# Patient Record
Sex: MALE | Race: ASIAN | NOT HISPANIC OR LATINO | URBAN - METROPOLITAN AREA
[De-identification: names, ages, dates, MRNs, and addresses within clinical notes are randomized per-mention and may not be internally consistent; named-entity substitution may affect disease eponyms.]

---

## 2017-04-09 ENCOUNTER — EMERGENCY (EMERGENCY)
Facility: HOSPITAL | Age: 77
LOS: 1 days | Discharge: HOME | End: 2017-04-09
Attending: EMERGENCY MEDICINE
Payer: COMMERCIAL

## 2017-04-09 VITALS
OXYGEN SATURATION: 98 % | DIASTOLIC BLOOD PRESSURE: 48 MMHG | RESPIRATION RATE: 18 BRPM | SYSTOLIC BLOOD PRESSURE: 130 MMHG | HEART RATE: 72 BPM | HEIGHT: 67 IN | WEIGHT: 210.1 LBS | TEMPERATURE: 98 F

## 2017-04-09 VITALS
DIASTOLIC BLOOD PRESSURE: 68 MMHG | SYSTOLIC BLOOD PRESSURE: 154 MMHG | RESPIRATION RATE: 18 BRPM | TEMPERATURE: 98 F | HEART RATE: 66 BPM | OXYGEN SATURATION: 99 %

## 2017-04-09 DIAGNOSIS — Z98.89 OTHER SPECIFIED POSTPROCEDURAL STATES: Chronic | ICD-10-CM

## 2017-04-09 DIAGNOSIS — M25.551 PAIN IN RIGHT HIP: ICD-10-CM

## 2017-04-09 DIAGNOSIS — I10 ESSENTIAL (PRIMARY) HYPERTENSION: ICD-10-CM

## 2017-04-09 DIAGNOSIS — E11.9 TYPE 2 DIABETES MELLITUS WITHOUT COMPLICATIONS: ICD-10-CM

## 2017-04-09 PROCEDURE — 73502 X-RAY EXAM HIP UNI 2-3 VIEWS: CPT | Mod: 26,RT

## 2017-04-09 PROCEDURE — 99284 EMERGENCY DEPT VISIT MOD MDM: CPT

## 2017-04-09 PROCEDURE — 99283 EMERGENCY DEPT VISIT LOW MDM: CPT | Mod: 25

## 2017-04-09 PROCEDURE — 73502 X-RAY EXAM HIP UNI 2-3 VIEWS: CPT

## 2017-04-09 RX ORDER — OXYCODONE HYDROCHLORIDE 5 MG/1
1 TABLET ORAL
Qty: 10 | Refills: 0 | OUTPATIENT
Start: 2017-04-09

## 2017-04-09 NOTE — ED PROVIDER NOTE - NS ED MD SCRIBE ATTENDING SCRIBE SECTIONS
HIV/DISPOSITION/PHYSICAL EXAM/HISTORY OF PRESENT ILLNESS/PAST MEDICAL/SURGICAL/SOCIAL HISTORY/VITAL SIGNS( Pullset)

## 2017-04-09 NOTE — ED PROVIDER NOTE - MEDICAL DECISION MAKING DETAILS
Patient with right hip pain. ambulatory in Emergency Department. Pain improved with percocet. home with rx percocet.

## 2017-04-09 NOTE — ED ADULT NURSE NOTE - OBJECTIVE STATEMENT
c/o  R sided groin pain from last night as per patient  he slipped and fell in the bathroom on Thursday.  denies hitting head or loc was ambulatory after the fall . denies any urinary complaints . b/l leg lymphedema more on R lower leg .with some dry skin and discoloration

## 2017-04-09 NOTE — ED PROVIDER NOTE - OBJECTIVE STATEMENT
78 y/o male with PMHx of DM and HTN presents to the ED c/o R thigh pain x last night. Pt reports he went to the bathroom last night and was unable to get back into bed as his leg became very painful and heavy. Pt took Oxycodone this morning with moderate relief in pain, but comes to the ED as pain is still continuous. Pt denies fever, chill, CP, or any other complaints. NKDA. PMD: Dr. Cameron Guidry 76 y/o male with PMHx of DM and HTN presents to the ED c/o R thigh pain x last night. Pt reports he went to the bathroom last night and was unable to get back into bed as his leg became very painful and heavy. Pt took Oxycodone this morning with moderate relief in pain, but comes to the ED as pain is still continuous. Pt denies fever, chill, CP, or any other complaints. NKDA. PMD: Dr. Cameron Guidry. Patient had chronic lymphedema and old right femur fracture.

## 2017-07-09 ENCOUNTER — INPATIENT (INPATIENT)
Facility: HOSPITAL | Age: 77
LOS: 1 days | Discharge: HOME | DRG: 312 | End: 2017-07-11
Attending: INTERNAL MEDICINE | Admitting: INTERNAL MEDICINE
Payer: COMMERCIAL

## 2017-07-09 VITALS
HEART RATE: 57 BPM | SYSTOLIC BLOOD PRESSURE: 147 MMHG | WEIGHT: 220.02 LBS | DIASTOLIC BLOOD PRESSURE: 72 MMHG | OXYGEN SATURATION: 97 % | RESPIRATION RATE: 17 BRPM | HEIGHT: 68 IN | TEMPERATURE: 98 F

## 2017-07-09 DIAGNOSIS — Z29.9 ENCOUNTER FOR PROPHYLACTIC MEASURES, UNSPECIFIED: ICD-10-CM

## 2017-07-09 DIAGNOSIS — E11.9 TYPE 2 DIABETES MELLITUS WITHOUT COMPLICATIONS: ICD-10-CM

## 2017-07-09 DIAGNOSIS — R94.31 ABNORMAL ELECTROCARDIOGRAM [ECG] [EKG]: ICD-10-CM

## 2017-07-09 DIAGNOSIS — I10 ESSENTIAL (PRIMARY) HYPERTENSION: ICD-10-CM

## 2017-07-09 DIAGNOSIS — R55 SYNCOPE AND COLLAPSE: ICD-10-CM

## 2017-07-09 DIAGNOSIS — Z98.89 OTHER SPECIFIED POSTPROCEDURAL STATES: Chronic | ICD-10-CM

## 2017-07-09 DIAGNOSIS — N40.0 BENIGN PROSTATIC HYPERPLASIA WITHOUT LOWER URINARY TRACT SYMPTOMS: ICD-10-CM

## 2017-07-09 DIAGNOSIS — M25.562 PAIN IN LEFT KNEE: ICD-10-CM

## 2017-07-09 DIAGNOSIS — I89.0 LYMPHEDEMA, NOT ELSEWHERE CLASSIFIED: ICD-10-CM

## 2017-07-09 LAB
ACETONE SERPL-MCNC: NEGATIVE — SIGNIFICANT CHANGE UP
ALBUMIN SERPL ELPH-MCNC: 3 G/DL — LOW (ref 3.5–5)
ALP SERPL-CCNC: 71 U/L — SIGNIFICANT CHANGE UP (ref 40–120)
ALT FLD-CCNC: 14 U/L DA — SIGNIFICANT CHANGE UP (ref 10–60)
ANION GAP SERPL CALC-SCNC: 7 MMOL/L — SIGNIFICANT CHANGE UP (ref 5–17)
APTT BLD: 29 SEC — SIGNIFICANT CHANGE UP (ref 27.5–37.4)
AST SERPL-CCNC: 13 U/L — SIGNIFICANT CHANGE UP (ref 10–40)
BASOPHILS # BLD AUTO: 0.1 K/UL — SIGNIFICANT CHANGE UP (ref 0–0.2)
BASOPHILS NFR BLD AUTO: 0.7 % — SIGNIFICANT CHANGE UP (ref 0–2)
BILIRUB SERPL-MCNC: 0.5 MG/DL — SIGNIFICANT CHANGE UP (ref 0.2–1.2)
BUN SERPL-MCNC: 24 MG/DL — HIGH (ref 7–18)
CALCIUM SERPL-MCNC: 8.7 MG/DL — SIGNIFICANT CHANGE UP (ref 8.4–10.5)
CHLORIDE SERPL-SCNC: 107 MMOL/L — SIGNIFICANT CHANGE UP (ref 96–108)
CHOLEST SERPL-MCNC: 92 MG/DL — SIGNIFICANT CHANGE UP (ref 10–199)
CK MB BLD-MCNC: 1.3 % — SIGNIFICANT CHANGE UP (ref 0–3.5)
CK MB BLD-MCNC: <0.9 % — SIGNIFICANT CHANGE UP (ref 0–3.5)
CK MB BLD-MCNC: <1 % — SIGNIFICANT CHANGE UP (ref 0–3.5)
CK MB CFR SERPL CALC: 1.2 NG/ML — SIGNIFICANT CHANGE UP (ref 0–3.6)
CK MB CFR SERPL CALC: <1 NG/ML — SIGNIFICANT CHANGE UP (ref 0–3.6)
CK MB CFR SERPL CALC: <1 NG/ML — SIGNIFICANT CHANGE UP (ref 0–3.6)
CK SERPL-CCNC: 101 U/L — SIGNIFICANT CHANGE UP (ref 35–232)
CK SERPL-CCNC: 106 U/L — SIGNIFICANT CHANGE UP (ref 35–232)
CK SERPL-CCNC: 95 U/L — SIGNIFICANT CHANGE UP (ref 35–232)
CO2 SERPL-SCNC: 27 MMOL/L — SIGNIFICANT CHANGE UP (ref 22–31)
CREAT SERPL-MCNC: 1.08 MG/DL — SIGNIFICANT CHANGE UP (ref 0.5–1.3)
EOSINOPHIL # BLD AUTO: 0.2 K/UL — SIGNIFICANT CHANGE UP (ref 0–0.5)
EOSINOPHIL NFR BLD AUTO: 2.1 % — SIGNIFICANT CHANGE UP (ref 0–6)
GLUCOSE SERPL-MCNC: 141 MG/DL — HIGH (ref 70–99)
HBA1C BLD-MCNC: 6.9 % — HIGH (ref 4–5.6)
HCT VFR BLD CALC: 37.8 % — LOW (ref 39–50)
HDLC SERPL-MCNC: 53 MG/DL — SIGNIFICANT CHANGE UP (ref 40–125)
HGB BLD-MCNC: 12.2 G/DL — LOW (ref 13–17)
INR BLD: 1.12 RATIO — SIGNIFICANT CHANGE UP (ref 0.88–1.16)
LIDOCAIN IGE QN: 107 U/L — SIGNIFICANT CHANGE UP (ref 73–393)
LIPID PNL WITH DIRECT LDL SERPL: 30 MG/DL — SIGNIFICANT CHANGE UP
LYMPHOCYTES # BLD AUTO: 0.8 K/UL — LOW (ref 1–3.3)
LYMPHOCYTES # BLD AUTO: 8.5 % — LOW (ref 13–44)
MAGNESIUM SERPL-MCNC: 1.9 MG/DL — SIGNIFICANT CHANGE UP (ref 1.6–2.6)
MCHC RBC-ENTMCNC: 26.6 PG — LOW (ref 27–34)
MCHC RBC-ENTMCNC: 32.3 GM/DL — SIGNIFICANT CHANGE UP (ref 32–36)
MCV RBC AUTO: 82.4 FL — SIGNIFICANT CHANGE UP (ref 80–100)
MONOCYTES # BLD AUTO: 1.1 K/UL — HIGH (ref 0–0.9)
MONOCYTES NFR BLD AUTO: 11.7 % — SIGNIFICANT CHANGE UP (ref 2–14)
NEUTROPHILS # BLD AUTO: 7.5 K/UL — HIGH (ref 1.8–7.4)
NEUTROPHILS NFR BLD AUTO: 77.1 % — HIGH (ref 43–77)
NT-PROBNP SERPL-SCNC: 541 PG/ML — HIGH (ref 0–450)
PLATELET # BLD AUTO: 186 K/UL — SIGNIFICANT CHANGE UP (ref 150–400)
POTASSIUM SERPL-MCNC: 5 MMOL/L — SIGNIFICANT CHANGE UP (ref 3.5–5.3)
POTASSIUM SERPL-SCNC: 5 MMOL/L — SIGNIFICANT CHANGE UP (ref 3.5–5.3)
PROT SERPL-MCNC: 7 G/DL — SIGNIFICANT CHANGE UP (ref 6–8.3)
PROTHROM AB SERPL-ACNC: 12.2 SEC — SIGNIFICANT CHANGE UP (ref 9.8–12.7)
RBC # BLD: 4.58 M/UL — SIGNIFICANT CHANGE UP (ref 4.2–5.8)
RBC # FLD: 13.5 % — SIGNIFICANT CHANGE UP (ref 10.3–14.5)
SODIUM SERPL-SCNC: 141 MMOL/L — SIGNIFICANT CHANGE UP (ref 135–145)
TOTAL CHOLESTEROL/HDL RATIO MEASUREMENT: 1.7 RATIO — LOW (ref 3.4–9.6)
TRIGL SERPL-MCNC: 44 MG/DL — SIGNIFICANT CHANGE UP (ref 10–149)
TROPONIN I SERPL-MCNC: <0.015 NG/ML — SIGNIFICANT CHANGE UP (ref 0–0.04)
TSH SERPL-MCNC: 2.31 UU/ML — SIGNIFICANT CHANGE UP (ref 0.34–4.82)
WBC # BLD: 9.7 K/UL — SIGNIFICANT CHANGE UP (ref 3.8–10.5)
WBC # FLD AUTO: 9.7 K/UL — SIGNIFICANT CHANGE UP (ref 3.8–10.5)

## 2017-07-09 PROCEDURE — 95819 EEG AWAKE AND ASLEEP: CPT | Mod: 26

## 2017-07-09 PROCEDURE — 70450 CT HEAD/BRAIN W/O DYE: CPT | Mod: 26

## 2017-07-09 PROCEDURE — 71275 CT ANGIOGRAPHY CHEST: CPT | Mod: 26

## 2017-07-09 PROCEDURE — 99223 1ST HOSP IP/OBS HIGH 75: CPT | Mod: GC

## 2017-07-09 PROCEDURE — 71010: CPT | Mod: 26

## 2017-07-09 PROCEDURE — 99285 EMERGENCY DEPT VISIT HI MDM: CPT

## 2017-07-09 RX ORDER — INSULIN LISPRO 100/ML
VIAL (ML) SUBCUTANEOUS
Qty: 0 | Refills: 0 | Status: DISCONTINUED | OUTPATIENT
Start: 2017-07-09 | End: 2017-07-11

## 2017-07-09 RX ORDER — DOXAZOSIN MESYLATE 4 MG
2 TABLET ORAL AT BEDTIME
Qty: 0 | Refills: 0 | Status: DISCONTINUED | OUTPATIENT
Start: 2017-07-09 | End: 2017-07-11

## 2017-07-09 RX ORDER — ATORVASTATIN CALCIUM 80 MG/1
40 TABLET, FILM COATED ORAL AT BEDTIME
Qty: 0 | Refills: 0 | Status: DISCONTINUED | OUTPATIENT
Start: 2017-07-09 | End: 2017-07-11

## 2017-07-09 RX ORDER — GLUCAGON INJECTION, SOLUTION 0.5 MG/.1ML
1 INJECTION, SOLUTION SUBCUTANEOUS ONCE
Qty: 0 | Refills: 0 | Status: DISCONTINUED | OUTPATIENT
Start: 2017-07-09 | End: 2017-07-11

## 2017-07-09 RX ORDER — ASPIRIN/CALCIUM CARB/MAGNESIUM 324 MG
81 TABLET ORAL DAILY
Qty: 0 | Refills: 0 | Status: DISCONTINUED | OUTPATIENT
Start: 2017-07-09 | End: 2017-07-11

## 2017-07-09 RX ORDER — LISINOPRIL 2.5 MG/1
40 TABLET ORAL DAILY
Qty: 0 | Refills: 0 | Status: DISCONTINUED | OUTPATIENT
Start: 2017-07-09 | End: 2017-07-11

## 2017-07-09 RX ORDER — ENOXAPARIN SODIUM 100 MG/ML
40 INJECTION SUBCUTANEOUS DAILY
Qty: 0 | Refills: 0 | Status: DISCONTINUED | OUTPATIENT
Start: 2017-07-09 | End: 2017-07-11

## 2017-07-09 RX ORDER — SODIUM CHLORIDE 9 MG/ML
1000 INJECTION, SOLUTION INTRAVENOUS
Qty: 0 | Refills: 0 | Status: DISCONTINUED | OUTPATIENT
Start: 2017-07-09 | End: 2017-07-11

## 2017-07-09 RX ORDER — DEXTROSE 50 % IN WATER 50 %
25 SYRINGE (ML) INTRAVENOUS ONCE
Qty: 0 | Refills: 0 | Status: DISCONTINUED | OUTPATIENT
Start: 2017-07-09 | End: 2017-07-11

## 2017-07-09 RX ORDER — SENNA PLUS 8.6 MG/1
2 TABLET ORAL AT BEDTIME
Qty: 0 | Refills: 0 | Status: DISCONTINUED | OUTPATIENT
Start: 2017-07-09 | End: 2017-07-11

## 2017-07-09 RX ORDER — ASPIRIN/CALCIUM CARB/MAGNESIUM 324 MG
81 TABLET ORAL ONCE
Qty: 0 | Refills: 0 | Status: COMPLETED | OUTPATIENT
Start: 2017-07-09 | End: 2017-07-09

## 2017-07-09 RX ORDER — DILTIAZEM HCL 120 MG
120 CAPSULE, EXT RELEASE 24 HR ORAL DAILY
Qty: 0 | Refills: 0 | Status: DISCONTINUED | OUTPATIENT
Start: 2017-07-09 | End: 2017-07-11

## 2017-07-09 RX ORDER — DEXTROSE 50 % IN WATER 50 %
12.5 SYRINGE (ML) INTRAVENOUS ONCE
Qty: 0 | Refills: 0 | Status: DISCONTINUED | OUTPATIENT
Start: 2017-07-09 | End: 2017-07-11

## 2017-07-09 RX ORDER — SODIUM CHLORIDE 9 MG/ML
3 INJECTION INTRAMUSCULAR; INTRAVENOUS; SUBCUTANEOUS ONCE
Qty: 0 | Refills: 0 | Status: COMPLETED | OUTPATIENT
Start: 2017-07-09 | End: 2017-07-09

## 2017-07-09 RX ORDER — ACETAMINOPHEN 500 MG
650 TABLET ORAL EVERY 6 HOURS
Qty: 0 | Refills: 0 | Status: DISCONTINUED | OUTPATIENT
Start: 2017-07-09 | End: 2017-07-09

## 2017-07-09 RX ORDER — ACETAMINOPHEN 500 MG
650 TABLET ORAL EVERY 6 HOURS
Qty: 0 | Refills: 0 | Status: DISCONTINUED | OUTPATIENT
Start: 2017-07-09 | End: 2017-07-11

## 2017-07-09 RX ORDER — DEXTROSE 50 % IN WATER 50 %
1 SYRINGE (ML) INTRAVENOUS ONCE
Qty: 0 | Refills: 0 | Status: DISCONTINUED | OUTPATIENT
Start: 2017-07-09 | End: 2017-07-11

## 2017-07-09 RX ORDER — DOCUSATE SODIUM 100 MG
100 CAPSULE ORAL
Qty: 0 | Refills: 0 | Status: DISCONTINUED | OUTPATIENT
Start: 2017-07-09 | End: 2017-07-11

## 2017-07-09 RX ADMIN — Medication 81 MILLIGRAM(S): at 10:25

## 2017-07-09 RX ADMIN — LISINOPRIL 40 MILLIGRAM(S): 2.5 TABLET ORAL at 17:31

## 2017-07-09 RX ADMIN — SODIUM CHLORIDE 75 MILLILITER(S): 9 INJECTION, SOLUTION INTRAVENOUS at 18:18

## 2017-07-09 RX ADMIN — Medication 2 MILLIGRAM(S): at 22:41

## 2017-07-09 RX ADMIN — ATORVASTATIN CALCIUM 40 MILLIGRAM(S): 80 TABLET, FILM COATED ORAL at 21:40

## 2017-07-09 RX ADMIN — Medication 100 MILLIGRAM(S): at 17:31

## 2017-07-09 RX ADMIN — SENNA PLUS 2 TABLET(S): 8.6 TABLET ORAL at 21:40

## 2017-07-09 RX ADMIN — SODIUM CHLORIDE 3 MILLILITER(S): 9 INJECTION INTRAMUSCULAR; INTRAVENOUS; SUBCUTANEOUS at 10:24

## 2017-07-09 RX ADMIN — ENOXAPARIN SODIUM 40 MILLIGRAM(S): 100 INJECTION SUBCUTANEOUS at 17:32

## 2017-07-09 RX ADMIN — Medication 650 MILLIGRAM(S): at 19:09

## 2017-07-09 RX ADMIN — Medication 120 MILLIGRAM(S): at 17:31

## 2017-07-09 NOTE — ED PROVIDER NOTE - MUSCULOSKELETAL, MLM
Spine appears normal, range of motion is not limited, no muscle or joint tenderness, lauren lower ext- lymphedema, RLE>LLE, lauren venous stasis Spine appears normal, range of motion is not limited, no muscle or joint tenderness, neck- NT to palp. lauren lower ext- lymphedema, RLE>LLE, lauren venous stasis

## 2017-07-09 NOTE — CONSULT NOTE ADULT - SUBJECTIVE AND OBJECTIVE BOX
Neurology consult    JHONNY GARCÍA77yMale    HPI:  77 y/ o. male, lives with family, walks without assistance at home, with cane outside,  PMHx  NIDDM ( Actos HTN, Chronic lymphedema of bilateral legs, left knee arthritis , DVT of bilateral legs ( left peroneal vein and right posterior tibial veins, on Xarelto for sometime then stopped taking it as told by PMD that has no DVT) , PSHx of right incarcerated inguinal hernia repair., presented with weakness, loss of consciousness this morning As per patient he was eating the breakfast in AM , sitting on chair when he felt very weak, diaphoretic, blackouts , unable to recognize anyone Patient denies any fall, any trauma to head. All this episode happen when he was sitting on chair. Episode witnessed by wife, as per her he is not responding to her voice, a/w bilateral shaking of hands, Whole episode lasted for about 2 mins. After that family called the EMS. Patient remember the episode Also he complains of clicking sensation in the neck since last couple of days , whic he attributed to inappropriate posture while sitting and watching TV. He took 2 Tylenol this morning , wth relief of pain. Also re[ports on and of dry cough for past couple of days. He reports that nowadays he is feeling very weak, does not have any energy to do the work. Denies any similar episode in past, chest pain, headache, SOB, abdominal  pain , urinary or bowel complains.     Patient took all morning meds today. No cardiac work up in past, last visit to PCP about 2 months ago for routine check up. Had colonoscopy about many years ago with normal results. For chronic lymphedema patient followed  , prescribed stocking but patient does not use it. (09 Jul 2017 12:04)    No chest pain.No prior episodes          MEDICATIONS    aspirin  chewable 81 milliGRAM(s) Oral daily  doxazosin 2 milliGRAM(s) Oral at bedtime  diltiazem    milliGRAM(s) Oral daily  atorvastatin 40 milliGRAM(s) Oral at bedtime  docusate sodium 100 milliGRAM(s) Oral two times a day  senna 2 Tablet(s) Oral at bedtime  insulin lispro (HumaLOG) corrective regimen sliding scale   SubCutaneous three times a day before meals  dextrose 5%. 1000 milliLiter(s) IV Continuous <Continuous>  dextrose Gel 1 Dose(s) Oral once PRN  dextrose 50% Injectable 12.5 Gram(s) IV Push once  dextrose 50% Injectable 25 Gram(s) IV Push once  dextrose 50% Injectable 25 Gram(s) IV Push once  glucagon  Injectable 1 milliGRAM(s) IntraMuscular once PRN  sodium chloride 0.45%. 1000 milliLiter(s) IV Continuous <Continuous>  enoxaparin Injectable 40 milliGRAM(s) SubCutaneous daily  lisinopril 40 milliGRAM(s) Oral daily         Family history: No history of dementia, strokes, or seizures   FAMILY HISTORY:  No pertinent family history in first degree relatives    SOCIAL HISTORY -- No history of tobacco or alcohol use     Allergies    No Known Allergies    Intolerances        Height (cm): 172.72 (07-09 @ 09:34)  Weight (kg): 99.8 (07-09 @ 09:34)  BMI (kg/m2): 33.5 (07-09 @ 09:34)    Vital Signs Last 24 Hrs  T(C): 36.7 (09 Jul 2017 10:19), Max: 36.7 (09 Jul 2017 10:19)  T(F): 98 (09 Jul 2017 10:19), Max: 98 (09 Jul 2017 10:19)  HR: 64 (09 Jul 2017 10:19) (57 - 64)  BP: 140/70 (09 Jul 2017 10:19) (140/70 - 147/72)  BP(mean): --  RR: 18 (09 Jul 2017 10:19) (17 - 18)  SpO2: 98% (09 Jul 2017 10:19) (97% - 98%)      REVIEW OF SYSTEMS:    Constitutional: No fever, chills, fatigue, weakness  Eyes: no eye pain, visual disturbances, or discharge  ENT:  No difficulty hearing, tinnitus, vertigo; No sinus or throat pain  Neck: No pain or stiffness  Respiratory: No cough, dyspnea, wheezing   Cardiovascular: No chest pain, palpitations,   Gastrointestinal: No abdominal or epigastric pain. No nausea, vomiting  No diarrhea or constipation.   Genitourinary: No dysuria, frequency, hematuria or incontinence    Psychiatric: No depression, anxiety, mood swings or difficulty sleeping  Musculoskeletal: No joint pain or swelling; No muscle, back or extremity pain  Skin: Lymphedema bilaterally in lower ext  Lymph Nodes: No enlarged glands    Allergy and Immunologic: No hives or eczema    On Neurological Examination:    Mental Status - Patient is alert, awake, oriented X3     Follows commands well and able to answer questions appropriately. Mood and affect  normal  Follow simple commands  Follow complex commands      Speech -   Fluent                                Cranial Nerves - Pupils 3 mm equal and reactive to light,   extraocular eye movements intact.     2 to 12 were normal    Motor Exam -   Right upper5/5  Left upper5/5  Right lower power is normal    Severe Lymphedema is seen  Left lower  power is normal Lymphedema is seen.    With stimuli positive movement of all 4 extremities    Muscle tone - is normal all over. No asymmetry is seen.      Sensory    Decreased pinprick of lower ext sec to lymphedema     dtr  uppers were normal      were absent for achilles    Gait -  not tested     GENERAL Exam:     Nontoxic , No Acute Distress   	  HEENT:  normocephalic, atraumatic  		  LUNGS:	Clear bilaterally  No Wheeze  Decreased bilaterally  	  HEART:	 Normal S1S2   No murmur RRR        	  GI/ ABDOMEN:  Soft  Non tender              LABS:  CBC Full  -  ( 09 Jul 2017 10:10 )  WBC Count : 9.7 K/uL  Hemoglobin : 12.2 g/dL  Hematocrit : 37.8 %  Platelet Count - Automated : 186 K/uL  Mean Cell Volume : 82.4 fl  Mean Cell Hemoglobin : 26.6 pg  Mean Cell Hemoglobin Concentration : 32.3 gm/dL  Auto Neutrophil # : 7.5 K/uL  Auto Lymphocyte # : 0.8 K/uL  Auto Monocyte # : 1.1 K/uL  Auto Eosinophil # : 0.2 K/uL  Auto Basophil # : 0.1 K/uL  Auto Neutrophil % : 77.1 %  Auto Lymphocyte % : 8.5 %  Auto Monocyte % : 11.7 %  Auto Eosinophil % : 2.1 %  Auto Basophil % : 0.7 %      07-09    141  |  107  |  24<H>  ----------------------------<  141<H>  5.0   |  27  |  1.08    Ca    8.7      09 Jul 2017 10:10  Mg     1.9     07-09    TPro  7.0  /  Alb  3.0<L>  /  TBili  0.5  /  DBili  x   /  AST  13  /  ALT  14  /  AlkPhos  71  07-09    Hemoglobin A1C:     LIVER FUNCTIONS - ( 09 Jul 2017 10:10 )  Alb: 3.0 g/dL / Pro: 7.0 g/dL / ALK PHOS: 71 U/L / ALT: 14 U/L DA / AST: 13 U/L / GGT: x           Vitamin B12   PT/INR - ( 09 Jul 2017 10:10 )   PT: 12.2 sec;   INR: 1.12 ratio         PTT - ( 09 Jul 2017 10:10 )  PTT:29.0 sec      RADIOLOGY    EKG    < from: CT Head No Cont (07.09.17 @ 10:08) >    EXAM:  CT BRAIN                            PROCEDURE DATE:  07/09/2017          INTERPRETATION:  HISTORY: Syncope.    TECHNIQUE: A noncontrast head CT was performed. 5 mm axial images were   performed from the skull base to the vertex. Coronal and sagittal   reconstructions were obtained.    COMPARISON: None    FINDINGS:    The ventricles and sulci are prominent, compatible with the patient's   stated age. Minimal periventricular white matter lucencies are present,   likely microvascular ischemic changes. There is a small low-attenuation   focus in the left basal ganglia, possibly representing remote lacunar   infarct or Virchow-Wesly space. No CT evidence for acute territorial   infarct, intracranial hemorrhage, or midline shift is visualized. No mass   or mass effect is noted.    The visualized portions of the paranasal sinuses, mastoids and the bones   of the calvarium appear to be unremarkable.    IMPRESSION:    Cerebral atrophy. No CT evidence for acute intracranial abnormality. MRI   brain may be obtained for further evaluation as clinically indicated.

## 2017-07-09 NOTE — EEG REPORT - NS EEG TEXT BOX
7/9/2017    Hx: Concern for Seizures    Study Interpretation:    FINDINGS:  The background was continuous, spontaneously variable and reactive.  During wakefulness, the posteriorly dominant rhythm consisted of symmetric, well modulated 7-8 Hz activity, with an amplitude to 40 uV, that attenuated to eye opening.  Low amplitude central beta was noted in wakefulness.    Sleep Background:  Drowsiness was characterized by fragmentation, attenuation, and slowing of the background activity.    Segments of stage II sleep were not recorded.    Background Slowing:  Generalized background slowing: Intermittent theta activity   Focal slowing: none was present.    Other Paroxysmal Non-Epileptiform Findings:    None.    Epileptiform Activity:   No epileptiform discharges were present.    Events:  No clinical events were recorded.  No seizures were recorded.    Activation Procedures:   -Hyperventilation was not performed.    -Photic stimulation was not performed.    Artifacts:  Intermittent myogenic and movement artifacts were noted.    ECG:  The heart rate on single channel ECG was predominantly between 70-80 BPM.    Compressed Spectral Array Digital Analysis    FINDINGS:  Compressed Spectral Array (CSA) data was reviewed separately and correlated with the electroencephalographic findings detailed above.  CSA showed a variable spectral pattern.  Areas of increased power in particular were reviewed in detail, and compared with the raw EEG data.  Areas of abrupt increases in spectral power were reviewed to exclude seizures, and were determined to be artifactual in nature.    The relative ratio of the power of delta range frequencies and faster frequencies remained stable over the course of the study.  There was no definitive increase in the relative power in the delta frequency spectrum apparent in the left hemisphere versus the right hemisphere.      Compressed Spectral Array (Digital Analysis) Summary/ Impression:  No persistent hemispheric asymmetry.  Intermittent areas of increased power reviewed, without definite epileptiform activity associated on CSA.      EEG Classification:  Abnormal study  -	Mild diffuse slowing    Impression:  Findings indicate non-specific mild diffuse or multifocal cerebral dysfunction. There were no epileptiform abnormalities recorded

## 2017-07-09 NOTE — H&P ADULT - NSHPREVIEWOFSYSTEMS_GEN_ALL_CORE
REVIEW OF SYSTEMS:    CONSTITUTIONAL: No fever, feeling weak   EYES: no acute visual disturbances  NECK: No pain or stiffness  RESPIRATORY: No cough; No shortness of breath  CARDIOVASCULAR: No chest pain, no palpitations  GASTROINTESTINAL: No pain. No nausea or vomiting; No diarrhea   NEUROLOGICAL: No headache or numbness, no tremors, feeling weak   HEME/LYMPH: No  bleeding gums

## 2017-07-09 NOTE — H&P ADULT - PROBLEM SELECTOR PLAN 1
Differential include syncope due to sinus bradycardia, hypoglycemia, could be vasovagal from weakness , also could be a seizure ( as witnessed shaky movements of body)   Due to DM, HTN, will rule out ACS also   Telemetry monitoring   Non-focal neuro exam   Ct head diffuse cerebral atrophy  EKG sinus bradycardia to 59 BPM, sinus arrythmia  Troponin x 1 negative , T2 at 4:00 pm, 10:00 pm  F/up on HBA1c, lipid profile, TSH, vitamin d, orthostatics   C/w aspirin 81 mg, Lipitor 40 mg, Cardizem 120   F/up on carotid doppler, echo , PT eval ( patient has been admitted to rehab twice in past)  Cardio Dr Holloawy   Neuro eval, patient likely need MRI , EEG   Since dehydrated will give gentle hydration x 24 hours Differential include syncope due to sinus bradycardia, hypoglycemia, could be vasovagal from weakness , also could be a seizure ( as witnessed shaky movements of body)   Due to DM, HTN, will rule out ACS also   Telemetry monitoring   Non-focal neuro exam   Ct head diffuse cerebral atrophy  EKG sinus bradycardia to 59 BPM, sinus arrythmia  Troponin x 1 negative , T2 at 4:00 pm, 10:00 pm  F/up on HBA1c, lipid profile, TSH, vitamin d, orthostatics   C/w aspirin 81 mg, Lipitor 40 mg, Cardizem 120   F/up on carotid doppler, echo , PT eval ( patient has been admitted to rehab twice in past)  Cardio Dr Rosenberg  Neuro eval Dr Echeverria , patient likely need MRI , EEG   Since dehydrated will give gentle hydration x 24 hours

## 2017-07-09 NOTE — H&P ADULT - NSHPLABSRESULTS_GEN_ALL_CORE
12.2   9.7   )-----------( 186      ( 09 Jul 2017 10:10 )             37.8   07-09    141  |  107  |  24<H>  ----------------------------<  141<H>  5.0   |  27  |  1.08    Ca    8.7      09 Jul 2017 10:10  Mg     1.9     07-09    TPro  7.0  /  Alb  3.0<L>  /  TBili  0.5  /  DBili  x   /  AST  13  /  ALT  14  /  AlkPhos  71  07-09  PT/INR - ( 09 Jul 2017 10:10 )   PT: 12.2 sec;   INR: 1.12 ratio         PTT - ( 09 Jul 2017 10:10 )  PTT:29.0 sec  CARDIAC MARKERS ( 09 Jul 2017 10:10 )  <0.015 ng/mL / x     / 106 U/L / x     / <1.0 ng/mL    Xray Chest 1 View AP/PA (07.09.17 @ 10:11) >   No acute pulmonary disease.    CT Head No Cont (07.09.17 @ 10:08) >  Cerebral atrophy. No CT evidence for acute intracranial abnormality.

## 2017-07-09 NOTE — H&P ADULT - ASSESSMENT
In the ED patient initial vitals bradycardiac to 57, /72 . Ct scan shows the  cerebral atrophy , no acute changes. EKG shows sinus bradycardia to 59 BPM, sinus arrythmia. Labs cbc, BMP, troponin x 1 negative. CXR clear . Patient admitted to [facility] for syncope .

## 2017-07-09 NOTE — H&P ADULT - PROBLEM SELECTOR PLAN 8
Improve VTE score is 4 with 3 month risk of VTE is 2.9   Patient has DVT last year , dc with xarelto as per him , only took for a month then stopped as told that he has no DVT by PMD.  Will give Lovenox for now   No need for GI ppx

## 2017-07-09 NOTE — H&P ADULT - PROBLEM SELECTOR PLAN 4
At home takes ramipril 10 mg , Cardizem 120 CD   BP stable on admission   Continue with Cardizem for now Continue with home meds ramipril 10 mg , Cardizem 120 CD   BP stable on admission   DASH/TLC diet

## 2017-07-09 NOTE — H&P ADULT - PROBLEM SELECTOR PLAN 3
Hold the home med Actoz  Used to take the gabapentin in past , but not using currently   F/u on HBA1c , last yr was 6.9   c/w HSS

## 2017-07-09 NOTE — H&P ADULT - HISTORY OF PRESENT ILLNESS
77 y/ o. male, lives with family, walks without assistance at home, with cane outside,  PMHx  NIDDM ( Actos HTN, Chronic lymphedema of bilateral legs, left knee arthritis , DVT of bilateral legs ( left peroneal vein and right posterior tibial veins, on Xarelto for sometime then stopped taking it as told by PMD that has no DVT) , PSHx of right incarcerated inguinal hernia repair., presented with weakness, loss of consciousness this morning As per patient he was eating the breakfast in AM , sitting on chair when he felt very weak, diaphoretic, blackouts , unable to recognize anyone Patient denies any fall, any trauma to head. All this episode happen when he sitting on chir. Episode witnessed by wife, as per her he is not responding to her voice, a/w bilateral shaking of hands, Whole episode lasted for about 2 mins. After that family called the EMS. Patient remember the episode Also he complains of clicking sensation in the neck since last couple of days , whic he attributed to inappropriate posture while sitting and watching TV. He took 2 Tylenol this morning , wth relief of pain. Also re[ports on and of dry cough for past couple of days. He reports that nowadays he is feeling very weak, does not have any energy to do the work. Denies any similar episode in past, chest pain, headache, SOB, abdominal  pain , urinary or bowel complains.     Patient took all morning meds today. No cardiac work up in past, last visit to PCP about 2 months ago for routine check up. Had colonoscopy about many years ago with normal results. For chronic lymphedema patient followed  , prescribed stocking but patient does not use it.

## 2017-07-09 NOTE — H&P ADULT - PROBLEM SELECTOR PLAN 5
Has chronic bilateral lymphedema ( Right > left)   As per patient initially on right side x 15 yrs , now developing on left side   On exam patient has bilateral xerotic patches , warm, positive pulses   Usual manages with compression stockings at home  C/w compression stockings

## 2017-07-09 NOTE — ED PROVIDER NOTE - ENMT, MLM
Airway patent, Nasal mucosa clear. Mouth with normal mucosa. Throat has no vesicles, no oropharyngeal exudates and uvula is midline. no tongue biting

## 2017-07-09 NOTE — H&P ADULT - NSHPPHYSICALEXAM_GEN_ALL_CORE
PHYSICAL EXAM:  GENERAL: NAD, lying in bed, weak   HEAD:  , Normocephalic  EYES:  conjunctiva and sclera clear  NECK: Supple, No JVD    NERVOUS SYSTEM:  Alert & Oriented X3, moving all extremities equally  CHEST/LUNG: Clear to auscultation bilaterally;   HEART: S1 S2+;   ABDOMEN: Soft, Nontender, Nondistended; Bowel sounds present  EXTREMITIES: no cyanosis;  no calf tenderness                        Bilateral lymphedema Right > left , has bilateral dry cracked skin patches over both shins , slightly warm

## 2017-07-09 NOTE — H&P ADULT - ATTENDING COMMENTS
Patient seen and examined; Agree with PGY2 A/P above with editing as needed. d/w PGY2 ARVIND Leugn Patient seen and examined; Agree with PGY2 A/P above with editing as needed. d/w PGY2 MAR dr. Leung    Patient seen and examined after d/w PGY MAR    HPI:  77 y/ o. male, lives with family, walks without assistance at home, with cane outside,  PMHx  NIDDM ( Actos HTN, Chronic lymphedema of bilateral legs, left knee arthritis , DVT of bilateral legs ( left peroneal vein and right posterior tibial veins, on Xarelto for sometime then stopped taking it as told by PMD that has no DVT) , PSHx of right incarcerated inguinal hernia repair., presented with weakness, loss of consciousness this morning As per patient he was eating the breakfast in AM , sitting on chair when he felt very weak, diaphoretic, blackouts , unable to recognize anyone Patient denies any fall, any trauma to head. All this episode happen when he sitting on chir. Episode witnessed by wife, as per her he is not responding to her voice, a/w bilateral shaking of hands, Whole episode lasted for about 2 mins. After that family called the EMS. Patient remember the episode Also he complains of clicking sensation in the neck since last couple of days , whic he attributed to inappropriate posture while sitting and watching TV. He took 2 Tylenol this morning , wth relief of pain. Also re[ports on and of dry cough for past couple of days. He reports that nowadays he is feeling very weak, does not have any energy to do the work. Denies any similar episode in past, chest pain, headache, SOB, abdominal  pain , urinary or bowel complains.     ROS: As above; other ROS negative    Allergies: No Known Allergies      SH: Non smoker  FAMILY HISTORY: No pertinent family history in first degree relatives      Vital Signs Last 24 Hrs  T(C): 37.1 (09 Jul 2017 15:16), Max: 37.1 (09 Jul 2017 15:16)  T(F): 98.7 (09 Jul 2017 15:16), Max: 98.7 (09 Jul 2017 15:16)  HR: 57 (09 Jul 2017 15:16) (57 - 64)  BP: 136/93 (09 Jul 2017 15:16) (136/93 - 147/72)  RR: 18 (09 Jul 2017 15:16) (17 - 18)  SpO2: 100% (09 Jul 2017 15:16) (97% - 100%)    P/E: As per MAR above    Labs Reviewed:                         12.2   9.7   )-----------( 186      ( 09 Jul 2017 10:10 )             37.8     07-09    141  |  107  |  24<H>  ----------------------------<  141<H>  5.0   |  27  |  1.08    Ca    8.7      09 Jul 2017 10:10  Mg     1.9     07-09    TPro  7.0  /  Alb  3.0<L>  /  TBili  0.5  /  DBili  x   /  AST  13  /  ALT  14  /  AlkPhos  71  07-09    CARDIAC MARKERS ( 09 Jul 2017 16:09 )  <0.015 ng/mL / x     / 101 U/L / x     / <1.0 ng/mL  CARDIAC MARKERS ( 09 Jul 2017 10:10 )  <0.015 ng/mL / x     / 106 U/L / x     / <1.0 ng/mL        PT/INR - ( 09 Jul 2017 10:10 )   PT: 12.2 sec;   INR: 1.12 ratio     PTT - ( 09 Jul 2017 10:10 )  PTT:29.0 sec  BNP: Serum Pro-Brain Natriuretic Peptide: 541 pg/mL (07-09 @ 10:10)    MEDICATIONS  (STANDING):  aspirin  chewable 81 milliGRAM(s) Oral daily  doxazosin 2 milliGRAM(s) Oral at bedtime  diltiazem    milliGRAM(s) Oral daily  atorvastatin 40 milliGRAM(s) Oral at bedtime  docusate sodium 100 milliGRAM(s) Oral two times a day  senna 2 Tablet(s) Oral at bedtime  insulin lispro (HumaLOG) corrective regimen sliding scale   SubCutaneous three times a day before meals  sodium chloride 0.45%. 1000 milliLiter(s) (75 mL/Hr) IV Continuous <Continuous>  enoxaparin Injectable 40 milliGRAM(s) SubCutaneous daily  lisinopril 40 milliGRAM(s) Oral daily    CXR reviewed:     EKG Reviewed:     D/D:  Syncope likely Vasovagal episode concern for ACS Vs Seizure activity less likely  Hx Diabetes with possible Hypoglycemic episode less likely  HTN controlled  Chronic lymphedema        A/P:  Admit to  Tele; Serial Cardiac enzymes; 2 D Echo; Orthostasis  EEG;   Gentle IVF  Cardiology evaluation Dr. Rosenberg;  Neurology evaluation Dr. Echeverria (discussed)  Labs in AM    Discussed with PGY MAR findings and plan of care  Discussed with Patient and family Patient seen and examined; Agree with PGY2 A/P above with editing as needed. d/w PGY2 MAR dr. Leung    Patient seen and examined after d/w PGY MAR    HPI:  77 y/ o. male, lives with family, walks without assistance at home, with cane outside,  PMHx  NIDDM ( Actos HTN, Chronic lymphedema of bilateral legs, left knee arthritis , DVT of bilateral legs ( left peroneal vein and right posterior tibial veins, on Xarelto for sometime then stopped taking it as told by PMD that has no DVT) , PSHx of right incarcerated inguinal hernia repair., presented with weakness, loss of consciousness this morning As per patient he was eating the breakfast in AM , sitting on chair when he felt very weak, diaphoretic, blackouts , unable to recognize anyone Patient denies any fall, any trauma to head. All this episode happen when he sitting on chir. Episode witnessed by wife, as per her he is not responding to her voice, a/w bilateral shaking of hands, Whole episode lasted for about 2 mins. After that family called the EMS. Patient remember the episode Also he complains of clicking sensation in the neck since last couple of days , whic he attributed to inappropriate posture while sitting and watching TV. He took 2 Tylenol this morning , wth relief of pain. Also re[ports on and of dry cough for past couple of days. He reports that nowadays he is feeling very weak, does not have any energy to do the work. Denies any similar episode in past, chest pain, headache, SOB, abdominal  pain , urinary or bowel complains.     ROS: As above; other ROS negative    Allergies: No Known Allergies      SH: Non smoker, lives with family  FAMILY HISTORY: No pertinent family history in first degree relatives      Vital Signs Last 24 Hrs  T(C): 37.1 (09 Jul 2017 15:16), Max: 37.1 (09 Jul 2017 15:16)  T(F): 98.7 (09 Jul 2017 15:16), Max: 98.7 (09 Jul 2017 15:16)  HR: 57 (09 Jul 2017 15:16) (57 - 64)  BP: 136/93 (09 Jul 2017 15:16) (136/93 - 147/72)  RR: 18 (09 Jul 2017 15:16) (17 - 18)  SpO2: 100% (09 Jul 2017 15:16) (97% - 100%)    P/E: As per MAR above    Labs Reviewed:                         12.2   9.7   )-----------( 186      ( 09 Jul 2017 10:10 )             37.8     07-09    141  |  107  |  24<H>  ----------------------------<  141<H>  5.0   |  27  |  1.08    Ca    8.7      09 Jul 2017 10:10  Mg     1.9     07-09    TPro  7.0  /  Alb  3.0<L>  /  TBili  0.5  /  DBili  x   /  AST  13  /  ALT  14  /  AlkPhos  71  07-09    CARDIAC MARKERS ( 09 Jul 2017 16:09 )  <0.015 ng/mL / x     / 101 U/L / x     / <1.0 ng/mL  CARDIAC MARKERS ( 09 Jul 2017 10:10 )  <0.015 ng/mL / x     / 106 U/L / x     / <1.0 ng/mL        PT/INR - ( 09 Jul 2017 10:10 )   PT: 12.2 sec;   INR: 1.12 ratio     PTT - ( 09 Jul 2017 10:10 )  PTT:29.0 sec  BNP: Serum Pro-Brain Natriuretic Peptide: 541 pg/mL (07-09 @ 10:10)    MEDICATIONS  (STANDING):  aspirin  chewable 81 milliGRAM(s) Oral daily  doxazosin 2 milliGRAM(s) Oral at bedtime  diltiazem    milliGRAM(s) Oral daily  atorvastatin 40 milliGRAM(s) Oral at bedtime  docusate sodium 100 milliGRAM(s) Oral two times a day  senna 2 Tablet(s) Oral at bedtime  insulin lispro (HumaLOG) corrective regimen sliding scale   SubCutaneous three times a day before meals  sodium chloride 0.45%. 1000 milliLiter(s) (75 mL/Hr) IV Continuous <Continuous>  enoxaparin Injectable 40 milliGRAM(s) SubCutaneous daily  lisinopril 40 milliGRAM(s) Oral daily    CXR reviewed:   EKG Reviewed:     D/D:  Syncope concern for PE given prior Hx DVT   Vasovagal episode concern for ACS Vs Seizure activity less likely  Hx Diabetes with possible Hypoglycemic episode less likely  HTN controlled  Chronic lymphedema        A/P:  Admit to  Tele; Serial Cardiac enzymes; 2 D Echo; Orthostasis  CT Angiogram for evaluation for PE; EEG;   Gentle IVF  Cardiology evaluation Dr. Rosenberg;  Neurology evaluation Dr. Echeverria (discussed) appreciated; Will get MRI in AM  Labs in AM  Hold Actos; Humalog scale; hgbA1c    Discussed with PGY MAR findings and plan of care  Discussed with Patient and family wife and son at bedside

## 2017-07-09 NOTE — CONSULT NOTE ADULT - ASSESSMENT
MOST LIKELY HE HAD SYNCOPE .    DISCUSSED WITH THE PATIENT AND WIFE AT BED SIDE     SEIZURE LESS LIKELY    PLAN:TELEMETRY    CARDIOLOGY EVALUATION    MRI OF THE BRAIN     EEG.

## 2017-07-09 NOTE — ED PROVIDER NOTE - OBJECTIVE STATEMENT
77 y.o. male with h/o NIDDM, last dose 7:30am, pt claims he has been having "clicking" of his neck past 2-3 days, took Tylenol with relief.  While having breakfast, pt suddenly felt dizzy, weak, as per wife, pt was shaking with stiffness lasted few secs., LOC, no diaphoresis, urinary incontinence, CP, SOB, recent travelling, fever. 77 y.o. male with h/o NIDDM, last dose 7:30am, pt claims he has been having "clicking" of his neck past 2-3 days, took Tylenol with relief.  While having breakfast, pt suddenly felt dizzy, weak, as per wife, pt was shaking with stiffness lasted few secs., LOC, no diaphoresis, urinary incontinence, CP, SOB, recent travelling, fever, no postictal.

## 2017-07-10 DIAGNOSIS — M79.2 NEURALGIA AND NEURITIS, UNSPECIFIED: ICD-10-CM

## 2017-07-10 DIAGNOSIS — R55 SYNCOPE AND COLLAPSE: ICD-10-CM

## 2017-07-10 DIAGNOSIS — I89.0 LYMPHEDEMA, NOT ELSEWHERE CLASSIFIED: ICD-10-CM

## 2017-07-10 DIAGNOSIS — E11.9 TYPE 2 DIABETES MELLITUS WITHOUT COMPLICATIONS: ICD-10-CM

## 2017-07-10 LAB
24R-OH-CALCIDIOL SERPL-MCNC: 40.4 NG/ML — SIGNIFICANT CHANGE UP (ref 30–100)
FOLATE SERPL-MCNC: 8.5 NG/ML — SIGNIFICANT CHANGE UP (ref 4.8–24.2)
VIT B12 SERPL-MCNC: 344 PG/ML — SIGNIFICANT CHANGE UP (ref 243–894)

## 2017-07-10 PROCEDURE — 99233 SBSQ HOSP IP/OBS HIGH 50: CPT

## 2017-07-10 PROCEDURE — 93880 EXTRACRANIAL BILAT STUDY: CPT | Mod: 26

## 2017-07-10 RX ORDER — GABAPENTIN 400 MG/1
100 CAPSULE ORAL
Qty: 0 | Refills: 0 | Status: DISCONTINUED | OUTPATIENT
Start: 2017-07-10 | End: 2017-07-11

## 2017-07-10 RX ADMIN — Medication 81 MILLIGRAM(S): at 13:14

## 2017-07-10 RX ADMIN — GABAPENTIN 100 MILLIGRAM(S): 400 CAPSULE ORAL at 17:55

## 2017-07-10 RX ADMIN — SENNA PLUS 2 TABLET(S): 8.6 TABLET ORAL at 21:20

## 2017-07-10 RX ADMIN — GABAPENTIN 100 MILLIGRAM(S): 400 CAPSULE ORAL at 14:40

## 2017-07-10 RX ADMIN — LISINOPRIL 40 MILLIGRAM(S): 2.5 TABLET ORAL at 05:49

## 2017-07-10 RX ADMIN — Medication 100 MILLIGRAM(S): at 17:55

## 2017-07-10 RX ADMIN — Medication 2: at 13:15

## 2017-07-10 RX ADMIN — Medication 100 MILLIGRAM(S): at 05:49

## 2017-07-10 RX ADMIN — ATORVASTATIN CALCIUM 40 MILLIGRAM(S): 80 TABLET, FILM COATED ORAL at 21:20

## 2017-07-10 RX ADMIN — Medication 2 MILLIGRAM(S): at 21:20

## 2017-07-10 RX ADMIN — Medication 650 MILLIGRAM(S): at 03:59

## 2017-07-10 RX ADMIN — Medication 650 MILLIGRAM(S): at 08:15

## 2017-07-10 RX ADMIN — ENOXAPARIN SODIUM 40 MILLIGRAM(S): 100 INJECTION SUBCUTANEOUS at 13:14

## 2017-07-10 NOTE — ED ADULT NURSE REASSESSMENT NOTE - NS ED NURSE REASSESS COMMENT FT1
Patient alert and oriented x3, breathing spontaneously on oxygen via nasal cannula @ 2lpm. Patient reported no complaints of chest pains or chest discomfort. Remains being nursed on Tele box. Stated that neck stiffness has subsided. V infusion continues as ordered,. Vitals charted rest in bed maintained. Patient was medicated as ordered. Is awaiting bed availability.
Patient awake, complained of left sided neck feels stiffness and pain. Oral analgesia given for pain relief as ordered. Oxygen and IV infusion administered as ordered.
Patient received sitting up in bed alert and oriented x3, breathing spontaneously on room air, relative at bedside. Patient Is being nursed on Telebox F. Patient voiced no complaints of pain or distress. IV infusion in progress flowing at prescribed rate. Patient is being admitted diagnosed with Syncope and collapse, is awaiting bed availability.
Pt axox3 NAD due meds given endorsed to Elodia NUÑEZ

## 2017-07-10 NOTE — PROGRESS NOTE ADULT - PROBLEM SELECTOR PLAN 1
Likely vasovagal; Orthostasis;   D/C IVF; Echo WNL  Cardio consult appreciated; Likely vasovagal; Orthostasis;   D/C IVF; Echo WNL  Cardio consult appreciated;  Continue Tele for overnight, if no arrhythmia D/C home tomorrow, d/w Cardio Dr. Rosenberg no further work up at this time. Likely vasovagal; Orthostasis;   D/C IVF; Echo WNL  Cardio consult appreciated;  Continue Tele for overnight, if no arrhythmia D/C home tomorrow, d/w Cardio Dr. Rosenberg no further work up at this time.  EEG findings noted; No epileptiform activity. neuro eval appreciated.  CTA Chest no PE

## 2017-07-10 NOTE — CONSULT NOTE ADULT - PROBLEM SELECTOR RECOMMENDATION 3
Chronic venous disease-support stockings,hold diuretics at present. Chronic lymph disease-support stockings,hold diuretics at present. Patient from [facility]-has had lymphedema since age 14-likely endemic?Filiarial.

## 2017-07-10 NOTE — PROGRESS NOTE ADULT - SUBJECTIVE AND OBJECTIVE BOX
MEDICAL ATTENDING NOTE  Patient is a 77y old  Male who presents with a chief complaint of Near syncope and weakness (09 Jul 2017 12:04)  patient seen earlier today 1.30PM in ED Holding.       INTERVAL HPI/OVERNIGHT EVENTS: no new complaints except c/o tearing from both eyes. On latanoprost at home but not using.     MEDICATIONS  (STANDING):  aspirin  chewable 81 milliGRAM(s) Oral daily  doxazosin 2 milliGRAM(s) Oral at bedtime  diltiazem    milliGRAM(s) Oral daily  atorvastatin 40 milliGRAM(s) Oral at bedtime  docusate sodium 100 milliGRAM(s) Oral two times a day  senna 2 Tablet(s) Oral at bedtime  insulin lispro (HumaLOG) corrective regimen sliding scale   SubCutaneous three times a day before meals  sodium chloride 0.45%. 1000 milliLiter(s) (75 mL/Hr) IV Continuous <Continuous>  enoxaparin Injectable 40 milliGRAM(s) SubCutaneous daily  lisinopril 40 milliGRAM(s) Oral daily  gabapentin 100 milliGRAM(s) Oral two times a day    MEDICATIONS  (PRN):  acetaminophen   Tablet 650 milliGRAM(s) Oral every 6 hours PRN Pain      __________________________________________________  REVIEW OF SYSTEMS:    CONSTITUTIONAL: No fever,   EYES: no acute visual disturbances; Tearing from both eyes.   NECK: Neck pain  RESPIRATORY: No cough; No shortness of breath  CARDIOVASCULAR: No chest pain, no palpitations  GASTROINTESTINAL: No pain. No nausea or vomiting; No diarrhea   NEUROLOGICAL: No headache or numbness, no tremors  MUSCULOSKELETAL: No joint pain, no muscle pain  GENITOURINARY: no dysuria, no frequency, no hesitancy  PSYCHIATRY: no depression , no anxiety  ALL OTHER  ROS negative        Vital Signs Last 24 Hrs  T(C): 36.3 (10 Jul 2017 16:49), Max: 37 (10 Jul 2017 08:31)  T(F): 97.4 (10 Jul 2017 16:49), Max: 98.6 (10 Jul 2017 08:31)  HR: 68 (10 Jul 2017 16:49) (58 - 68)  BP: 137/47 (10 Jul 2017 16:49) (120/44 - 149/47)  RR: 16 (10 Jul 2017 16:49) (16 - 18)  SpO2: 100% (10 Jul 2017 16:49) (95% - 100%)    ________________________________________________  PHYSICAL EXAM:  GENERAL: NAD  HEENT: Normocephalic;  conjunctivae and sclerae clear; moist mucous membranes;   NECK : supple; chronically flexed  CHEST/LUNG: Clear to auscultation bilaterally with good air entry   HEART: S1 S2  regular; no murmurs, gallops or rubs  ABDOMEN: Soft, Nontender, Nondistended; Bowel sounds present  EXTREMITIES: no cyanosis; no edema; no calf tenderness  SKIN: warm and dry; no rash  NERVOUS SYSTEM:  Awake and alert; Oriented  to place, person and time ; no new deficits    _________________________________________________  LABS:                        12.2   9.7   )-----------( 186      ( 09 Jul 2017 10:10 )             37.8     07-09    141  |  107  |  24<H>  ----------------------------<  141<H>  5.0   |  27  |  1.08    Ca    8.7      09 Jul 2017 10:10  Mg     1.9     07-09    TPro  7.0  /  Alb  3.0<L>  /  TBili  0.5  /  DBili  x   /  AST  13  /  ALT  14  /  AlkPhos  71  07-09    PT/INR - ( 09 Jul 2017 10:10 )   PT: 12.2 sec;   INR: 1.12 ratio    PTT - ( 09 Jul 2017 10:10 )  PTT:29.0 sec    CAPILLARY BLOOD GLUCOSE  90 (10 Jul 2017 16:49)  210 (10 Jul 2017 11:21)  106 (10 Jul 2017 08:28)            RADIOLOGY & ADDITIONAL TESTS:  Transthoracic Echocardiogram (07.10.17 @ 12:57) >  CONCLUSIONS:  1. Normal Left Ventricular Systolic Function,  (EF = 55 to 60%)  2. Grade II diastolic dysfunction  3. RV systolic pressure is mildly increased.=33mmhg    Consultant(s) Notes Reviewed:   YES    Care Discussed with Consultants :     Plan of care was discussed with patient and /or primary care giver; all questions and concerns were addressed and care was aligned with patient's wishes. MEDICAL ATTENDING NOTE  Patient is a 77y old  Male who presents with a chief complaint of Near syncope and weakness (09 Jul 2017 12:04)  patient seen earlier today 1.30PM in ED Holding.       INTERVAL HPI/OVERNIGHT EVENTS: no new complaints except c/o tearing from both eyes. On latanoprost at home but not using.     MEDICATIONS  (STANDING):  aspirin  chewable 81 milliGRAM(s) Oral daily  doxazosin 2 milliGRAM(s) Oral at bedtime  diltiazem    milliGRAM(s) Oral daily  atorvastatin 40 milliGRAM(s) Oral at bedtime  docusate sodium 100 milliGRAM(s) Oral two times a day  senna 2 Tablet(s) Oral at bedtime  insulin lispro (HumaLOG) corrective regimen sliding scale   SubCutaneous three times a day before meals  sodium chloride 0.45%. 1000 milliLiter(s) (75 mL/Hr) IV Continuous <Continuous>  enoxaparin Injectable 40 milliGRAM(s) SubCutaneous daily  lisinopril 40 milliGRAM(s) Oral daily  gabapentin 100 milliGRAM(s) Oral two times a day    MEDICATIONS  (PRN):  acetaminophen   Tablet 650 milliGRAM(s) Oral every 6 hours PRN Pain      __________________________________________________  REVIEW OF SYSTEMS:    CONSTITUTIONAL: No fever,   EYES: no acute visual disturbances; Tearing from both eyes.   NECK: Neck pain  RESPIRATORY: No cough; No shortness of breath  CARDIOVASCULAR: No chest pain, no palpitations  GASTROINTESTINAL: No pain. No nausea or vomiting; No diarrhea   NEUROLOGICAL: No headache or numbness, no tremors  MUSCULOSKELETAL: No joint pain, no muscle pain  GENITOURINARY: no dysuria, no frequency, no hesitancy  PSYCHIATRY: no depression , no anxiety  ALL OTHER  ROS negative        Vital Signs Last 24 Hrs  T(C): 36.3 (10 Jul 2017 16:49), Max: 37 (10 Jul 2017 08:31)  T(F): 97.4 (10 Jul 2017 16:49), Max: 98.6 (10 Jul 2017 08:31)  HR: 68 (10 Jul 2017 16:49) (58 - 68)  BP: 137/47 (10 Jul 2017 16:49) (120/44 - 149/47)  RR: 16 (10 Jul 2017 16:49) (16 - 18)  SpO2: 100% (10 Jul 2017 16:49) (95% - 100%)    ________________________________________________  PHYSICAL EXAM:  GENERAL: NAD  HEENT: Normocephalic;  conjunctivae and sclerae clear; moist mucous membranes;   NECK : supple; chronically flexed  CHEST/LUNG: Clear to auscultation bilaterally with good air entry   HEART: S1 S2  regular; no murmurs, gallops or rubs  ABDOMEN: Soft, Nontender, Nondistended; Bowel sounds present  EXTREMITIES: no cyanosis; no edema; no calf tenderness  SKIN: warm and dry; no rash  NERVOUS SYSTEM:  Awake and alert; Oriented  to place, person and time ; no new deficits    _________________________________________________  LABS:                        12.2   9.7   )-----------( 186      ( 09 Jul 2017 10:10 )             37.8     07-09    141  |  107  |  24<H>  ----------------------------<  141<H>  5.0   |  27  |  1.08    Ca    8.7      09 Jul 2017 10:10  Mg     1.9     07-09    TPro  7.0  /  Alb  3.0<L>  /  TBili  0.5  /  DBili  x   /  AST  13  /  ALT  14  /  AlkPhos  71  07-09    PT/INR - ( 09 Jul 2017 10:10 )   PT: 12.2 sec;   INR: 1.12 ratio    PTT - ( 09 Jul 2017 10:10 )  PTT:29.0 sec    CAPILLARY BLOOD GLUCOSE  90 (10 Jul 2017 16:49)  210 (10 Jul 2017 11:21)  106 (10 Jul 2017 08:28)            RADIOLOGY & ADDITIONAL TESTS:  Transthoracic Echocardiogram (07.10.17 @ 12:57) >  CONCLUSIONS:  1. Normal Left Ventricular Systolic Function,  (EF = 55 to 60%)  2. Grade II diastolic dysfunction  3. RV systolic pressure is mildly increased.=33mmhg      EEG: Findings indicate non-specific mild diffuse or multifocal cerebral dysfunction. There were no epileptiform abnormalities recorded      Consultant(s) Notes Reviewed:   YES    Care Discussed with Consultants :     Plan of care was discussed with patient and /or primary care giver; all questions and concerns were addressed and care was aligned with patient's wishes.

## 2017-07-10 NOTE — PHYSICAL THERAPY INITIAL EVALUATION ADULT - GENERAL OBSERVATIONS, REHAB EVAL
Consult received, chart reviewed. Patient received supine in bed, NAD, +NC, +tele. Patient agreed to EVALUATION from Physical Therapist.

## 2017-07-10 NOTE — CONSULT NOTE ADULT - PROBLEM SELECTOR RECOMMENDATION 9
Non exertional-no orthostatics-noted bradycardia-likely vasovagal,no arrhythmias noted-TTE if no sig LV dysfunction can discontinue telemetry

## 2017-07-10 NOTE — PROGRESS NOTE ADULT - ASSESSMENT
Patient is a 77y old  Male who presents with a chief complaint of Near syncope and weakness (09 Jul 2017 12:04)  HPI:  77 y/ o. male, lives with family, walks without assistance at home, with cane outside,  PMHx  NIDDM ( Actos HTN, Chronic lymphedema of bilateral legs, left knee arthritis , DVT of bilateral legs ( left peroneal vein and right posterior tibial veins, on Xarelto for sometime then stopped taking it as told by PMD that has no DVT) , PSHx of right incarcerated inguinal hernia repair., presented with weakness, loss of consciousness was eating the breakfast in AM , sitting on chair when he felt very weak, diaphoretic, blackouts , unable to recognize anyone  Episode witnessed by wife, as per her he is not responding to her voice, a/w bilateral shaking of hands, Whole episode lasted for about 2 mins. After that family called the EMS. Patient remember the episode Also he complains of clicking sensation in the neck since last couple of days , whic he attributed to inappropriate posture while sitting and watching TV. He took 2 Tylenol this morning , wth relief of pain. abdominal  pain , urinary or bowel complains.     Patient took all morning meds today. No cardiac work up in past, last visit to PCP about 2 months ago for routine check up. Had colonoscopy about many years ago with normal results. For chronic lymphedema patient followed  , prescribed stocking but patient does not use it. (09 Jul 2017 12:04)

## 2017-07-10 NOTE — CONSULT NOTE ADULT - SUBJECTIVE AND OBJECTIVE BOX
Patient is a 77y old  Male who presents with a chief complaint of Near syncope and weakness    HPI:  77 y/ o. male, lives with family, walks without assistance at home, with cane outside,  PMHx  T2DM ,HTN, Chronic lymphedema of bilateral legs, left knee arthritis , DVT of bilateral legs ( left peroneal vein and right posterior tibial veins, on Xarelto for sometime then stopped taking it as told by PMD that has no DVT) ,, presented with weakness, loss of consciousness this morning As per patient he was eating the breakfast, sitting on chair when he felt very weak, diaphoretic,and passed out, unable to recognize anyone Patient denies any fall, any trauma to head. All this episode happen when he sitting down. Episode witnessed by wife, as per her he was not responding to her voice, a/w bilateral shaking of hands, Whole episode lasted for about 2 mins. After that family called the EMS. Patient remember the episode Also he complains of clicking sensation in the neck since last couple of days , which he attributed to inappropriate posture while sitting and watching TV. He took 2 Tylenol this morning , wth relief of pain. Also reports on and of dry cough for past couple of days. He reports that nowadays he is feeling very weak, does not have any energy to do the work. Denies any similar episode in past,No chest pain, headache, SOB, abdominal  pain , urinary or bowel complains.     Patient took all morning meds today. No cardiac work up in past, last visit to PCP about 2 months ago for routine check up. Had colonoscopy about many years ago with normal results. For chronic lymphedema patient followed  , prescribed stocking but patient does not use it.     PAST MEDICAL & SURGICAL HISTORY:  Lymphedema of leg  Hypertension  Diabetes  H/O inguinal hernia repair      PREVIOUS DIAGNOSTIC TESTING:      ECHO  FINDINGS:PENDING    MEDICATIONS  (STANDING):  aspirin  chewable 81 milliGRAM(s) Oral daily  doxazosin 2 milliGRAM(s) Oral at bedtime  diltiazem    milliGRAM(s) Oral daily  atorvastatin 40 milliGRAM(s) Oral at bedtime  docusate sodium 100 milliGRAM(s) Oral two times a day  senna 2 Tablet(s) Oral at bedtime  insulin lispro (HumaLOG) corrective regimen sliding scale   SubCutaneous three times a day before meals  sodium chloride 0.45%. 1000 milliLiter(s) (75 mL/Hr) IV Continuous <Continuous>  enoxaparin Injectable 40 milliGRAM(s) SubCutaneous daily  lisinopril 40 milliGRAM(s) Oral daily    MEDICATIONS  (PRN):  dextrose Gel 1 Dose(s) Oral once PRN Blood Glucose LESS THAN 70 milliGRAM(s)/deciLiter  glucagon  Injectable 1 milliGRAM(s) IntraMuscular once PRN Glucose <70 milliGRAM(s)/deciLiter  acetaminophen   Tablet 650 milliGRAM(s) Oral every 6 hours PRN Pain      FAMILY HISTORY:  No pertinent family history in first degree relatives      SOCIAL HISTORY:Lives at home with wife    CIGARETTES:Non smoker    ALCOHOL:Denied    REVIEW OF SYSTEMS:  CONSTITUTIONAL: No fever, weight loss, or fatigue  EYES: No eye pain, visual disturbances, or discharge  ENMT:  No difficulty hearing, tinnitus, vertigo; No sinus or throat pain  NECK: No pain or stiffness  RESPIRATORY: No cough, wheezing, chills or hemoptysis; No shortness of breath  CARDIOVASCULAR: No chest pain, palpitations,had dizziness, bilateral leg swelling  GASTROINTESTINAL: No abdominal or epigastric pain. No nausea, vomiting, or hematemesis; No diarrhea or constipation. No melena or hematochezia.  GENITOURINARY: No dysuria, frequency, hematuria, or incontinence  NEUROLOGICAL: No headaches, memory loss, loss of strength, numbness, or tremors  SKIN: No itching, burning, rashes, or lesions   LYMPH NODES: No enlarged glands  ENDOCRINE: No heat or cold intolerance; No hair loss  MUSCULOSKELETAL: No joint pain or swelling; No muscle, back, or extremity pain  PSYCHIATRIC: No depression, anxiety, mood swings, or difficulty sleeping  HEME/LYMPH: No easy bruising, or bleeding gums  ALLERY AND IMMUNOLOGIC: No hives or eczema    Vital Signs Last 24 Hrs  T(C): 37 (10 Jul 2017 08:31), Max: 37.1 (09 Jul 2017 15:16)  T(F): 98.6 (10 Jul 2017 08:31), Max: 98.7 (09 Jul 2017 15:16)  HR: 59 (10 Jul 2017 08:31) (57 - 78)  BP: 129/59 (10 Jul 2017 08:31) (120/44 - 147/72)   ORTHOSTATICS SUPINE--134/56  STANDING--124/54  RR: 16 (10 Jul 2017 08:31) (16 - 18)  SpO2: 99% (10 Jul 2017 08:31) (97% - 100%)    PHYSICAL EXAM:  GENERAL: NAD, well-groomed, well-developed BMI-33  HEAD:  Atraumatic, Normocephalic  EYES: EOMI, PERRLA, conjunctiva and sclera clear  ENMT: No tonsillar erythema, exudates, or enlargement; Moist mucous membranes, Good dentition, No lesions  NECK: Supple, No JVD, Normal thyroid  NERVOUS SYSTEM:  Alert & Oriented X3, Good concentration; Motor Strength 5/5 B/L upper and lower extremities; DTRs 2+ intact and symmetric  CHEST/LUNG: Clear to percussion bilaterally; No rales, rhonchi, wheezing, or rubs  HEART: Regular rate and rhythm; 3/6 systolic murmurs,No rubs, or gallops  ABDOMEN: Soft, Nontender, Nondistended; Bowel sounds present  EXTREMITIES:  2+ Peripheral Pulses, No clubbing, cyanosis,Bilateral pitting edema  LYMPH: No lymphadenopathy noted  SKIN: No rashes or lesions      INTERPRETATION OF TELEMETRY:Sinus rhythm PVC's    ECG:Simus bradycardia no acute ST,T wave abnormalities    LABS:                        12.2   9.7   )-----------( 186      ( 09 Jul 2017 10:10 )             37.8     07-09    141  |  107  |  24<H>  ----------------------------<  141<H>  5.0   |  27  |  1.08    Ca    8.7      09 Jul 2017 10:10  Mg     1.9     07-09    TPro  7.0  /  Alb  3.0<L>  /  TBili  0.5  /  DBili  x   /  AST  13  /  ALT  14  /  AlkPhos  71  07-09    CARDIAC MARKERS ( 09 Jul 2017 21:53 )  <0.015 ng/mL / x     / 95 U/L / x     / 1.2 ng/mL  CARDIAC MARKERS ( 09 Jul 2017 16:09 )  <0.015 ng/mL / x     / 101 U/L / x     / <1.0 ng/mL  CARDIAC MARKERS ( 09 Jul 2017 10:10 )  <0.015 ng/mL / x     / 106 U/L / x     / <1.0 ng/mL      PT/INR - ( 09 Jul 2017 10:10 )   PT: 12.2 sec;   INR: 1.12 ratio    PTT - ( 09 Jul 2017 10:10 )  PTT:29.0 sec    Lipid Panel: Cholesterol, Serum 92  Direct LDL 30  HDL Cholesterol, Serum 53  Triglycerides, Serum 44    RADIOLOGY & ADDITIONAL STUDIES  CHEST SINGLE AP--No acute pulmonary disease.    CT BRAIN-- Cerebral atrophy. No CT evidence for acute intracranial abnormality

## 2017-07-11 VITALS
RESPIRATION RATE: 16 BRPM | TEMPERATURE: 98 F | SYSTOLIC BLOOD PRESSURE: 127 MMHG | HEART RATE: 65 BPM | OXYGEN SATURATION: 98 % | DIASTOLIC BLOOD PRESSURE: 48 MMHG

## 2017-07-11 PROCEDURE — 82550 ASSAY OF CK (CPK): CPT

## 2017-07-11 PROCEDURE — 82009 KETONE BODYS QUAL: CPT

## 2017-07-11 PROCEDURE — 82746 ASSAY OF FOLIC ACID SERUM: CPT

## 2017-07-11 PROCEDURE — 85730 THROMBOPLASTIN TIME PARTIAL: CPT

## 2017-07-11 PROCEDURE — 83735 ASSAY OF MAGNESIUM: CPT

## 2017-07-11 PROCEDURE — 80061 LIPID PANEL: CPT

## 2017-07-11 PROCEDURE — 99239 HOSP IP/OBS DSCHRG MGMT >30: CPT

## 2017-07-11 PROCEDURE — 82306 VITAMIN D 25 HYDROXY: CPT

## 2017-07-11 PROCEDURE — 99285 EMERGENCY DEPT VISIT HI MDM: CPT | Mod: 25

## 2017-07-11 PROCEDURE — 70450 CT HEAD/BRAIN W/O DYE: CPT

## 2017-07-11 PROCEDURE — 93005 ELECTROCARDIOGRAM TRACING: CPT

## 2017-07-11 PROCEDURE — 85610 PROTHROMBIN TIME: CPT

## 2017-07-11 PROCEDURE — 82553 CREATINE MB FRACTION: CPT

## 2017-07-11 PROCEDURE — 84443 ASSAY THYROID STIM HORMONE: CPT

## 2017-07-11 PROCEDURE — 97162 PT EVAL MOD COMPLEX 30 MIN: CPT

## 2017-07-11 PROCEDURE — 36415 COLL VENOUS BLD VENIPUNCTURE: CPT

## 2017-07-11 PROCEDURE — 82607 VITAMIN B-12: CPT

## 2017-07-11 PROCEDURE — 80053 COMPREHEN METABOLIC PANEL: CPT

## 2017-07-11 PROCEDURE — 83880 ASSAY OF NATRIURETIC PEPTIDE: CPT

## 2017-07-11 PROCEDURE — 93306 TTE W/DOPPLER COMPLETE: CPT

## 2017-07-11 PROCEDURE — 85027 COMPLETE CBC AUTOMATED: CPT

## 2017-07-11 PROCEDURE — 93880 EXTRACRANIAL BILAT STUDY: CPT

## 2017-07-11 PROCEDURE — 95819 EEG AWAKE AND ASLEEP: CPT

## 2017-07-11 PROCEDURE — 84484 ASSAY OF TROPONIN QUANT: CPT

## 2017-07-11 PROCEDURE — 71275 CT ANGIOGRAPHY CHEST: CPT

## 2017-07-11 PROCEDURE — 83690 ASSAY OF LIPASE: CPT

## 2017-07-11 PROCEDURE — 95957 EEG DIGITAL ANALYSIS: CPT

## 2017-07-11 PROCEDURE — 83036 HEMOGLOBIN GLYCOSYLATED A1C: CPT

## 2017-07-11 PROCEDURE — 71045 X-RAY EXAM CHEST 1 VIEW: CPT

## 2017-07-11 RX ORDER — CYCLOBENZAPRINE HYDROCHLORIDE 10 MG/1
1 TABLET, FILM COATED ORAL
Qty: 15 | Refills: 0 | OUTPATIENT
Start: 2017-07-11 | End: 2017-07-16

## 2017-07-11 RX ORDER — DOCUSATE SODIUM 100 MG
1 CAPSULE ORAL
Qty: 0 | Refills: 0 | DISCHARGE
Start: 2017-07-11

## 2017-07-11 RX ORDER — DOXAZOSIN MESYLATE 4 MG
1 TABLET ORAL
Qty: 0 | Refills: 0 | DISCHARGE
Start: 2017-07-11

## 2017-07-11 RX ORDER — ATORVASTATIN CALCIUM 80 MG/1
1 TABLET, FILM COATED ORAL
Qty: 0 | Refills: 0 | DISCHARGE
Start: 2017-07-11

## 2017-07-11 RX ORDER — ASPIRIN/CALCIUM CARB/MAGNESIUM 324 MG
1 TABLET ORAL
Qty: 0 | Refills: 0 | DISCHARGE
Start: 2017-07-11

## 2017-07-11 RX ORDER — GABAPENTIN 400 MG/1
1 CAPSULE ORAL
Qty: 0 | Refills: 0 | COMMUNITY
Start: 2017-07-11

## 2017-07-11 RX ORDER — DILTIAZEM HCL 120 MG
1 CAPSULE, EXT RELEASE 24 HR ORAL
Qty: 0 | Refills: 0 | COMMUNITY
Start: 2017-07-11

## 2017-07-11 RX ORDER — SENNA PLUS 8.6 MG/1
2 TABLET ORAL
Qty: 0 | Refills: 0 | COMMUNITY
Start: 2017-07-11

## 2017-07-11 RX ORDER — CYCLOBENZAPRINE HYDROCHLORIDE 10 MG/1
5 TABLET, FILM COATED ORAL EVERY 8 HOURS
Qty: 0 | Refills: 0 | Status: DISCONTINUED | OUTPATIENT
Start: 2017-07-11 | End: 2017-07-11

## 2017-07-11 RX ADMIN — ENOXAPARIN SODIUM 40 MILLIGRAM(S): 100 INJECTION SUBCUTANEOUS at 11:20

## 2017-07-11 RX ADMIN — LISINOPRIL 40 MILLIGRAM(S): 2.5 TABLET ORAL at 05:56

## 2017-07-11 RX ADMIN — Medication 100 MILLIGRAM(S): at 05:56

## 2017-07-11 RX ADMIN — GABAPENTIN 100 MILLIGRAM(S): 400 CAPSULE ORAL at 05:56

## 2017-07-11 RX ADMIN — Medication 120 MILLIGRAM(S): at 05:55

## 2017-07-11 RX ADMIN — Medication 81 MILLIGRAM(S): at 11:20

## 2017-07-11 RX ADMIN — Medication 2: at 11:22

## 2017-07-11 RX ADMIN — GABAPENTIN 100 MILLIGRAM(S): 400 CAPSULE ORAL at 17:44

## 2017-07-11 NOTE — PROGRESS NOTE ADULT - SUBJECTIVE AND OBJECTIVE BOX
SUBJ:Patient is a 77y old  Male who presents with a chief complaint of Near syncope and weakness,no arrhythmias noted,denies chest pain dizziness or dyspnea.      MEDICATIONS  (STANDING):  aspirin  chewable 81 milliGRAM(s) Oral daily  doxazosin 2 milliGRAM(s) Oral at bedtime  diltiazem    milliGRAM(s) Oral daily  atorvastatin 40 milliGRAM(s) Oral at bedtime  docusate sodium 100 milliGRAM(s) Oral two times a day  senna 2 Tablet(s) Oral at bedtime  insulin lispro (HumaLOG) corrective regimen sliding scale   SubCutaneous three times a day before meals  sodium chloride 0.45%. 1000 milliLiter(s) (75 mL/Hr) IV Continuous <Continuous>  enoxaparin Injectable 40 milliGRAM(s) SubCutaneous daily  lisinopril 40 milliGRAM(s) Oral daily  gabapentin 100 milliGRAM(s) Oral two times a day    PAST MEDICAL & SURGICAL HISTORY:  Lymphedema of leg  Hypertension  Diabetes  H/O inguinal hernia repair        Vital Signs Last 24 Hrs  T(C): 37.3 (11 Jul 2017 05:20), Max: 37.3 (11 Jul 2017 05:20)  T(F): 99.2 (11 Jul 2017 05:20), Max: 99.2 (11 Jul 2017 05:20)  HR: 75 (11 Jul 2017 05:20) (60 - 75)  BP: 145/60 (11 Jul 2017 05:20) (126/49 - 149/47)  BP(mean): --  RR: 16 (11 Jul 2017 05:20) (16 - 18)  SpO2: 96% (11 Jul 2017 05:20) (95% - 100%)    REVIEW OF SYSTEMS:  NO CP, SOB, RICHARD, PND, orthopnea, palpitations, diaphoresis, lightheadedness, dizziness, syncope, increased lower extremity edema, fever chills, malaise, myalgias, anorexia, weight changes ( loss or gain), night sweats, generalized fatigue abdominal pain, N/V/C/D BRBPR, melena, urinary symptoms, cough, and wheezing.       PHYSICAL EXAM:  GENERAL: NAD, well-groomed, well-developed BMI-33  HEAD:  Atraumatic, Normocephalic  EYES: EOMI, PERRLA, conjunctiva and sclera clear  ENMT: No tonsillar erythema, exudates, or enlargement; Moist mucous membranes, Good dentition, No lesions  NECK: Supple, No JVD, Normal thyroid  NERVOUS SYSTEM:  Alert & Oriented X3, Good concentration; Motor Strength 5/5 B/L upper and lower extremities; DTRs 2+ intact and symmetric  CHEST/LUNG: Clear to percussion bilaterally; No rales, rhonchi, wheezing, or rubs  HEART: Regular rate and rhythm; 3/6 systolic murmurs,No rubs, or gallops  ABDOMEN: Soft, Nontender, Nondistended; Bowel sounds present  EXTREMITIES:  2+ Peripheral Pulses, No clubbing, cyanosis,Bilateral pitting edema  LYMPH: No lymphadenopathy noted  SKIN: No rashes or lesions·    ECG:Sinus bradycardia with marked sinus arrhythmia      TTE:Study Date: 7/10/2017--Normal Left Ventricular Systolic Function,  (EF = 55 to 60%) Grade II diastolic dysfunction    LABS:                        12.2   9.7   )-----------( 186      ( 09 Jul 2017 10:10 )             37.8     07-09    141  |  107  |  24<H>  ----------------------------<  141<H>  5.0   |  27  |  1.08    Ca    8.7      09 Jul 2017 10:10  Mg     1.9     07-09    TPro  7.0  /  Alb  3.0<L>  /  TBili  0.5  /  DBili  x   /  AST  13  /  ALT  14  /  AlkPhos  71  07-09    CARDIAC MARKERS ( 09 Jul 2017 21:53 )  <0.015 ng/mL / x     / 95 U/L / x     / 1.2 ng/mL  CARDIAC MARKERS ( 09 Jul 2017 16:09 )  <0.015 ng/mL / x     / 101 U/L / x     / <1.0 ng/mL  CARDIAC MARKERS ( 09 Jul 2017 10:10 )  <0.015 ng/mL / x     / 106 U/L / x     / <1.0 ng/mL      PT/INR - ( 09 Jul 2017 10:10 )   PT: 12.2 sec;   INR: 1.12 ratio    PTT - ( 09 Jul 2017 10:10 )  PTT:29.0 sec      RADIOLOGY & ADDITIONAL STUDIES: US DPLX CAROTIDS No hemodynamically significant internal carotid artery  stenosis by velocity criteria.        IMPRESSION AND PLAN:  Problem/Recommendation - 1:  Problem: Syncope, unspecified syncope type. Recommendation: Non exertional-no orthostatics-noted bradycardia-likely vasovagal,no arrhythmias noted-TTE-normal LV systolic function  Cardiac status stable for discharge.    Problem/Recommendation - 2:  ·  Problem: Type 2 diabetes mellitus without complication, without long-term current use of insulin.  Recommendation: On insulin coverage.     Problem/Recommendation - 3:  ·  Problem: Lymphedema of both lower extremities.  Recommendation: Chronic lymph disease-support stockings,hold diuretics at present. Patient from [facility]-has had lymphedema since age 14-likely endemic?Filiarial.Chronic venous disease-support stockings,hold diuretics at present..

## 2017-07-11 NOTE — DISCHARGE NOTE ADULT - REASON FOR ADMISSION
Near syncope and weakness Passed out with shaking for few seconds and loss of consciousness and awareness for few seconds

## 2017-07-11 NOTE — DISCHARGE NOTE ADULT - MEDICATION SUMMARY - MEDICATIONS TO TAKE
I will START or STAY ON the medications listed below when I get home from the hospital:    finasteride 5 mg oral tablet  -- 1 tab(s) by mouth once a day  -- Indication: For BPH (benign prostatic hyperplasia)    acetaminophen-oxyCODONE 325 mg-5 mg oral tablet  -- 1 tab(s) by mouth every 6 hours, As Needed - for severe pain RICKI: [id number] MDD:2  -- Caution federal law prohibits the transfer of this drug to any person other  than the person for whom it was prescribed.  May cause drowsiness.  Alcohol may intensify this effect.  Use care when operating dangerous machinery.  This prescription cannot be refilled.  This product contains acetaminophen.  Do not use  with any other product containing acetaminophen to prevent possible liver damage.  Using more of this medication than prescribed may cause serious breathing problems.      -- Indication: For pain    aspirin 81 mg oral tablet, chewable  -- 1 tab(s) by mouth once a day  -- Indication: For chest pain    ramipril  -- 10 milligram(s) by mouth once a day  -- Indication: For HTN    doxazosin 2 mg oral tablet  -- 1 tab(s) by mouth once a day (at bedtime)  -- Indication: For Htn    dilTIAZem 120 mg/24 hours oral capsule, extended release  -- 1 cap(s) by mouth once a day  -- Indication: For HtN    Xarelto 15 mg oral tablet  -- 1 tab(s) by mouth 2 times a dayfor 21 days  -- Check with your doctor before becoming pregnant.  It is very important that you take or use this exactly as directed.  Do not skip doses or discontinue unless directed by your doctor.  Obtain medical advice before taking any non-prescription drugs as some may affect the action of this medication.  Take with food.    -- Indication: For CLOT PREVENTION    gabapentin 100 mg oral capsule  -- 1 cap(s) by mouth 2 times a day  -- Indication: For PAIN    Actos  -- 30 milligram(s) by mouth once a day  -- Indication: For Diabetes    atorvastatin 40 mg oral tablet  -- 1 tab(s) by mouth once a day (at bedtime)  -- Indication: For Hld    Advair Diskus 250 mcg-50 mcg inhalation powder  -- 1 puff(s) inhaled 2 times a day  -- Check with your doctor before becoming pregnant.  For inhalation only.  Obtain medical advice before taking any non-prescription drugs as some may affect the action of this medication.  Rinse mouth thoroughly after use.    -- Indication: For reactive airway disease    docusate sodium 100 mg oral capsule  -- 1 cap(s) by mouth 2 times a day  -- Indication: For constipation    senna oral tablet  -- 2 tab(s) by mouth once a day (at bedtime)  -- Indication: For constipation    cyclobenzaprine 5 mg oral tablet  -- 1 tab(s) by mouth every 8 hours, As needed, Muscle Spasm  -- Indication: For muscle spasms

## 2017-07-11 NOTE — PROGRESS NOTE ADULT - ATTENDING COMMENTS
Yuan Rosenberg MD  [address] [place], [place] [phone]
D/C Plan Home tomorrow if tele unremarkable and if Orthostasis negative  PT eval noted Outpatient PT

## 2017-07-11 NOTE — DISCHARGE NOTE ADULT - HOSPITAL COURSE
77 y/ o. male, lives with family, walks without assistance at home, with cane outside,  PMHx  NIDDM ( Actos HTN, Chronic lymphedema of bilateral legs, left knee arthritis , DVT of bilateral legs ( left peroneal vein and right posterior tibial veins, on Xarelto for sometime then stopped taking it as told by PMD that has no DVT) , PSHx of right incarcerated inguinal hernia repair., presented with weakness, loss of consciousness was eating the breakfast in AM , sitting on chair when he felt very weak, diaphoretic, blackouts , unable to recognize anyone  Episode witnessed by wife, as per her he is not responding to her voice, a/w bilateral shaking of hands, Whole episode lasted for about 2 mins. After that family called the EMS.    Patient was admitted to medicine and evaluated for syncope to rule out of any cardiovascular , neurological or metabolic causes. Patient EKG showed sinus bradycardia, Trponionx3 negative orthostatics were negative hypotension  ECHO showed EF 51%, Chest X Ray normal, CT angio negative for PE. patient neurological exam showed no focal neurological deficits, carotid duplex normal, Ct head was normal , pt is not a candidate for MRI because of implanted metallic device., EEG showed no seizure like activity. Neurology Dr Akins and Cardiology Dr. Rosenberg were consulted. their recommendations were incorporated in the management of the patient. Dr Rosenberg advised to avoid Diuretics. patient came with sinu bradycardia his syncopal episode is most likely due to vasovagal response.     At present patient denies any complaints of Dizziness, sob, cheat pain or palpitations . Pt is afebrile, hemodynamically stable is been discharged to home today.

## 2017-07-11 NOTE — DISCHARGE NOTE ADULT - PATIENT PORTAL LINK FT
“You can access the FollowHealth Patient Portal, offered by [facility], by registering with the following website: [email or web address]”

## 2017-07-11 NOTE — DISCHARGE NOTE ADULT - CARE PROVIDER_API CALL
Marilia Be), Internal Medicine  [address] [address] [place] [address]  Phone: [phone]  Fax: [phone] Cameron Guidry), Family Medicine  [address] [place] [address]  Phone: [phone]  Fax: [phone]

## 2017-07-11 NOTE — DISCHARGE NOTE ADULT - CARE PLAN
Principal Discharge DX:	Vasovagal syncope  Goal:	avoid stressful situations, prevent future syncope attacks and injuries due to falls  Instructions for follow-up, activity and diet:	regular follow up with primary care  Secondary Diagnosis:	BPH (benign prostatic hyperplasia)  Goal:	To prevent acute urinary retention  Instructions for follow-up, activity and diet:	continue to prescribed medications  Secondary Diagnosis:	Diabetes  Goal:	maintain blood sugars < 130 , HAB1c , 6.5  Instructions for follow-up, activity and diet:	continue hypoglycemic drugs as proscribed  Secondary Diagnosis:	Essential hypertension  Goal:	maintain SBP < 140  Instructions for follow-up, activity and diet:	continue anti- hypertensive drugs as prescribed  Secondary Diagnosis:	Neuropathic pain  Goal:	to treat painful episodes  Instructions for follow-up, activity and diet:	Continue medications as per prescribed  Secondary Diagnosis:	Lymphedema of both lower extremities  Goal:	To prevent worsening of Edema, maintain integrity of skin, avoid skin infections and DVT  Instructions for follow-up, activity and diet:	to use compression stocking, elevate leg and regular follow Principal Discharge DX:	Vasovagal syncope  Goal:	avoid stressful situations, prevent future syncope attacks and injuries due to falls  Instructions for follow-up, activity and diet:	Encourage oral fluids; Give some time between moving from a lying to standing position.  Secondary Diagnosis:	BPH (benign prostatic hyperplasia)  Goal:	To prevent acute urinary retention  Instructions for follow-up, activity and diet:	continue Finasteride and Doxazosin  Secondary Diagnosis:	Diabetes  Goal:	maintain blood sugars 120 to 160 , HAB1c target around 7.0 for age  Instructions for follow-up, activity and diet:	Continue Actos as before. May consider decreasing dose of Actos to 15mg if sugars low.  Secondary Diagnosis:	Essential hypertension  Goal:	maintain SBP < 140  Instructions for follow-up, activity and diet:	continue anti- hypertensive drugs as prescribed  Secondary Diagnosis:	Neck pain  Goal:	Relief of pain  Instructions for follow-up, activity and diet:	You were put on gabapentin for possible Neuropathic pain. You were also put on Flexeril which is a muscle relaxant for possible Neck spasm. If no significant relief in the next couple of weeks may consider imaging study with a CT scan.  Secondary Diagnosis:	Lymphedema of both lower extremities  Goal:	To prevent worsening of Edema, maintain integrity of skin, avoid skin infections and DVT  Instructions for follow-up, activity and diet:	to use compression stocking, elevate leg and regular follow;  Also clean both legs with lukewarm water and antibacterial soap to prevent infection you are at risk

## 2017-07-11 NOTE — DISCHARGE NOTE ADULT - SECONDARY DIAGNOSIS.
BPH (benign prostatic hyperplasia) Diabetes Essential hypertension Neuropathic pain Lymphedema of both lower extremities Neck pain

## 2017-07-11 NOTE — DISCHARGE NOTE ADULT - PLAN OF CARE
avoid stressful situations, prevent future syncope attacks and injuries due to falls regular follow up with primary care To prevent acute urinary retention continue to prescribed medications maintain blood sugars < 130 , HAB1c , 6.5 continue hypoglycemic drugs as proscribed maintain SBP < 140 continue anti- hypertensive drugs as prescribed to treat painful episodes Continue medications as per prescribed To prevent worsening of Edema, maintain integrity of skin, avoid skin infections and DVT to use compression stocking, elevate leg and regular follow Encourage oral fluids; Give some time between moving from a lying to standing position. continue Finasteride and Doxazosin maintain blood sugars 120 to 160 , HAB1c target around 7.0 for age Continue Actos as before. May consider decreasing dose of Actos to 15mg if sugars low. Relief of pain You were put on gabapentin for possible Neuropathic pain. You were also put on Flexeril which is a muscle relaxant for possible Neck spasm. If no significant relief in the next couple of weeks may consider imaging study with a CT scan. to use compression stocking, elevate leg and regular follow;  Also clean both legs with lukewarm water and antibacterial soap to prevent infection you are at risk

## 2017-07-13 DIAGNOSIS — I89.0 LYMPHEDEMA, NOT ELSEWHERE CLASSIFIED: ICD-10-CM

## 2017-07-13 DIAGNOSIS — Z87.891 PERSONAL HISTORY OF NICOTINE DEPENDENCE: ICD-10-CM

## 2017-07-13 DIAGNOSIS — E11.9 TYPE 2 DIABETES MELLITUS WITHOUT COMPLICATIONS: ICD-10-CM

## 2017-07-13 DIAGNOSIS — M54.2 CERVICALGIA: ICD-10-CM

## 2017-07-13 DIAGNOSIS — Z86.718 PERSONAL HISTORY OF OTHER VENOUS THROMBOSIS AND EMBOLISM: ICD-10-CM

## 2017-07-13 DIAGNOSIS — N40.0 BENIGN PROSTATIC HYPERPLASIA WITHOUT LOWER URINARY TRACT SYMPTOMS: ICD-10-CM

## 2017-07-13 DIAGNOSIS — I10 ESSENTIAL (PRIMARY) HYPERTENSION: ICD-10-CM

## 2017-07-13 DIAGNOSIS — M17.12 UNILATERAL PRIMARY OSTEOARTHRITIS, LEFT KNEE: ICD-10-CM

## 2017-07-13 DIAGNOSIS — R55 SYNCOPE AND COLLAPSE: ICD-10-CM

## 2017-07-13 DIAGNOSIS — R00.1 BRADYCARDIA, UNSPECIFIED: ICD-10-CM

## 2018-03-13 ENCOUNTER — INPATIENT (INPATIENT)
Facility: HOSPITAL | Age: 78
LOS: 7 days | Discharge: NURSING, REHAB OR LONG-TERM CARE | DRG: 292 | End: 2018-03-21
Attending: HOSPITALIST | Admitting: HOSPITALIST
Payer: COMMERCIAL

## 2018-03-13 VITALS
WEIGHT: 220.02 LBS | HEIGHT: 68 IN | HEART RATE: 90 BPM | SYSTOLIC BLOOD PRESSURE: 131 MMHG | OXYGEN SATURATION: 96 % | TEMPERATURE: 95 F | DIASTOLIC BLOOD PRESSURE: 71 MMHG | RESPIRATION RATE: 22 BRPM

## 2018-03-13 DIAGNOSIS — D64.9 ANEMIA, UNSPECIFIED: ICD-10-CM
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
## 2018-03-13 DIAGNOSIS — I89.0 LYMPHEDEMA, NOT ELSEWHERE CLASSIFIED: ICD-10-CM

## 2018-03-13 DIAGNOSIS — Z29.9 ENCOUNTER FOR PROPHYLACTIC MEASURES, UNSPECIFIED: ICD-10-CM

## 2018-03-13 DIAGNOSIS — Z98.89 OTHER SPECIFIED POSTPROCEDURAL STATES: Chronic | ICD-10-CM

## 2018-03-13 DIAGNOSIS — J18.9 PNEUMONIA, UNSPECIFIED ORGANISM: ICD-10-CM

## 2018-03-13 DIAGNOSIS — E11.9 TYPE 2 DIABETES MELLITUS WITHOUT COMPLICATIONS: ICD-10-CM

## 2018-03-13 DIAGNOSIS — I10 ESSENTIAL (PRIMARY) HYPERTENSION: ICD-10-CM

## 2018-03-13 DIAGNOSIS — R06.83 SNORING: ICD-10-CM

## 2018-03-13 DIAGNOSIS — R06.02 SHORTNESS OF BREATH: ICD-10-CM

## 2018-03-13 DIAGNOSIS — R93.8 ABNORMAL FINDINGS ON DIAGNOSTIC IMAGING OF OTHER SPECIFIED BODY STRUCTURES: ICD-10-CM

## 2018-03-13 DIAGNOSIS — I50.9 HEART FAILURE, UNSPECIFIED: ICD-10-CM

## 2018-03-13 LAB
24R-OH-CALCIDIOL SERPL-MCNC: 35.2 NG/ML — SIGNIFICANT CHANGE UP (ref 30–80)
ALBUMIN SERPL ELPH-MCNC: 2.6 G/DL — LOW (ref 3.5–5)
ALBUMIN SERPL ELPH-MCNC: 2.8 G/DL — LOW (ref 3.5–5)
ALP SERPL-CCNC: 60 U/L — SIGNIFICANT CHANGE UP (ref 40–120)
ALP SERPL-CCNC: 63 U/L — SIGNIFICANT CHANGE UP (ref 40–120)
ALT FLD-CCNC: 38 U/L DA — SIGNIFICANT CHANGE UP (ref 10–60)
ALT FLD-CCNC: 42 U/L DA — SIGNIFICANT CHANGE UP (ref 10–60)
ANION GAP SERPL CALC-SCNC: 6 MMOL/L — SIGNIFICANT CHANGE UP (ref 5–17)
ANION GAP SERPL CALC-SCNC: 8 MMOL/L — SIGNIFICANT CHANGE UP (ref 5–17)
APPEARANCE UR: CLEAR — SIGNIFICANT CHANGE UP
AST SERPL-CCNC: 58 U/L — HIGH (ref 10–40)
AST SERPL-CCNC: 61 U/L — HIGH (ref 10–40)
BASOPHILS # BLD AUTO: 0 K/UL — SIGNIFICANT CHANGE UP (ref 0–0.2)
BASOPHILS # BLD AUTO: 0.1 K/UL — SIGNIFICANT CHANGE UP (ref 0–0.2)
BASOPHILS NFR BLD AUTO: 0.7 % — SIGNIFICANT CHANGE UP (ref 0–2)
BASOPHILS NFR BLD AUTO: 1.3 % — SIGNIFICANT CHANGE UP (ref 0–2)
BILIRUB SERPL-MCNC: 0.4 MG/DL — SIGNIFICANT CHANGE UP (ref 0.2–1.2)
BILIRUB SERPL-MCNC: 0.5 MG/DL — SIGNIFICANT CHANGE UP (ref 0.2–1.2)
BILIRUB UR-MCNC: NEGATIVE — SIGNIFICANT CHANGE UP
BUN SERPL-MCNC: 32 MG/DL — HIGH (ref 7–18)
BUN SERPL-MCNC: 37 MG/DL — HIGH (ref 7–18)
CALCIUM SERPL-MCNC: 8.2 MG/DL — LOW (ref 8.4–10.5)
CALCIUM SERPL-MCNC: 8.5 MG/DL — SIGNIFICANT CHANGE UP (ref 8.4–10.5)
CHLORIDE SERPL-SCNC: 105 MMOL/L — SIGNIFICANT CHANGE UP (ref 96–108)
CHLORIDE SERPL-SCNC: 107 MMOL/L — SIGNIFICANT CHANGE UP (ref 96–108)
CHOLEST SERPL-MCNC: 69 MG/DL — SIGNIFICANT CHANGE UP (ref 10–199)
CK MB BLD-MCNC: 0.9 % — SIGNIFICANT CHANGE UP (ref 0–3.5)
CK MB BLD-MCNC: 1 % — SIGNIFICANT CHANGE UP (ref 0–3.5)
CK MB BLD-MCNC: 1.1 % — SIGNIFICANT CHANGE UP (ref 0–3.5)
CK MB CFR SERPL CALC: 11.7 NG/ML — HIGH (ref 0–3.6)
CK MB CFR SERPL CALC: 12.6 NG/ML — HIGH (ref 0–3.6)
CK MB CFR SERPL CALC: 15.6 NG/ML — HIGH (ref 0–3.6)
CK SERPL-CCNC: 1326 U/L — HIGH (ref 35–232)
CK SERPL-CCNC: 1341 U/L — HIGH (ref 35–232)
CK SERPL-CCNC: 1374 U/L — HIGH (ref 35–232)
CO2 SERPL-SCNC: 24 MMOL/L — SIGNIFICANT CHANGE UP (ref 22–31)
CO2 SERPL-SCNC: 24 MMOL/L — SIGNIFICANT CHANGE UP (ref 22–31)
COLOR SPEC: YELLOW — SIGNIFICANT CHANGE UP
CREAT SERPL-MCNC: 1.01 MG/DL — SIGNIFICANT CHANGE UP (ref 0.5–1.3)
CREAT SERPL-MCNC: 1.1 MG/DL — SIGNIFICANT CHANGE UP (ref 0.5–1.3)
DIFF PNL FLD: ABNORMAL
EOSINOPHIL # BLD AUTO: 0.4 K/UL — SIGNIFICANT CHANGE UP (ref 0–0.5)
EOSINOPHIL # BLD AUTO: 0.4 K/UL — SIGNIFICANT CHANGE UP (ref 0–0.5)
EOSINOPHIL NFR BLD AUTO: 5 % — SIGNIFICANT CHANGE UP (ref 0–6)
EOSINOPHIL NFR BLD AUTO: 6.3 % — HIGH (ref 0–6)
FOLATE SERPL-MCNC: 8.4 NG/ML — SIGNIFICANT CHANGE UP (ref 4.8–24.2)
GLUCOSE BLDC GLUCOMTR-MCNC: 106 MG/DL — HIGH (ref 70–99)
GLUCOSE BLDC GLUCOMTR-MCNC: 112 MG/DL — HIGH (ref 70–99)
GLUCOSE SERPL-MCNC: 110 MG/DL — HIGH (ref 70–99)
GLUCOSE SERPL-MCNC: 126 MG/DL — HIGH (ref 70–99)
GLUCOSE UR QL: NEGATIVE — SIGNIFICANT CHANGE UP
HBA1C BLD-MCNC: 7 % — HIGH (ref 4–5.6)
HCT VFR BLD CALC: 31.7 % — LOW (ref 39–50)
HCT VFR BLD CALC: 32 % — LOW (ref 39–50)
HDLC SERPL-MCNC: 54 MG/DL — SIGNIFICANT CHANGE UP (ref 40–125)
HGB BLD-MCNC: 10 G/DL — LOW (ref 13–17)
HGB BLD-MCNC: 9.7 G/DL — LOW (ref 13–17)
INR BLD: 1.12 RATIO — SIGNIFICANT CHANGE UP (ref 0.88–1.16)
KETONES UR-MCNC: ABNORMAL
LACTATE SERPL-SCNC: 1.5 MMOL/L — SIGNIFICANT CHANGE UP (ref 0.7–2)
LEUKOCYTE ESTERASE UR-ACNC: NEGATIVE — SIGNIFICANT CHANGE UP
LIPID PNL WITH DIRECT LDL SERPL: 7 MG/DL — SIGNIFICANT CHANGE UP
LYMPHOCYTES # BLD AUTO: 0.4 K/UL — LOW (ref 1–3.3)
LYMPHOCYTES # BLD AUTO: 0.4 K/UL — LOW (ref 1–3.3)
LYMPHOCYTES # BLD AUTO: 4.6 % — LOW (ref 13–44)
LYMPHOCYTES # BLD AUTO: 5.6 % — LOW (ref 13–44)
MAGNESIUM SERPL-MCNC: 1.9 MG/DL — SIGNIFICANT CHANGE UP (ref 1.6–2.6)
MCHC RBC-ENTMCNC: 25.8 PG — LOW (ref 27–34)
MCHC RBC-ENTMCNC: 26 PG — LOW (ref 27–34)
MCHC RBC-ENTMCNC: 30.6 GM/DL — LOW (ref 32–36)
MCHC RBC-ENTMCNC: 31.1 GM/DL — LOW (ref 32–36)
MCV RBC AUTO: 83.6 FL — SIGNIFICANT CHANGE UP (ref 80–100)
MCV RBC AUTO: 84.1 FL — SIGNIFICANT CHANGE UP (ref 80–100)
MONOCYTES # BLD AUTO: 0.8 K/UL — SIGNIFICANT CHANGE UP (ref 0–0.9)
MONOCYTES # BLD AUTO: 0.8 K/UL — SIGNIFICANT CHANGE UP (ref 0–0.9)
MONOCYTES NFR BLD AUTO: 10.3 % — SIGNIFICANT CHANGE UP (ref 2–14)
MONOCYTES NFR BLD AUTO: 10.9 % — SIGNIFICANT CHANGE UP (ref 2–14)
NEUTROPHILS # BLD AUTO: 5.3 K/UL — SIGNIFICANT CHANGE UP (ref 1.8–7.4)
NEUTROPHILS # BLD AUTO: 6.4 K/UL — SIGNIFICANT CHANGE UP (ref 1.8–7.4)
NEUTROPHILS NFR BLD AUTO: 76.5 % — SIGNIFICANT CHANGE UP (ref 43–77)
NEUTROPHILS NFR BLD AUTO: 78.7 % — HIGH (ref 43–77)
NITRITE UR-MCNC: NEGATIVE — SIGNIFICANT CHANGE UP
PH UR: 5 — SIGNIFICANT CHANGE UP (ref 5–8)
PHOSPHATE SERPL-MCNC: 3.7 MG/DL — SIGNIFICANT CHANGE UP (ref 2.5–4.5)
PLATELET # BLD AUTO: 209 K/UL — SIGNIFICANT CHANGE UP (ref 150–400)
PLATELET # BLD AUTO: 210 K/UL — SIGNIFICANT CHANGE UP (ref 150–400)
POTASSIUM SERPL-MCNC: 4.5 MMOL/L — SIGNIFICANT CHANGE UP (ref 3.5–5.3)
POTASSIUM SERPL-MCNC: 4.6 MMOL/L — SIGNIFICANT CHANGE UP (ref 3.5–5.3)
POTASSIUM SERPL-SCNC: 4.5 MMOL/L — SIGNIFICANT CHANGE UP (ref 3.5–5.3)
POTASSIUM SERPL-SCNC: 4.6 MMOL/L — SIGNIFICANT CHANGE UP (ref 3.5–5.3)
PROCALCITONIN SERPL-MCNC: 0.08 NG/ML — HIGH (ref 0–0.04)
PROT SERPL-MCNC: 5.9 G/DL — LOW (ref 6–8.3)
PROT SERPL-MCNC: 6.2 G/DL — SIGNIFICANT CHANGE UP (ref 6–8.3)
PROT UR-MCNC: NEGATIVE — SIGNIFICANT CHANGE UP
PROTHROM AB SERPL-ACNC: 12.2 SEC — SIGNIFICANT CHANGE UP (ref 9.8–12.7)
RAPID RVP RESULT: SIGNIFICANT CHANGE UP
RBC # BLD: 3.77 M/UL — LOW (ref 4.2–5.8)
RBC # BLD: 3.83 M/UL — LOW (ref 4.2–5.8)
RBC # FLD: 14 % — SIGNIFICANT CHANGE UP (ref 10.3–14.5)
RBC # FLD: 14 % — SIGNIFICANT CHANGE UP (ref 10.3–14.5)
SODIUM SERPL-SCNC: 137 MMOL/L — SIGNIFICANT CHANGE UP (ref 135–145)
SODIUM SERPL-SCNC: 137 MMOL/L — SIGNIFICANT CHANGE UP (ref 135–145)
SP GR SPEC: 1.02 — SIGNIFICANT CHANGE UP (ref 1.01–1.02)
TOTAL CHOLESTEROL/HDL RATIO MEASUREMENT: 1.3 RATIO — LOW (ref 3.4–9.6)
TRIGL SERPL-MCNC: 42 MG/DL — SIGNIFICANT CHANGE UP (ref 10–149)
TROPONIN I SERPL-MCNC: 0.03 NG/ML — SIGNIFICANT CHANGE UP (ref 0–0.04)
TROPONIN I SERPL-MCNC: 0.03 NG/ML — SIGNIFICANT CHANGE UP (ref 0–0.04)
TROPONIN I SERPL-MCNC: <0.015 NG/ML — SIGNIFICANT CHANGE UP (ref 0–0.04)
TSH SERPL-MCNC: 2.8 UU/ML — SIGNIFICANT CHANGE UP (ref 0.34–4.82)
UROBILINOGEN FLD QL: NEGATIVE — SIGNIFICANT CHANGE UP
VIT B12 SERPL-MCNC: 538 PG/ML — SIGNIFICANT CHANGE UP (ref 232–1245)
WBC # BLD: 6.9 K/UL — SIGNIFICANT CHANGE UP (ref 3.8–10.5)
WBC # BLD: 8.1 K/UL — SIGNIFICANT CHANGE UP (ref 3.8–10.5)
WBC # FLD AUTO: 6.9 K/UL — SIGNIFICANT CHANGE UP (ref 3.8–10.5)
WBC # FLD AUTO: 8.1 K/UL — SIGNIFICANT CHANGE UP (ref 3.8–10.5)

## 2018-03-13 PROCEDURE — 70450 CT HEAD/BRAIN W/O DYE: CPT | Mod: 26

## 2018-03-13 PROCEDURE — 72125 CT NECK SPINE W/O DYE: CPT | Mod: 26

## 2018-03-13 PROCEDURE — 71045 X-RAY EXAM CHEST 1 VIEW: CPT | Mod: 26

## 2018-03-13 PROCEDURE — 71275 CT ANGIOGRAPHY CHEST: CPT | Mod: 26

## 2018-03-13 PROCEDURE — 93970 EXTREMITY STUDY: CPT | Mod: 26

## 2018-03-13 PROCEDURE — 99285 EMERGENCY DEPT VISIT HI MDM: CPT | Mod: 25

## 2018-03-13 RX ORDER — GABAPENTIN 400 MG/1
100 CAPSULE ORAL
Qty: 0 | Refills: 0 | Status: DISCONTINUED | OUTPATIENT
Start: 2018-03-13 | End: 2018-03-21

## 2018-03-13 RX ORDER — LISINOPRIL 2.5 MG/1
40 TABLET ORAL DAILY
Qty: 0 | Refills: 0 | Status: DISCONTINUED | OUTPATIENT
Start: 2018-03-13 | End: 2018-03-20

## 2018-03-13 RX ORDER — FINASTERIDE 5 MG/1
5 TABLET, FILM COATED ORAL DAILY
Qty: 0 | Refills: 0 | Status: DISCONTINUED | OUTPATIENT
Start: 2018-03-13 | End: 2018-03-21

## 2018-03-13 RX ORDER — ASPIRIN/CALCIUM CARB/MAGNESIUM 324 MG
81 TABLET ORAL DAILY
Qty: 0 | Refills: 0 | Status: DISCONTINUED | OUTPATIENT
Start: 2018-03-13 | End: 2018-03-21

## 2018-03-13 RX ORDER — DILTIAZEM HCL 120 MG
120 CAPSULE, EXT RELEASE 24 HR ORAL DAILY
Qty: 0 | Refills: 0 | Status: DISCONTINUED | OUTPATIENT
Start: 2018-03-13 | End: 2018-03-21

## 2018-03-13 RX ORDER — ATORVASTATIN CALCIUM 80 MG/1
40 TABLET, FILM COATED ORAL AT BEDTIME
Qty: 0 | Refills: 0 | Status: DISCONTINUED | OUTPATIENT
Start: 2018-03-13 | End: 2018-03-21

## 2018-03-13 RX ORDER — DOXAZOSIN MESYLATE 4 MG
2 TABLET ORAL AT BEDTIME
Qty: 0 | Refills: 0 | Status: DISCONTINUED | OUTPATIENT
Start: 2018-03-13 | End: 2018-03-21

## 2018-03-13 RX ORDER — ACETAMINOPHEN 500 MG
650 TABLET ORAL EVERY 6 HOURS
Qty: 0 | Refills: 0 | Status: DISCONTINUED | OUTPATIENT
Start: 2018-03-13 | End: 2018-03-21

## 2018-03-13 RX ORDER — IPRATROPIUM/ALBUTEROL SULFATE 18-103MCG
3 AEROSOL WITH ADAPTER (GRAM) INHALATION EVERY 6 HOURS
Qty: 0 | Refills: 0 | Status: DISCONTINUED | OUTPATIENT
Start: 2018-03-13 | End: 2018-03-21

## 2018-03-13 RX ORDER — VANCOMYCIN HCL 1 G
1000 VIAL (EA) INTRAVENOUS ONCE
Qty: 0 | Refills: 0 | Status: COMPLETED | OUTPATIENT
Start: 2018-03-13 | End: 2018-03-13

## 2018-03-13 RX ORDER — SODIUM CHLORIDE 9 MG/ML
3000 INJECTION INTRAMUSCULAR; INTRAVENOUS; SUBCUTANEOUS ONCE
Qty: 0 | Refills: 0 | Status: COMPLETED | OUTPATIENT
Start: 2018-03-13 | End: 2018-03-13

## 2018-03-13 RX ORDER — FUROSEMIDE 40 MG
40 TABLET ORAL ONCE
Qty: 0 | Refills: 0 | Status: COMPLETED | OUTPATIENT
Start: 2018-03-13 | End: 2018-03-13

## 2018-03-13 RX ORDER — FUROSEMIDE 40 MG
40 TABLET ORAL DAILY
Qty: 0 | Refills: 0 | Status: DISCONTINUED | OUTPATIENT
Start: 2018-03-13 | End: 2018-03-14

## 2018-03-13 RX ORDER — INSULIN LISPRO 100/ML
VIAL (ML) SUBCUTANEOUS
Qty: 0 | Refills: 0 | Status: DISCONTINUED | OUTPATIENT
Start: 2018-03-13 | End: 2018-03-21

## 2018-03-13 RX ORDER — ENOXAPARIN SODIUM 100 MG/ML
40 INJECTION SUBCUTANEOUS DAILY
Qty: 0 | Refills: 0 | Status: DISCONTINUED | OUTPATIENT
Start: 2018-03-13 | End: 2018-03-15

## 2018-03-13 RX ADMIN — Medication 3 MILLILITER(S): at 16:31

## 2018-03-13 RX ADMIN — Medication 81 MILLIGRAM(S): at 11:46

## 2018-03-13 RX ADMIN — GABAPENTIN 100 MILLIGRAM(S): 400 CAPSULE ORAL at 17:53

## 2018-03-13 RX ADMIN — ENOXAPARIN SODIUM 40 MILLIGRAM(S): 100 INJECTION SUBCUTANEOUS at 11:46

## 2018-03-13 RX ADMIN — SODIUM CHLORIDE 1500 MILLILITER(S): 9 INJECTION INTRAMUSCULAR; INTRAVENOUS; SUBCUTANEOUS at 03:36

## 2018-03-13 RX ADMIN — Medication 250 MILLIGRAM(S): at 04:40

## 2018-03-13 RX ADMIN — FINASTERIDE 5 MILLIGRAM(S): 5 TABLET, FILM COATED ORAL at 11:46

## 2018-03-13 RX ADMIN — Medication 40 MILLIGRAM(S): at 09:27

## 2018-03-13 NOTE — ED PROVIDER NOTE - CHPI ED SYMPTOMS NEG
no shortness of breath, no chest pain, no palpitations, nausea, vomiting, diarrhea, abd pain, no dysuria, no urinary frequency, no hematuria, no numbness, no tingling, no weakness

## 2018-03-13 NOTE — H&P ADULT - PROBLEM SELECTOR PROBLEM 6
After Visit Summary   3/15/2017    Lasha Webster    MRN: [id number]           Patient Information     Date Of Birth          2007        Visit Information        Provider Department      3/15/2017 9:50 AM Junie Curiel MD [facility]        Today's Diagnoses     Ear pain, right    -  1       Follow-ups after your visit        Who to contact     You can reach your care team any time of the day by calling [phone].  Notification of test results:  If you have an abnormal lab result, we will notify you by phone as soon as possible.         Additional Information About Your Visit        MyChart Information     [employer] lets you send messages to your doctor, view your test results, renew your prescriptions, schedule appointments and more. To sign up, go to [email or web address], contact your [place] clinic or call [phone] during business hours.            Care EveryWhere ID     This is your Care EveryWhere ID. This could be used by other organizations to access your [place] medical records  [id number]        Your Vitals Were     Temperature                   96.7  F (35.9  C) (Temporal)            Blood Pressure from Last 3 Encounters:   08/22/16 110/60   06/22/16 90/62   09/01/15 110/54    Weight from Last 3 Encounters:   03/15/17 160 lb 9.6 oz (72.8 kg) (>99 %)*   08/22/16 148 lb (67.1 kg) (>99 %)*   07/06/16 144 lb 9.6 oz (65.6 kg) (>99 %)*     * Growth percentiles are based on CDC 2-20 Years data.              Today, you had the following     No orders found for display         Today's Medication Changes          These changes are accurate as of: 3/15/17 10:32 AM.  If you have any questions, ask your nurse or doctor.               Start taking these medicines.        Dose/Directions    amoxicillin 250 MG chewable tablet   Commonly known as:  AMOXIL   Used for:  Ear pain, right        Dose:  750 mg   Take 3 tablets (750 mg) by mouth 2 times daily   Quantity:   60 tablet   Refills:  0            Where to get your medicines      These medications were sent to [id number] - [place], [place] - [address] [facility]  [address] [address]     Phone:  [phone]     amoxicillin 250 MG chewable tablet                Primary Care Provider Office Phone # Fax #    Courtney Zuleta [phone]       [facility] [address]  [facility] [address]        Thank you!     Thank you for choosing [facility]  for your care. Our goal is always to provide you with excellent care. Hearing back from our patients is one way we can continue to improve our services. Please take a few minutes to complete the written survey that you may receive in the mail after your visit with us. Thank you!             Your Updated Medication List - Protect others around you: Learn how to safely use, store and throw away your medicines at www.disposemymeds.org.          This list is accurate as of: 3/15/17 10:32 AM.  Always use your most recent med list.                   Brand Name Dispense Instructions for use    amoxicillin 250 MG chewable tablet    AMOXIL    60 tablet    Take 3 tablets (750 mg) by mouth 2 times daily       DAYQUIL OR          NYQUIL PO             Lymphedema of leg Hypertension

## 2018-03-13 NOTE — ED PROVIDER NOTE - OBJECTIVE STATEMENT
79 y/o M pt with PMHx of HTN, DM, BPH, ex smoker, presents to ED c/o s/p 2 falls; one fall around 2300 tonight and another fall yesterday. Pt claims he lost his balance both times which caused him to fall. Pt notes having a low temperature at home associated with chills, and cough. Pt denies shortness of breath, chest pain, palpitations, nausea, vomiting, diarrhea, abd pain, dysuria, urinary frequency, hematuria, numbness, tingling, weakness, or any other complaints. NKDA.

## 2018-03-13 NOTE — H&P ADULT - PROBLEM SELECTOR PLAN 2
P/w worsening SOB, productive cough with sputum   Meets SIRS criteria ( temp<96, tachypenic, source PNA)  RVp negative  Afebrile, no wbc count, lactate wnl,  but based on symptoms will treat for CAP  Given 3 L bolus, vancomycin, Levaquin  C/w levaquin, duoneb for now   F/up sputum cx, blood cx  ID consult Dr Beauchamp

## 2018-03-13 NOTE — ED ADULT NURSE NOTE - PAIN: PRESENCE, MLM
----- Message from Zulma Shaffer sent at 3/6/2018  8:10 AM CST -----  pls work pt in for prozac change/back pain any time except 8 &11..[phone] ([place])      complains of pain/discomfort

## 2018-03-13 NOTE — H&P ADULT - PROBLEM SELECTOR PLAN 3
Patient has falls times x 2, could be due to generalized weakness as has recent diarrhea and not eating and drinking properly   Non-focal neuro exam   Ck elevated, got the fluids, for now stop the fluid  CT head negative   Ct cervical spine wnl  Complain of left knee, leg, thigh pain, will do Xray of that sides  F/up on B12, vitamin d, folate, RPR  F/up on Pt consult

## 2018-03-13 NOTE — H&P ADULT - RS GEN PE MLT RESP DETAILS PC
breath sounds equal/no rales/respirations non-labored/no intercostal retractions/no rhonchi/no subcutaneous emphysema/no wheezes/airway patent/no chest wall tenderness

## 2018-03-13 NOTE — CONSULT NOTE ADULT - SUBJECTIVE AND OBJECTIVE BOX
HPI:  78  y/ o. 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 Patient reports that on  he went to sister home, there on ground he fell down on back and on left side, but did not hit the head. Yesterday morning he fell again. Denies any palpitations, dizziness, headache, LOC, near syncope lightheaded, vertigo prior to it. He is unable to get by himself later on. Patient also reports that since few days he is getting more SOb upon lying down, on walking, exertion he is ok. He endorses productive cough with yellow sputum . Denies fever, recent travel, sick contacts,  chest pain, SOB on exertion, abdominal pain, urinary or bowel complains, urinary or bowel complains.     Allergies: NKDA  Fhx: No pertinent fhx    In the ED initial vitals shows temp of 94.7, HR 90, BP of 129/52, RR 22, SPO2 on 2 L NC is 96, labs with Hb of 10, wbc normal, pro bnp is 1112, UA 11-25 wbc, moderate bacteria, likely negative, CT head negative, CT spine normal, CT angio chest shows no PE, multiple opacities, vague  4.5 cm left retroperitoneal/ paraspinal mass, got Levaquin vancomycin, 3 L bolus, lactate wnl, admitted to [facility] for SOB with concern for CHF?, had CAP, falls. (13 Mar 2018 09:48)      PAST MEDICAL & SURGICAL HISTORY:  Lymphedema of leg  Hypertension  Diabetes  H/O inguinal hernia repair      No Known Allergies      acetaminophen   Tablet. 650 milliGRAM(s) Oral every 6 hours PRN  ALBUTerol/ipratropium for Nebulization 3 milliLiter(s) Nebulizer every 6 hours  aspirin  chewable 81 milliGRAM(s) Oral daily  atorvastatin 40 milliGRAM(s) Oral at bedtime  diltiazem    milliGRAM(s) Oral daily  doxazosin 2 milliGRAM(s) Oral at bedtime  enoxaparin Injectable 40 milliGRAM(s) SubCutaneous daily  finasteride 5 milliGRAM(s) Oral daily  furosemide   Injectable 40 milliGRAM(s) IV Push daily  gabapentin 100 milliGRAM(s) Oral two times a day  insulin lispro (HumaLOG) corrective regimen sliding scale   SubCutaneous three times a day before meals  levoFLOXacin IVPB 750 milliGRAM(s) IV Intermittent every 24 hours  lisinopril 40 milliGRAM(s) Oral daily      Social Hx:    FAMILY HISTORY:  No pertinent family history in first degree relatives        ROS  [  ] UNABLE TO ELICIT    General:  [  ] None  [  ] Fever  [  ] Chills  [ x ] Malaise    Skin:  [ x ] None [  ] Rash  [  ] Wound  [  ] Ulcer    HEENT:  [ x ] None  [  ] Sore Throat  [  ] Nasal congestion/ runny nose  [  ] Photophobia [  ] Neck pain      Chest:  [  ] None   [ x ] SOB  [ x ] Cough  [  ] None    Cardiovascular:   [  ] None  [  ] CP  [  ] Palpitation [ x ] Orthopnea    Gastrointestinal:  [ x ] None  [  ] Abd pain   [  ] Nausea    [  ] Vomiting   [  ] Diarrhea	     Genitourinary:  [ x ] None [  ] Polyuria   [  ] Urgency  [  ] Frequency  [  ] Dysuria    [  ]  Hematuria       Musculoskeletal:  [  ] None [  ] Back Pain	[  ] Body aches  [  ] Joint pain [ x ] Recurrent fall.    Neurological: [  ] None [  ]Dizziness  [  ]Visual Disturbance  [  ]Headaches   [ x ] Weakness      PHYSICAL EXAM:    Vital Signs Last 24 Hrs  T(C): 36.6 (13 Mar 2018 16:00), Max: 36.9 (13 Mar 2018 07:25)  T(F): 97.8 (13 Mar 2018 16:00), Max: 98.4 (13 Mar 2018 07:25)  HR: 78 (13 Mar 2018 16:00) (76 - 90)  BP: 134/56 (13 Mar 2018 16:00) (117/44 - 134/56)  BP(mean): --  RR: 16 (13 Mar 2018 16:00) (16 - 22)  SpO2: 99% (13 Mar 2018 16:00) (96% - 100%)    Constitutional:    HEENT: [ x ] Wnl  [  ] Pharyngeal congestion    Neck:  [ x ] Supple  [  ]Lymphadenopathy  [ x ] No JVD   [  ] JVD  [  ] Masses   [  ] WNL    CHEST/Respiratory:  [  ]Clear to auscultation  [ x ] Rales   [  ] Rhonchi   [  ] Wheezing     [  ] Chest Tenderness      Cardiovascular:  [ x ] Reg S1 S2   [  ] Irreg S1 S2   [ x ]No Murmur  [  ] +ve Murmurs  [  ]Systolic [  ]Diastolic      Abdomen:  [ x ] Soft  [ x ] No tendrerness  [  ] Tenderness  [  ] Organomegaly  [  ] ABD Distention  [  ] Rigidity                       [ x ] No Regidity                       [ x ] No Rebound Tenderness  [ x ] No Guarding Rigidity  [  ] Rebound Tenderness[  ] Guarding Rigidity                          [ x ]  +ve Bowel Sounds  [  ] Decreased Bowel Sounds    [  ] Absent Bowel Sounds                            Extremities: [  ] No edema [ x ] Edema  [  ] Clubbing   [  ] Cyanosis                         [ x ] No Tender Calf muscles  [  ] Tender Calf muscles                        [ x ] Palpable peripheral pulses    Neurological: [ x ] Awake  [ x ] Alert  [ x ] Oriented  x  3                           [  ] Confused  [  ] Drowsy  [  ] respond to painful stimuli  [  ] Unresponsive    Skin:  [ x ] Intact [  ] Redness [  ] Thrombophlebitis  [  ] Rashes  [  ] Dry  [  ] Ulcers    Ortho:  [  ] Joint Swelling  [  ] Joint erythema [  ] Joint tenderness                [  ] Increased temp. to touch  [  ] DJD [ x ] WNL      LABS/DIAGNOSTIC TESTS                          9.7    6.9   )-----------( 209      ( 13 Mar 2018 10:27 )             31.7     WBC Count: 6.9 K/uL ( @ 10:27)  WBC Count: 8.1 K/uL ( @ 03:00)          137  |  107  |  32<H>  ----------------------------<  110<H>  4.5   |  24  |  1.01    Ca    8.2<L>      13 Mar 2018 10:27  Phos  3.7       Mg     1.9         TPro  5.9<L>  /  Alb  2.6<L>  /  TBili  0.4  /  DBili  x   /  AST  58<H>  /  ALT  38  /  AlkPhos  60        Urinalysis Basic - ( 13 Mar 2018 04:16 )    Color: Yellow / Appearance: Clear / S.020 / pH: x  Gluc: x / Ketone: Trace  / Bili: Negative / Urobili: Negative   Blood: x / Protein: Negative / Nitrite: Negative   Leuk Esterase: Negative / RBC: 5-10 /HPF / WBC 11-25 /HPF   Sq Epi: x / Non Sq Epi: Few /HPF / Bacteria: Moderate /HPF        LIVER FUNCTIONS - ( 13 Mar 2018 10:27 )  Alb: 2.6 g/dL / Pro: 5.9 g/dL / ALK PHOS: 60 U/L / ALT: 38 U/L DA / AST: 58 U/L / GGT: x             PT/INR - ( 13 Mar 2018 10:27 )   PT: 12.2 sec;   INR: 1.12 ratio             LACTATE:Lactate, Blood: 1.5 mmol/L ( @ 03:00)        CULTURES:   None yet.    RADIOLOGY      EXAM:  CT ANGIO CHEST (W)AW IC                            PROCEDURE DATE:  2018          INTERPRETATION:  CLINICAL INFORMATION: Pain    COMPARISON: CT chest 2017    PROCEDURE:   CT Angiography of the Chest.  70 ml of Omnipaque 350 was injected intravenously.   Sagittal and coronal reformats were performed as well as MIPS.    FINDINGS:    CHEST:     Limited examination due to motion.    LUNGS, LARGE AIRWAYS, PLEURA: Central airways are patent. There is likely   trace pleural effusion along the right major fissure. Scattered   groundglass opacities particularly along the upper lobes, right greater   than left, as well as the right lower lobe may indicate pulmonary edema   or multifocal infection.    VESSELS: Limited evaluation for apulmonary embolism especially along the   segmental and subsegmental branches. No definite pulmonary embolism is   seen. Aortic atherosclerosis  HEART: Cardiomegaly. No pericardial effusion. Coronary atherosclerosis  MEDIASTINUM AND TANIKA: Shotty mediastinal lymph nodes are nonspecific  CHEST WALL AND LOWER NECK: Anasarca along the visualized abdominal   subcutaneous tissues  VISUALIZED UPPER ABDOMEN: Gallstones. Partially visualized 4.5 x 3.4 cm   left retroperitoneal/paraspinal lesion.  BONES: Degenerative changes    IMPRESSION:     No definite pulmonary embolism given significant motion artifact.    Multifocal groundglass opacities along the upper lobes, right greater   than left as well as right lower lobe. This may be related to pulmonary  edema or multifocal infection. Correlate with symptoms. Follow-up after   treatment    Partially visualized 4.5 cm left retroperitoneal/paraspinal mass is not   well characterized. CT abdomen/pelvis recommended.
PULMONARY CONSULT NOTE      JHONNY GARCÍA  MRN-[id number]    Patient is a 78y old  Male who presents with a chief complaint of Fall x 2, worsening SOB (13 Mar 2018 09:48)      History of Present Illness:  Chief Complaint/Reason for Admission: Fall x 2, worsening SOB	  History of Present Illness: 	  78  y/ o. 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 Patient reports that on  he went to sister home, there on ground he fell down on back and on left side, but did not hit the head. Yesterday morning he fell again. Denies any palpitations, dizziness, headache, LOC, near syncope lightheaded, vertigo prior to it. He is unable to get by himself later on. Patient also reports that since few days he is getting more SOb upon lying down, on walking, exertion he is ok. He endorses productive cough with yellow sputum . Denies fever, recent travel, sick contacts,  chest pain, SOB on exertion, abdominal pain, urinary or bowel complains, urinary or bowel complains.       HISTORY OF PRESENT ILLNESS: As above. Has loud snoring and hypersomnia. Never tested for JOSIE    MEDICATIONS  (STANDING):  ALBUTerol/ipratropium for Nebulization 3 milliLiter(s) Nebulizer every 6 hours  aspirin  chewable 81 milliGRAM(s) Oral daily  atorvastatin 40 milliGRAM(s) Oral at bedtime  diltiazem    milliGRAM(s) Oral daily  doxazosin 2 milliGRAM(s) Oral at bedtime  enoxaparin Injectable 40 milliGRAM(s) SubCutaneous daily  finasteride 5 milliGRAM(s) Oral daily  furosemide   Injectable 40 milliGRAM(s) IV Push daily  gabapentin 100 milliGRAM(s) Oral two times a day  insulin lispro (HumaLOG) corrective regimen sliding scale   SubCutaneous three times a day before meals  levoFLOXacin IVPB 750 milliGRAM(s) IV Intermittent every 24 hours  lisinopril 40 milliGRAM(s) Oral daily      MEDICATIONS  (PRN):  acetaminophen   Tablet. 650 milliGRAM(s) Oral every 6 hours PRN Mild Pain (1 - 3)      Allergies    No Known Allergies    Intolerances        PAST MEDICAL & SURGICAL HISTORY:  Lymphedema of leg  Hypertension  Diabetes  H/O inguinal hernia repair      FAMILY HISTORY:  No pertinent family history in first degree relatives      SOCIAL HISTORY  Smoking History:     REVIEW OF SYSTEMS:    CONSTITUTIONAL:  No fevers, chills, sweats    HEENT:  Eyes:  No diplopia or blurred vision. ENT:  No earache, sore throat or runny nose.    CARDIOVASCULAR:  No pressure, squeezing, tightness, or heaviness about the chest; no palpitations.    RESPIRATORY:  Per HPI    GASTROINTESTINAL:  No abdominal pain, nausea, vomiting or diarrhea.    GENITOURINARY:  No dysuria, frequency or urgency.    NEUROLOGIC:  No paresthesias, fasciculations, seizures or weakness.    PSYCHIATRIC:  No disorder of thought or mood.    Vital Signs Last 24 Hrs  T(C): 36.8 (13 Mar 2018 10:51), Max: 36.9 (13 Mar 2018 07:25)  T(F): 98.2 (13 Mar 2018 10:51), Max: 98.4 (13 Mar 2018 07:25)  HR: 80 (13 Mar 2018 10:51) (76 - 90)  BP: 128/55 (13 Mar 2018 10:51) (117/44 - 129/52)  BP(mean): --  RR: 20 (13 Mar 2018 10:51) (20 - 22)  SpO2: 98% (13 Mar 2018 10:51) (96% - 100%)  I&O's Detail      PHYSICAL EXAMINATION:    GENERAL: The patient is a well-developed, well-nourished _____in no apparent distress.     HEENT: Head is normocephalic and atraumatic. Extraocular muscles are intact. Mucous membranes are moist.     NECK: Supple.     LUNGS: Clear to auscultation without wheezing, rales, or rhonchi. Respirations unlabored    HEART: Regular rate and rhythm without murmur.    ABDOMEN: Soft, nontender, and nondistended.  No hepatosplenomegaly is noted.    EXTREMITIES: Edema of leg    NEUROLOGIC: Grossly intact.      LABS:                        9.7    6.9   )-----------( 209      ( 13 Mar 2018 10:27 )             31.7     03-    137  |  107  |  32<H>  ----------------------------<  110<H>  4.5   |  24  |  1.01    Ca    8.2<L>      13 Mar 2018 10:27  Phos  3.7       Mg     1.9         TPro  5.9<L>  /  Alb  2.6<L>  /  TBili  0.4  /  DBili  x   /  AST  58<H>  /  ALT  38  /  AlkPhos  60      PT/INR - ( 13 Mar 2018 10:27 )   PT: 12.2 sec;   INR: 1.12 ratio           Urinalysis Basic - ( 13 Mar 2018 04:16 )    Color: Yellow / Appearance: Clear / S.020 / pH: x  Gluc: x / Ketone: Trace  / Bili: Negative / Urobili: Negative   Blood: x / Protein: Negative / Nitrite: Negative   Leuk Esterase: Negative / RBC: 5-10 /HPF / WBC 11-25 /HPF   Sq Epi: x / Non Sq Epi: Few /HPF / Bacteria: Moderate /HPF        CARDIAC MARKERS ( 13 Mar 2018 10:27 )  0.030 ng/mL / x     / 1326 U/L / x     / 12.6 ng/mL  CARDIAC MARKERS ( 13 Mar 2018 09:06 )  <0.015 ng/mL / x     / 1374 U/L / x     / 15.6 ng/mL        Serum Pro-Brain Natriuretic Peptide: 1112 pg/mL (18 @ 03:00)    Lactate, Blood: 1.5 mmol/L (18 @ 03:00)        MICROBIOLOGY:    RADIOLOGY & ADDITIONAL STUDIES:    CXR:  < from: Xray Chest 1 View-PORTABLE IMMEDIATE (18 @ 03:08) >  IMPRESSION:Question finding diffuse acute bilateral infiltrates.    < end of copied text >    Ct scan chest:  < from: CT Angio Chest w/ IV Cont (18 @ 06:34) >  IMPRESSION:     No definite pulmonary embolism given significant motion artifact.    Multifocal groundglass opacities along the upper lobes, right greater   than left as well as right lower lobe. This may be related to pulmonary  edema or multifocal infection. Correlate with symptoms. Follow-up after   treatment    Partially visualized 4.5 cm left retroperitoneal/paraspinal mass is not   well characterized. CT abdomen/pelvis recommended.    < end of copied text >    ekg;    echo:

## 2018-03-13 NOTE — H&P ADULT - ASSESSMENT
78  y/ o. male,   PMHx  NIDDM, HTN, Chronic lymphedema of bilateral legs, left knee arthritis , DVT of bilateral legs ( left peroneal vein and right posterior tibial veins, on Xarelto for sometime then stopped), BPH, PSHx of right incarcerated inguinal hernia repair., presented with fall x 2 , worsening SOB from 2 days, productive cough admitted to tele for SOB with concern for CHF?, had CAP, falls.

## 2018-03-13 NOTE — ED ADULT NURSE REASSESSMENT NOTE - NS ED NURSE REASSESS COMMENT FT1
code sepsis called when pt. arrived. Pt. received fluids and bear hugger placed on pt to elevate temperature to normal limits. Wife at bedside. Pt. is pending admission. Dr. Freeman notified of Pt. request for pain medication.

## 2018-03-13 NOTE — CONSULT NOTE ADULT - PROBLEM SELECTOR RECOMMENDATION 9
1- Continue Levaquin.  2- Cardiac management for CHF.  3- O2 as needed.  4- Pulmonary management.  5- F/u CXR.
Panculture  antibiotics  oxygen supp  follow up CXR  ID eval

## 2018-03-13 NOTE — H&P ADULT - PROBLEM SELECTOR PLAN 8
IMPROVE VTE Individual Risk Assessment    RISK                                                          Points  [x] Previous VTE                                           3  [] Thrombophilia                                        2  [] Lower limb paralysis                              2   [] Current Cancer                                       2   [] Immobilization > 24 hrs                        1  [] ICU/CCU stay > 24 hours                       1  [x] Age > 60                                                   1    IMPROVE VTE Score: 4, will give the Lovenox  no need for GI ppx

## 2018-03-13 NOTE — CONSULT NOTE ADULT - PROBLEM SELECTOR RECOMMENDATION 3
DVT PPX
1- Blood sugar monitoring and control.  2- Accu-Cheks with coverage.  3- 1800 sheldon ADA diet.  4- Follow HB A1C.

## 2018-03-13 NOTE — H&P ADULT - PROBLEM SELECTOR PLAN 1
Worsening of SOb on lying down, on exertion also   In 2017 echo with Ef > 53%, 2 dd  On exam with mild crackles on bases, chronic edema on legs, BNP is 1112  Got 3 L bolus in ED, later was very SOb   S/p lasix 40 in ED   Could be CHf exacerbation?   ECG  Troponin x 1 negative   F/up on ECHO   Monitor on tele, aspirin, statin, Cardizem, ramipril, serial trops  Cardio consult Dr Grullon

## 2018-03-13 NOTE — H&P ADULT - PROBLEM SELECTOR PLAN 4
Ct angio shows gallstones, partially visualized 4.5 x 3.4 cm left retroperitoneal /paraspinal lesion.  F/up on Ct scan abdomen and pelvis

## 2018-03-13 NOTE — CONSULT NOTE ADULT - PROBLEM SELECTOR RECOMMENDATION 2
1- Anemia profile.  2- Iron supplements.  3- Closely monitor H & H.  4- Observe for bleeding.
lasxi trial  Echo  Cardio eval  Echo

## 2018-03-13 NOTE — H&P ADULT - PROBLEM SELECTOR PLAN 7
Chronic venous insufficiency, hx of DVT in past on leg, was on Xarelto for long time then was told to stop as he no longer needed it   Right leg > left, warmth  F/up on doppler lower ext  Keep leg elevated

## 2018-03-13 NOTE — CONSULT NOTE ADULT - ASSESSMENT
78  y/ o. 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   Patient was admitted for pneumonia and CHF and was started on IV Levaquin.

## 2018-03-13 NOTE — H&P ADULT - HISTORY OF PRESENT ILLNESS
78  y/ o. male, lives with family, walks without assistance at home, with cane outside,  PMHx  NIDDM, HTN, Chronic lymphedema of bilateral legs, left knee arthritis , DVT of bilateral legs ( left peroneal vein and right posterior tibial veins, on Xarelto for sometime then stopped), BPH, PSHx of right incarcerated inguinal hernia repair., presented with fall x 2 , worsening SOB from 2 days. Patient reports that on Sunday he went to sister home, there on ground he fell down on back and on left side, but did not hit the head. Yesterday morning he fell again. Denies any palpitations, dizziness, headache, LOC, near syncope lightheaded, vertigo prior to it. He is unable to get by himself later on. Patient also reports that since few days he is getting more SOb upon lying down, on walking, exertion he is ok. Denies chest pain, SOB on exertion, abdominal pain, urinary or bowel complains, urinary or bowel complains.     Allergies: NKDA  Fhx: No pertinent fhx 78  y/ o. 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 Patient reports that on Sunday he went to sister home, there on ground he fell down on back and on left side, but did not hit the head. Yesterday morning he fell again. Denies any palpitations, dizziness, headache, LOC, near syncope lightheaded, vertigo prior to it. He is unable to get by himself later on. Patient also reports that since few days he is getting more SOb upon lying down, on walking, exertion he is ok. He endorses productive cough with yellow sputum . Denies fever, recent travel, sick contacts,  chest pain, SOB on exertion, abdominal pain, urinary or bowel complains, urinary or bowel complains.     Allergies: NKDA  Fhx: No pertinent fhx    In the ED initial vitals shows temp of 94.7, HR 90, BP of 129/52, RR 22, SPO2 on 2 L NC is 96, labs with Hb of 10, wbc normal, pro bnp is 1112, UA 11-25 wbc, moderate bacteria, likely negative, CT head negative, CT spine normal, CT angio chest shows no PE, multiple opacities, vague  4.5 cm left retroperitoneal/ paraspinal mass, got Levaquin vancomycin, 3 L bolus, lactate wnl, admitted to [facility] for SOB with concern for CHF?, had CAP, falls.

## 2018-03-14 ENCOUNTER — TRANSCRIPTION ENCOUNTER (OUTPATIENT)
Age: 78
End: 2018-03-14

## 2018-03-14 LAB
ALBUMIN SERPL ELPH-MCNC: 2.7 G/DL — LOW (ref 3.5–5)
ALP SERPL-CCNC: 63 U/L — SIGNIFICANT CHANGE UP (ref 40–120)
ALT FLD-CCNC: 40 U/L DA — SIGNIFICANT CHANGE UP (ref 10–60)
ANION GAP SERPL CALC-SCNC: 9 MMOL/L — SIGNIFICANT CHANGE UP (ref 5–17)
AST SERPL-CCNC: 55 U/L — HIGH (ref 10–40)
BILIRUB SERPL-MCNC: 0.5 MG/DL — SIGNIFICANT CHANGE UP (ref 0.2–1.2)
BUN SERPL-MCNC: 26 MG/DL — HIGH (ref 7–18)
CALCIUM SERPL-MCNC: 8.6 MG/DL — SIGNIFICANT CHANGE UP (ref 8.4–10.5)
CHLORIDE SERPL-SCNC: 105 MMOL/L — SIGNIFICANT CHANGE UP (ref 96–108)
CO2 SERPL-SCNC: 26 MMOL/L — SIGNIFICANT CHANGE UP (ref 22–31)
CREAT SERPL-MCNC: 1 MG/DL — SIGNIFICANT CHANGE UP (ref 0.5–1.3)
CULTURE RESULTS: NO GROWTH — SIGNIFICANT CHANGE UP
FERRITIN SERPL-MCNC: 286 NG/ML — SIGNIFICANT CHANGE UP (ref 30–400)
GLUCOSE SERPL-MCNC: 99 MG/DL — SIGNIFICANT CHANGE UP (ref 70–99)
GRAM STN FLD: SIGNIFICANT CHANGE UP
HCT VFR BLD CALC: 30.7 % — LOW (ref 39–50)
HGB BLD-MCNC: 10 G/DL — LOW (ref 13–17)
IRON SATN MFR SERPL: 16 % — LOW (ref 20–55)
IRON SATN MFR SERPL: 35 UG/DL — LOW (ref 65–170)
MAGNESIUM SERPL-MCNC: 2 MG/DL — SIGNIFICANT CHANGE UP (ref 1.6–2.6)
MCHC RBC-ENTMCNC: 27.4 PG — SIGNIFICANT CHANGE UP (ref 27–34)
MCHC RBC-ENTMCNC: 32.5 GM/DL — SIGNIFICANT CHANGE UP (ref 32–36)
MCV RBC AUTO: 84.4 FL — SIGNIFICANT CHANGE UP (ref 80–100)
PHOSPHATE SERPL-MCNC: 3.4 MG/DL — SIGNIFICANT CHANGE UP (ref 2.5–4.5)
PLATELET # BLD AUTO: 194 K/UL — SIGNIFICANT CHANGE UP (ref 150–400)
POTASSIUM SERPL-MCNC: 4.8 MMOL/L — SIGNIFICANT CHANGE UP (ref 3.5–5.3)
POTASSIUM SERPL-SCNC: 4.8 MMOL/L — SIGNIFICANT CHANGE UP (ref 3.5–5.3)
PROT SERPL-MCNC: 6.4 G/DL — SIGNIFICANT CHANGE UP (ref 6–8.3)
RBC # BLD: 3.64 M/UL — LOW (ref 4.2–5.8)
RBC # FLD: 14.2 % — SIGNIFICANT CHANGE UP (ref 10.3–14.5)
SODIUM SERPL-SCNC: 140 MMOL/L — SIGNIFICANT CHANGE UP (ref 135–145)
SPECIMEN SOURCE: SIGNIFICANT CHANGE UP
SPECIMEN SOURCE: SIGNIFICANT CHANGE UP
T PALLIDUM AB TITR SER: NEGATIVE — SIGNIFICANT CHANGE UP
TIBC SERPL-MCNC: 221 UG/DL — LOW (ref 250–450)
TRANSFERRIN SERPL-MCNC: 159 MG/DL — LOW (ref 200–360)
UIBC SERPL-MCNC: 186 UG/DL — SIGNIFICANT CHANGE UP (ref 110–370)
WBC # BLD: 6.7 K/UL — SIGNIFICANT CHANGE UP (ref 3.8–10.5)
WBC # FLD AUTO: 6.7 K/UL — SIGNIFICANT CHANGE UP (ref 3.8–10.5)

## 2018-03-14 PROCEDURE — 73630 X-RAY EXAM OF FOOT: CPT | Mod: 26,LT

## 2018-03-14 PROCEDURE — 73551 X-RAY EXAM OF FEMUR 1: CPT | Mod: 26,LT

## 2018-03-14 PROCEDURE — 73564 X-RAY EXAM KNEE 4 OR MORE: CPT | Mod: 26,LT

## 2018-03-14 PROCEDURE — 73502 X-RAY EXAM HIP UNI 2-3 VIEWS: CPT | Mod: 26,LT

## 2018-03-14 PROCEDURE — 99233 SBSQ HOSP IP/OBS HIGH 50: CPT | Mod: GC

## 2018-03-14 PROCEDURE — 74177 CT ABD & PELVIS W/CONTRAST: CPT | Mod: 26

## 2018-03-14 RX ORDER — SENNA PLUS 8.6 MG/1
2 TABLET ORAL AT BEDTIME
Qty: 0 | Refills: 0 | Status: COMPLETED | OUTPATIENT
Start: 2018-03-14 | End: 2018-03-16

## 2018-03-14 RX ORDER — FERROUS SULFATE 325(65) MG
325 TABLET ORAL DAILY
Qty: 0 | Refills: 0 | Status: DISCONTINUED | OUTPATIENT
Start: 2018-03-14 | End: 2018-03-21

## 2018-03-14 RX ORDER — AMPICILLIN SODIUM AND SULBACTAM SODIUM 250; 125 MG/ML; MG/ML
1.5 INJECTION, POWDER, FOR SUSPENSION INTRAMUSCULAR; INTRAVENOUS EVERY 6 HOURS
Qty: 0 | Refills: 0 | Status: DISCONTINUED | OUTPATIENT
Start: 2018-03-15 | End: 2018-03-16

## 2018-03-14 RX ORDER — AMPICILLIN SODIUM AND SULBACTAM SODIUM 250; 125 MG/ML; MG/ML
1.5 INJECTION, POWDER, FOR SUSPENSION INTRAMUSCULAR; INTRAVENOUS ONCE
Qty: 0 | Refills: 0 | Status: COMPLETED | OUTPATIENT
Start: 2018-03-14 | End: 2018-03-15

## 2018-03-14 RX ORDER — AMPICILLIN SODIUM AND SULBACTAM SODIUM 250; 125 MG/ML; MG/ML
INJECTION, POWDER, FOR SUSPENSION INTRAMUSCULAR; INTRAVENOUS
Qty: 0 | Refills: 0 | Status: DISCONTINUED | OUTPATIENT
Start: 2018-03-15 | End: 2018-03-16

## 2018-03-14 RX ORDER — FUROSEMIDE 40 MG
40 TABLET ORAL EVERY 12 HOURS
Qty: 0 | Refills: 0 | Status: DISCONTINUED | OUTPATIENT
Start: 2018-03-14 | End: 2018-03-16

## 2018-03-14 RX ORDER — DOCUSATE SODIUM 100 MG
100 CAPSULE ORAL
Qty: 0 | Refills: 0 | Status: COMPLETED | OUTPATIENT
Start: 2018-03-14 | End: 2018-03-17

## 2018-03-14 RX ADMIN — Medication 1: at 17:15

## 2018-03-14 RX ADMIN — GABAPENTIN 100 MILLIGRAM(S): 400 CAPSULE ORAL at 17:15

## 2018-03-14 RX ADMIN — Medication 3 MILLILITER(S): at 15:34

## 2018-03-14 RX ADMIN — Medication 650 MILLIGRAM(S): at 08:59

## 2018-03-14 RX ADMIN — Medication 3 MILLILITER(S): at 00:06

## 2018-03-14 RX ADMIN — Medication 650 MILLIGRAM(S): at 09:30

## 2018-03-14 RX ADMIN — FINASTERIDE 5 MILLIGRAM(S): 5 TABLET, FILM COATED ORAL at 12:01

## 2018-03-14 RX ADMIN — Medication 2 MILLIGRAM(S): at 21:17

## 2018-03-14 RX ADMIN — Medication 81 MILLIGRAM(S): at 12:01

## 2018-03-14 RX ADMIN — ATORVASTATIN CALCIUM 40 MILLIGRAM(S): 80 TABLET, FILM COATED ORAL at 21:17

## 2018-03-14 RX ADMIN — Medication 3 MILLILITER(S): at 09:22

## 2018-03-14 RX ADMIN — Medication 100 MILLIGRAM(S): at 17:15

## 2018-03-14 RX ADMIN — Medication 3 MILLILITER(S): at 20:00

## 2018-03-14 RX ADMIN — Medication 120 MILLIGRAM(S): at 06:05

## 2018-03-14 RX ADMIN — Medication 1: at 12:01

## 2018-03-14 RX ADMIN — ATORVASTATIN CALCIUM 40 MILLIGRAM(S): 80 TABLET, FILM COATED ORAL at 00:04

## 2018-03-14 RX ADMIN — Medication 40 MILLIGRAM(S): at 06:05

## 2018-03-14 RX ADMIN — ENOXAPARIN SODIUM 40 MILLIGRAM(S): 100 INJECTION SUBCUTANEOUS at 11:57

## 2018-03-14 RX ADMIN — Medication 2 MILLIGRAM(S): at 00:05

## 2018-03-14 RX ADMIN — LISINOPRIL 40 MILLIGRAM(S): 2.5 TABLET ORAL at 06:05

## 2018-03-14 RX ADMIN — SENNA PLUS 2 TABLET(S): 8.6 TABLET ORAL at 21:17

## 2018-03-14 RX ADMIN — GABAPENTIN 100 MILLIGRAM(S): 400 CAPSULE ORAL at 06:05

## 2018-03-14 NOTE — DISCHARGE NOTE ADULT - MEDICATION SUMMARY - MEDICATIONS TO TAKE
I will START or STAY ON the medications listed below when I get home from the hospital:    finasteride 5 mg oral tablet  -- 1 tab(s) by mouth once a day  -- Indication: For BPH    aspirin 81 mg oral tablet, chewable  -- 1 tab(s) by mouth once a day  -- Indication: For Prophylactic measure    lisinopril 20 mg oral tablet  -- 1 tab(s) by mouth once a day  -- Indication: For HTN    doxazosin 2 mg oral tablet  -- 1 tab(s) by mouth once a day (at bedtime)  -- Indication: For BPH    dilTIAZem 120 mg/24 hours oral capsule, extended release  -- 1 cap(s) by mouth once a day  -- Indication: For CHF, left ventricular    gabapentin 100 mg oral capsule  -- 1 cap(s) by mouth 2 times a day  -- Indication: For Diabetic neuropathy    Actos  -- 30 milligram(s) by mouth once a day  -- Indication: For Diabetes    atorvastatin 40 mg oral tablet  -- 1 tab(s) by mouth once a day (at bedtime)  -- Indication: For HLD    Advair Diskus 250 mcg-50 mcg inhalation powder  -- 1 puff(s) inhaled 2 times a day  -- Check with your doctor before becoming pregnant.  For inhalation only.  Obtain medical advice before taking any non-prescription drugs as some may affect the action of this medication.  Rinse mouth thoroughly after use.    -- Indication: For SOB (shortness of breath)    furosemide 40 mg oral tablet  -- 1 tab(s) by mouth 2 times a day  -- Indication: For CHF, left ventricular    ferrous sulfate 325 mg (65 mg elemental iron) oral tablet  -- 1 tab(s) by mouth once a day   -- Indication: For iron deficiency    docusate sodium 100 mg oral capsule  -- 1 cap(s) by mouth 2 times a day  -- Indication: For Constipataion I will START or STAY ON the medications listed below when I get home from the hospital:    finasteride 5 mg oral tablet  -- 1 tab(s) by mouth once a day  -- Indication: For BPH    aspirin 81 mg oral tablet, chewable  -- 1 tab(s) by mouth once a day  -- Indication: For Prophylactic measure    ramipril 5 mg oral capsule  -- 1 cap(s) by mouth once a day   -- Do not take this drug if you are pregnant.  It is very important that you take or use this exactly as directed.  Do not skip doses or discontinue unless directed by your doctor.  Some non-prescription drugs may aggravate your condition.  Read all labels carefully.  If a warning appears, check with your doctor before taking.    -- Indication: For HTN    doxazosin 2 mg oral tablet  -- 1 tab(s) by mouth once a day (at bedtime)  -- Indication: For BPH    dilTIAZem 120 mg/24 hours oral capsule, extended release  -- 1 cap(s) by mouth once a day  -- Indication: For CHF, left ventricular    gabapentin 100 mg oral capsule  -- 1 cap(s) by mouth 2 times a day  -- Indication: For Diabetic neuropathy    Actos  -- 30 milligram(s) by mouth once a day  -- Indication: For Diabetes    atorvastatin 40 mg oral tablet  -- 1 tab(s) by mouth once a day (at bedtime)  -- Indication: For HLD    Advair Diskus 250 mcg-50 mcg inhalation powder  -- 1 puff(s) inhaled 2 times a day  -- Check with your doctor before becoming pregnant.  For inhalation only.  Obtain medical advice before taking any non-prescription drugs as some may affect the action of this medication.  Rinse mouth thoroughly after use.    -- Indication: For SOB (shortness of breath)    furosemide 40 mg oral tablet  -- 1 tab(s) by mouth 2 times a day  -- Indication: For CHF, left ventricular    ferrous sulfate 325 mg (65 mg elemental iron) oral tablet  -- 1 tab(s) by mouth once a day   -- Indication: For iron deficiency    docusate sodium 100 mg oral capsule  -- 1 cap(s) by mouth 2 times a day  -- Indication: For Constipataion

## 2018-03-14 NOTE — DISCHARGE NOTE ADULT - FINDINGS/TREATMENT
< from: CT Abdomen and Pelvis w/ IV Cont (03.14.18 @ 14:14) >  Impression: Asymmetric enlargement of the left psoas muscle; this may be   due to muscular hypertrophy, or underlying intramuscular hematoma or   mass. If clinically indicated, lumbar spine MR without and with IV   contrast may be pursued for further evaluation after 24 hours.   Small bilateral pleural perfusions.  Patchy airspace opacifications in both lungs may be due to pneumonia or   pulmonary edema. Clinical correlation is recommended.  Cholelithiasis. Mild stranding adjacent to the gallbladder and the   duodenum may represent acute cholecystitis and/or nonspecific duodenitis.   Clinical correlation is recommended. If clinically indicated, HIDA scan   may be pursued for further evaluation.  Sclerotic lesion in L5 vertebra. If clinically indicated, bone scan may   be pursued to rule out osseous metastasis.  Small pelvic free fluid.  < end of copied text >

## 2018-03-14 NOTE — DISCHARGE NOTE ADULT - CARE PLAN
Principal Discharge DX:	CHF, left ventricular  Goal:	symptoms resolved  Assessment and plan of treatment:	Your repeat Echocardiogram shows EF 55%, moderate Aortic stenosis. Please continue lasix  ---- and please follow up with primary care doctor and cardiologist within one week.  Secondary Diagnosis:	Cellulitis  Goal:	clear infection  Assessment and plan of treatment:	Your already completed 7 days unasyn for cellulitis. Please  follow up with primary care doctor within one week.  Secondary Diagnosis:	Hematoma  Assessment and plan of treatment:	Your CT abd shows asymmetric enlargement of the left psoas muscle intramuscular hematoma, likely due to falls. Your red blood cell count stable. Please  follow up with primary care doctor within one week.  Secondary Diagnosis:	Hypertension  Goal:	keep BP <140/90mmHg  Assessment and plan of treatment:	Please continue home meds for HTN as above dose and Please  follow up with primary care doctor within one week. Principal Discharge DX:	CHF, left ventricular  Goal:	symptoms resolved  Assessment and plan of treatment:	Your repeat Echocardiogram shows EF 55%, moderate Aortic stenosis. Please continue ramipril to 5mg daily and cardizem 120mg once a day , lasix 40mg two times a day as above instruction doses and please follow up with primary care doctor and cardiologist within one week.  Secondary Diagnosis:	Cellulitis  Goal:	clear infection  Assessment and plan of treatment:	Your already completed 7 days unasyn for cellulitis. Please  follow up with primary care doctor within one week.  Secondary Diagnosis:	Hematoma  Assessment and plan of treatment:	Your CT abd shows asymmetric enlargement of the left psoas muscle intramuscular hematoma, likely due to falls. Your red blood cell count stable. Please  follow up with primary care doctor within one week.  Secondary Diagnosis:	Hypertension  Goal:	keep BP <140/90mmHg  Assessment and plan of treatment:	Please decrease ramipril to 5mg daily and continue cardizem 120mg once a day , lasix 40mg two times a day for HTN as above dose and Please  follow up with primary care doctor within one week.

## 2018-03-14 NOTE — DISCHARGE NOTE ADULT - PATIENT PORTAL LINK FT
You can access the [facility] Patient Portal, offered by [facility], by registering with the following website: [email or web address]

## 2018-03-14 NOTE — DISCHARGE NOTE ADULT - CARE PROVIDER_API CALL
Cameron Guidry), Family Medicine  [address] [place] [address]  Phone: [phone]  Fax: [phone]

## 2018-03-14 NOTE — PROGRESS NOTE ADULT - PROBLEM SELECTOR PLAN 3
Patient has falls times x 2, could be due to generalized weakness as has recent diarrhea and not eating and drinking properly   Non-focal neuro exam   Ck elevated, got the fluids, for now stop the fluid  CT head negative   CT cervical spine wnl  Complain of left knee, leg, thigh pain, f/u Xray of that sides  f/u syphilis RPR   F/up on PT eval

## 2018-03-14 NOTE — PROGRESS NOTE ADULT - PROBLEM SELECTOR PLAN 1
Worsening of SOb on lying down, on exertion also , On exam with mild crackles on bases, chronic edema on legs, BNP is 1112 on admission  In 2017 echo with Ef > 53%, G2DD  Got 3 L bolus in ED, later was very SOB  S/p lasix 40 in ED , Could be CHf exacerbation    ECG NSR @75  Troponin x 3 negative   F/up repeat ECHO   US LE negative for DVT   CT angio chest negative for PE  Monitor on tele, aspirin, statin, Cardizem, ramipril  Cardio consult Dr Grullon Worsening of SOb on lying down, on exertion also , On exam with mild crackles on bases, acute on chronic edema on legs, BNP is 1112 on admission  In 2017 echo with Ef > 53%, G2DD  Got 3 L bolus in ED, later was very SOB  S/p lasix 40 in ED , Could be CHf exacerbation    ECG NSR @75  Troponin x 3 negative   F/up repeat ECHO   US LE negative for DVT   CT angio chest negative for PE  Monitor on tele, aspirin, statin, Cardizem, ramipril

## 2018-03-14 NOTE — PROGRESS NOTE ADULT - SUBJECTIVE AND OBJECTIVE BOX
PGY1 Note discussed with supervising resident and primary attending.    Patient is a 78y old  Male who presents with a chief complaint of Fall x 2, worsening SOB (13 Mar 2018 09:48)      INTERVAL HPI/OVERNIGHT EVENTS: pt had no new overnight events, no SOB this am, still c/o left leg pain, controlled by Tylenol. will keep NPO for CT abd and f/u x ray of left leg.     MEDICATIONS  (STANDING):  ALBUTerol/ipratropium for Nebulization 3 milliLiter(s) Nebulizer every 6 hours  aspirin  chewable 81 milliGRAM(s) Oral daily  atorvastatin 40 milliGRAM(s) Oral at bedtime  diltiazem    milliGRAM(s) Oral daily  doxazosin 2 milliGRAM(s) Oral at bedtime  enoxaparin Injectable 40 milliGRAM(s) SubCutaneous daily  finasteride 5 milliGRAM(s) Oral daily  furosemide   Injectable 40 milliGRAM(s) IV Push daily  gabapentin 100 milliGRAM(s) Oral two times a day  insulin lispro (HumaLOG) corrective regimen sliding scale   SubCutaneous three times a day before meals  levoFLOXacin IVPB 750 milliGRAM(s) IV Intermittent every 24 hours  lisinopril 40 milliGRAM(s) Oral daily    MEDICATIONS  (PRN):  acetaminophen   Tablet. 650 milliGRAM(s) Oral every 6 hours PRN Mild Pain (1 - 3)      Allergies    No Known Allergies    Intolerances        REVIEW OF SYSTEMS:  CONSTITUTIONAL: No fever, weight loss, or fatigue  RESPIRATORY: No cough, wheezing, chills or hemoptysis; no shortness of breath   CARDIOVASCULAR: No chest pain, palpitations, dizziness, or leg swelling  GASTROINTESTINAL: No abdominal or epigastric pain. No nausea, vomiting, or hematemesis;   NEUROLOGICAL: No headaches, memory loss, loss of strength, numbness, or tremors  EXT: left leg pain  SKIN: No itching, burning, rashes, or lesions     Vital Signs Last 24 Hrs  T(C): 37 (14 Mar 2018 07:44), Max: 37.1 (14 Mar 2018 04:20)  T(F): 98.6 (14 Mar 2018 07:44), Max: 98.7 (14 Mar 2018 04:20)  HR: 80 (14 Mar 2018 07:44) (78 - 90)  BP: 118/52 (14 Mar 2018 07:44) (118/52 - 143/79)  BP(mean): --  RR: 18 (14 Mar 2018 07:44) (16 - 22)  SpO2: 98% (14 Mar 2018 07:44) (96% - 100%)    PHYSICAL EXAM:  GENERAL: NAD, well-groomed, well-developed  CHEST/LUNG: Clear to percussion bilaterally; mild rales on bases  HEART: Regular rate and rhythm; No murmurs, rubs, or gallops  ABDOMEN: Soft, Nontender, Nondistended; Bowel sounds present  NERVOUS SYSTEM:  Alert & Oriented X3, Good concentration; Motor Strength 5/5 right side, 4/5 left side    EXTREMITIES:  2+ Peripheral Pulses, left thigh and lower leg tenderness, chronic lower legs edema      LABS:                        9.7    6.9   )-----------( 209      ( 13 Mar 2018 10:27 )             31.7     03-13    137  |  107  |  32<H>  ----------------------------<  110<H>  4.5   |  24  |  1.01    Ca    8.2<L>      13 Mar 2018 10:27  Phos  3.7     03-13  Mg     1.9     03-13    TPro  5.9<L>  /  Alb  2.6<L>  /  TBili  0.4  /  DBili  x   /  AST  58<H>  /  ALT  38  /  AlkPhos  60  03-13    PT/INR - ( 13 Mar 2018 10:27 )   PT: 12.2 sec;   INR: 1.12 ratio           Urinalysis Basic - ( 13 Mar 2018 04:16 )    Color: Yellow / Appearance: Clear / S.020 / pH: x  Gluc: x / Ketone: Trace  / Bili: Negative / Urobili: Negative   Blood: x / Protein: Negative / Nitrite: Negative   Leuk Esterase: Negative / RBC: 5-10 /HPF / WBC 11-25 /HPF   Sq Epi: x / Non Sq Epi: Few /HPF / Bacteria: Moderate /HPF      CAPILLARY BLOOD GLUCOSE      POCT Blood Glucose.: 122 mg/dL (14 Mar 2018 07:28)  POCT Blood Glucose.: 106 mg/dL (13 Mar 2018 16:40)  POCT Blood Glucose.: 112 mg/dL (13 Mar 2018 11:34)      RADIOLOGY & ADDITIONAL TESTS:    Imaging Personally Reviewed:  [ x] YES  [ ] NO    Consultant(s) Notes Reviewed:  [x ] YES  [ ] NO

## 2018-03-14 NOTE — DISCHARGE NOTE ADULT - MEDICATION SUMMARY - MEDICATIONS TO CHANGE
I will SWITCH the dose or number of times a day I take the medications listed below when I get home from the hospital:  None I will SWITCH the dose or number of times a day I take the medications listed below when I get home from the hospital:    ramipril  -- 10 milligram(s) by mouth once a day

## 2018-03-14 NOTE — PROGRESS NOTE ADULT - SUBJECTIVE AND OBJECTIVE BOX
Meds:    Allergies:  Allergies    No Known Allergies    Intolerances    ROS  [  ] UNABLE TO ELICIT    General:  [  ] None  [  ] Fever  [  ] Chills  [ x ] Malaise    Skin:  [ x ] None [  ] Rash  [  ] Wound  [  ] Ulcer    HEENT:  [ x ] None  [  ] Sore Throat  [  ] Nasal congestion/ runny nose  [  ] Photophobia [  ] Neck pain      Chest:  [  ] None   [ x ] SOB  [ x ] Cough  [  ] None    Cardiovascular:   [  ] None  [  ] CP  [  ] Palpitation [ x ] Orthopnea    Gastrointestinal:  [ x ] None  [  ] Abd pain   [  ] Nausea    [  ] Vomiting   [  ] Diarrhea	     Genitourinary:  [ x ] None [  ] Polyuria   [  ] Urgency  [  ] Frequency  [  ] Dysuria    [  ]  Hematuria       Musculoskeletal:  [  ] None [  ] Back Pain	[  ] Body aches  [  ] Joint pain [ x ] Recurrent fall.    Neurological: [  ] None [  ]Dizziness  [  ]Visual Disturbance  [  ]Headaches   [ x ] Weakness              PHYSICAL EXAM:    Vital Signs Last 24 Hrs  T(C): 36.6 (14 Mar 2018 15:21), Max: 37.1 (14 Mar 2018 04:20)  T(F): 97.9 (14 Mar 2018 15:21), Max: 98.7 (14 Mar 2018 04:20)  HR: 72 (14 Mar 2018 15:21) (70 - 95)  BP: 127/42 (14 Mar 2018 15:21) (100/52 - 143/79)  BP(mean): --  RR: 18 (14 Mar 2018 15:21) (18 - 22)  SpO2: 99% (14 Mar 2018 15:21) (96% - 100%)    Constitutional:    HEENT: [ x ] Wnl  [  ] Pharyngeal congestion    Neck:  [ x ] Supple  [  ]Lymphadenopathy  [ x ] No JVD   [  ] JVD  [  ] Masses   [  ] WNL    CHEST/Respiratory:  [  ]Clear to auscultation  [ x ] Rales   [  ] Rhonchi   [  ] Wheezing     [  ] Chest Tenderness      Cardiovascular:  [ x ] Reg S1 S2   [  ] Irreg S1 S2   [ x ]No Murmur  [  ] +ve Murmurs  [  ]Systolic [  ]Diastolic      Abdomen:  [ x ] Soft  [ x ] No tendrerness  [  ] Tenderness  [  ] Organomegaly  [  ] ABD Distention  [  ] Rigidity                       [ x ] No Regidity                       [ x ] No Rebound Tenderness  [ x ] No Guarding Rigidity  [  ] Rebound Tenderness[  ] Guarding Rigidity                          [ x ]  +ve Bowel Sounds  [  ] Decreased Bowel Sounds    [  ] Absent Bowel Sounds                            Extremities: [  ] No edema [ x ] Edema  [  ] Clubbing   [  ] Cyanosis                         [ x ] No Tender Calf muscles  [  ] Tender Calf muscles                        [ x ] Palpable peripheral pulses    Neurological: [ x ] Awake  [ x ] Alert  [ x ] Oriented  x  3                           [  ] Confused  [  ] Drowsy  [  ] respond to painful stimuli  [  ] Unresponsive    Skin:  [ x ] Intact [  ] Redness [  ] Thrombophlebitis  [  ] Rashes  [  ] Dry  [  ] Ulcers    Ortho:  [  ] Joint Swelling  [  ] Joint erythema [  ] Joint tenderness                [  ] Increased temp. to touch  [  ] DJD [ x ] WNL          LABS/DIAGNOSTIC TESTS                          10.0   6.7   )-----------( 194      ( 14 Mar 2018 06:59 )             30.7         03-14    140  |  105  |  26<H>  ----------------------------<  99  4.8   |  26  |  1.00    Ca    8.6      14 Mar 2018 06:59  Phos  3.4     03-14  Mg     2.0     03-14    TPro  6.4  /  Alb  2.7<L>  /  TBili  0.5  /  DBili  x   /  AST  55<H>  /  ALT  40  /  AlkPhos  63  03-14      LIVER FUNCTIONS - ( 14 Mar 2018 06:59 )  Alb: 2.7 g/dL / Pro: 6.4 g/dL / ALK PHOS: 63 U/L / ALT: 40 U/L DA / AST: 55 U/L / GGT: x               CULTURES:   Culture - Urine (03.13.18 @ 09:58)    Specimen Source: .Urine Clean Catch (Midstream)    Culture Results:   No growth    Culture - Blood (03.13.18 @ 09:42)    Specimen Source: .Blood Blood-Peripheral    Culture Results:   No growth to date.    Culture - Blood (03.13.18 @ 09:42)    Specimen Source: .Blood Blood-Peripheral    Culture Results:   No growth to date.        RADIOLOGY  RADIOLOGY      EXAM:  CT ANGIO CHEST (W)AW IC                            PROCEDURE DATE:  03/13/2018          INTERPRETATION:  CLINICAL INFORMATION: Pain    COMPARISON: CT chest 7/9/2017    PROCEDURE:   CT Angiography of the Chest.  70 ml of Omnipaque 350 was injected intravenously.   Sagittal and coronal reformats were performed as well as MIPS.    FINDINGS:    CHEST:     Limited examination due to motion.    LUNGS, LARGE AIRWAYS, PLEURA: Central airways are patent. There is likely   trace pleural effusion along the right major fissure. Scattered   groundglass opacities particularly along the upper lobes, right greater   than left, as well as the right lower lobe may indicate pulmonary edema   or multifocal infection.    VESSELS: Limited evaluation for apulmonary embolism especially along the   segmental and subsegmental branches. No definite pulmonary embolism is   seen. Aortic atherosclerosis  HEART: Cardiomegaly. No pericardial effusion. Coronary atherosclerosis  MEDIASTINUM AND TANIKA: Shotty mediastinal lymph nodes are nonspecific  CHEST WALL AND LOWER NECK: Anasarca along the visualized abdominal   subcutaneous tissues  VISUALIZED UPPER ABDOMEN: Gallstones. Partially visualized 4.5 x 3.4 cm   left retroperitoneal/paraspinal lesion.  BONES: Degenerative changes    IMPRESSION:     No definite pulmonary embolism given significant motion artifact.    Multifocal groundglass opacities along the upper lobes, right greater   than left as well as right lower lobe. This may be related to pulmonary  edema or multifocal infection. Correlate with symptoms. Follow-up after   treatment    Partially visualized 4.5 cm left retroperitoneal/paraspinal mass is not   well characterized. CT abdomen/pelvis recommended.        Assessment and Recommendation:   78  y/ o. 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   Patient was admitted for pneumonia and CHF and was started on IV Levaquin, that was subsequently discontinued in view of absent symptoms and procalcitonin level of 0.08.    Problem/Recommendation - 1:  Problem: PNA (Questionable pneumonia).   Recommendation:   1- Discontinue Levaquin and Closely observe the patient.  2- Cardiac management for CHF.  3- O2 as needed.  4- Pulmonary management.  5- F/u CT abdomen.    Problem/Recommendation - 2:  ·  Problem: Anemia.    Recommendation:   1- Anemia profile.  2- Iron supplements.  3- Closely monitor H & H.  4- Observe for bleeding.     Problem/Recommendation - 3:  ·  Problem: Diabetes.    Recommendation:   1- Blood sugar monitoring and control.  2- Accu-Cheks with coverage.  3- 1800 sheldon ADA diet.  4- Follow HB A1C.     Problem/Recommendation - 4:  ·  Problem: Hypertension.    Recommendation:   1- Monitor Blood pressure closely.  2- Blood pressure control.  3- BP. meds as per cardiology and primary care team.       Discussed with medical resident.

## 2018-03-14 NOTE — PROGRESS NOTE ADULT - PROBLEM SELECTOR PLAN 7
Chronic venous insufficiency, hx of DVT in past on leg, was on Xarelto for long time then was told to stop as he no longer needed it   Right leg > left, warmth  doppler lower ext negative for DVT  Keep leg elevated

## 2018-03-14 NOTE — DISCHARGE NOTE ADULT - PLAN OF CARE
symptoms resolved Your repeat Echocardiogram shows EF 55%, moderate Aortic stenosis. Please continue lasix  ---- and please follow up with primary care doctor and cardiologist within one week. clear infection Your already completed 7 days unasyn for cellulitis. Please  follow up with primary care doctor within one week. Your CT abd shows asymmetric enlargement of the left psoas muscle intramuscular hematoma, likely due to falls. Your red blood cell count stable. Please  follow up with primary care doctor within one week. keep BP <140/90mmHg Please continue home meds for HTN as above dose and Please  follow up with primary care doctor within one week. Your repeat Echocardiogram shows EF 55%, moderate Aortic stenosis. Please continue ramipril to 5mg daily and cardizem 120mg once a day , lasix 40mg two times a day as above instruction doses and please follow up with primary care doctor and cardiologist within one week. Please decrease ramipril to 5mg daily and continue cardizem 120mg once a day , lasix 40mg two times a day for HTN as above dose and Please  follow up with primary care doctor within one week. Your repeat Echocardiogram shows EF 55%, moderate Aortic stenosis. Please continue ramipril to 5mg daily and Cardizem 120mg once a day , Lasix 40mg two times a day as above instruction doses and please follow up with primary care doctor and cardiologist within one week. Your already completed 7 days Unasyn for cellulitis. Please  follow up with primary care doctor within one week. Please decrease ramipril to 5mg daily and continue Cardizem 120mg once a day , Lasix 40mg two times a day for HTN as above dose and Please  follow up with primary care doctor within one week.

## 2018-03-14 NOTE — PHYSICAL THERAPY INITIAL EVALUATION ADULT - DIAGNOSIS, PT EVAL
Patient presented with pain limiting ability to perform AROM on L LE, impaired balance with transfers

## 2018-03-14 NOTE — PROGRESS NOTE ADULT - PROBLEM SELECTOR PLAN 2
P/w worsening SOB, productive cough with sputum   Meets SIRS criteria ( temp<96, tachypenic, source PNA)  RVP negative, Afebrile, no wbc count, lactate wnl  Given 3 L bolus, vancomycin, Levaquin in ER  CT angio shows IMPRESSION: Multifocal groundglass opacities along the upper lobes, right greater than left as well as right lower lobe. This may be related to pulmonary edema or multifocal infection.   C/w levaquin, duoneb for now   F/up sputum cx, blood cx  ID consult Dr Beauchamp Bilateral groundglass opacities on CXR; CT angio shows IMPRESSION: Multifocal groundglass opacities along the upper lobes, right greater than left as well as right lower lobe. This may be related to pulmonary edema or multifocal infection.   Empirically started on levaquin; Continue duoneb for now   F/up sputum cx, blood cx

## 2018-03-14 NOTE — PROGRESS NOTE ADULT - PROBLEM SELECTOR PLAN 8
IMPROVE VTE Individual Risk Assessment    RISK                                                          Points  [x] Previous VTE                                           3  [] Thrombophilia                                        2  [] Lower limb paralysis                              2   [] Current Cancer                                       2   [] Immobilization > 24 hrs                        1  [] ICU/CCU stay > 24 hours                       1  [x] Age > 60                                                   1    IMPROVE VTE Score: 4  Need DVT ppx , on Lovenox  no need for GI ppx

## 2018-03-14 NOTE — CHART NOTE - NSCHARTNOTEFT_GEN_A_CORE
Care transferred to fulltime hospitalist on 3/14/18 from Dr Bliss as this is an [facility] patient ( Dr Cameron Guidry- PCP)

## 2018-03-14 NOTE — PROGRESS NOTE ADULT - SUBJECTIVE AND OBJECTIVE BOX
Patient is a 78y old  Male who presents with a chief complaint of Fall x 2, worsening SOB (13 Mar 2018 09:48)  awake, alert, comfortable in bed in NAD. Feeling better    INTERVAL HPI/OVERNIGHT EVENTS:      VITAL SIGNS:  T(F): 97.9 (18 @ 15:21)  HR: 72 (18 @ 15:21)  BP: 127/42 (18 @ 15:21)  RR: 18 (18 @ 15:21)  SpO2: 99% (18 @ 15:21)  Wt(kg): --  I&O's Detail    13 Mar 2018 07:01  -  14 Mar 2018 07:00  --------------------------------------------------------  IN:    Solution: 150 mL  Total IN: 150 mL    OUT:    Voided: 450 mL  Total OUT: 450 mL    Total NET: -300 mL              REVIEW OF SYSTEMS:    CONSTITUTIONAL:  No fevers, chills, sweats    HEENT:  Eyes:  No diplopia or blurred vision. ENT:  No earache, sore throat or runny nose.    CARDIOVASCULAR:  No pressure, squeezing, tightness, or heaviness about the chest; no palpitations.    RESPIRATORY:  Per HPI    GASTROINTESTINAL:  No abdominal pain, nausea, vomiting or diarrhea.    GENITOURINARY:  No dysuria, frequency or urgency.    NEUROLOGIC:  No paresthesias, fasciculations, seizures or weakness.    PSYCHIATRIC:  No disorder of thought or mood.      PHYSICAL EXAM:    Constitutional: Well developed and nourished  Eyes:Perrla  ENMT: normal  Neck:supple  Respiratory: Occasional ronchi bilaterally  Cardiovascular: S1 S2 regular  Gastrointestinal: Soft, Non tender  Extremities: + edema  Vascular:normal  Neurological:Awake, alert,Ox3  Musculoskeletal:Normal      MEDICATIONS  (STANDING):  ALBUTerol/ipratropium for Nebulization 3 milliLiter(s) Nebulizer every 6 hours  aspirin  chewable 81 milliGRAM(s) Oral daily  atorvastatin 40 milliGRAM(s) Oral at bedtime  diltiazem    milliGRAM(s) Oral daily  doxazosin 2 milliGRAM(s) Oral at bedtime  enoxaparin Injectable 40 milliGRAM(s) SubCutaneous daily  ferrous    sulfate 325 milliGRAM(s) Oral daily  finasteride 5 milliGRAM(s) Oral daily  furosemide   Injectable 40 milliGRAM(s) IV Push daily  gabapentin 100 milliGRAM(s) Oral two times a day  insulin lispro (HumaLOG) corrective regimen sliding scale   SubCutaneous three times a day before meals  lisinopril 40 milliGRAM(s) Oral daily    MEDICATIONS  (PRN):  acetaminophen   Tablet. 650 milliGRAM(s) Oral every 6 hours PRN Mild Pain (1 - 3)      Allergies    No Known Allergies    Intolerances        LABS:                        10.0   6.7   )-----------( 194      ( 14 Mar 2018 06:59 )             30.7     03-14    140  |  105  |  26<H>  ----------------------------<  99  4.8   |  26  |  1.00    Ca    8.6      14 Mar 2018 06:59  Phos  3.4     -  Mg     2.0     -14    TPro  6.4  /  Alb  2.7<L>  /  TBili  0.5  /  DBili  x   /  AST  55<H>  /  ALT  40  /  AlkPhos  63  03-14    PT/INR - ( 13 Mar 2018 10:27 )   PT: 12.2 sec;   INR: 1.12 ratio           Urinalysis Basic - ( 13 Mar 2018 04:16 )    Color: Yellow / Appearance: Clear / S.020 / pH: x  Gluc: x / Ketone: Trace  / Bili: Negative / Urobili: Negative   Blood: x / Protein: Negative / Nitrite: Negative   Leuk Esterase: Negative / RBC: 5-10 /HPF / WBC 11-25 /HPF   Sq Epi: x / Non Sq Epi: Few /HPF / Bacteria: Moderate /HPF        CARDIAC MARKERS ( 13 Mar 2018 17:05 )  0.027 ng/mL / x     / 1341 U/L / x     / 11.7 ng/mL  CARDIAC MARKERS ( 13 Mar 2018 10:27 )  0.030 ng/mL / x     / 1326 U/L / x     / 12.6 ng/mL  CARDIAC MARKERS ( 13 Mar 2018 09:06 )  <0.015 ng/mL / x     / 1374 U/L / x     / 15.6 ng/mL      CAPILLARY BLOOD GLUCOSE      POCT Blood Glucose.: 194 mg/dL (14 Mar 2018 11:34)  POCT Blood Glucose.: 122 mg/dL (14 Mar 2018 07:28)  POCT Blood Glucose.: 106 mg/dL (13 Mar 2018 16:40)    pro-bnp 1112  @ 03:00     d-dimer --   @ 03:00      RADIOLOGY & ADDITIONAL TESTS:    CXR:  < from: Xray Hip 2-3 Views, Left (18 @ 14:16) >  IMPRESSION:  No radiographic evidence of acutefracture or dislocation. If symptoms   persist, consider CT or MRI exam to exclude occult fracture.      < end of copied text >  < from: CT Abdomen and Pelvis w/ IV Cont (18 @ 14:14) >  Impression: Asymmetric enlargement of the left psoas muscle; this may be   due to muscular hypertrophy, or underlying intramuscular hematoma or   mass. If clinically indicated, lumbar spine MR without and with IV   contrast may be pursued for further evaluation after 24 hours.     Small bilateral pleural perfusions.    Patchy airspace opacifications in both lungs may be due to pneumonia or   pulmonary edema. Clinical correlation is recommended.    Cholelithiasis. Mild stranding adjacent to the gallbladder and the   duodenum may represent acute cholecystitis and/or nonspecific duodenitis.   Clinical correlation is recommended. If clinically indicated, HIDA scan   may be pursued for further evaluation.    Sclerotic lesion in L5 vertebra. If clinically indicated, bone scan may   be pursued to rule out osseous metastasis.    Small pelvic free fluid.    < end of copied text >    Ct scan chest:    ekg;    echo:

## 2018-03-14 NOTE — PHYSICAL THERAPY INITIAL EVALUATION ADULT - PERTINENT HX OF CURRENT PROBLEM, REHAB EVAL
Patient s/p fall at home twice.Imaging for Brain and cervical spine negative for fracture Patient s/p fall at home twice.Imaging for Brain and cervical spine negative for fracture or acute changes

## 2018-03-14 NOTE — DISCHARGE NOTE ADULT - MEDICATION SUMMARY - MEDICATIONS TO STOP TAKING
I will STOP taking the medications listed below when I get home from the hospital:    ramipril  -- 10 milligram(s) by mouth once a day I will STOP taking the medications listed below when I get home from the hospital:  None

## 2018-03-14 NOTE — DISCHARGE NOTE ADULT - HOSPITAL COURSE
Patient is a 78  year old male with PMHx  NIDDM, HTN, Chronic lymphedema of bilateral legs, left knee arthritis , DVT of bilateral legs ( left peroneal vein and right posterior tibial veins, on Xarelto for sometime then stopped), BPH, PSHx of right incarcerated inguinal hernia repair., presented with fall x 2 , worsening SOB from 2 days, productive cough admitted to tele for SOB with concern for CHF , had CAP, falls. Patient is a 78  y/ o male with PMHx  NIDDM, HTN, Chronic lymphedema of bilateral legs, left knee arthritis , DVT of bilateral legs ( left peroneal vein and right posterior tibial veins, on Xarelto for sometime then stopped), BPH, PSHx of right incarcerated inguinal hernia repair., presented with fall x 2 , worsening SOB , productive cough admitted to tele for SOB with concern for CHF , s/p two falls. Pt was admitted for CHF exacerbation.In 2017 echo with Ef > 53%, G2DD, S/P 3 L bolus in ED, later was very SOB  ECG NSR @75, Troponin x 3 negative, given IV and po lasix 40mg bid, diuresis well  ,repeat ECHO shows EF 55%, moderate AS  US LE negative for DVT , CT angio chest negative for PE, Monitor on tele, aspirin, statin, Cardizem, ramipril. Patient has falls times x 2, Non-focal neuro exam, CK elevated, got the fluids, for now stop the fluid, CT head negative , CT cervical spine wnl  Xray of that sides negative , syphilis RPR negative, CT abd shows asymmetric enlargement of the left psoas muscle; this may be   due to muscular hypertrophy, or underlying intramuscular hematoma due to s/p fall, Hb stable, Pain controlled with Tylenol.  PT recommended sub acute rehab. Pt also has cellulitis of lower legs.  afebrile , no leucocytosis, BCx negative, given antibiotics for total 7 days. Bp currently stable, c/w home med ramipril, diltiazem and DASH/TLC diet.     Pt was seen and examined at bedside, medical stable to be discharged to sub acute rehab. Patient is a 78  y/ o male with PMHx  NIDDM, HTN, Chronic lymphedema of bilateral legs, left knee arthritis , DVT of bilateral legs ( left peroneal vein and right posterior tibial veins, on Xarelto for sometime then stopped), BPH, PSHx of right incarcerated inguinal hernia repair., presented with fall x 2 , worsening SOB , productive cough admitted to tele for SOB with concern for CHF , s/p two falls. Pt was admitted for CHF exacerbation.In 2017 echo with Ef > 53%, G2DD, S/P 3 L bolus in ED, later was very SOB, ECG NSR @75, Troponin x 3 negative, given IV and po lasix 40mg bid, diuresis well  ,repeat ECHO shows EF 55%, moderate AS, US LE negative for DVT , CT angio chest negative for PE, Monitor on tele, aspirin, statin, Cardizem, ramipril. Patient has falls times x 2, Non-focal neuro exam, CK elevated, got the fluids, for now stop the fluid, CT head negative , CT cervical spine wnl, Xray of that sides negative , syphilis RPR negative, CT abd shows asymmetric enlargement of the left psoas muscle; this may be due to muscular hypertrophy, or underlying intramuscular hematoma due to s/p fall, Hb stable, Pain controlled with Tylenol.  PT recommended sub acute rehab. Pt also has cellulitis of lower legs.  afebrile , no leucocytosis, BCx negative, given antibiotics for total 7 days. Bp currently stable, c/w home med ramipril, diltiazem and DASH/TLC diet.     Pt was seen and examined at bedside, medical stable to be discharged to sub acute rehab. Discussed with Attending . Patient is a 78  y/ o male with PMHx  NIDDM, HTN, Chronic lymphedema of bilateral legs, left knee arthritis , DVT of bilateral legs ( left peroneal vein and right posterior tibial veins, on Xarelto for sometime then stopped), BPH, PSHx of right incarcerated inguinal hernia repair., presented with fall x 2 , worsening SOB , productive cough admitted to tele for SOB with concern for CHF , s/p two falls. Pt was admitted for CHF exacerbation.In 2017 echo with Ef > 53%, G2DD, S/P 3 L bolus in ED, later was very SOB, ECG NSR @75, Troponin x 3 negative, given IV and po lasix 40mg bid, diuresis well ,repeat ECHO shows EF 55%, moderate AS, US LE negative for DVT , CT angio chest negative for PE, Monitor on tele, aspirin, statin, Cardizem, ramipril. Patient has falls times x 2, Non-focal neuro exam, CK elevated, got the fluids, for now stop the fluid, CT head negative , CT cervical spine wnl, Xray of that sides negative , syphilis RPR negative, CT abd shows asymmetric enlargement of the left psoas muscle; this may be due to muscular hypertrophy, or underlying intramuscular hematoma due to s/p fall, Hb stable, Pain controlled with Tylenol.  PT recommended sub acute rehab. Pt also has cellulitis of lower legs.  afebrile , no leucocytosis, BCx negative, given antibiotics for total 7 days. Bp currently stable, decrease ramipril to 5mg daily and continue cardizem 120mg once a day , lasix 40mg two times a day  and DASH/TLC diet.     Pt was seen and examined at bedside, medical stable to be discharged to sub acute rehab. Discussed with Attending . Patient is a 78  y/ o male with PMHx  NIDDM, HTN, Chronic lymphedema of bilateral legs, left knee arthritis , DVT of bilateral legs ( left peroneal vein and right posterior tibial veins, on Xarelto for sometime then stopped), BPH, PSHx of right incarcerated inguinal hernia repair., presented with fall x 2 , worsening SOB , productive cough admitted to tele for SOB with concern for CHF , s/p two falls. Pt was admitted for CHF exacerbation.In 2017 echo with Ef > 53%, G2DD, S/P 3 L bolus in ED, later was very SOB, ECG NSR @75, Troponin x 3 negative, given IV and po lasix 40mg bid, diuresis well ,repeat ECHO shows EF 55%, moderate AS, US LE negative for DVT , CT angio chest negative for PE, Monitor on tele, aspirin, statin, Cardizem, ramipril. Patient has falls times x 2, Non-focal neuro exam, CK elevated, got the fluids which were discontinued due to heart failure. CT head negative , CT cervical spine wnl, Xray of that sides negative , syphilis RPR negative, CT abd shows asymmetric enlargement of the left psoas muscle; this may be due to muscular hypertrophy, or underlying intramuscular hematoma due to s/p fall.    HOSPITAL COURSE: Hb remained stable, Pain was controlled with Tylenol.  PT recommended sub acute rehab. Pt was also treated for cellulitis of lower extremities He was  afebrile and without leucocytosis, Blood cultures negative and he was given antibiotics for total 7 days. BP currently stable, decrease ramipril to 5mg daily and continue Cardizem 120mg once a day , Lasix 40mg two times a day  and DASH/ TLC diet.     Pt was seen and examined at bedside, medical stable to be discharged to sub acute rehab. Discussed with Attending .     ATTENDING ADDENDUM:  Patient was hospitalized due to respiratory distress due to Acute systolic heart failure and presumed pneumonia. He also had cellulitis of the lower extremity. He had recent multiple fall episodes and there was concern for hematoma of the left hip around the psoas muscle. Anticoagulation was held due to anemia and presumed hematoma. Dopplers of the lower extremity was negative for acute DVT.  He was seen in consultation by Pulmonologist and infectious disease ( consults requested by the admitting attending Physician Dr Bliss)  due to progressive SOB and concern for infectious etiology.   Hospital course was otherwise uncomplicated. He improved overtime with stabilization of his cardiopulmonary system. His cellulitis also resolved.   He was discharged in stable medical condition to subacute rehab due to muscle weakness and deconditioning post hospitalization.  Discharge meds as listed above.

## 2018-03-15 DIAGNOSIS — L03.90 CELLULITIS, UNSPECIFIED: ICD-10-CM

## 2018-03-15 DIAGNOSIS — K80.20 CALCULUS OF GALLBLADDER WITHOUT CHOLECYSTITIS WITHOUT OBSTRUCTION: ICD-10-CM

## 2018-03-15 DIAGNOSIS — T14.8XXA OTHER INJURY OF UNSPECIFIED BODY REGION, INITIAL ENCOUNTER: ICD-10-CM

## 2018-03-15 DIAGNOSIS — R93.8 ABNORMAL FINDINGS ON DIAGNOSTIC IMAGING OF OTHER SPECIFIED BODY STRUCTURES: ICD-10-CM

## 2018-03-15 DIAGNOSIS — N17.9 ACUTE KIDNEY FAILURE, UNSPECIFIED: ICD-10-CM

## 2018-03-15 LAB
ACE SERPL-CCNC: 21 U/L — SIGNIFICANT CHANGE UP (ref 14–82)
ANION GAP SERPL CALC-SCNC: 8 MMOL/L — SIGNIFICANT CHANGE UP (ref 5–17)
BASOPHILS # BLD AUTO: 0.1 K/UL — SIGNIFICANT CHANGE UP (ref 0–0.2)
BASOPHILS NFR BLD AUTO: 1.4 % — SIGNIFICANT CHANGE UP (ref 0–2)
BUN SERPL-MCNC: 35 MG/DL — HIGH (ref 7–18)
CALCIUM SERPL-MCNC: 8.5 MG/DL — SIGNIFICANT CHANGE UP (ref 8.4–10.5)
CANCER AG125 SERPL-ACNC: 13 U/ML — SIGNIFICANT CHANGE UP
CEA SERPL-MCNC: 2.6 NG/ML — SIGNIFICANT CHANGE UP (ref 0–3.8)
CHLORIDE SERPL-SCNC: 103 MMOL/L — SIGNIFICANT CHANGE UP (ref 96–108)
CO2 SERPL-SCNC: 27 MMOL/L — SIGNIFICANT CHANGE UP (ref 22–31)
CREAT SERPL-MCNC: 1.64 MG/DL — HIGH (ref 0.5–1.3)
EOSINOPHIL # BLD AUTO: 0.3 K/UL — SIGNIFICANT CHANGE UP (ref 0–0.5)
EOSINOPHIL NFR BLD AUTO: 4.7 % — SIGNIFICANT CHANGE UP (ref 0–6)
GLUCOSE SERPL-MCNC: 96 MG/DL — SIGNIFICANT CHANGE UP (ref 70–99)
HCT VFR BLD CALC: 30.1 % — LOW (ref 39–50)
HCT VFR BLD CALC: 30.6 % — LOW (ref 39–50)
HGB BLD-MCNC: 9.1 G/DL — LOW (ref 13–17)
HGB BLD-MCNC: 9.3 G/DL — LOW (ref 13–17)
LEGIONELLA AG UR QL: NEGATIVE — SIGNIFICANT CHANGE UP
LYMPHOCYTES # BLD AUTO: 0.8 K/UL — LOW (ref 1–3.3)
LYMPHOCYTES # BLD AUTO: 10.8 % — LOW (ref 13–44)
MCHC RBC-ENTMCNC: 25.5 PG — LOW (ref 27–34)
MCHC RBC-ENTMCNC: 25.7 PG — LOW (ref 27–34)
MCHC RBC-ENTMCNC: 30.4 GM/DL — LOW (ref 32–36)
MCHC RBC-ENTMCNC: 30.4 GM/DL — LOW (ref 32–36)
MCV RBC AUTO: 84 FL — SIGNIFICANT CHANGE UP (ref 80–100)
MCV RBC AUTO: 84.5 FL — SIGNIFICANT CHANGE UP (ref 80–100)
MONOCYTES # BLD AUTO: 0.9 K/UL — SIGNIFICANT CHANGE UP (ref 0–0.9)
MONOCYTES NFR BLD AUTO: 12.3 % — SIGNIFICANT CHANGE UP (ref 2–14)
NEUTROPHILS # BLD AUTO: 5.1 K/UL — SIGNIFICANT CHANGE UP (ref 1.8–7.4)
NEUTROPHILS NFR BLD AUTO: 70.8 % — SIGNIFICANT CHANGE UP (ref 43–77)
PLATELET # BLD AUTO: 212 K/UL — SIGNIFICANT CHANGE UP (ref 150–400)
PLATELET # BLD AUTO: 214 K/UL — SIGNIFICANT CHANGE UP (ref 150–400)
POTASSIUM SERPL-MCNC: 4.7 MMOL/L — SIGNIFICANT CHANGE UP (ref 3.5–5.3)
POTASSIUM SERPL-SCNC: 4.7 MMOL/L — SIGNIFICANT CHANGE UP (ref 3.5–5.3)
PSA FLD-MCNC: 0.17 NG/ML — SIGNIFICANT CHANGE UP (ref 0–4)
RBC # BLD: 3.59 M/UL — LOW (ref 4.2–5.8)
RBC # BLD: 3.61 M/UL — LOW (ref 4.2–5.8)
RBC # FLD: 13.9 % — SIGNIFICANT CHANGE UP (ref 10.3–14.5)
RBC # FLD: 14.2 % — SIGNIFICANT CHANGE UP (ref 10.3–14.5)
SODIUM SERPL-SCNC: 138 MMOL/L — SIGNIFICANT CHANGE UP (ref 135–145)
WBC # BLD: 7 K/UL — SIGNIFICANT CHANGE UP (ref 3.8–10.5)
WBC # BLD: 7.2 K/UL — SIGNIFICANT CHANGE UP (ref 3.8–10.5)
WBC # FLD AUTO: 7 K/UL — SIGNIFICANT CHANGE UP (ref 3.8–10.5)
WBC # FLD AUTO: 7.2 K/UL — SIGNIFICANT CHANGE UP (ref 3.8–10.5)

## 2018-03-15 PROCEDURE — 99233 SBSQ HOSP IP/OBS HIGH 50: CPT | Mod: GC

## 2018-03-15 RX ORDER — FUROSEMIDE 40 MG
20 TABLET ORAL ONCE
Qty: 0 | Refills: 0 | Status: COMPLETED | OUTPATIENT
Start: 2018-03-15 | End: 2018-03-15

## 2018-03-15 RX ADMIN — Medication 120 MILLIGRAM(S): at 05:43

## 2018-03-15 RX ADMIN — AMPICILLIN SODIUM AND SULBACTAM SODIUM 100 GRAM(S): 250; 125 INJECTION, POWDER, FOR SUSPENSION INTRAMUSCULAR; INTRAVENOUS at 18:08

## 2018-03-15 RX ADMIN — Medication 3 MILLILITER(S): at 20:45

## 2018-03-15 RX ADMIN — Medication 1: at 11:35

## 2018-03-15 RX ADMIN — GABAPENTIN 100 MILLIGRAM(S): 400 CAPSULE ORAL at 17:52

## 2018-03-15 RX ADMIN — Medication 100 MILLIGRAM(S): at 05:43

## 2018-03-15 RX ADMIN — FINASTERIDE 5 MILLIGRAM(S): 5 TABLET, FILM COATED ORAL at 11:37

## 2018-03-15 RX ADMIN — Medication 1: at 16:34

## 2018-03-15 RX ADMIN — Medication 2 MILLIGRAM(S): at 21:26

## 2018-03-15 RX ADMIN — Medication 40 MILLIGRAM(S): at 17:52

## 2018-03-15 RX ADMIN — Medication 81 MILLIGRAM(S): at 11:37

## 2018-03-15 RX ADMIN — AMPICILLIN SODIUM AND SULBACTAM SODIUM 100 GRAM(S): 250; 125 INJECTION, POWDER, FOR SUSPENSION INTRAMUSCULAR; INTRAVENOUS at 05:43

## 2018-03-15 RX ADMIN — ATORVASTATIN CALCIUM 40 MILLIGRAM(S): 80 TABLET, FILM COATED ORAL at 21:26

## 2018-03-15 RX ADMIN — GABAPENTIN 100 MILLIGRAM(S): 400 CAPSULE ORAL at 05:43

## 2018-03-15 RX ADMIN — LISINOPRIL 40 MILLIGRAM(S): 2.5 TABLET ORAL at 05:43

## 2018-03-15 RX ADMIN — AMPICILLIN SODIUM AND SULBACTAM SODIUM 100 GRAM(S): 250; 125 INJECTION, POWDER, FOR SUSPENSION INTRAMUSCULAR; INTRAVENOUS at 23:36

## 2018-03-15 RX ADMIN — Medication 3 MILLILITER(S): at 14:23

## 2018-03-15 RX ADMIN — SENNA PLUS 2 TABLET(S): 8.6 TABLET ORAL at 21:26

## 2018-03-15 RX ADMIN — Medication 100 MILLIGRAM(S): at 17:52

## 2018-03-15 RX ADMIN — Medication 325 MILLIGRAM(S): at 11:37

## 2018-03-15 RX ADMIN — Medication 20 MILLIGRAM(S): at 11:36

## 2018-03-15 RX ADMIN — Medication 3 MILLILITER(S): at 08:23

## 2018-03-15 RX ADMIN — AMPICILLIN SODIUM AND SULBACTAM SODIUM 100 GRAM(S): 250; 125 INJECTION, POWDER, FOR SUSPENSION INTRAMUSCULAR; INTRAVENOUS at 11:37

## 2018-03-15 RX ADMIN — AMPICILLIN SODIUM AND SULBACTAM SODIUM 100 GRAM(S): 250; 125 INJECTION, POWDER, FOR SUSPENSION INTRAMUSCULAR; INTRAVENOUS at 00:52

## 2018-03-15 RX ADMIN — Medication 40 MILLIGRAM(S): at 05:54

## 2018-03-15 NOTE — PROGRESS NOTE ADULT - PROBLEM SELECTOR PLAN 4
CT angio shows gallstones, partially visualized 4.5 x 3.4 cm left retroperitoneal /paraspinal lesion.  F/up on CT scan abdomen and pelvis -CT abd shows asymmetric enlargement of the left psoas muscle; this may be   due to muscular hypertrophy, or underlying intramuscular hematoma or mass,  Sclerotic lesion in L5 vertebra. If clinically indicated, bone scan may be pursued to rule out osseous metastasis.  -possible due to Fall with possible hematoma around left psoas    -c/w  warm compresses  -Monitor cbc for Hb  Pain control with Tylenol

## 2018-03-15 NOTE — PROGRESS NOTE ADULT - PROBLEM SELECTOR PLAN 6
Bp currently stable, c/w home med ramipril, diltiazem  DASH/TLC diet CT abd shows Cholelithiasis. Mild stranding adjacent to the gallbladder and the   duodenum may represent acute cholecystitis and/or nonspecific duodenitis.   pt had no abd pain, stable currently   may be pursued for further evaluation. changes likely due to pulmonary vascular congestion rather than PNA  discontinued  levaquin; Continue duoneb for now   sputum cx shows moderate gram positive cocci in pairs  blood cx negative  f/u QuantiFeron latent TB  b/l small pleural effusion on CT scan

## 2018-03-15 NOTE — PROGRESS NOTE ADULT - PROBLEM SELECTOR PLAN 7
Chronic venous insufficiency, hx of DVT in past on leg, was on Xarelto for long time then was told to stop as he no longer needed it   Right leg > left, warmth  doppler lower ext negative for DVT  Keep leg elevated On actos at home, hold the med  C/w HSS  HBA1c 7.0

## 2018-03-15 NOTE — PROGRESS NOTE ADULT - PROBLEM SELECTOR PLAN 1
acute on chronic edema on legs, BNP is 1112 on admission  In 2017 echo with Ef > 53%, G2DD, S/P 3 L bolus in ED, later was very SOB  c/w  lasix 40 IV daily    ECG NSR @75  Troponin x 3 negative   F/up repeat ECHO report   US LE negative for DVT   CT angio chest negative for PE  Monitor on tele, aspirin, statin, Cardizem, ramipril acute on chronic edema on legs, BNP is 1112 on admission  In 2017 echo with Ef > 53%, G2DD, S/P 3 L bolus in ED, later was very SOB  c/w  lasix 40 IV bid   UO 1100cc over last 24 hrs, net 0   ECG NSR @75  Troponin x 3 negative   F/up repeat ECHO report   US LE negative for DVT   CT angio chest negative for PE  Monitor on tele, aspirin, statin, Cardizem, ramipril

## 2018-03-15 NOTE — PROGRESS NOTE ADULT - SUBJECTIVE AND OBJECTIVE BOX
Patient is a 78y old  Male who presents with a chief complaint of Fall x 2, worsening SOB (14 Mar 2018 16:06)  Awake, alert, comfortable in bed in NAD. Still with wheezing and sob.    INTERVAL HPI/OVERNIGHT EVENTS:      VITAL SIGNS:  T(F): 98.2 (03-15-18 @ 11:10)  HR: 70 (03-15-18 @ 11:10)  BP: 109/44 (03-15-18 @ 11:10)  RR: 18 (03-15-18 @ 11:10)  SpO2: 98% (03-15-18 @ 11:10)  Wt(kg): --  I&O's Detail    14 Mar 2018 07:01  -  15 Mar 2018 07:00  --------------------------------------------------------  IN:    IV PiggyBack: 150 mL    Oral Fluid: 950 mL  Total IN: 1100 mL    OUT:    Voided: 1100 mL  Total OUT: 1100 mL    Total NET: 0 mL      15 Mar 2018 07:01  -  15 Mar 2018 13:13  --------------------------------------------------------  IN:    Oral Fluid: 200 mL  Total IN: 200 mL    OUT:    Voided: 600 mL  Total OUT: 600 mL    Total NET: -400 mL              REVIEW OF SYSTEMS:    CONSTITUTIONAL:  No fevers, chills, sweats    HEENT:  Eyes:  No diplopia or blurred vision. ENT:  No earache, sore throat or runny nose.    CARDIOVASCULAR:  No pressure, squeezing, tightness, or heaviness about the chest; no palpitations.    RESPIRATORY:  Per HPI    GASTROINTESTINAL:  No abdominal pain, nausea, vomiting or diarrhea.    GENITOURINARY:  No dysuria, frequency or urgency.    NEUROLOGIC:  No paresthesias, fasciculations, seizures or weakness.    PSYCHIATRIC:  No disorder of thought or mood.      PHYSICAL EXAM:    Constitutional: Well developed and nourished  Eyes:Perrla  ENMT: normal  Neck:supple  Respiratory: Wheezes bilaterally  Cardiovascular: S1 S2 regular  Gastrointestinal: Soft, Non tender  Extremities: + edema  Vascular:normal  Neurological:Awake, alert,Ox3  Musculoskeletal:Normal      MEDICATIONS  (STANDING):  ALBUTerol/ipratropium for Nebulization 3 milliLiter(s) Nebulizer every 6 hours  ampicillin/sulbactam  IVPB 1.5 Gram(s) IV Intermittent every 6 hours  ampicillin/sulbactam  IVPB      aspirin  chewable 81 milliGRAM(s) Oral daily  atorvastatin 40 milliGRAM(s) Oral at bedtime  diltiazem    milliGRAM(s) Oral daily  docusate sodium 100 milliGRAM(s) Oral two times a day  doxazosin 2 milliGRAM(s) Oral at bedtime  ferrous    sulfate 325 milliGRAM(s) Oral daily  finasteride 5 milliGRAM(s) Oral daily  furosemide   Injectable 40 milliGRAM(s) IV Push every 12 hours  gabapentin 100 milliGRAM(s) Oral two times a day  insulin lispro (HumaLOG) corrective regimen sliding scale   SubCutaneous three times a day before meals  lisinopril 40 milliGRAM(s) Oral daily  senna 2 Tablet(s) Oral at bedtime    MEDICATIONS  (PRN):  acetaminophen   Tablet. 650 milliGRAM(s) Oral every 6 hours PRN Mild Pain (1 - 3)      Allergies    No Known Allergies    Intolerances        LABS:                        9.3    7.2   )-----------( 212      ( 15 Mar 2018 09:13 )             30.6     03-15    138  |  103  |  35<H>  ----------------------------<  96  4.7   |  27  |  1.64<H>    Ca    8.5      15 Mar 2018 09:13  Phos  3.4     03-14  Mg     2.0     03-14    TPro  6.4  /  Alb  2.7<L>  /  TBili  0.5  /  DBili  x   /  AST  55<H>  /  ALT  40  /  AlkPhos  63  03-14          CARDIAC MARKERS ( 13 Mar 2018 17:05 )  0.027 ng/mL / x     / 1341 U/L / x     / 11.7 ng/mL      CAPILLARY BLOOD GLUCOSE      POCT Blood Glucose.: 188 mg/dL (15 Mar 2018 11:19)  POCT Blood Glucose.: 125 mg/dL (15 Mar 2018 07:29)  POCT Blood Glucose.: 154 mg/dL (14 Mar 2018 20:58)  POCT Blood Glucose.: 187 mg/dL (14 Mar 2018 16:33)    pro-bnp 1112 03-13 @ 03:00     d-dimer --  03-13 @ 03:00      RADIOLOGY & ADDITIONAL TESTS:    CXR:    Ct scan chest:    ekg;    echo:

## 2018-03-15 NOTE — PROGRESS NOTE ADULT - ATTENDING COMMENTS
Patient was seen and examined by myself. Case was discussed with house staff in details. I have reviewed and agree with the plan as outlined above with edits where appropriate.    78  y/ o. male with h/o DM, HTN, Chronic lymphedema of both LE, h/o DVT of bilateral legs off anticoagulation   admitted for worsening SOB with associated progressive leg edema and cough  productive of brownish sputum.  - 1. Acute heart failure- IV Lasix; monitor i/os; daily weights; check echocardiogram; CXR findings likely consistent with pulmonary vascular congestion rather than PNA. Continue Duoneb; oxygen supplementation and PT.  - 2. Cellulitis of LE- continue Unasyn  - 3.  debility and generalized muscle weakness- PT as tolerated  - 4.  Bilateral chronic LE lymphedema- elevated legs in bed; Continue with Lasix.  - 5. Fall with possible hematoma around left psoas- warm compresses. Monitor cbc for hb; Pain control  - 6. H/O DVT with current minimal mobility due to acute illness-  DVT prophylaxis  Other plan as outlined above.  Physical therapy as tolerated..    Plan discussed with patient and family in details at bedside and all their questions / concerns were addressed

## 2018-03-15 NOTE — PROGRESS NOTE ADULT - SUBJECTIVE AND OBJECTIVE BOX
Meds:  Unasyn 1.5 gm IVPB q 6 hours.    Allergies:  Allergies    No Known Allergies    Intolerances    ROS  [  ] UNABLE TO ELICIT    General:  [  ] None  [  ] Fever  [  ] Chills  [ x ] Malaise    Skin:  [ x ] None [  ] Rash  [  ] Wound  [  ] Ulcer    HEENT:  [ x ] None  [  ] Sore Throat  [  ] Nasal congestion/ runny nose  [  ] Photophobia [  ] Neck pain      Chest:  [  ] None   [ x ] SOB  [ x ] Cough  [  ] None    Cardiovascular:   [  ] None  [  ] CP  [  ] Palpitation [ x ] Orthopnea    Gastrointestinal:  [ x ] None  [  ] Abd pain   [  ] Nausea    [  ] Vomiting   [  ] Diarrhea	     Genitourinary:  [ x ] None [  ] Polyuria   [  ] Urgency  [  ] Frequency  [  ] Dysuria    [  ]  Hematuria       Musculoskeletal:  [  ] None [  ] Back Pain	[  ] Body aches  [  ] Joint pain [ x ] Recurrent fall.    Neurological: [  ] None [  ]Dizziness  [  ]Visual Disturbance  [  ]Headaches   [ x ] Weakness          PHYSICAL EXAM:  Vital Signs Last 24 Hrs  T(C): 36.8 (15 Mar 2018 11:10), Max: 37.3 (14 Mar 2018 23:21)  T(F): 98.2 (15 Mar 2018 11:10), Max: 99.2 (14 Mar 2018 23:21)  HR: 70 (15 Mar 2018 11:10) (64 - 73)  BP: 109/44 (15 Mar 2018 11:10) (108/44 - 127/42)  BP(mean): --  RR: 18 (15 Mar 2018 11:10) (18 - 18)  SpO2: 98% (15 Mar 2018 11:10) (98% - 100%)    Constitutional:    HEENT: [ x ] Wnl  [  ] Pharyngeal congestion    Neck:  [ x ] Supple  [  ]Lymphadenopathy  [ x ] No JVD   [  ] JVD  [  ] Masses   [  ] WNL    CHEST/Respiratory:  [  ]Clear to auscultation  [ x ] Rales   [  ] Rhonchi   [  ] Wheezing     [  ] Chest Tenderness      Cardiovascular:  [ x ] Reg S1 S2   [  ] Irreg S1 S2   [ x ]No Murmur  [  ] +ve Murmurs  [  ]Systolic [  ]Diastolic      Abdomen:  [ x ] Soft  [ x ] No tendrerness  [  ] Tenderness  [  ] Organomegaly  [  ] ABD Distention  [  ] Rigidity                       [ x ] No Regidity                       [ x ] No Rebound Tenderness  [ x ] No Guarding Rigidity  [  ] Rebound Tenderness[  ] Guarding Rigidity                          [ x ]  +ve Bowel Sounds  [  ] Decreased Bowel Sounds    [  ] Absent Bowel Sounds                            Extremities: [  ] No edema [ x ] Edema and erythema both legs, with chronic skin changes. [  ] Clubbing   [  ] Cyanosis                         [ x ] No Tender Calf muscles  [  ] Tender Calf muscles                        [ x ] Palpable peripheral pulses    Neurological: [ x ] Awake  [ x ] Alert  [ x ] Oriented  x  3                           [  ] Confused  [  ] Drowsy  [  ] respond to painful stimuli  [  ] Unresponsive    Skin:  [ x ] Intact [  ] Redness [  ] Thrombophlebitis  [  ] Rashes  [  ] Dry  [  ] Ulcers    Ortho:  [  ] Joint Swelling  [  ] Joint erythema [  ] Joint tenderness                [  ] Increased temp. to touch  [  ] DJD [ x ] WNL          LABS/DIAGNOSTIC TESTS                        9.3    7.2   )-----------( 212      ( 15 Mar 2018 09:13 )             30.6   03-15    138  |  103  |  35<H>  ----------------------------<  96  4.7   |  27  |  1.64<H>    Ca    8.5      15 Mar 2018 09:13  Phos  3.4     03-14  Mg     2.0     03-14    TPro  6.4  /  Alb  2.7<L>  /  TBili  0.5  /  DBili  x   /  AST  55<H>  /  ALT  40  /  AlkPhos  63  03-14        CULTURES:   Culture - Sputum . (03.14.18 @ 18:48)    Gram Stain:   No polymorphonuclear leukocytes per low power field  Moderate Squamous epithelial cells per low power field  Moderate Gram positive cocci in pairs, chains and clusters per oil power  field  Few Gram Positive Rods per oil power field  Results consistent with oropharyngeal contamination    Specimen Source: .Sputum Sputum      Culture - Urine (03.13.18 @ 09:58)    Specimen Source: .Urine Clean Catch (Midstream)    Culture Results:   No growth    Culture - Blood (03.13.18 @ 09:42)    Specimen Source: .Blood Blood-Peripheral    Culture Results:   No growth to date.    Culture - Blood (03.13.18 @ 09:42)    Specimen Source: .Blood Blood-Peripheral    Culture Results:   No growth to date.        RADIOLOGY    EXAM:  CT ABDOMEN AND PELVIS IC                            PROCEDURE DATE:  03/14/2018          INTERPRETATION:  CT of the abdomen and pelvis with IV contrast    Clinical Indication: 4.5 cm retroperitoneal mass on prior chest CTA dated   3/13/2018    Technique: Axial multidetector CT images of the abdomen and pelvis are   acquired following the administration of oral and IV contrast (96 cc   Omnipaque-350 administered, 4 cc discarded).    Comparison: No prior abdominal/pelvic CT is available for comparison.   Reference is made with previous chest CTs dated 3/13/2018 and 7/9/2017.    Findings:    Abdomen: Limited sections through the lung bases demonstrate small   bilateral pleural effusions. Small atelectasis bilaterally. Patchy   groundglass density airspace opacifications in both lungs.     There is asymmetric enlargement of the left psoas muscle and this was   seen on the previous chest CT dated 3/13/2018; this is a new finding   since the previous chest CT dated 7/9/2017.     Gallstones in the gallbladder. No evidence for thickened gallbladder wall   or pericholecystic fluid. Mild stranding adjacent to the gallbladder.    The liver, pancreas, spleen, adrenals and kidneys appear unremarkable.    Colonic diverticulosis without evidence for diverticulitis. No bowel   obstruction, or grossly thickened bowel wall. Mild stranding adjacent to   the duodenum.    Small fat-containing periumbilical hernia.    No evidence for free air, ascites, or enlarged lymph node.    Pelvis: The urinary bladder is within normal limits. Sigmoid   diverticulosis without evidence for diverticulitis. Small pelvic free   fluid.    Sclerotic lesions in L5 vertebra.    Impression: Asymmetric enlargement of the left psoas muscle; this may be   due to muscular hypertrophy, or underlying intramuscular hematoma or   mass. If clinically indicated, lumbar spine MR without and with IV   contrast may be pursued for further evaluation after 24 hours.     Small bilateral pleural perfusions.    Patchy airspace opacifications in both lungs may be due to pneumonia or   pulmonary edema. Clinical correlation is recommended.    Cholelithiasis. Mild stranding adjacent to the gallbladder and the   duodenum may represent acute cholecystitis and/or nonspecific duodenitis.   Clinical correlation is recommended. If clinically indicated, HIDA scan   may be pursued for further evaluation.    Sclerotic lesion in L5 vertebra. If clinically indicated, bone scan may   be pursued to rule out osseous metastasis.    Small pelvic free fluid.            Assessment and Recommendation:   78  y/ o. 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   Patient was admitted for pneumonia and CHF and was started on IV Levaquin, that was subsequently discontinued in view of absent symptoms and procalcitonin level of 0.08.  Patient C/O pain left lower extremity and was started on iv Unasyn for cellulitis.     Problem/Recommendation - 1:  Problem: PNA (Questionable pneumonia).   Recommendation:   1- Continue IV Unasyn for left leg cellulitis.  2- Cardiac management for CHF.  3- O2 as needed.  4- Pulmonary management.  5- F/u CT abdomen is noted.  6- Legs elevation in bed.  7- Suggest HIDA scan, and further work up for possible intraabdominal mass.    Problem/Recommendation - 2:  ·  Problem: Anemia.    Recommendation:   1- Anemia profile.  2- Iron supplements.  3- Closely monitor H & H.  4- Observe for bleeding.     Problem/Recommendation - 3:  ·  Problem: Diabetes.    Recommendation:   1- Blood sugar monitoring and control.  2- Accu-Cheks with coverage.  3- 1800 sheldon ADA diet.  4- Follow HB A1C.     Problem/Recommendation - 4:  ·  Problem: Hypertension.    Recommendation:   1- Monitor Blood pressure closely.  2- Blood pressure control.  3- BP. meds as per cardiology and primary care team.       Discussed with medical resident, PCP, patient and patient's wife on the bedside today.

## 2018-03-15 NOTE — PROGRESS NOTE ADULT - PROBLEM SELECTOR PLAN 5
On actos at home, hold the med  C/w HSS  HBA1c 7.0 afebrile , no leucocytosis    BCx negative  started  on Unasyn

## 2018-03-15 NOTE — PROGRESS NOTE ADULT - PROBLEM SELECTOR PLAN 3
Patient has falls times x 2, could be due to generalized weakness as has recent diarrhea and not eating and drinking properly   Non-focal neuro exam   Ck elevated, got the fluids, for now stop the fluid  CT head negative   CT cervical spine wnl  Complain of left knee, leg, thigh pain, f/u Xray of that sides  f/u syphilis RPR   F/up on PT eval Patient has falls times x 2, Non-focal neuro exam   Ck elevated, got the fluids, for now stop the fluid  CT head negative   CT cervical spine wnl  Xray of that sides negative   syphilis RPR negative  PT recommended sub acute rehab

## 2018-03-15 NOTE — PROGRESS NOTE ADULT - SUBJECTIVE AND OBJECTIVE BOX
PGY1 Note discussed with supervising resident and primary attending.    Patient is a 78y old  Male who presents with a chief complaint of Fall x 2, worsening SOB (14 Mar 2018 16:06)      INTERVAL HPI/OVERNIGHT EVENTS:    MEDICATIONS  (STANDING):  ALBUTerol/ipratropium for Nebulization 3 milliLiter(s) Nebulizer every 6 hours  ampicillin/sulbactam  IVPB 1.5 Gram(s) IV Intermittent every 6 hours  ampicillin/sulbactam  IVPB      aspirin  chewable 81 milliGRAM(s) Oral daily  atorvastatin 40 milliGRAM(s) Oral at bedtime  diltiazem    milliGRAM(s) Oral daily  docusate sodium 100 milliGRAM(s) Oral two times a day  doxazosin 2 milliGRAM(s) Oral at bedtime  enoxaparin Injectable 40 milliGRAM(s) SubCutaneous daily  ferrous    sulfate 325 milliGRAM(s) Oral daily  finasteride 5 milliGRAM(s) Oral daily  furosemide   Injectable 40 milliGRAM(s) IV Push every 12 hours  gabapentin 100 milliGRAM(s) Oral two times a day  insulin lispro (HumaLOG) corrective regimen sliding scale   SubCutaneous three times a day before meals  lisinopril 40 milliGRAM(s) Oral daily  senna 2 Tablet(s) Oral at bedtime    MEDICATIONS  (PRN):  acetaminophen   Tablet. 650 milliGRAM(s) Oral every 6 hours PRN Mild Pain (1 - 3)      Allergies    No Known Allergies    Intolerances        REVIEW OF SYSTEMS:  CONSTITUTIONAL: No fever, weight loss, or fatigue  RESPIRATORY: No cough, wheezing, chills or hemoptysis; No shortness of breath  CARDIOVASCULAR: No chest pain, palpitations, dizziness, or leg swelling  GASTROINTESTINAL: No abdominal or epigastric pain. No nausea, vomiting, or hematemesis; No diarrhea or constipation. No melena or hematochezia.  NEUROLOGICAL: No headaches, memory loss, loss of strength, numbness, or tremors  SKIN: No itching, burning, rashes, or lesions     Vital Signs Last 24 Hrs  T(C): 37.1 (15 Mar 2018 07:24), Max: 37.3 (14 Mar 2018 23:21)  T(F): 98.7 (15 Mar 2018 07:24), Max: 99.2 (14 Mar 2018 23:21)  HR: 71 (15 Mar 2018 07:24) (64 - 95)  BP: 114/52 (15 Mar 2018 07:24) (100/52 - 127/42)  BP(mean): --  RR: 18 (15 Mar 2018 07:24) (18 - 18)  SpO2: 98% (15 Mar 2018 07:24) (98% - 100%)    PHYSICAL EXAM:  GENERAL: NAD, well-groomed, well-developed  HEAD:  Atraumatic, Normocephalic  EYES: EOMI, PERRLA, conjunctiva and sclera clear  NECK: Supple, No JVD, Normal thyroid  CHEST/LUNG: Clear to percussion bilaterally; No rales, rhonchi, wheezing, or rubs  HEART: Regular rate and rhythm; No murmurs, rubs, or gallops  ABDOMEN: Soft, Nontender, Nondistended; Bowel sounds present  NERVOUS SYSTEM:  Alert & Oriented X3, Good concentration; Motor Strength 5/5 B/L   EXTREMITIES:  2+ Peripheral Pulses, No clubbing, cyanosis, or edema  SKIN;    LABS:                        10.0   6.7   )-----------( 194      ( 14 Mar 2018 06:59 )             30.7     03-14    140  |  105  |  26<H>  ----------------------------<  99  4.8   |  26  |  1.00    Ca    8.6      14 Mar 2018 06:59  Phos  3.4     03-14  Mg     2.0     03-14    TPro  6.4  /  Alb  2.7<L>  /  TBili  0.5  /  DBili  x   /  AST  55<H>  /  ALT  40  /  AlkPhos  63  03-14    PT/INR - ( 13 Mar 2018 10:27 )   PT: 12.2 sec;   INR: 1.12 ratio             CAPILLARY BLOOD GLUCOSE      POCT Blood Glucose.: 125 mg/dL (15 Mar 2018 07:29)  POCT Blood Glucose.: 154 mg/dL (14 Mar 2018 20:58)  POCT Blood Glucose.: 187 mg/dL (14 Mar 2018 16:33)  POCT Blood Glucose.: 194 mg/dL (14 Mar 2018 11:34)      RADIOLOGY & ADDITIONAL TESTS:    Imaging Personally Reviewed:  [ ] YES  [ ] NO    Consultant(s) Notes Reviewed:  [ ] YES  [ ] NO PGY1 Note discussed with supervising resident and primary attending.    Patient is a 78y old  Male who presents with a chief complaint of Fall x 2, worsening SOB (14 Mar 2018 16:06)      INTERVAL HPI/OVERNIGHT EVENTS: pt had no new overnight events.    MEDICATIONS  (STANDING):  ALBUTerol/ipratropium for Nebulization 3 milliLiter(s) Nebulizer every 6 hours  ampicillin/sulbactam  IVPB 1.5 Gram(s) IV Intermittent every 6 hours  ampicillin/sulbactam  IVPB      aspirin  chewable 81 milliGRAM(s) Oral daily  atorvastatin 40 milliGRAM(s) Oral at bedtime  diltiazem    milliGRAM(s) Oral daily  docusate sodium 100 milliGRAM(s) Oral two times a day  doxazosin 2 milliGRAM(s) Oral at bedtime  enoxaparin Injectable 40 milliGRAM(s) SubCutaneous daily  ferrous    sulfate 325 milliGRAM(s) Oral daily  finasteride 5 milliGRAM(s) Oral daily  furosemide   Injectable 40 milliGRAM(s) IV Push every 12 hours  gabapentin 100 milliGRAM(s) Oral two times a day  insulin lispro (HumaLOG) corrective regimen sliding scale   SubCutaneous three times a day before meals  lisinopril 40 milliGRAM(s) Oral daily  senna 2 Tablet(s) Oral at bedtime    MEDICATIONS  (PRN):  acetaminophen   Tablet. 650 milliGRAM(s) Oral every 6 hours PRN Mild Pain (1 - 3)      Allergies    No Known Allergies    Intolerances        REVIEW OF SYSTEMS:  CONSTITUTIONAL: No fever, weight loss, or fatigue  RESPIRATORY: No cough, wheezing, chills or hemoptysis; still mild shortness of breath   CARDIOVASCULAR:  chronic lower legs swelling, No chest pain, palpitations, dizziness.  GASTROINTESTINAL: No abdominal or epigastric pain. No nausea, vomiting, or hematemesis;   NEUROLOGICAL: No headaches, memory loss, loss of strength, numbness, or tremors  EXT: left leg pain  SKIN: No itching, burning, rashes, or lesions     Vital Signs Last 24 Hrs  T(C): 37.1 (15 Mar 2018 07:24), Max: 37.3 (14 Mar 2018 23:21)  T(F): 98.7 (15 Mar 2018 07:24), Max: 99.2 (14 Mar 2018 23:21)  HR: 71 (15 Mar 2018 07:24) (64 - 95)  BP: 114/52 (15 Mar 2018 07:24) (100/52 - 127/42)  BP(mean): --  RR: 18 (15 Mar 2018 07:24) (18 - 18)  SpO2: 98% (15 Mar 2018 07:24) (98% - 100%)      PHYSICAL EXAM:  GENERAL: NAD, well-groomed, well-developed  CHEST/LUNG: Clear to percussion bilaterally; mild rales on bases  HEART: Regular rate and rhythm; No murmurs, rubs, or gallops  ABDOMEN: Soft, Nontender, Nondistended; Bowel sounds present  NERVOUS SYSTEM:  Alert & Oriented X3, Good concentration; Motor Strength 5/5 right side, 4/5 left side    EXTREMITIES:  2+ Peripheral Pulses, left thigh and lower leg tenderness, chronic lower legs edema    LABS:                        10.0   6.7   )-----------( 194      ( 14 Mar 2018 06:59 )             30.7     03-14    140  |  105  |  26<H>  ----------------------------<  99  4.8   |  26  |  1.00    Ca    8.6      14 Mar 2018 06:59  Phos  3.4     03-14  Mg     2.0     03-14    TPro  6.4  /  Alb  2.7<L>  /  TBili  0.5  /  DBili  x   /  AST  55<H>  /  ALT  40  /  AlkPhos  63  03-14    PT/INR - ( 13 Mar 2018 10:27 )   PT: 12.2 sec;   INR: 1.12 ratio             CAPILLARY BLOOD GLUCOSE      POCT Blood Glucose.: 125 mg/dL (15 Mar 2018 07:29)  POCT Blood Glucose.: 154 mg/dL (14 Mar 2018 20:58)  POCT Blood Glucose.: 187 mg/dL (14 Mar 2018 16:33)  POCT Blood Glucose.: 194 mg/dL (14 Mar 2018 11:34)      RADIOLOGY & ADDITIONAL TESTS:    Imaging Personally Reviewed:  [ x] YES  [ ] NO    Consultant(s) Notes Reviewed:  [ x] YES  [ ] NO PGY1 Note discussed with supervising resident and primary attending.    Patient is a 78y old  Male who presents with a chief complaint of Fall x 2, worsening SOB (14 Mar 2018 16:06)      INTERVAL HPI/OVERNIGHT EVENTS: pt c/o worsening breath this am, physical exam shows moderate rales b/l ,  UO 1100cc over last 24hrs, but pt endorsed decreased urine output since this am. Cr worsening , 1.0-->1.6 this am. Will give 40mg lasix stat.     MEDICATIONS  (STANDING):  ALBUTerol/ipratropium for Nebulization 3 milliLiter(s) Nebulizer every 6 hours  ampicillin/sulbactam  IVPB 1.5 Gram(s) IV Intermittent every 6 hours  ampicillin/sulbactam  IVPB      aspirin  chewable 81 milliGRAM(s) Oral daily  atorvastatin 40 milliGRAM(s) Oral at bedtime  diltiazem    milliGRAM(s) Oral daily  docusate sodium 100 milliGRAM(s) Oral two times a day  doxazosin 2 milliGRAM(s) Oral at bedtime  enoxaparin Injectable 40 milliGRAM(s) SubCutaneous daily  ferrous    sulfate 325 milliGRAM(s) Oral daily  finasteride 5 milliGRAM(s) Oral daily  furosemide   Injectable 40 milliGRAM(s) IV Push every 12 hours  gabapentin 100 milliGRAM(s) Oral two times a day  insulin lispro (HumaLOG) corrective regimen sliding scale   SubCutaneous three times a day before meals  lisinopril 40 milliGRAM(s) Oral daily  senna 2 Tablet(s) Oral at bedtime    MEDICATIONS  (PRN):  acetaminophen   Tablet. 650 milliGRAM(s) Oral every 6 hours PRN Mild Pain (1 - 3)      Allergies    No Known Allergies    Intolerances        REVIEW OF SYSTEMS:  CONSTITUTIONAL:  fatigue  RESPIRATORY: No cough, wheezing, chills or hemoptysis; worsening shortness of breath   CARDIOVASCULAR:  chronic lower legs swelling, No chest pain, palpitations, dizziness.  GASTROINTESTINAL: No abdominal or epigastric pain. No nausea, vomiting, or hematemesis;   NEUROLOGICAL: No headaches, memory loss, loss of strength, numbness, or tremors  EXT: LE swelling    SKIN: No itching, burning, rashes, or lesions     Vital Signs Last 24 Hrs  T(C): 37.1 (15 Mar 2018 07:24), Max: 37.3 (14 Mar 2018 23:21)  T(F): 98.7 (15 Mar 2018 07:24), Max: 99.2 (14 Mar 2018 23:21)  HR: 71 (15 Mar 2018 07:24) (64 - 95)  BP: 114/52 (15 Mar 2018 07:24) (100/52 - 127/42)  BP(mean): --  RR: 18 (15 Mar 2018 07:24) (18 - 18)  SpO2: 98% (15 Mar 2018 07:24) (98% - 100%)      PHYSICAL EXAM:  GENERAL: NAD, well-groomed, well-developed  CHEST/LUNG:  moderated  rales b/l lungs   HEART: Regular rate and rhythm; No murmurs, rubs, or gallops  ABDOMEN: Soft, Nontender, Nondistended; Bowel sounds present  NERVOUS SYSTEM:  Alert & Oriented X3, Good concentration; Motor Strength 5/5 right side, 4/5 left side    EXTREMITIES:  2+ Peripheral Pulses, left thigh and lower leg tenderness, chronic lower legs edema    LABS:                        10.0   6.7   )-----------( 194      ( 14 Mar 2018 06:59 )             30.7     03-14    140  |  105  |  26<H>  ----------------------------<  99  4.8   |  26  |  1.00    Ca    8.6      14 Mar 2018 06:59  Phos  3.4     03-14  Mg     2.0     03-14    TPro  6.4  /  Alb  2.7<L>  /  TBili  0.5  /  DBili  x   /  AST  55<H>  /  ALT  40  /  AlkPhos  63  03-14    PT/INR - ( 13 Mar 2018 10:27 )   PT: 12.2 sec;   INR: 1.12 ratio             CAPILLARY BLOOD GLUCOSE      POCT Blood Glucose.: 125 mg/dL (15 Mar 2018 07:29)  POCT Blood Glucose.: 154 mg/dL (14 Mar 2018 20:58)  POCT Blood Glucose.: 187 mg/dL (14 Mar 2018 16:33)  POCT Blood Glucose.: 194 mg/dL (14 Mar 2018 11:34)      RADIOLOGY & ADDITIONAL TESTS:    Imaging Personally Reviewed:  [ x] YES  [ ] NO    Consultant(s) Notes Reviewed:  [ x] YES  [ ] NO

## 2018-03-15 NOTE — PROGRESS NOTE ADULT - PROBLEM SELECTOR PLAN 2
changes likely due to pulmonary vascular congestion rather than PNA  discontinued  levaquin; Continue duoneb for now   F/up sputum cx, blood cx changes likely due to pulmonary vascular congestion rather than PNA  discontinued  levaquin; Continue duoneb for now   sputum cx shows moderate gram positive cocci in pairs  blood cx negative  f/u QuantiFeron latent TB  b/l small pleural effusion on CT scan Cr base line nl   Cr 1.0-->1.6 this am  UO 1100cc over last 24 hrs, net 0   pt may need US renal

## 2018-03-15 NOTE — PROGRESS NOTE ADULT - PROBLEM SELECTOR PLAN 8
IMPROVE VTE Individual Risk Assessment    RISK                                                          Points  [x] Previous VTE                                           3  [] Thrombophilia                                        2  [] Lower limb paralysis                              2   [] Current Cancer                                       2   [] Immobilization > 24 hrs                        1  [] ICU/CCU stay > 24 hours                       1  [x] Age > 60                                                   1    IMPROVE VTE Score: 4  Need DVT ppx , on Lovenox  no need for GI ppx Bp currently stable, c/w home med ramipril, diltiazem  DASH/TLC diet

## 2018-03-16 LAB
ANION GAP SERPL CALC-SCNC: 9 MMOL/L — SIGNIFICANT CHANGE UP (ref 5–17)
BUN SERPL-MCNC: 40 MG/DL — HIGH (ref 7–18)
CALCIUM SERPL-MCNC: 8.8 MG/DL — SIGNIFICANT CHANGE UP (ref 8.4–10.5)
CALCIUM SERPL-MCNC: 8.8 MG/DL — SIGNIFICANT CHANGE UP (ref 8.4–10.5)
CHLORIDE SERPL-SCNC: 104 MMOL/L — SIGNIFICANT CHANGE UP (ref 96–108)
CO2 SERPL-SCNC: 28 MMOL/L — SIGNIFICANT CHANGE UP (ref 22–31)
CREAT SERPL-MCNC: 1.53 MG/DL — HIGH (ref 0.5–1.3)
CULTURE RESULTS: SIGNIFICANT CHANGE UP
GLUCOSE SERPL-MCNC: 105 MG/DL — HIGH (ref 70–99)
HCT VFR BLD CALC: 32.8 % — LOW (ref 39–50)
HGB BLD-MCNC: 10 G/DL — LOW (ref 13–17)
M TB TUBERC IFN-G BLD QL: 0.03 IU/ML — SIGNIFICANT CHANGE UP
M TB TUBERC IFN-G BLD QL: 0.05 IU/ML — SIGNIFICANT CHANGE UP
M TB TUBERC IFN-G BLD QL: NEGATIVE — SIGNIFICANT CHANGE UP
MAGNESIUM SERPL-MCNC: 2.1 MG/DL — SIGNIFICANT CHANGE UP (ref 1.6–2.6)
MCHC RBC-ENTMCNC: 25.9 PG — LOW (ref 27–34)
MCHC RBC-ENTMCNC: 30.6 GM/DL — LOW (ref 32–36)
MCV RBC AUTO: 84.6 FL — SIGNIFICANT CHANGE UP (ref 80–100)
MITOGEN IGNF BCKGRD COR BLD-ACNC: 6.26 IU/ML — SIGNIFICANT CHANGE UP
NRBC # BLD: SIGNIFICANT CHANGE UP /100 WBCS (ref 0–0)
PHOSPHATE SERPL-MCNC: 5.4 MG/DL — HIGH (ref 2.5–4.5)
PLATELET # BLD AUTO: 220 K/UL — SIGNIFICANT CHANGE UP (ref 150–400)
POTASSIUM SERPL-MCNC: 5 MMOL/L — SIGNIFICANT CHANGE UP (ref 3.5–5.3)
POTASSIUM SERPL-SCNC: 5 MMOL/L — SIGNIFICANT CHANGE UP (ref 3.5–5.3)
PTH-INTACT FLD-MCNC: 43 PG/ML — SIGNIFICANT CHANGE UP (ref 15–65)
RBC # BLD: 3.87 M/UL — LOW (ref 4.2–5.8)
RBC # FLD: 13.9 % — SIGNIFICANT CHANGE UP (ref 10.3–14.5)
SODIUM SERPL-SCNC: 141 MMOL/L — SIGNIFICANT CHANGE UP (ref 135–145)
SPECIMEN SOURCE: SIGNIFICANT CHANGE UP
WBC # BLD: 7.6 K/UL — SIGNIFICANT CHANGE UP (ref 3.8–10.5)
WBC # FLD AUTO: 7.6 K/UL — SIGNIFICANT CHANGE UP (ref 3.8–10.5)

## 2018-03-16 PROCEDURE — 99233 SBSQ HOSP IP/OBS HIGH 50: CPT | Mod: GC

## 2018-03-16 RX ORDER — FUROSEMIDE 40 MG
40 TABLET ORAL
Qty: 0 | Refills: 0 | Status: DISCONTINUED | OUTPATIENT
Start: 2018-03-17 | End: 2018-03-21

## 2018-03-16 RX ORDER — AMPICILLIN SODIUM AND SULBACTAM SODIUM 250; 125 MG/ML; MG/ML
1.5 INJECTION, POWDER, FOR SUSPENSION INTRAMUSCULAR; INTRAVENOUS EVERY 6 HOURS
Qty: 0 | Refills: 0 | Status: DISCONTINUED | OUTPATIENT
Start: 2018-03-16 | End: 2018-03-20

## 2018-03-16 RX ADMIN — AMPICILLIN SODIUM AND SULBACTAM SODIUM 100 GRAM(S): 250; 125 INJECTION, POWDER, FOR SUSPENSION INTRAMUSCULAR; INTRAVENOUS at 17:34

## 2018-03-16 RX ADMIN — Medication 40 MILLIGRAM(S): at 05:36

## 2018-03-16 RX ADMIN — LISINOPRIL 40 MILLIGRAM(S): 2.5 TABLET ORAL at 05:36

## 2018-03-16 RX ADMIN — Medication 81 MILLIGRAM(S): at 12:10

## 2018-03-16 RX ADMIN — Medication 3 MILLILITER(S): at 02:57

## 2018-03-16 RX ADMIN — AMPICILLIN SODIUM AND SULBACTAM SODIUM 100 GRAM(S): 250; 125 INJECTION, POWDER, FOR SUSPENSION INTRAMUSCULAR; INTRAVENOUS at 23:22

## 2018-03-16 RX ADMIN — Medication 100 MILLIGRAM(S): at 05:36

## 2018-03-16 RX ADMIN — Medication 3 MILLILITER(S): at 14:41

## 2018-03-16 RX ADMIN — SENNA PLUS 2 TABLET(S): 8.6 TABLET ORAL at 21:23

## 2018-03-16 RX ADMIN — GABAPENTIN 100 MILLIGRAM(S): 400 CAPSULE ORAL at 17:34

## 2018-03-16 RX ADMIN — Medication 2 MILLIGRAM(S): at 21:23

## 2018-03-16 RX ADMIN — AMPICILLIN SODIUM AND SULBACTAM SODIUM 100 GRAM(S): 250; 125 INJECTION, POWDER, FOR SUSPENSION INTRAMUSCULAR; INTRAVENOUS at 05:36

## 2018-03-16 RX ADMIN — FINASTERIDE 5 MILLIGRAM(S): 5 TABLET, FILM COATED ORAL at 12:10

## 2018-03-16 RX ADMIN — AMPICILLIN SODIUM AND SULBACTAM SODIUM 100 GRAM(S): 250; 125 INJECTION, POWDER, FOR SUSPENSION INTRAMUSCULAR; INTRAVENOUS at 12:10

## 2018-03-16 RX ADMIN — Medication 120 MILLIGRAM(S): at 05:36

## 2018-03-16 RX ADMIN — Medication 3 MILLILITER(S): at 20:17

## 2018-03-16 RX ADMIN — Medication 325 MILLIGRAM(S): at 12:10

## 2018-03-16 RX ADMIN — GABAPENTIN 100 MILLIGRAM(S): 400 CAPSULE ORAL at 05:36

## 2018-03-16 RX ADMIN — Medication 2: at 12:10

## 2018-03-16 RX ADMIN — Medication 1: at 17:32

## 2018-03-16 RX ADMIN — Medication 3 MILLILITER(S): at 08:20

## 2018-03-16 RX ADMIN — Medication 100 MILLIGRAM(S): at 17:35

## 2018-03-16 RX ADMIN — ATORVASTATIN CALCIUM 40 MILLIGRAM(S): 80 TABLET, FILM COATED ORAL at 21:23

## 2018-03-16 RX ADMIN — Medication 40 MILLIGRAM(S): at 17:34

## 2018-03-16 NOTE — PROGRESS NOTE ADULT - ATTENDING COMMENTS
Patient was seen and examined by myself. Case was discussed with house staff in details. I have reviewed and agree with the plan as outlined above with edits where appropriate.    - 1. Acute heart failure- IV Lasix; monitor i/os; daily weights;  Continue Duoneb; oxygen supplementation and PT.  - 2. Cellulitis of LE- continue Unasyn; clinically improved  - 3. debility and generalized muscle weakness- PT as tolerated  - 4. Bilateral chronic LE lymphedema- elevated legs in bed; Continue with Lasix.  - 5. Fall with possible hematoma around left psoas- warm compresses. Monitor cbc for hb; Pain control  - 6. H/O DVT with current minimal mobility due to acute illness-  DVT prophylaxis Patient was seen and examined by myself. Case was discussed with house staff in details. I have reviewed and agree with the plan as outlined above with edits where appropriate.  77 y/o M with   - 1. Acute heart failure- IV Lasix; monitor i/os; daily weights;  Continue Duoneb; oxygen supplementation and PT.  - 2. Cellulitis of LE- continue Unasyn; clinically improved; anticipate antibiotics for total 7 days.  - 3. debility and generalized muscle weakness- continue PT as tolerated  - 4. Bilateral chronic LE lymphedema- elevated legs in bed; Continue with Lasix.  - 5. Fall with presumed hematoma around left psoas- warm compresses as needed. hb has been stable; Pain control  - 6. H/O DVT with current minimal mobility due to acute illness-  DVT prophylaxis.    Plan discussed with patient and family in details at bedside and all their questions / concerns were addressed.  Plan for discharge to rehab on Monday

## 2018-03-16 NOTE — PROGRESS NOTE ADULT - PROBLEM SELECTOR PLAN 6
changes likely due to pulmonary vascular congestion rather than PNA  discontinued  levaquin; Continue duoneb for now   sputum cx shows moderate gram positive cocci in pairs  blood cx negative  f/u QuantiFeron latent TB  b/l small pleural effusion on CT scan

## 2018-03-16 NOTE — PROGRESS NOTE ADULT - PROBLEM SELECTOR PLAN 1
acute on chronic edema on legs, BNP is 1112 on admission  In 2017 echo with Ef > 53%, G2DD, S/P 3 L bolus in ED, later was very SOB  ECG NSR @75  Troponin x 3 negative   c/w  lasix 40 IV bid , will taper dose   diuresis well    repeat ECHO shows EF 55%, moderate AS  US LE negative for DVT   CT angio chest negative for PE  Monitor on tele, aspirin, statin, Cardizem, ramipril

## 2018-03-16 NOTE — PROGRESS NOTE ADULT - PROBLEM SELECTOR PLAN 4
-CT abd shows asymmetric enlargement of the left psoas muscle; this may be   due to muscular hypertrophy, or underlying intramuscular hematoma or mass,  Sclerotic lesion in L5 vertebra. If clinically indicated, bone scan may be pursued to rule out osseous metastasis.  -possible due to Fall with possible hematoma around left psoas    -c/w  warm compresses  -Monitor cbc for Hb, stable now  -Pain control with Tylenol

## 2018-03-16 NOTE — PROGRESS NOTE ADULT - SUBJECTIVE AND OBJECTIVE BOX
PGY1 Note discussed with supervising resident and primary attending.    Patient is a 78y old  Male who presents with a chief complaint of Fall x 2, worsening SOB (14 Mar 2018 16:06)      INTERVAL HPI/OVERNIGHT EVENTS:    MEDICATIONS  (STANDING):  ALBUTerol/ipratropium for Nebulization 3 milliLiter(s) Nebulizer every 6 hours  ampicillin/sulbactam  IVPB 1.5 Gram(s) IV Intermittent every 6 hours  ampicillin/sulbactam  IVPB      aspirin  chewable 81 milliGRAM(s) Oral daily  atorvastatin 40 milliGRAM(s) Oral at bedtime  diltiazem    milliGRAM(s) Oral daily  docusate sodium 100 milliGRAM(s) Oral two times a day  doxazosin 2 milliGRAM(s) Oral at bedtime  ferrous    sulfate 325 milliGRAM(s) Oral daily  finasteride 5 milliGRAM(s) Oral daily  furosemide   Injectable 40 milliGRAM(s) IV Push every 12 hours  gabapentin 100 milliGRAM(s) Oral two times a day  insulin lispro (HumaLOG) corrective regimen sliding scale   SubCutaneous three times a day before meals  lisinopril 40 milliGRAM(s) Oral daily  senna 2 Tablet(s) Oral at bedtime    MEDICATIONS  (PRN):  acetaminophen   Tablet. 650 milliGRAM(s) Oral every 6 hours PRN Mild Pain (1 - 3)      Allergies    No Known Allergies    Intolerances        REVIEW OF SYSTEMS:  CONSTITUTIONAL: No fever, weight loss, or fatigue  RESPIRATORY: No cough, wheezing, chills or hemoptysis; No shortness of breath  CARDIOVASCULAR: No chest pain, palpitations, dizziness, or leg swelling  GASTROINTESTINAL: No abdominal or epigastric pain. No nausea, vomiting, or hematemesis; No diarrhea or constipation. No melena or hematochezia.  NEUROLOGICAL: No headaches, memory loss, loss of strength, numbness, or tremors  SKIN: No itching, burning, rashes, or lesions     Vital Signs Last 24 Hrs  T(C): 36.8 (16 Mar 2018 05:13), Max: 37.4 (15 Mar 2018 15:07)  T(F): 98.3 (16 Mar 2018 05:13), Max: 99.3 (15 Mar 2018 15:07)  HR: 74 (16 Mar 2018 05:13) (68 - 77)  BP: 138/49 (16 Mar 2018 05:13) (101/42 - 145/55)  BP(mean): --  RR: 18 (16 Mar 2018 05:13) (18 - 18)  SpO2: 100% (16 Mar 2018 05:13) (97% - 100%)    PHYSICAL EXAM:  GENERAL: NAD, well-groomed, well-developed  HEAD:  Atraumatic, Normocephalic  EYES: EOMI, PERRLA, conjunctiva and sclera clear  NECK: Supple, No JVD, Normal thyroid  CHEST/LUNG: Clear to percussion bilaterally; No rales, rhonchi, wheezing, or rubs  HEART: Regular rate and rhythm; No murmurs, rubs, or gallops  ABDOMEN: Soft, Nontender, Nondistended; Bowel sounds present  NERVOUS SYSTEM:  Alert & Oriented X3, Good concentration; Motor Strength 5/5 B/L   EXTREMITIES:  2+ Peripheral Pulses, No clubbing, cyanosis, or edema  SKIN;    LABS:                        10.0   7.6   )-----------( 220      ( 16 Mar 2018 06:34 )             32.8     03-16    141  |  104  |  40<H>  ----------------------------<  105<H>  5.0   |  28  |  1.53<H>    Ca    8.8      16 Mar 2018 06:34  Phos  5.4     03-16  Mg     2.1     03-16          CAPILLARY BLOOD GLUCOSE      POCT Blood Glucose.: 141 mg/dL (15 Mar 2018 22:37)  POCT Blood Glucose.: 167 mg/dL (15 Mar 2018 16:31)  POCT Blood Glucose.: 188 mg/dL (15 Mar 2018 11:19)      RADIOLOGY & ADDITIONAL TESTS:    Imaging Personally Reviewed:  [ ] YES  [ ] NO    Consultant(s) Notes Reviewed:  [ ] YES  [ ] NO PGY1 Note discussed with supervising resident and primary attending.    Patient is a 78y old  Male who presents with a chief complaint of Fall x 2, worsening SOB (14 Mar 2018 16:06)      INTERVAL HPI/OVERNIGHT EVENTS: pt had no new overnight events, sob improving, UO over last 24hrs 2300, net negative -860.     MEDICATIONS  (STANDING):  ALBUTerol/ipratropium for Nebulization 3 milliLiter(s) Nebulizer every 6 hours  ampicillin/sulbactam  IVPB 1.5 Gram(s) IV Intermittent every 6 hours  ampicillin/sulbactam  IVPB      aspirin  chewable 81 milliGRAM(s) Oral daily  atorvastatin 40 milliGRAM(s) Oral at bedtime  diltiazem    milliGRAM(s) Oral daily  docusate sodium 100 milliGRAM(s) Oral two times a day  doxazosin 2 milliGRAM(s) Oral at bedtime  ferrous    sulfate 325 milliGRAM(s) Oral daily  finasteride 5 milliGRAM(s) Oral daily  furosemide   Injectable 40 milliGRAM(s) IV Push every 12 hours  gabapentin 100 milliGRAM(s) Oral two times a day  insulin lispro (HumaLOG) corrective regimen sliding scale   SubCutaneous three times a day before meals  lisinopril 40 milliGRAM(s) Oral daily  senna 2 Tablet(s) Oral at bedtime    MEDICATIONS  (PRN):  acetaminophen   Tablet. 650 milliGRAM(s) Oral every 6 hours PRN Mild Pain (1 - 3)      Allergies    No Known Allergies    Intolerances        REVIEW OF SYSTEMS:  CONSTITUTIONAL:  fatigue  RESPIRATORY: No cough, wheezing, chills or hemoptysis;  shortness of breath  improving   CARDIOVASCULAR:  chronic lower legs swelling, No chest pain, palpitations, dizziness.  GASTROINTESTINAL: No abdominal or epigastric pain. No nausea, vomiting, or hematemesis;   NEUROLOGICAL: No headaches, memory loss, loss of strength, numbness, or tremors  EXT: LE swelling    SKIN: No itching, burning, rashes, or lesions     Vital Signs Last 24 Hrs  T(C): 36.8 (16 Mar 2018 05:13), Max: 37.4 (15 Mar 2018 15:07)  T(F): 98.3 (16 Mar 2018 05:13), Max: 99.3 (15 Mar 2018 15:07)  HR: 74 (16 Mar 2018 05:13) (68 - 77)  BP: 138/49 (16 Mar 2018 05:13) (101/42 - 145/55)  BP(mean): --  RR: 18 (16 Mar 2018 05:13) (18 - 18)  SpO2: 100% (16 Mar 2018 05:13) (97% - 100%)    PHYSICAL EXAM:  GENERAL: NAD, well-groomed, well-developed  CHEST/LUNG:  mild  rales b/l lungs   HEART: Regular rate and rhythm; No murmurs, rubs, or gallops  ABDOMEN: Soft, Nontender, Nondistended; Bowel sounds present  NERVOUS SYSTEM:  Alert & Oriented X3, Good concentration; Motor Strength 5/5 right side, 4/5 left side    EXTREMITIES:  2+ Peripheral Pulses, left thigh and lower leg tenderness, chronic lower legs edema    LABS:                        10.0   7.6   )-----------( 220      ( 16 Mar 2018 06:34 )             32.8     03-16    141  |  104  |  40<H>  ----------------------------<  105<H>  5.0   |  28  |  1.53<H>    Ca    8.8      16 Mar 2018 06:34  Phos  5.4     03-16  Mg     2.1     03-16          CAPILLARY BLOOD GLUCOSE      POCT Blood Glucose.: 141 mg/dL (15 Mar 2018 22:37)  POCT Blood Glucose.: 167 mg/dL (15 Mar 2018 16:31)  POCT Blood Glucose.: 188 mg/dL (15 Mar 2018 11:19)      RADIOLOGY & ADDITIONAL TESTS:    Imaging Personally Reviewed:  [x ] YES  [ ] NO    Consultant(s) Notes Reviewed:  [x ] YES  [ ] NO PGY1 Note discussed with supervising resident and primary attending.    Patient is a 78y old  Male who presents with a chief complaint of Fall x 2, worsening SOB (14 Mar 2018 16:06)      INTERVAL HPI/OVERNIGHT EVENTS: pt had no new overnight events, sob improving, UO over last 24hrs 2300, net negative -860.     MEDICATIONS  (STANDING):  ALBUTerol/ipratropium for Nebulization 3 milliLiter(s) Nebulizer every 6 hours  ampicillin/sulbactam  IVPB 1.5 Gram(s) IV Intermittent every 6 hours  ampicillin/sulbactam  IVPB      aspirin  chewable 81 milliGRAM(s) Oral daily  atorvastatin 40 milliGRAM(s) Oral at bedtime  diltiazem    milliGRAM(s) Oral daily  docusate sodium 100 milliGRAM(s) Oral two times a day  doxazosin 2 milliGRAM(s) Oral at bedtime  ferrous    sulfate 325 milliGRAM(s) Oral daily  finasteride 5 milliGRAM(s) Oral daily  furosemide   Injectable 40 milliGRAM(s) IV Push every 12 hours  gabapentin 100 milliGRAM(s) Oral two times a day  insulin lispro (HumaLOG) corrective regimen sliding scale   SubCutaneous three times a day before meals  lisinopril 40 milliGRAM(s) Oral daily  senna 2 Tablet(s) Oral at bedtime    MEDICATIONS  (PRN):  acetaminophen   Tablet. 650 milliGRAM(s) Oral every 6 hours PRN Mild Pain (1 - 3)      Allergies    No Known Allergies    Intolerances        REVIEW OF SYSTEMS:  CONSTITUTIONAL:  fatigue  RESPIRATORY: No cough, wheezing, chills or hemoptysis;  shortness of breath  improving   CARDIOVASCULAR:  chronic lower legs swelling, No chest pain, palpitations, dizziness.  GASTROINTESTINAL: No abdominal or epigastric pain. No nausea, vomiting, or hematemesis;   NEUROLOGICAL: No headaches, memory loss, loss of strength, numbness, or tremors  EXT: LE swelling    SKIN: No itching, burning, rashes, or lesions     Vital Signs Last 24 Hrs  T(C): 36.8 (16 Mar 2018 05:13), Max: 37.4 (15 Mar 2018 15:07)  T(F): 98.3 (16 Mar 2018 05:13), Max: 99.3 (15 Mar 2018 15:07)  HR: 74 (16 Mar 2018 05:13) (68 - 77)  BP: 138/49 (16 Mar 2018 05:13) (101/42 - 145/55)  BP(mean): --  RR: 18 (16 Mar 2018 05:13) (18 - 18)  SpO2: 100% (16 Mar 2018 05:13) (97% - 100%)    PHYSICAL EXAM:  GENERAL: NAD, well-groomed, well-developed  CHEST/LUNG:  mild  rales b/l lungs   HEART: Regular rate and rhythm; No murmurs, rubs, or gallops  ABDOMEN: Soft, Nontender, Nondistended; Bowel sounds present  NERVOUS SYSTEM:  Alert & Oriented X3, Good concentration; Motor Strength 5/5 right side, 4/5 left side    EXTREMITIES:  2+ Peripheral Pulses, left thigh and lower leg tenderness, chronic lower legs edema with hyperpigmentation    LABS:                        10.0   7.6   )-----------( 220      ( 16 Mar 2018 06:34 )             32.8     03-16    141  |  104  |  40<H>  ----------------------------<  105<H>  5.0   |  28  |  1.53<H>    Ca    8.8      16 Mar 2018 06:34  Phos  5.4     03-16  Mg     2.1     03-16          CAPILLARY BLOOD GLUCOSE      POCT Blood Glucose.: 141 mg/dL (15 Mar 2018 22:37)  POCT Blood Glucose.: 167 mg/dL (15 Mar 2018 16:31)  POCT Blood Glucose.: 188 mg/dL (15 Mar 2018 11:19)      RADIOLOGY & ADDITIONAL TESTS:    Imaging Personally Reviewed:  [x ] YES  [ ] NO    Consultant(s) Notes Reviewed:  [x ] YES  [ ] NO

## 2018-03-16 NOTE — PROGRESS NOTE ADULT - SUBJECTIVE AND OBJECTIVE BOX
Patient is a 78y old  Male who presents with a chief complaint of Fall x 2, worsening SOB (14 Mar 2018 16:06)  Awake, alert, Comfortable in bed in NAD. Feeling better    INTERVAL HPI/OVERNIGHT EVENTS:      VITAL SIGNS:  T(F): 98.6 (03-16-18 @ 11:14)  HR: 83 (03-16-18 @ 11:56)  BP: 118/58 (03-16-18 @ 11:56)  RR: 18 (03-16-18 @ 11:14)  SpO2: 98% (03-16-18 @ 11:56)  Wt(kg): --  I&O's Detail    15 Mar 2018 07:01  -  16 Mar 2018 07:00  --------------------------------------------------------  IN:    IV PiggyBack: 100 mL    Oral Fluid: 1240 mL    Solution: 100 mL  Total IN: 1440 mL    OUT:    Voided: 2300 mL  Total OUT: 2300 mL    Total NET: -860 mL      16 Mar 2018 07:01  -  16 Mar 2018 13:30  --------------------------------------------------------  IN:    Oral Fluid: 200 mL  Total IN: 200 mL    OUT:  Total OUT: 0 mL    Total NET: 200 mL              REVIEW OF SYSTEMS:    CONSTITUTIONAL:  No fevers, chills, sweats    HEENT:  Eyes:  No diplopia or blurred vision. ENT:  No earache, sore throat or runny nose.    CARDIOVASCULAR:  No pressure, squeezing, tightness, or heaviness about the chest; no palpitations.    RESPIRATORY:  Per HPI    GASTROINTESTINAL:  No abdominal pain, nausea, vomiting or diarrhea.    GENITOURINARY:  No dysuria, frequency or urgency.    NEUROLOGIC:  No paresthesias, fasciculations, seizures or weakness.    PSYCHIATRIC:  No disorder of thought or mood.      PHYSICAL EXAM:    Constitutional: Well developed and nourished  Eyes:Perrla  ENMT: normal  Neck:supple  Respiratory: No wheezing  Cardiovascular: S1 S2 regular  Gastrointestinal: Soft, Non tender  Extremities: + edema  Vascular:normal  Neurological:Awake, alert,Ox3  Musculoskeletal:Normal      MEDICATIONS  (STANDING):  ALBUTerol/ipratropium for Nebulization 3 milliLiter(s) Nebulizer every 6 hours  ampicillin/sulbactam  IVPB 1.5 Gram(s) IV Intermittent every 6 hours  aspirin  chewable 81 milliGRAM(s) Oral daily  atorvastatin 40 milliGRAM(s) Oral at bedtime  diltiazem    milliGRAM(s) Oral daily  docusate sodium 100 milliGRAM(s) Oral two times a day  doxazosin 2 milliGRAM(s) Oral at bedtime  ferrous    sulfate 325 milliGRAM(s) Oral daily  finasteride 5 milliGRAM(s) Oral daily  furosemide   Injectable 40 milliGRAM(s) IV Push every 12 hours  gabapentin 100 milliGRAM(s) Oral two times a day  insulin lispro (HumaLOG) corrective regimen sliding scale   SubCutaneous three times a day before meals  lisinopril 40 milliGRAM(s) Oral daily  senna 2 Tablet(s) Oral at bedtime    MEDICATIONS  (PRN):  acetaminophen   Tablet. 650 milliGRAM(s) Oral every 6 hours PRN Mild Pain (1 - 3)      Allergies    No Known Allergies    Intolerances        LABS:                        10.0   7.6   )-----------( 220      ( 16 Mar 2018 06:34 )             32.8     03-16    141  |  104  |  40<H>  ----------------------------<  105<H>  5.0   |  28  |  1.53<H>    Ca    8.8      16 Mar 2018 06:34  Phos  5.4     03-16  Mg     2.1     03-16                CAPILLARY BLOOD GLUCOSE      POCT Blood Glucose.: 203 mg/dL (16 Mar 2018 11:27)  POCT Blood Glucose.: 105 mg/dL (16 Mar 2018 07:48)  POCT Blood Glucose.: 141 mg/dL (15 Mar 2018 22:37)  POCT Blood Glucose.: 167 mg/dL (15 Mar 2018 16:31)    pro-bnp 1112 03-13 @ 03:00     d-dimer --  03-13 @ 03:00      RADIOLOGY & ADDITIONAL TESTS:  < from: Xray Hip 2-3 Views, Left (03.14.18 @ 14:16) >  IMPRESSION:  No radiographic evidence of acutefracture or dislocation. If symptoms   persist, consider CT or MRI exam to exclude occult fracture.      < end of copied text >    CXR:    Ct scan chest:    ekg;    echo:

## 2018-03-16 NOTE — PROGRESS NOTE ADULT - SUBJECTIVE AND OBJECTIVE BOX
Meds:  Unasyn 1.5 gm IVPB q 6 hours.    Allergies:  Allergies    No Known Allergies    Intolerances    ROS  [  ] UNABLE TO ELICIT    General:  [  ] None  [  ] Fever  [  ] Chills  [ x ] Malaise    Skin:  [ x ] None [  ] Rash  [  ] Wound  [  ] Ulcer    HEENT:  [ x ] None  [  ] Sore Throat  [  ] Nasal congestion/ runny nose  [  ] Photophobia [  ] Neck pain      Chest:  [  ] None   [ x ] SOB  [ x ] Cough  [  ] None    Cardiovascular:   [  ] None  [  ] CP  [  ] Palpitation [ x ] Orthopnea    Gastrointestinal:  [ x ] None  [  ] Abd pain   [  ] Nausea    [  ] Vomiting   [  ] Diarrhea	     Genitourinary:  [ x ] None [  ] Polyuria   [  ] Urgency  [  ] Frequency  [  ] Dysuria    [  ]  Hematuria       Musculoskeletal:  [  ] None [  ] Back Pain	[  ] Body aches  [  ] Joint pain [ x ] Recurrent fall.    Neurological: [  ] None [  ]Dizziness  [  ]Visual Disturbance  [  ]Headaches   [ x ] Weakness          PHYSICAL EXAM:  Vital Signs Last 24 Hrs  T(C): 36.8 (16 Mar 2018 05:13), Max: 37.4 (15 Mar 2018 15:07)  T(F): 98.3 (16 Mar 2018 05:13), Max: 99.3 (15 Mar 2018 15:07)  HR: 74 (16 Mar 2018 05:13) (68 - 77)  BP: 138/49 (16 Mar 2018 05:13) (101/42 - 145/55)  BP(mean): --  RR: 18 (16 Mar 2018 05:13) (18 - 18)  SpO2: 100% (16 Mar 2018 05:13) (97% - 100%)    Constitutional:    HEENT: [ x ] Wnl  [  ] Pharyngeal congestion    Neck:  [ x ] Supple  [  ]Lymphadenopathy  [ x ] No JVD   [  ] JVD  [  ] Masses   [  ] WNL    CHEST/Respiratory:  [  ]Clear to auscultation  [ x ] Rales   [  ] Rhonchi   [  ] Wheezing     [  ] Chest Tenderness      Cardiovascular:  [ x ] Reg S1 S2   [  ] Irreg S1 S2   [ x ]No Murmur  [  ] +ve Murmurs  [  ]Systolic [  ]Diastolic      Abdomen:  [ x ] Soft  [ x ] No tendrerness  [  ] Tenderness  [  ] Organomegaly  [  ] ABD Distention  [  ] Rigidity                       [ x ] No Regidity                       [ x ] No Rebound Tenderness  [ x ] No Guarding Rigidity  [  ] Rebound Tenderness[  ] Guarding Rigidity                          [ x ]  +ve Bowel Sounds  [  ] Decreased Bowel Sounds    [  ] Absent Bowel Sounds                            Extremities: [  ] No edema [ x ] Edema and erythema both legs, with chronic skin changes. [  ] Clubbing   [  ] Cyanosis                         [ x ] No Tender Calf muscles  [  ] Tender Calf muscles                        [ x ] Palpable peripheral pulses    Neurological: [ x ] Awake  [ x ] Alert  [ x ] Oriented  x  3                           [  ] Confused  [  ] Drowsy  [  ] respond to painful stimuli  [  ] Unresponsive    Skin:  [ x ] Intact [  ] Redness [  ] Thrombophlebitis  [  ] Rashes  [  ] Dry  [  ] Ulcers    Ortho:  [  ] Joint Swelling  [  ] Joint erythema [  ] Joint tenderness                [  ] Increased temp. to touch  [  ] DJD [ x ] WNL          LABS/DIAGNOSTIC TESTS                        9.3    7.2   )-----------( 212      ( 15 Mar 2018 09:13 )             30.6   03-15    138  |  103  |  35<H>  ----------------------------<  96  4.7   |  27  |  1.64<H>    Ca    8.5      15 Mar 2018 09:13  Phos  3.4     03-14  Mg     2.0     03-14    TPro  6.4  /  Alb  2.7<L>  /  TBili  0.5  /  DBili  x   /  AST  55<H>  /  ALT  40  /  AlkPhos  63  03-14    CAPILLARY BLOOD GLUCOSE      POCT Blood Glucose.: 141 mg/dL (15 Mar 2018 22:37)  POCT Blood Glucose.: 167 mg/dL (15 Mar 2018 16:31)  POCT Blood Glucose.: 188 mg/dL (15 Mar 2018 11:19)  POCT Blood Glucose.: 125 mg/dL (15 Mar 2018 07:29)      CULTURES:   Culture - Sputum . (03.14.18 @ 18:48)    Gram Stain:   No polymorphonuclear leukocytes per low power field  Moderate Squamous epithelial cells per low power field  Moderate Gram positive cocci in pairs, chains and clusters per oil power  field  Few Gram Positive Rods per oil power field  Results consistent with oropharyngeal contamination    Specimen Source: .Sputum Sputum      Culture - Urine (03.13.18 @ 09:58)    Specimen Source: .Urine Clean Catch (Midstream)    Culture Results:   No growth    Culture - Blood (03.13.18 @ 09:42)    Specimen Source: .Blood Blood-Peripheral    Culture Results:   No growth to date.    Culture - Blood (03.13.18 @ 09:42)    Specimen Source: .Blood Blood-Peripheral    Culture Results:   No growth to date.        RADIOLOGY    EXAM:  CT ABDOMEN AND PELVIS IC                            PROCEDURE DATE:  03/14/2018          INTERPRETATION:  CT of the abdomen and pelvis with IV contrast    Clinical Indication: 4.5 cm retroperitoneal mass on prior chest CTA dated   3/13/2018    Technique: Axial multidetector CT images of the abdomen and pelvis are   acquired following the administration of oral and IV contrast (96 cc   Omnipaque-350 administered, 4 cc discarded).    Comparison: No prior abdominal/pelvic CT is available for comparison.   Reference is made with previous chest CTs dated 3/13/2018 and 7/9/2017.    Findings:    Abdomen: Limited sections through the lung bases demonstrate small   bilateral pleural effusions. Small atelectasis bilaterally. Patchy   groundglass density airspace opacifications in both lungs.     There is asymmetric enlargement of the left psoas muscle and this was   seen on the previous chest CT dated 3/13/2018; this is a new finding   since the previous chest CT dated 7/9/2017.     Gallstones in the gallbladder. No evidence for thickened gallbladder wall   or pericholecystic fluid. Mild stranding adjacent to the gallbladder.    The liver, pancreas, spleen, adrenals and kidneys appear unremarkable.    Colonic diverticulosis without evidence for diverticulitis. No bowel   obstruction, or grossly thickened bowel wall. Mild stranding adjacent to   the duodenum.    Small fat-containing periumbilical hernia.    No evidence for free air, ascites, or enlarged lymph node.    Pelvis: The urinary bladder is within normal limits. Sigmoid   diverticulosis without evidence for diverticulitis. Small pelvic free   fluid.    Sclerotic lesions in L5 vertebra.    Impression: Asymmetric enlargement of the left psoas muscle; this may be   due to muscular hypertrophy, or underlying intramuscular hematoma or   mass. If clinically indicated, lumbar spine MR without and with IV   contrast may be pursued for further evaluation after 24 hours.     Small bilateral pleural perfusions.    Patchy airspace opacifications in both lungs may be due to pneumonia or   pulmonary edema. Clinical correlation is recommended.    Cholelithiasis. Mild stranding adjacent to the gallbladder and the   duodenum may represent acute cholecystitis and/or nonspecific duodenitis.   Clinical correlation is recommended. If clinically indicated, HIDA scan   may be pursued for further evaluation.    Sclerotic lesion in L5 vertebra. If clinically indicated, bone scan may   be pursued to rule out osseous metastasis.    Small pelvic free fluid.            Assessment and Recommendation:   78  y/ o. 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   Patient was admitted for pneumonia and CHF and was started on IV Levaquin, that was subsequently discontinued in view of absent symptoms and procalcitonin level of 0.08.  Patient C/O pain left lower extremity and was started on iv Unasyn for cellulitis.     Problem/Recommendation - 1:  Problem: PNA (Questionable pneumonia).   Recommendation:   1- Continue IV Unasyn for left leg cellulitis.  2- Cardiac management for CHF.  3- O2 as needed.  4- Pulmonary management.  5- CT abdomen result is noted.  6- Legs elevation in bed.  7- Suggest HIDA scan, and further work up for possible intraabdominal mass.    Problem/Recommendation - 2:  ·  Problem: Anemia.    Recommendation:   1- Anemia profile.  2- Iron supplements.  3- Closely monitor H & H.  4- Observe for bleeding.     Problem/Recommendation - 3:  ·  Problem: Diabetes.    Recommendation:   1- Blood sugar monitoring and control.  2- Accu-Cheks with coverage.  3- 1800 sheldon ADA diet.  4- Follow HB A1C.     Problem/Recommendation - 4:  ·  Problem: Hypertension.    Recommendation:   1- Monitor Blood pressure closely.  2- Blood pressure control.  3- BP. meds as per cardiology and primary care team.       Discussed with medical resident.

## 2018-03-16 NOTE — PROGRESS NOTE ADULT - PROBLEM SELECTOR PLAN 10
IMPROVE VTE Individual Risk Assessment    RISK                                                          Points  [x] Previous VTE                                           3  [] Thrombophilia                                        2  [] Lower limb paralysis                              2   [] Current Cancer                                       2   [] Immobilization > 24 hrs                        1  [] ICU/CCU stay > 24 hours                       1  [x] Age > 60                                                   1    IMPROVE VTE Score: 4  Need DVT ppx , on Lovenox  no need for GI ppx DVT prophylaxis

## 2018-03-17 LAB
ANION GAP SERPL CALC-SCNC: 5 MMOL/L — SIGNIFICANT CHANGE UP (ref 5–17)
BASOPHILS # BLD AUTO: 0.1 K/UL — SIGNIFICANT CHANGE UP (ref 0–0.2)
BASOPHILS NFR BLD AUTO: 1.4 % — SIGNIFICANT CHANGE UP (ref 0–2)
BUN SERPL-MCNC: 43 MG/DL — HIGH (ref 7–18)
CALCIUM SERPL-MCNC: 8.3 MG/DL — LOW (ref 8.4–10.5)
CANCER AG19-9 SERPL-ACNC: 19.1 U/ML — SIGNIFICANT CHANGE UP
CHLORIDE SERPL-SCNC: 102 MMOL/L — SIGNIFICANT CHANGE UP (ref 96–108)
CO2 SERPL-SCNC: 32 MMOL/L — HIGH (ref 22–31)
CREAT SERPL-MCNC: 1.3 MG/DL — SIGNIFICANT CHANGE UP (ref 0.5–1.3)
EOSINOPHIL # BLD AUTO: 0.8 K/UL — HIGH (ref 0–0.5)
EOSINOPHIL NFR BLD AUTO: 10.4 % — HIGH (ref 0–6)
GLUCOSE SERPL-MCNC: 109 MG/DL — HIGH (ref 70–99)
HCT VFR BLD CALC: 31.6 % — LOW (ref 39–50)
HGB BLD-MCNC: 9.5 G/DL — LOW (ref 13–17)
LYMPHOCYTES # BLD AUTO: 0.6 K/UL — LOW (ref 1–3.3)
LYMPHOCYTES # BLD AUTO: 7.8 % — LOW (ref 13–44)
MCHC RBC-ENTMCNC: 25.6 PG — LOW (ref 27–34)
MCHC RBC-ENTMCNC: 30.2 GM/DL — LOW (ref 32–36)
MCV RBC AUTO: 84.6 FL — SIGNIFICANT CHANGE UP (ref 80–100)
MONOCYTES # BLD AUTO: 0.9 K/UL — SIGNIFICANT CHANGE UP (ref 0–0.9)
MONOCYTES NFR BLD AUTO: 11.8 % — SIGNIFICANT CHANGE UP (ref 2–14)
NEUTROPHILS # BLD AUTO: 5 K/UL — SIGNIFICANT CHANGE UP (ref 1.8–7.4)
NEUTROPHILS NFR BLD AUTO: 68.7 % — SIGNIFICANT CHANGE UP (ref 43–77)
OB PNL STL: NEGATIVE — SIGNIFICANT CHANGE UP
PLATELET # BLD AUTO: 213 K/UL — SIGNIFICANT CHANGE UP (ref 150–400)
POTASSIUM SERPL-MCNC: 4.5 MMOL/L — SIGNIFICANT CHANGE UP (ref 3.5–5.3)
POTASSIUM SERPL-SCNC: 4.5 MMOL/L — SIGNIFICANT CHANGE UP (ref 3.5–5.3)
RBC # BLD: 3.73 M/UL — LOW (ref 4.2–5.8)
RBC # FLD: 13.8 % — SIGNIFICANT CHANGE UP (ref 10.3–14.5)
SODIUM SERPL-SCNC: 139 MMOL/L — SIGNIFICANT CHANGE UP (ref 135–145)
WBC # BLD: 7.3 K/UL — SIGNIFICANT CHANGE UP (ref 3.8–10.5)
WBC # FLD AUTO: 7.3 K/UL — SIGNIFICANT CHANGE UP (ref 3.8–10.5)

## 2018-03-17 PROCEDURE — 71045 X-RAY EXAM CHEST 1 VIEW: CPT | Mod: 26

## 2018-03-17 RX ORDER — HEPARIN SODIUM 5000 [USP'U]/ML
5000 INJECTION INTRAVENOUS; SUBCUTANEOUS EVERY 8 HOURS
Qty: 0 | Refills: 0 | Status: DISCONTINUED | OUTPATIENT
Start: 2018-03-17 | End: 2018-03-21

## 2018-03-17 RX ADMIN — FINASTERIDE 5 MILLIGRAM(S): 5 TABLET, FILM COATED ORAL at 12:27

## 2018-03-17 RX ADMIN — AMPICILLIN SODIUM AND SULBACTAM SODIUM 100 GRAM(S): 250; 125 INJECTION, POWDER, FOR SUSPENSION INTRAMUSCULAR; INTRAVENOUS at 17:06

## 2018-03-17 RX ADMIN — HEPARIN SODIUM 5000 UNIT(S): 5000 INJECTION INTRAVENOUS; SUBCUTANEOUS at 22:18

## 2018-03-17 RX ADMIN — Medication 40 MILLIGRAM(S): at 06:35

## 2018-03-17 RX ADMIN — Medication 2 MILLIGRAM(S): at 22:18

## 2018-03-17 RX ADMIN — Medication 325 MILLIGRAM(S): at 12:27

## 2018-03-17 RX ADMIN — ATORVASTATIN CALCIUM 40 MILLIGRAM(S): 80 TABLET, FILM COATED ORAL at 22:18

## 2018-03-17 RX ADMIN — GABAPENTIN 100 MILLIGRAM(S): 400 CAPSULE ORAL at 17:06

## 2018-03-17 RX ADMIN — Medication 3 MILLILITER(S): at 21:03

## 2018-03-17 RX ADMIN — Medication 1: at 12:28

## 2018-03-17 RX ADMIN — Medication 100 MILLIGRAM(S): at 06:35

## 2018-03-17 RX ADMIN — Medication 3 MILLILITER(S): at 08:29

## 2018-03-17 RX ADMIN — AMPICILLIN SODIUM AND SULBACTAM SODIUM 100 GRAM(S): 250; 125 INJECTION, POWDER, FOR SUSPENSION INTRAMUSCULAR; INTRAVENOUS at 12:28

## 2018-03-17 RX ADMIN — GABAPENTIN 100 MILLIGRAM(S): 400 CAPSULE ORAL at 06:35

## 2018-03-17 RX ADMIN — LISINOPRIL 40 MILLIGRAM(S): 2.5 TABLET ORAL at 06:35

## 2018-03-17 RX ADMIN — Medication 81 MILLIGRAM(S): at 12:27

## 2018-03-17 RX ADMIN — Medication 40 MILLIGRAM(S): at 17:06

## 2018-03-17 RX ADMIN — Medication 3 MILLILITER(S): at 16:26

## 2018-03-17 RX ADMIN — AMPICILLIN SODIUM AND SULBACTAM SODIUM 100 GRAM(S): 250; 125 INJECTION, POWDER, FOR SUSPENSION INTRAMUSCULAR; INTRAVENOUS at 06:36

## 2018-03-17 RX ADMIN — Medication 120 MILLIGRAM(S): at 06:36

## 2018-03-17 NOTE — PROGRESS NOTE ADULT - SUBJECTIVE AND OBJECTIVE BOX
Meds:  Unasyn 1.5 gm IVPB q 6 hours.    Allergies:  Allergies    No Known Allergies    Intolerances    ROS  [  ] UNABLE TO ELICIT    General:  [  ] None  [  ] Fever  [  ] Chills  [ x ] Malaise    Skin:  [ x ] None [  ] Rash  [  ] Wound  [  ] Ulcer    HEENT:  [ x ] None  [  ] Sore Throat  [  ] Nasal congestion/ runny nose  [  ] Photophobia [  ] Neck pain      Chest:  [  ] None   [ x ] SOB  [ x ] Cough  [  ] None    Cardiovascular:   [  ] None  [  ] CP  [  ] Palpitation [ x ] Orthopnea    Gastrointestinal:  [ x ] None  [  ] Abd pain   [  ] Nausea    [  ] Vomiting   [  ] Diarrhea	     Genitourinary:  [ x ] None [  ] Polyuria   [  ] Urgency  [  ] Frequency  [  ] Dysuria    [  ]  Hematuria       Musculoskeletal:  [  ] None [  ] Back Pain	[  ] Body aches  [  ] Joint pain [ x ] Recurrent fall.    Neurological: [  ] None [  ]Dizziness  [  ]Visual Disturbance  [  ]Headaches   [ x ] Weakness          PHYSICAL EXAM:  Vital Signs Last 24 Hrs  T(C): 36.8 (16 Mar 2018 05:13), Max: 37.4 (15 Mar 2018 15:07)  T(F): 98.3 (16 Mar 2018 05:13), Max: 99.3 (15 Mar 2018 15:07)  HR: 74 (16 Mar 2018 05:13) (68 - 77)  BP: 138/49 (16 Mar 2018 05:13) (101/42 - 145/55)  BP(mean): --  RR: 18 (16 Mar 2018 05:13) (18 - 18)  SpO2: 100% (16 Mar 2018 05:13) (97% - 100%)    Constitutional:    HEENT: [ x ] Wnl  [  ] Pharyngeal congestion    Neck:  [ x ] Supple  [  ]Lymphadenopathy  [ x ] No JVD   [  ] JVD  [  ] Masses   [  ] WNL    CHEST/Respiratory:  [  ]Clear to auscultation  [ x ] Rales   [  ] Rhonchi   [  ] Wheezing     [  ] Chest Tenderness      Cardiovascular:  [ x ] Reg S1 S2   [  ] Irreg S1 S2   [ x ]No Murmur  [  ] +ve Murmurs  [  ]Systolic [  ]Diastolic      Abdomen:  [ x ] Soft  [ x ] No tendrerness  [  ] Tenderness  [  ] Organomegaly  [  ] ABD Distention  [  ] Rigidity                       [ x ] No Regidity                       [ x ] No Rebound Tenderness  [ x ] No Guarding Rigidity  [  ] Rebound Tenderness[  ] Guarding Rigidity                          [ x ]  +ve Bowel Sounds  [  ] Decreased Bowel Sounds    [  ] Absent Bowel Sounds                            Extremities: [  ] No edema [ x ] Edema and erythema both legs(markedly improved), with chronic skin changes. [  ] Clubbing   [  ] Cyanosis                         [ x ] No Tender Calf muscles  [  ] Tender Calf muscles                        [ x ] Palpable peripheral pulses    Neurological: [ x ] Awake  [ x ] Alert  [ x ] Oriented  x  3                           [  ] Confused  [  ] Drowsy  [  ] respond to painful stimuli  [  ] Unresponsive    Skin:  [ x ] Intact [  ] Redness [  ] Thrombophlebitis  [  ] Rashes  [  ] Dry  [  ] Ulcers    Ortho:  [  ] Joint Swelling  [  ] Joint erythema [  ] Joint tenderness                [  ] Increased temp. to touch  [  ] DJD [ x ] WNL          LABS/DIAGNOSTIC TESTS                        9.5    7.3   )-----------( 213      ( 17 Mar 2018 08:17 )             31.6   03-17    139  |  102  |  43<H>  ----------------------------<  109<H>  4.5   |  32<H>  |  1.30    Ca    8.3<L>      17 Mar 2018 08:17  Phos  5.4     03-16  Mg     2.1     03-16    CAPILLARY BLOOD GLUCOSE      POCT Blood Glucose.: 194 mg/dL (17 Mar 2018 11:41)  POCT Blood Glucose.: 114 mg/dL (17 Mar 2018 07:52)  POCT Blood Glucose.: 153 mg/dL (16 Mar 2018 20:51)  POCT Blood Glucose.: 160 mg/dL (16 Mar 2018 16:42)      CULTURES:   Culture - Sputum . (03.14.18 @ 18:48)    Gram Stain:   No polymorphonuclear leukocytes per low power field  Moderate Squamous epithelial cells per low power field  Moderate Gram positive cocci in pairs, chains and clusters per oil power  field  Few Gram Positive Rods per oil power field  Results consistent with oropharyngeal contamination    Specimen Source: .Sputum Sputum      Culture - Urine (03.13.18 @ 09:58)    Specimen Source: .Urine Clean Catch (Midstream)    Culture Results:   No growth    Culture - Blood (03.13.18 @ 09:42)    Specimen Source: .Blood Blood-Peripheral    Culture Results:   No growth to date.    Culture - Blood (03.13.18 @ 09:42)    Specimen Source: .Blood Blood-Peripheral    Culture Results:   No growth to date.        RADIOLOGY    EXAM:  CT ABDOMEN AND PELVIS IC                            PROCEDURE DATE:  03/14/2018          INTERPRETATION:  CT of the abdomen and pelvis with IV contrast    Clinical Indication: 4.5 cm retroperitoneal mass on prior chest CTA dated   3/13/2018    Technique: Axial multidetector CT images of the abdomen and pelvis are   acquired following the administration of oral and IV contrast (96 cc   Omnipaque-350 administered, 4 cc discarded).    Comparison: No prior abdominal/pelvic CT is available for comparison.   Reference is made with previous chest CTs dated 3/13/2018 and 7/9/2017.    Findings:    Abdomen: Limited sections through the lung bases demonstrate small   bilateral pleural effusions. Small atelectasis bilaterally. Patchy   groundglass density airspace opacifications in both lungs.     There is asymmetric enlargement of the left psoas muscle and this was   seen on the previous chest CT dated 3/13/2018; this is a new finding   since the previous chest CT dated 7/9/2017.     Gallstones in the gallbladder. No evidence for thickened gallbladder wall   or pericholecystic fluid. Mild stranding adjacent to the gallbladder.    The liver, pancreas, spleen, adrenals and kidneys appear unremarkable.    Colonic diverticulosis without evidence for diverticulitis. No bowel   obstruction, or grossly thickened bowel wall. Mild stranding adjacent to   the duodenum.    Small fat-containing periumbilical hernia.    No evidence for free air, ascites, or enlarged lymph node.    Pelvis: The urinary bladder is within normal limits. Sigmoid   diverticulosis without evidence for diverticulitis. Small pelvic free   fluid.    Sclerotic lesions in L5 vertebra.    Impression: Asymmetric enlargement of the left psoas muscle; this may be   due to muscular hypertrophy, or underlying intramuscular hematoma or   mass. If clinically indicated, lumbar spine MR without and with IV   contrast may be pursued for further evaluation after 24 hours.     Small bilateral pleural perfusions.    Patchy airspace opacifications in both lungs may be due to pneumonia or   pulmonary edema. Clinical correlation is recommended.    Cholelithiasis. Mild stranding adjacent to the gallbladder and the   duodenum may represent acute cholecystitis and/or nonspecific duodenitis.   Clinical correlation is recommended. If clinically indicated, HIDA scan   may be pursued for further evaluation.    Sclerotic lesion in L5 vertebra. If clinically indicated, bone scan may   be pursued to rule out osseous metastasis.    Small pelvic free fluid.            Assessment and Recommendation:   78  y/ o. 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   Patient was admitted for pneumonia and CHF and was started on IV Levaquin, that was subsequently discontinued in view of absent symptoms and procalcitonin level of 0.08.  Patient C/O pain left lower extremity and was started on iv Unasyn for cellulitis.     Problem/Recommendation - 1:  Problem: PNA (Questionable pneumonia).   Recommendation:   1- Continue IV Unasyn for left leg cellulitis till 3/19/18 then discontinue it.  2- Cardiac management for CHF.  3- O2 as needed.  4- Pulmonary management.  5- CT abdomen result is noted.  6- Legs elevation in bed.    Problem/Recommendation - 2:  ·  Problem: Anemia.    Recommendation:   1- Anemia profile.  2- Iron supplements.  3- Closely monitor H & H.  4- Observe for bleeding.     Problem/Recommendation - 3:  ·  Problem: Diabetes.    Recommendation:   1- Blood sugar monitoring and control.  2- Accu-Cheks with coverage.  3- 1800 sheldon ADA diet.  4- Follow HB A1C.     Problem/Recommendation - 4:  ·  Problem: Hypertension.    Recommendation:   1- Monitor Blood pressure closely.  2- Blood pressure control.  3- BP. meds as per cardiology and primary care team.       Discussed with medical resident.

## 2018-03-17 NOTE — PROGRESS NOTE ADULT - SUBJECTIVE AND OBJECTIVE BOX
Patient is a 78y old  Male who presents with a chief complaint of Fall x 2, worsening SOB (14 Mar 2018 16:06)  Awake, alert, comfortable in bed in NAD. Clinically improved    INTERVAL HPI/OVERNIGHT EVENTS:      VITAL SIGNS:  T(F): 98.6 (03-17-18 @ 11:39)  HR: 66 (03-17-18 @ 11:39)  BP: 105/69 (03-17-18 @ 11:39)  RR: 18 (03-17-18 @ 11:39)  SpO2: 100% (03-17-18 @ 11:39)  Wt(kg): --  I&O's Detail    16 Mar 2018 07:01  -  17 Mar 2018 07:00  --------------------------------------------------------  IN:    IV PiggyBack: 100 mL    Oral Fluid: 720 mL  Total IN: 820 mL    OUT:    Voided: 1000 mL  Total OUT: 1000 mL    Total NET: -180 mL              REVIEW OF SYSTEMS:    CONSTITUTIONAL:  No fevers, chills, sweats    HEENT:  Eyes:  No diplopia or blurred vision. ENT:  No earache, sore throat or runny nose.    CARDIOVASCULAR:  No pressure, squeezing, tightness, or heaviness about the chest; no palpitations.    RESPIRATORY:  Per HPI    GASTROINTESTINAL:  No abdominal pain, nausea, vomiting or diarrhea.    GENITOURINARY:  No dysuria, frequency or urgency.    NEUROLOGIC:  No paresthesias, fasciculations, seizures or weakness.    PSYCHIATRIC:  No disorder of thought or mood.      PHYSICAL EXAM:    Constitutional: Well developed and nourished  Eyes:Perrla  ENMT: normal  Neck:supple  Respiratory: good air entry. No wheeezing  Cardiovascular: S1 S2 regular  Gastrointestinal: Soft, Non tender  Extremities: decreased leg edema  Vascular:normal  Neurological:Awake, alert,Ox3  Musculoskeletal:Normal      MEDICATIONS  (STANDING):  ALBUTerol/ipratropium for Nebulization 3 milliLiter(s) Nebulizer every 6 hours  ampicillin/sulbactam  IVPB 1.5 Gram(s) IV Intermittent every 6 hours  aspirin  chewable 81 milliGRAM(s) Oral daily  atorvastatin 40 milliGRAM(s) Oral at bedtime  diltiazem    milliGRAM(s) Oral daily  doxazosin 2 milliGRAM(s) Oral at bedtime  ferrous    sulfate 325 milliGRAM(s) Oral daily  finasteride 5 milliGRAM(s) Oral daily  furosemide    Tablet 40 milliGRAM(s) Oral two times a day  gabapentin 100 milliGRAM(s) Oral two times a day  insulin lispro (HumaLOG) corrective regimen sliding scale   SubCutaneous three times a day before meals  lisinopril 40 milliGRAM(s) Oral daily    MEDICATIONS  (PRN):  acetaminophen   Tablet. 650 milliGRAM(s) Oral every 6 hours PRN Mild Pain (1 - 3)      Allergies    No Known Allergies    Intolerances        LABS:                        9.5    7.3   )-----------( 213      ( 17 Mar 2018 08:17 )             31.6     03-17    139  |  102  |  43<H>  ----------------------------<  109<H>  4.5   |  32<H>  |  1.30    Ca    8.3<L>      17 Mar 2018 08:17  Phos  5.4     03-16  Mg     2.1     03-16                CAPILLARY BLOOD GLUCOSE      POCT Blood Glucose.: 194 mg/dL (17 Mar 2018 11:41)  POCT Blood Glucose.: 114 mg/dL (17 Mar 2018 07:52)  POCT Blood Glucose.: 153 mg/dL (16 Mar 2018 20:51)  POCT Blood Glucose.: 160 mg/dL (16 Mar 2018 16:42)    pro-bnp 1112 03-13 @ 03:00     d-dimer --  03-13 @ 03:00      RADIOLOGY & ADDITIONAL TESTS:    CXR:    Ct scan chest:    ekg;    echo:

## 2018-03-17 NOTE — PROGRESS NOTE ADULT - ATTENDING COMMENTS
Patient was seen and examined at bedside, feeling ok  He is diuresing fine, weight decreased from 111kg on admission to 109.5 today.  physical exam:  vitals stable  Chest: bilateral basal rales continues to be appreciated.  Bilateral LE edema Left>right, lymphedema  A/P   1. Acute on chronic diastolic heart failure:   Echo noted, diuresing satisfactory,   Repeat CXR compared with initial one, showing much improvement.   keep negative balance, Lasix switched to PO.   2. Cellulitis of left LE- continue Unasyn; clinically improved; anticipate antibiotics for total 7 days.   3. Hematoma in the left psoas, most likely secondary to fall.   4. Bilateral chronic LE lymphedema   6. H/O DVT with current minimal mobility due to acute illness-  DVT prophylaxis.   7. Pending NIESHA placement.     rest as above.

## 2018-03-17 NOTE — PROGRESS NOTE ADULT - PROBLEM SELECTOR PLAN 3
Patient has falls times x 2, Non-focal neuro exam   Ck elevated, got the fluids, for now stop the fluid  CT head negative   CT cervical spine wnl  Xray of that sides negative   syphilis RPR negative  PT recommended sub acute rehab

## 2018-03-17 NOTE — PROGRESS NOTE ADULT - PROBLEM SELECTOR PLAN 1
acute on chronic edema on legs, BNP is 1112 on admission  In 2017 echo with Ef > 53%, G2DD, S/P 3 L bolus in ED, later was very SOB  ECG NSR @75  Troponin x 3 negative   changed lasix to PO 40mg bid  diuresis well    repeat ECHO shows EF 55%, moderate AS  US LE negative for DVT   CT angio chest negative for PE  Monitor on tele, aspirin, statin, Cardizem, ramipril  Plan discharge on Monday

## 2018-03-17 NOTE — PROGRESS NOTE ADULT - PROBLEM SELECTOR PLAN 6
changes likely due to pulmonary vascular congestion rather than PNA  discontinued  levaquin; Continue duoneb for now   sputum cx shows moderate gram positive cocci in pairs  blood cx negative  QuantiFeron latent TB negative   b/l small pleural effusion on CT scan

## 2018-03-17 NOTE — PROGRESS NOTE ADULT - SUBJECTIVE AND OBJECTIVE BOX
PGY1 Note discussed with supervising resident and primary attending.    Patient is a 78y old  Male who presents with a chief complaint of Fall x 2, worsening SOB (14 Mar 2018 16:06)      INTERVAL HPI/OVERNIGHT EVENTS: pt has no new overnight events.    MEDICATIONS  (STANDING):  ALBUTerol/ipratropium for Nebulization 3 milliLiter(s) Nebulizer every 6 hours  ampicillin/sulbactam  IVPB 1.5 Gram(s) IV Intermittent every 6 hours  aspirin  chewable 81 milliGRAM(s) Oral daily  atorvastatin 40 milliGRAM(s) Oral at bedtime  diltiazem    milliGRAM(s) Oral daily  doxazosin 2 milliGRAM(s) Oral at bedtime  ferrous    sulfate 325 milliGRAM(s) Oral daily  finasteride 5 milliGRAM(s) Oral daily  furosemide    Tablet 40 milliGRAM(s) Oral two times a day  gabapentin 100 milliGRAM(s) Oral two times a day  insulin lispro (HumaLOG) corrective regimen sliding scale   SubCutaneous three times a day before meals  lisinopril 40 milliGRAM(s) Oral daily    MEDICATIONS  (PRN):  acetaminophen   Tablet. 650 milliGRAM(s) Oral every 6 hours PRN Mild Pain (1 - 3)      Allergies    No Known Allergies    Intolerances        PHYSICAL EXAM:  GENERAL: NAD, well-groomed, well-developed  CHEST/LUNG:  mild  rales b/l lungs   HEART: Regular rate and rhythm; No murmurs, rubs, or gallops  ABDOMEN: Soft, Nontender, Nondistended; Bowel sounds present  NERVOUS SYSTEM:  Alert & Oriented X3, Good concentration; Motor Strength 5/5 right side, 4/5 left side    EXTREMITIES:  2+ Peripheral Pulses, left thigh and lower leg tenderness, chronic lower legs edema with hyperpigmentation    Vital Signs Last 24 Hrs  T(C): 36.9 (17 Mar 2018 04:40), Max: 37.3 (16 Mar 2018 19:42)  T(F): 98.4 (17 Mar 2018 04:40), Max: 99.2 (16 Mar 2018 19:42)  HR: 83 (17 Mar 2018 04:40) (68 - 87)  BP: 114/51 (17 Mar 2018 04:40) (111/47 - 126/52)  BP(mean): --  RR: 18 (17 Mar 2018 04:40) (17 - 18)  SpO2: 98% (17 Mar 2018 04:40) (96% - 100%)    PHYSICAL EXAM:  GENERAL: NAD, well-groomed, well-developed  HEAD:  Atraumatic, Normocephalic  EYES: EOMI, PERRLA, conjunctiva and sclera clear  NECK: Supple, No JVD, Normal thyroid  CHEST/LUNG: Clear to percussion bilaterally; No rales, rhonchi, wheezing, or rubs  HEART: Regular rate and rhythm; No murmurs, rubs, or gallops  ABDOMEN: Soft, Nontender, Nondistended; Bowel sounds present  NERVOUS SYSTEM:  Alert & Oriented X3, Good concentration; Motor Strength 5/5 B/L   EXTREMITIES:  2+ Peripheral Pulses, No clubbing, cyanosis, or edema  SKIN;    LABS:                        10.0   7.6   )-----------( 220      ( 16 Mar 2018 06:34 )             32.8     03-16    141  |  104  |  40<H>  ----------------------------<  105<H>  5.0   |  28  |  1.53<H>    Ca    8.8      16 Mar 2018 06:34  Phos  5.4     03-16  Mg     2.1     03-16          CAPILLARY BLOOD GLUCOSE      POCT Blood Glucose.: 153 mg/dL (16 Mar 2018 20:51)  POCT Blood Glucose.: 160 mg/dL (16 Mar 2018 16:42)  POCT Blood Glucose.: 203 mg/dL (16 Mar 2018 11:27)  POCT Blood Glucose.: 105 mg/dL (16 Mar 2018 07:48)      RADIOLOGY & ADDITIONAL TESTS:    Imaging Personally Reviewed:  [x ] YES  [ ] NO    Consultant(s) Notes Reviewed:  [x ] YES  [ ] NO

## 2018-03-18 LAB
ANION GAP SERPL CALC-SCNC: 7 MMOL/L — SIGNIFICANT CHANGE UP (ref 5–17)
BASOPHILS # BLD AUTO: 0.1 K/UL — SIGNIFICANT CHANGE UP (ref 0–0.2)
BASOPHILS NFR BLD AUTO: 0.8 % — SIGNIFICANT CHANGE UP (ref 0–2)
BUN SERPL-MCNC: 40 MG/DL — HIGH (ref 7–18)
CALCIUM SERPL-MCNC: 8.8 MG/DL — SIGNIFICANT CHANGE UP (ref 8.4–10.5)
CHLORIDE SERPL-SCNC: 101 MMOL/L — SIGNIFICANT CHANGE UP (ref 96–108)
CO2 SERPL-SCNC: 33 MMOL/L — HIGH (ref 22–31)
CREAT SERPL-MCNC: 1.16 MG/DL — SIGNIFICANT CHANGE UP (ref 0.5–1.3)
CULTURE RESULTS: SIGNIFICANT CHANGE UP
CULTURE RESULTS: SIGNIFICANT CHANGE UP
EOSINOPHIL # BLD AUTO: 1 K/UL — HIGH (ref 0–0.5)
EOSINOPHIL NFR BLD AUTO: 11.2 % — HIGH (ref 0–6)
GLUCOSE SERPL-MCNC: 108 MG/DL — HIGH (ref 70–99)
HCT VFR BLD CALC: 34.3 % — LOW (ref 39–50)
HGB BLD-MCNC: 10.4 G/DL — LOW (ref 13–17)
LYMPHOCYTES # BLD AUTO: 0.5 K/UL — LOW (ref 1–3.3)
LYMPHOCYTES # BLD AUTO: 5.8 % — LOW (ref 13–44)
MCHC RBC-ENTMCNC: 25.6 PG — LOW (ref 27–34)
MCHC RBC-ENTMCNC: 30.2 GM/DL — LOW (ref 32–36)
MCV RBC AUTO: 84.8 FL — SIGNIFICANT CHANGE UP (ref 80–100)
MONOCYTES # BLD AUTO: 1 K/UL — HIGH (ref 0–0.9)
MONOCYTES NFR BLD AUTO: 11.2 % — SIGNIFICANT CHANGE UP (ref 2–14)
NEUTROPHILS # BLD AUTO: 6.2 K/UL — SIGNIFICANT CHANGE UP (ref 1.8–7.4)
NEUTROPHILS NFR BLD AUTO: 70.9 % — SIGNIFICANT CHANGE UP (ref 43–77)
PLATELET # BLD AUTO: 242 K/UL — SIGNIFICANT CHANGE UP (ref 150–400)
POTASSIUM SERPL-MCNC: 5 MMOL/L — SIGNIFICANT CHANGE UP (ref 3.5–5.3)
POTASSIUM SERPL-SCNC: 5 MMOL/L — SIGNIFICANT CHANGE UP (ref 3.5–5.3)
RBC # BLD: 4.05 M/UL — LOW (ref 4.2–5.8)
RBC # FLD: 14 % — SIGNIFICANT CHANGE UP (ref 10.3–14.5)
SODIUM SERPL-SCNC: 141 MMOL/L — SIGNIFICANT CHANGE UP (ref 135–145)
SPECIMEN SOURCE: SIGNIFICANT CHANGE UP
SPECIMEN SOURCE: SIGNIFICANT CHANGE UP
WBC # BLD: 8.8 K/UL — SIGNIFICANT CHANGE UP (ref 3.8–10.5)
WBC # FLD AUTO: 8.8 K/UL — SIGNIFICANT CHANGE UP (ref 3.8–10.5)

## 2018-03-18 RX ORDER — FERROUS SULFATE 325(65) MG
1 TABLET ORAL
Qty: 30 | Refills: 0 | OUTPATIENT
Start: 2018-03-18 | End: 2018-04-16

## 2018-03-18 RX ADMIN — HEPARIN SODIUM 5000 UNIT(S): 5000 INJECTION INTRAVENOUS; SUBCUTANEOUS at 21:51

## 2018-03-18 RX ADMIN — Medication 325 MILLIGRAM(S): at 12:45

## 2018-03-18 RX ADMIN — FINASTERIDE 5 MILLIGRAM(S): 5 TABLET, FILM COATED ORAL at 12:43

## 2018-03-18 RX ADMIN — Medication 1: at 12:43

## 2018-03-18 RX ADMIN — Medication 3 MILLILITER(S): at 20:52

## 2018-03-18 RX ADMIN — GABAPENTIN 100 MILLIGRAM(S): 400 CAPSULE ORAL at 06:38

## 2018-03-18 RX ADMIN — AMPICILLIN SODIUM AND SULBACTAM SODIUM 100 GRAM(S): 250; 125 INJECTION, POWDER, FOR SUSPENSION INTRAMUSCULAR; INTRAVENOUS at 12:51

## 2018-03-18 RX ADMIN — HEPARIN SODIUM 5000 UNIT(S): 5000 INJECTION INTRAVENOUS; SUBCUTANEOUS at 06:38

## 2018-03-18 RX ADMIN — HEPARIN SODIUM 5000 UNIT(S): 5000 INJECTION INTRAVENOUS; SUBCUTANEOUS at 14:33

## 2018-03-18 RX ADMIN — Medication 2 MILLIGRAM(S): at 21:52

## 2018-03-18 RX ADMIN — Medication 3 MILLILITER(S): at 15:38

## 2018-03-18 RX ADMIN — Medication 40 MILLIGRAM(S): at 06:38

## 2018-03-18 RX ADMIN — Medication 120 MILLIGRAM(S): at 06:38

## 2018-03-18 RX ADMIN — AMPICILLIN SODIUM AND SULBACTAM SODIUM 100 GRAM(S): 250; 125 INJECTION, POWDER, FOR SUSPENSION INTRAMUSCULAR; INTRAVENOUS at 17:44

## 2018-03-18 RX ADMIN — LISINOPRIL 40 MILLIGRAM(S): 2.5 TABLET ORAL at 06:38

## 2018-03-18 RX ADMIN — Medication 81 MILLIGRAM(S): at 12:43

## 2018-03-18 RX ADMIN — GABAPENTIN 100 MILLIGRAM(S): 400 CAPSULE ORAL at 17:44

## 2018-03-18 RX ADMIN — Medication 1: at 17:45

## 2018-03-18 RX ADMIN — Medication 3 MILLILITER(S): at 09:46

## 2018-03-18 RX ADMIN — AMPICILLIN SODIUM AND SULBACTAM SODIUM 100 GRAM(S): 250; 125 INJECTION, POWDER, FOR SUSPENSION INTRAMUSCULAR; INTRAVENOUS at 06:39

## 2018-03-18 RX ADMIN — Medication 40 MILLIGRAM(S): at 17:44

## 2018-03-18 RX ADMIN — AMPICILLIN SODIUM AND SULBACTAM SODIUM 100 GRAM(S): 250; 125 INJECTION, POWDER, FOR SUSPENSION INTRAMUSCULAR; INTRAVENOUS at 01:22

## 2018-03-18 RX ADMIN — ATORVASTATIN CALCIUM 40 MILLIGRAM(S): 80 TABLET, FILM COATED ORAL at 21:52

## 2018-03-18 NOTE — PROGRESS NOTE ADULT - SUBJECTIVE AND OBJECTIVE BOX
Patient is a 78y old  Male who presents with a chief complaint of Fall x 2, worsening SOB (14 Mar 2018 16:06)  Awake, alert, comfortable in bed in NAD.    INTERVAL HPI/OVERNIGHT EVENTS:      VITAL SIGNS:  T(F): 98.1 (03-18-18 @ 11:35)  HR: 77 (03-18-18 @ 11:35)  BP: 110/49 (03-18-18 @ 11:35)  RR: 18 (03-18-18 @ 11:35)  SpO2: 100% (03-18-18 @ 11:35)  Wt(kg): --  I&O's Detail    17 Mar 2018 07:01  -  18 Mar 2018 07:00  --------------------------------------------------------  IN:    Oral Fluid: 300 mL  Total IN: 300 mL    OUT:    Voided: 1750 mL  Total OUT: 1750 mL    Total NET: -1450 mL              REVIEW OF SYSTEMS:    CONSTITUTIONAL:  No fevers, chills, sweats    HEENT:  Eyes:  No diplopia or blurred vision. ENT:  No earache, sore throat or runny nose.    CARDIOVASCULAR:  No pressure, squeezing, tightness, or heaviness about the chest; no palpitations.    RESPIRATORY:  Per HPI    GASTROINTESTINAL:  No abdominal pain, nausea, vomiting or diarrhea.    GENITOURINARY:  No dysuria, frequency or urgency.    NEUROLOGIC:  No paresthesias, fasciculations, seizures or weakness.    PSYCHIATRIC:  No disorder of thought or mood.      PHYSICAL EXAM:    Constitutional: Well developed and nourished  Eyes:Perrla  ENMT: normal  Neck:supple  Respiratory: good air entry. No wheezes  Cardiovascular: S1 S2 regular  Gastrointestinal: Soft, Non tender  Extremities: + edema but less than before  Vascular:normal  Neurological:Awake, alert,Ox3  Musculoskeletal:Normal      MEDICATIONS  (STANDING):  ALBUTerol/ipratropium for Nebulization 3 milliLiter(s) Nebulizer every 6 hours  ampicillin/sulbactam  IVPB 1.5 Gram(s) IV Intermittent every 6 hours  aspirin  chewable 81 milliGRAM(s) Oral daily  atorvastatin 40 milliGRAM(s) Oral at bedtime  diltiazem    milliGRAM(s) Oral daily  doxazosin 2 milliGRAM(s) Oral at bedtime  ferrous    sulfate 325 milliGRAM(s) Oral daily  finasteride 5 milliGRAM(s) Oral daily  furosemide    Tablet 40 milliGRAM(s) Oral two times a day  gabapentin 100 milliGRAM(s) Oral two times a day  heparin  Injectable 5000 Unit(s) SubCutaneous every 8 hours  insulin lispro (HumaLOG) corrective regimen sliding scale   SubCutaneous three times a day before meals  lisinopril 40 milliGRAM(s) Oral daily    MEDICATIONS  (PRN):  acetaminophen   Tablet. 650 milliGRAM(s) Oral every 6 hours PRN Mild Pain (1 - 3)      Allergies    No Known Allergies    Intolerances        LABS:                        10.4   8.8   )-----------( 242      ( 18 Mar 2018 08:51 )             34.3     03-18    141  |  101  |  40<H>  ----------------------------<  108<H>  5.0   |  33<H>  |  1.16    Ca    8.8      18 Mar 2018 08:51                CAPILLARY BLOOD GLUCOSE      POCT Blood Glucose.: 167 mg/dL (18 Mar 2018 12:00)  POCT Blood Glucose.: 109 mg/dL (18 Mar 2018 07:56)  POCT Blood Glucose.: 213 mg/dL (17 Mar 2018 21:26)  POCT Blood Glucose.: 140 mg/dL (17 Mar 2018 16:31)    pro-bnp 1112 03-13 @ 03:00     d-dimer --  03-13 @ 03:00      RADIOLOGY & ADDITIONAL TESTS:    CXR:  < from: Xray Chest 1 View-PORTABLE IMMEDIATE (03.17.18 @ 14:27) >  INTERPRETATION:  Follow-up.    AP chest. Prior dated 3/13/2018.    Low lung volumes. No change heart mediastinum. Discounting technical   variation nosignificant change in mild diffuse bilateral interstitial   prominence. Correlate for congestion versus inflammation/infection. No   focal consolidation or pleural effusion. Elevation right hemidiaphragm   similar to prior.    Impression: As above    < end of copied text >    Ct scan chest:    ekg;    echo:

## 2018-03-18 NOTE — PROGRESS NOTE ADULT - SUBJECTIVE AND OBJECTIVE BOX
Patient is a 78y old  Male who presents with a chief complaint of Fall x 2, worsening SOB (14 Mar 2018 16:06)    INTERVAL HPI/OVERNIGHT EVENTS:  Patient was seen and examined at bedside, feeling ok  sleepy.     Allergies    No Known Allergies    Intolerances        REVIEW OF SYSTEMS:  CONSTITUTIONAL: No fever, weight loss, or fatigue  EYES: No eye pain, visual disturbances, or discharge  RESPIRATORY: No cough, wheezing or shortness of breath  CARDIOVASCULAR: No chest pain, feeling of heart beats  GASTROINTESTINAL: No abdominal or epigastric pain. No nausea, vomiting; No diarrhea or constipation.  GENITOURINARY: No dysuria, frequency, hematuria  NEUROLOGICAL: No headaches  MUSCULOSKELETAL: No joint pain  PSYCHIATRIC: No depression or anxiety  HEME/LYMPH: No easy bruising, or bleeding gums  ALLERGY AND IMMUNOLOGIC: No hives or eczema    Medications:  acetaminophen   Tablet. 650 milliGRAM(s) Oral every 6 hours PRN Mild Pain (1 - 3)  ALBUTerol/ipratropium for Nebulization 3 milliLiter(s) Nebulizer every 6 hours  ampicillin/sulbactam  IVPB 1.5 Gram(s) IV Intermittent every 6 hours  aspirin  chewable 81 milliGRAM(s) Oral daily  atorvastatin 40 milliGRAM(s) Oral at bedtime  diltiazem    milliGRAM(s) Oral daily  doxazosin 2 milliGRAM(s) Oral at bedtime  ferrous    sulfate 325 milliGRAM(s) Oral daily  finasteride 5 milliGRAM(s) Oral daily  furosemide    Tablet 40 milliGRAM(s) Oral two times a day  gabapentin 100 milliGRAM(s) Oral two times a day  heparin  Injectable 5000 Unit(s) SubCutaneous every 8 hours  insulin lispro (HumaLOG) corrective regimen sliding scale   SubCutaneous three times a day before meals  lisinopril 40 milliGRAM(s) Oral daily      PHYSICAL EXAM:  Vital Signs Last 24 Hrs  T(C): 36.7 (18 Mar 2018 16:14), Max: 37.1 (17 Mar 2018 20:49)  T(F): 98.1 (18 Mar 2018 16:14), Max: 98.8 (17 Mar 2018 20:49)  HR: 67 (18 Mar 2018 16:14) (67 - 92)  BP: 114/56 (18 Mar 2018 16:14) (105/46 - 135/57)  BP(mean): --  RR: 18 (18 Mar 2018 16:14) (18 - 18)  SpO2: 98% (18 Mar 2018 16:14) (98% - 100%)    GENERAL: NAD  HEAD:  Atraumatic, Normocephalic  EYES: EOMI, PERRLA, conjunctiva and sclera clear  ENT: moist mucous membranes  NECK: Supple, No JVD, Normal thyroid  NERVOUS SYSTEM:  Alert & Oriented X3, Good concentration; Motor Strength 5/5 B/L upper and lower extremities; DTRs 2+ intact and symmetric  CHEST/LUNG: No rales, rhonchi, wheezing, or rubs  HEART: Regular rate and rhythm; No murmurs, rubs, or gallops  ABDOMEN: Soft, Nontender, Nondistended; Bowel sounds present  EXTREMITIES:  Right LE extensive lymphedema, left LE less with shrunken skin.   SKIN: No rashes or lesions    LABS:                        10.4   8.8   )-----------( 242      ( 18 Mar 2018 08:51 )             34.3     03-18    141  |  101  |  40<H>  ----------------------------<  108<H>  5.0   |  33<H>  |  1.16    Ca    8.8      18 Mar 2018 08:51                  Culture & Sensitivity:   CAPILLARY BLOOD GLUCOSE      POCT Blood Glucose.: 198 mg/dL (18 Mar 2018 17:01)  POCT Blood Glucose.: 167 mg/dL (18 Mar 2018 12:00)  POCT Blood Glucose.: 109 mg/dL (18 Mar 2018 07:56)  POCT Blood Glucose.: 213 mg/dL (17 Mar 2018 21:26)      03-17 @ 07:01  -  03-18 @ 07:00  --------------------------------------------------------  IN: 300 mL / OUT: 1750 mL / NET: -1450 mL        RADIOLOGY & ADDITIONAL TESTS:  Radiology testing independently reviewed:     Consultant(s) Notes Reviewed:  [ x] YES  [ ] NO    Care Discussed with Consultants/Other Providers [ x] YES  [ ] NO

## 2018-03-18 NOTE — PROGRESS NOTE ADULT - SUBJECTIVE AND OBJECTIVE BOX
Meds:  Unasyn 1.5 gm IVPB q 6 hours.    Allergies:  Allergies    No Known Allergies    Intolerances    ROS  [  ] UNABLE TO ELICIT    General:  [  ] None  [  ] Fever  [  ] Chills  [ x ] Malaise    Skin:  [ x ] None [  ] Rash  [  ] Wound  [  ] Ulcer    HEENT:  [ x ] None  [  ] Sore Throat  [  ] Nasal congestion/ runny nose  [  ] Photophobia [  ] Neck pain      Chest:  [  ] None   [ x ] SOB  [ x ] Cough  [  ] None    Cardiovascular:   [  ] None  [  ] CP  [  ] Palpitation [ x ] Orthopnea    Gastrointestinal:  [ x ] None  [  ] Abd pain   [  ] Nausea    [  ] Vomiting   [  ] Diarrhea	     Genitourinary:  [ x ] None [  ] Polyuria   [  ] Urgency  [  ] Frequency  [  ] Dysuria    [  ]  Hematuria       Musculoskeletal:  [  ] None [  ] Back Pain	[  ] Body aches  [  ] Joint pain [ x ] Recurrent fall.    Neurological: [  ] None [  ]Dizziness  [  ]Visual Disturbance  [  ]Headaches   [ x ] Weakness          PHYSICAL EXAM:  Vital Signs Last 24 Hrs  T(C): 36.6 (18 Mar 2018 07:34), Max: 37.1 (17 Mar 2018 16:01)  T(F): 97.9 (18 Mar 2018 07:34), Max: 98.8 (17 Mar 2018 16:01)  HR: 76 (18 Mar 2018 07:34) (66 - 92)  BP: 135/57 (18 Mar 2018 07:34) (105/46 - 135/57)  BP(mean): --  RR: 18 (18 Mar 2018 07:34) (17 - 18)  SpO2: 100% (18 Mar 2018 07:34) (98% - 100%)    Constitutional:    HEENT: [ x ] Wnl  [  ] Pharyngeal congestion    Neck:  [ x ] Supple  [  ]Lymphadenopathy  [ x ] No JVD   [  ] JVD  [  ] Masses   [  ] WNL    CHEST/Respiratory:  [  ]Clear to auscultation  [ x ] Rales   [  ] Rhonchi   [  ] Wheezing     [  ] Chest Tenderness      Cardiovascular:  [ x ] Reg S1 S2   [  ] Irreg S1 S2   [ x ]No Murmur  [  ] +ve Murmurs  [  ]Systolic [  ]Diastolic      Abdomen:  [ x ] Soft  [ x ] No tendrerness  [  ] Tenderness  [  ] Organomegaly  [  ] ABD Distention  [  ] Rigidity                       [ x ] No Regidity                       [ x ] No Rebound Tenderness  [ x ] No Guarding Rigidity  [  ] Rebound Tenderness[  ] Guarding Rigidity                          [ x ]  +ve Bowel Sounds  [  ] Decreased Bowel Sounds    [  ] Absent Bowel Sounds                            Extremities: [  ] No edema [ x ] Edema and erythema both legs(markedly improved), with chronic skin changes. [  ] Clubbing   [  ] Cyanosis                         [ x ] No Tender Calf muscles  [  ] Tender Calf muscles                        [ x ] Palpable peripheral pulses    Neurological: [ x ] Awake  [ x ] Alert  [ x ] Oriented  x  3                           [  ] Confused  [  ] Drowsy  [  ] respond to painful stimuli  [  ] Unresponsive    Skin:  [ x ] Intact [  ] Redness [  ] Thrombophlebitis  [  ] Rashes  [  ] Dry  [  ] Ulcers    Ortho:  [  ] Joint Swelling  [  ] Joint erythema [  ] Joint tenderness                [  ] Increased temp. to touch  [  ] DJD [ x ] WNL          LABS/DIAGNOSTIC TESTS                        10.4   8.8   )-----------( 242      ( 18 Mar 2018 08:51 )             34.3   03-18    141  |  101  |  40<H>  ----------------------------<  108<H>  5.0   |  33<H>  |  1.16    Ca    8.8      18 Mar 2018 08:51        CULTURES:   Culture - Sputum . (03.14.18 @ 18:48)    Gram Stain:   No polymorphonuclear leukocytes per low power field  Moderate Squamous epithelial cells per low power field  Moderate Gram positive cocci in pairs, chains and clusters per oil power  field  Few Gram Positive Rods per oil power field  Results consistent with oropharyngeal contamination    Specimen Source: .Sputum Sputum      Culture - Urine (03.13.18 @ 09:58)    Specimen Source: .Urine Clean Catch (Midstream)    Culture Results:   No growth    Culture - Blood (03.13.18 @ 09:42)    Specimen Source: .Blood Blood-Peripheral    Culture Results:   No growth to date.    Culture - Blood (03.13.18 @ 09:42)    Specimen Source: .Blood Blood-Peripheral    Culture Results:   No growth to date.        RADIOLOGY:    EXAM:  XR CHEST PORTABLE IMMED 1V                            PROCEDURE DATE:  03/17/2018          INTERPRETATION:  Follow-up.    AP chest. Prior dated 3/13/2018.    Low lung volumes. No change heart mediastinum. Discounting technical   variation nosignificant change in mild diffuse bilateral interstitial   prominence. Correlate for congestion versus inflammation/infection. No   focal consolidation or pleural effusion. Elevation right hemidiaphragm   similar to prior.    Impression: As above          EXAM:  CT ABDOMEN AND PELVIS IC                            PROCEDURE DATE:  03/14/2018          INTERPRETATION:  CT of the abdomen and pelvis with IV contrast    Clinical Indication: 4.5 cm retroperitoneal mass on prior chest CTA dated   3/13/2018    Technique: Axial multidetector CT images of the abdomen and pelvis are   acquired following the administration of oral and IV contrast (96 cc   Omnipaque-350 administered, 4 cc discarded).    Comparison: No prior abdominal/pelvic CT is available for comparison.   Reference is made with previous chest CTs dated 3/13/2018 and 7/9/2017.    Findings:    Abdomen: Limited sections through the lung bases demonstrate small   bilateral pleural effusions. Small atelectasis bilaterally. Patchy   groundglass density airspace opacifications in both lungs.     There is asymmetric enlargement of the left psoas muscle and this was   seen on the previous chest CT dated 3/13/2018; this is a new finding   since the previous chest CT dated 7/9/2017.     Gallstones in the gallbladder. No evidence for thickened gallbladder wall   or pericholecystic fluid. Mild stranding adjacent to the gallbladder.    The liver, pancreas, spleen, adrenals and kidneys appear unremarkable.    Colonic diverticulosis without evidence for diverticulitis. No bowel   obstruction, or grossly thickened bowel wall. Mild stranding adjacent to   the duodenum.    Small fat-containing periumbilical hernia.    No evidence for free air, ascites, or enlarged lymph node.    Pelvis: The urinary bladder is within normal limits. Sigmoid   diverticulosis without evidence for diverticulitis. Small pelvic free   fluid.    Sclerotic lesions in L5 vertebra.    Impression: Asymmetric enlargement of the left psoas muscle; this may be   due to muscular hypertrophy, or underlying intramuscular hematoma or   mass. If clinically indicated, lumbar spine MR without and with IV   contrast may be pursued for further evaluation after 24 hours.     Small bilateral pleural perfusions.    Patchy airspace opacifications in both lungs may be due to pneumonia or   pulmonary edema. Clinical correlation is recommended.    Cholelithiasis. Mild stranding adjacent to the gallbladder and the   duodenum may represent acute cholecystitis and/or nonspecific duodenitis.   Clinical correlation is recommended. If clinically indicated, HIDA scan   may be pursued for further evaluation.    Sclerotic lesion in L5 vertebra. If clinically indicated, bone scan may   be pursued to rule out osseous metastasis.    Small pelvic free fluid.            Assessment and Recommendation:   78  y/ o. 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   Patient was admitted for pneumonia and CHF and was started on IV Levaquin, that was subsequently discontinued in view of absent symptoms and procalcitonin level of 0.08.  Patient C/O pain left lower extremity and was started on iv Unasyn for cellulitis.     Problem/Recommendation - 1:  Problem: PNA (Questionable pneumonia).   Recommendation:   1- Continue IV Unasyn for left leg cellulitis till 3/19/18 then discontinue it.  2- Cardiac management for CHF.  3- O2 as needed.  4- Pulmonary management.  5- CT abdomen result is noted.  6- Legs elevation in bed.    Problem/Recommendation - 2:  ·  Problem: Anemia.    Recommendation:   1- Anemia profile.  2- Iron supplements.  3- Closely monitor H & H.  4- Observe for bleeding.     Problem/Recommendation - 3:  ·  Problem: Diabetes.    Recommendation:   1- Blood sugar monitoring and control.  2- Accu-Cheks with coverage.  3- 1800 sheldon ADA diet.  4- Follow HB A1C.     Problem/Recommendation - 4:  ·  Problem: Hypertension.    Recommendation:   1- Monitor Blood pressure closely.  2- Blood pressure control.  3- BP. meds as per cardiology and primary care team.       Discussed with medical resident.

## 2018-03-19 DIAGNOSIS — I50.31 ACUTE DIASTOLIC (CONGESTIVE) HEART FAILURE: ICD-10-CM

## 2018-03-19 LAB
ANION GAP SERPL CALC-SCNC: 5 MMOL/L — SIGNIFICANT CHANGE UP (ref 5–17)
BASOPHILS # BLD AUTO: 0.1 K/UL — SIGNIFICANT CHANGE UP (ref 0–0.2)
BASOPHILS NFR BLD AUTO: 0.8 % — SIGNIFICANT CHANGE UP (ref 0–2)
BUN SERPL-MCNC: 33 MG/DL — HIGH (ref 7–18)
CALCIUM SERPL-MCNC: 8.9 MG/DL — SIGNIFICANT CHANGE UP (ref 8.4–10.5)
CHLORIDE SERPL-SCNC: 97 MMOL/L — SIGNIFICANT CHANGE UP (ref 96–108)
CO2 SERPL-SCNC: 35 MMOL/L — HIGH (ref 22–31)
CREAT SERPL-MCNC: 1.05 MG/DL — SIGNIFICANT CHANGE UP (ref 0.5–1.3)
EOSINOPHIL # BLD AUTO: 1 K/UL — HIGH (ref 0–0.5)
EOSINOPHIL NFR BLD AUTO: 9.5 % — HIGH (ref 0–6)
GLUCOSE SERPL-MCNC: 104 MG/DL — HIGH (ref 70–99)
HCT VFR BLD CALC: 33.5 % — LOW (ref 39–50)
HGB BLD-MCNC: 10.3 G/DL — LOW (ref 13–17)
LYMPHOCYTES # BLD AUTO: 0.7 K/UL — LOW (ref 1–3.3)
LYMPHOCYTES # BLD AUTO: 7 % — LOW (ref 13–44)
MCHC RBC-ENTMCNC: 26 PG — LOW (ref 27–34)
MCHC RBC-ENTMCNC: 30.8 GM/DL — LOW (ref 32–36)
MCV RBC AUTO: 84.3 FL — SIGNIFICANT CHANGE UP (ref 80–100)
MONOCYTES # BLD AUTO: 0.9 K/UL — SIGNIFICANT CHANGE UP (ref 0–0.9)
MONOCYTES NFR BLD AUTO: 8.9 % — SIGNIFICANT CHANGE UP (ref 2–14)
NEUTROPHILS # BLD AUTO: 7.8 K/UL — HIGH (ref 1.8–7.4)
NEUTROPHILS NFR BLD AUTO: 73.8 % — SIGNIFICANT CHANGE UP (ref 43–77)
PLATELET # BLD AUTO: 232 K/UL — SIGNIFICANT CHANGE UP (ref 150–400)
POTASSIUM SERPL-MCNC: 4.4 MMOL/L — SIGNIFICANT CHANGE UP (ref 3.5–5.3)
POTASSIUM SERPL-SCNC: 4.4 MMOL/L — SIGNIFICANT CHANGE UP (ref 3.5–5.3)
RBC # BLD: 3.97 M/UL — LOW (ref 4.2–5.8)
RBC # FLD: 13.7 % — SIGNIFICANT CHANGE UP (ref 10.3–14.5)
SODIUM SERPL-SCNC: 137 MMOL/L — SIGNIFICANT CHANGE UP (ref 135–145)
WBC # BLD: 10.5 K/UL — SIGNIFICANT CHANGE UP (ref 3.8–10.5)
WBC # FLD AUTO: 10.5 K/UL — SIGNIFICANT CHANGE UP (ref 3.8–10.5)

## 2018-03-19 PROCEDURE — 99233 SBSQ HOSP IP/OBS HIGH 50: CPT | Mod: GC

## 2018-03-19 RX ORDER — POLYETHYLENE GLYCOL 3350 17 G/17G
17 POWDER, FOR SOLUTION ORAL DAILY
Qty: 0 | Refills: 0 | Status: COMPLETED | OUTPATIENT
Start: 2018-03-19 | End: 2018-03-20

## 2018-03-19 RX ORDER — POLYETHYLENE GLYCOL 3350 17 G/17G
17 POWDER, FOR SOLUTION ORAL DAILY
Qty: 0 | Refills: 0 | Status: DISCONTINUED | OUTPATIENT
Start: 2018-03-19 | End: 2018-03-19

## 2018-03-19 RX ORDER — DOCUSATE SODIUM 100 MG
100 CAPSULE ORAL
Qty: 0 | Refills: 0 | Status: COMPLETED | OUTPATIENT
Start: 2018-03-19 | End: 2018-03-20

## 2018-03-19 RX ORDER — SENNA PLUS 8.6 MG/1
2 TABLET ORAL AT BEDTIME
Qty: 0 | Refills: 0 | Status: COMPLETED | OUTPATIENT
Start: 2018-03-19 | End: 2018-03-20

## 2018-03-19 RX ORDER — LACTULOSE 10 G/15ML
10 SOLUTION ORAL ONCE
Qty: 0 | Refills: 0 | Status: COMPLETED | OUTPATIENT
Start: 2018-03-19 | End: 2018-03-19

## 2018-03-19 RX ORDER — ACETAMINOPHEN 500 MG
650 TABLET ORAL EVERY 6 HOURS
Qty: 0 | Refills: 0 | Status: DISCONTINUED | OUTPATIENT
Start: 2018-03-19 | End: 2018-03-21

## 2018-03-19 RX ADMIN — Medication 100 MILLIGRAM(S): at 18:48

## 2018-03-19 RX ADMIN — SENNA PLUS 2 TABLET(S): 8.6 TABLET ORAL at 21:15

## 2018-03-19 RX ADMIN — LACTULOSE 10 GRAM(S): 10 SOLUTION ORAL at 23:22

## 2018-03-19 RX ADMIN — HEPARIN SODIUM 5000 UNIT(S): 5000 INJECTION INTRAVENOUS; SUBCUTANEOUS at 21:15

## 2018-03-19 RX ADMIN — LISINOPRIL 40 MILLIGRAM(S): 2.5 TABLET ORAL at 05:57

## 2018-03-19 RX ADMIN — Medication 81 MILLIGRAM(S): at 12:33

## 2018-03-19 RX ADMIN — Medication 40 MILLIGRAM(S): at 05:57

## 2018-03-19 RX ADMIN — AMPICILLIN SODIUM AND SULBACTAM SODIUM 100 GRAM(S): 250; 125 INJECTION, POWDER, FOR SUSPENSION INTRAMUSCULAR; INTRAVENOUS at 17:47

## 2018-03-19 RX ADMIN — Medication 3 MILLILITER(S): at 20:56

## 2018-03-19 RX ADMIN — GABAPENTIN 100 MILLIGRAM(S): 400 CAPSULE ORAL at 05:57

## 2018-03-19 RX ADMIN — HEPARIN SODIUM 5000 UNIT(S): 5000 INJECTION INTRAVENOUS; SUBCUTANEOUS at 08:46

## 2018-03-19 RX ADMIN — AMPICILLIN SODIUM AND SULBACTAM SODIUM 100 GRAM(S): 250; 125 INJECTION, POWDER, FOR SUSPENSION INTRAMUSCULAR; INTRAVENOUS at 23:21

## 2018-03-19 RX ADMIN — Medication 3 MILLILITER(S): at 08:26

## 2018-03-19 RX ADMIN — AMPICILLIN SODIUM AND SULBACTAM SODIUM 100 GRAM(S): 250; 125 INJECTION, POWDER, FOR SUSPENSION INTRAMUSCULAR; INTRAVENOUS at 00:54

## 2018-03-19 RX ADMIN — Medication 650 MILLIGRAM(S): at 22:16

## 2018-03-19 RX ADMIN — Medication 325 MILLIGRAM(S): at 12:34

## 2018-03-19 RX ADMIN — GABAPENTIN 100 MILLIGRAM(S): 400 CAPSULE ORAL at 18:48

## 2018-03-19 RX ADMIN — Medication 3 MILLILITER(S): at 14:31

## 2018-03-19 RX ADMIN — AMPICILLIN SODIUM AND SULBACTAM SODIUM 100 GRAM(S): 250; 125 INJECTION, POWDER, FOR SUSPENSION INTRAMUSCULAR; INTRAVENOUS at 12:33

## 2018-03-19 RX ADMIN — POLYETHYLENE GLYCOL 3350 17 GRAM(S): 17 POWDER, FOR SOLUTION ORAL at 14:19

## 2018-03-19 RX ADMIN — Medication 120 MILLIGRAM(S): at 05:57

## 2018-03-19 RX ADMIN — ATORVASTATIN CALCIUM 40 MILLIGRAM(S): 80 TABLET, FILM COATED ORAL at 21:15

## 2018-03-19 RX ADMIN — HEPARIN SODIUM 5000 UNIT(S): 5000 INJECTION INTRAVENOUS; SUBCUTANEOUS at 16:47

## 2018-03-19 RX ADMIN — Medication 2 MILLIGRAM(S): at 21:15

## 2018-03-19 RX ADMIN — Medication 3: at 12:33

## 2018-03-19 RX ADMIN — Medication 40 MILLIGRAM(S): at 18:48

## 2018-03-19 RX ADMIN — FINASTERIDE 5 MILLIGRAM(S): 5 TABLET, FILM COATED ORAL at 12:34

## 2018-03-19 RX ADMIN — AMPICILLIN SODIUM AND SULBACTAM SODIUM 100 GRAM(S): 250; 125 INJECTION, POWDER, FOR SUSPENSION INTRAMUSCULAR; INTRAVENOUS at 05:57

## 2018-03-19 NOTE — PROGRESS NOTE ADULT - PROBLEM SELECTOR PLAN 5
afebrile , no leucocytosis    BCx negative  c/w  Unasyn Day 7(total 7 days) Cr base line nl   Cr improved , resolved   diuresis well  repeat BMP in am -CT abd shows asymmetric enlargement of the left psoas muscle; this may be   due to muscular hypertrophy, or underlying intramuscular hematoma or mass,  Sclerotic lesion in L5 vertebra. If clinically indicated, bone scan may be pursued to rule out osseous metastasis.  -possible due to Fall with possible hematoma around left psoas    -c/w  warm compresses  -Monitor cbc for Hb, stable now  -Pain control with Tylenol

## 2018-03-19 NOTE — PROGRESS NOTE ADULT - SUBJECTIVE AND OBJECTIVE BOX
Patient is a 78y old  Male who presents with a chief complaint of Fall x 2, worsening SOB.   Awake, alert, comfortable in bed in NAD.      INTERVAL HPI/OVERNIGHT EVENTS:      VITAL SIGNS:  T(F): 98.7 (03-19-18 @ 08:02)  HR: 83 (03-19-18 @ 08:02)  BP: 128/65 (03-19-18 @ 08:02)  RR: 18 (03-19-18 @ 08:02)  SpO2: 98% (03-19-18 @ 08:02)  Wt(kg): --  I&O's Detail    18 Mar 2018 07:01  -  19 Mar 2018 07:00  --------------------------------------------------------  IN:    Oral Fluid: 400 mL    Solution: 100 mL  Total IN: 500 mL    OUT:    Voided: 1500 mL  Total OUT: 1500 mL    Total NET: -1000 mL      19 Mar 2018 07:01  -  19 Mar 2018 11:08  --------------------------------------------------------  IN:    Oral Fluid: 200 mL  Total IN: 200 mL    OUT:  Total OUT: 0 mL    Total NET: 200 mL              REVIEW OF SYSTEMS:    CONSTITUTIONAL:  No fevers, chills, sweats    HEENT:  Eyes:  No diplopia or blurred vision. ENT:  No earache, sore throat or runny nose.    CARDIOVASCULAR:  No pressure, squeezing, tightness, or heaviness about the chest; no palpitations.    RESPIRATORY:  Per HPI    GASTROINTESTINAL:  No abdominal pain, nausea, vomiting or diarrhea.    GENITOURINARY:  No dysuria, frequency or urgency.    NEUROLOGIC:  No paresthesias, fasciculations, seizures or weakness.    PSYCHIATRIC:  No disorder of thought or mood.      PHYSICAL EXAM:    Constitutional: Well developed and nourished  Eyes:Perrla  ENMT: normal  Neck:supple  Respiratory: good air entry  Cardiovascular: S1 S2 regular  Gastrointestinal: Soft, Non tender  Extremities: No edema  Vascular:normal  Neurological:Awake, alert,Ox3  Musculoskeletal:Normal      MEDICATIONS  (STANDING):  ALBUTerol/ipratropium for Nebulization 3 milliLiter(s) Nebulizer every 6 hours  ampicillin/sulbactam  IVPB 1.5 Gram(s) IV Intermittent every 6 hours  aspirin  chewable 81 milliGRAM(s) Oral daily  atorvastatin 40 milliGRAM(s) Oral at bedtime  diltiazem    milliGRAM(s) Oral daily  doxazosin 2 milliGRAM(s) Oral at bedtime  ferrous    sulfate 325 milliGRAM(s) Oral daily  finasteride 5 milliGRAM(s) Oral daily  furosemide    Tablet 40 milliGRAM(s) Oral two times a day  gabapentin 100 milliGRAM(s) Oral two times a day  heparin  Injectable 5000 Unit(s) SubCutaneous every 8 hours  insulin lispro (HumaLOG) corrective regimen sliding scale   SubCutaneous three times a day before meals  lisinopril 40 milliGRAM(s) Oral daily    MEDICATIONS  (PRN):  acetaminophen   Tablet. 650 milliGRAM(s) Oral every 6 hours PRN Mild Pain (1 - 3)      Allergies    No Known Allergies    Intolerances        LABS:                        10.3   10.5  )-----------( 232      ( 19 Mar 2018 09:09 )             33.5     03-19    137  |  97  |  33<H>  ----------------------------<  104<H>  4.4   |  35<H>  |  1.05    Ca    8.9      19 Mar 2018 09:09                CAPILLARY BLOOD GLUCOSE      POCT Blood Glucose.: 125 mg/dL (19 Mar 2018 07:43)  POCT Blood Glucose.: 179 mg/dL (18 Mar 2018 21:11)  POCT Blood Glucose.: 198 mg/dL (18 Mar 2018 17:01)  POCT Blood Glucose.: 167 mg/dL (18 Mar 2018 12:00)    pro-bnp 1112 03-13 @ 03:00     d-dimer --  03-13 @ 03:00      RADIOLOGY & ADDITIONAL TESTS:    CXR:  < from: Xray Chest 1 View-PORTABLE IMMEDIATE (03.17.18 @ 14:27) >  Low lung volumes. No change heart mediastinum. Discounting technical   variation nosignificant change in mild diffuse bilateral interstitial   prominence. Correlate for congestion versus inflammation/infection. No   focal consolidation or pleural effusion. Elevation right hemidiaphragm   similar to prior.    < end of copied text >    Ct scan chest:    ekg;    echo: Patient is a 78y old  Male who presents with a chief complaint of Fall x 2, worsening SOB.   Awake, alert, comfortable in bed in NAD. No wheezing and less leg edema. Renal function improved as well      INTERVAL HPI/OVERNIGHT EVENTS:      VITAL SIGNS:  T(F): 98.7 (03-19-18 @ 08:02)  HR: 83 (03-19-18 @ 08:02)  BP: 128/65 (03-19-18 @ 08:02)  RR: 18 (03-19-18 @ 08:02)  SpO2: 98% (03-19-18 @ 08:02)  Wt(kg): --  I&O's Detail    18 Mar 2018 07:01  -  19 Mar 2018 07:00  --------------------------------------------------------  IN:    Oral Fluid: 400 mL    Solution: 100 mL  Total IN: 500 mL    OUT:    Voided: 1500 mL  Total OUT: 1500 mL    Total NET: -1000 mL      19 Mar 2018 07:01  -  19 Mar 2018 11:08  --------------------------------------------------------  IN:    Oral Fluid: 200 mL  Total IN: 200 mL    OUT:  Total OUT: 0 mL    Total NET: 200 mL              REVIEW OF SYSTEMS:    CONSTITUTIONAL:  No fevers, chills, sweats    HEENT:  Eyes:  No diplopia or blurred vision. ENT:  No earache, sore throat or runny nose.    CARDIOVASCULAR:  No pressure, squeezing, tightness, or heaviness about the chest; no palpitations.    RESPIRATORY:  Per HPI    GASTROINTESTINAL:  No abdominal pain, nausea, vomiting or diarrhea.    GENITOURINARY:  No dysuria, frequency or urgency.    NEUROLOGIC:  No paresthesias, fasciculations, seizures or weakness.    PSYCHIATRIC:  No disorder of thought or mood.      PHYSICAL EXAM:    Constitutional: Well developed and nourished  Eyes:Perrla  ENMT: normal  Neck:supple  Respiratory: good air entry  Cardiovascular: S1 S2 regular  Gastrointestinal: Soft, Non tender  Extremities: + edema  Vascular:normal  Neurological:Awake, alert,Ox3  Musculoskeletal:Normal      MEDICATIONS  (STANDING):  ALBUTerol/ipratropium for Nebulization 3 milliLiter(s) Nebulizer every 6 hours  ampicillin/sulbactam  IVPB 1.5 Gram(s) IV Intermittent every 6 hours  aspirin  chewable 81 milliGRAM(s) Oral daily  atorvastatin 40 milliGRAM(s) Oral at bedtime  diltiazem    milliGRAM(s) Oral daily  doxazosin 2 milliGRAM(s) Oral at bedtime  ferrous    sulfate 325 milliGRAM(s) Oral daily  finasteride 5 milliGRAM(s) Oral daily  furosemide    Tablet 40 milliGRAM(s) Oral two times a day  gabapentin 100 milliGRAM(s) Oral two times a day  heparin  Injectable 5000 Unit(s) SubCutaneous every 8 hours  insulin lispro (HumaLOG) corrective regimen sliding scale   SubCutaneous three times a day before meals  lisinopril 40 milliGRAM(s) Oral daily    MEDICATIONS  (PRN):  acetaminophen   Tablet. 650 milliGRAM(s) Oral every 6 hours PRN Mild Pain (1 - 3)      Allergies    No Known Allergies    Intolerances        LABS:                        10.3   10.5  )-----------( 232      ( 19 Mar 2018 09:09 )             33.5     03-19    137  |  97  |  33<H>  ----------------------------<  104<H>  4.4   |  35<H>  |  1.05    Ca    8.9      19 Mar 2018 09:09                CAPILLARY BLOOD GLUCOSE      POCT Blood Glucose.: 125 mg/dL (19 Mar 2018 07:43)  POCT Blood Glucose.: 179 mg/dL (18 Mar 2018 21:11)  POCT Blood Glucose.: 198 mg/dL (18 Mar 2018 17:01)  POCT Blood Glucose.: 167 mg/dL (18 Mar 2018 12:00)    pro-bnp 1112 03-13 @ 03:00     d-dimer --  03-13 @ 03:00      RADIOLOGY & ADDITIONAL TESTS:    CXR:  < from: Xray Chest 1 View-PORTABLE IMMEDIATE (03.17.18 @ 14:27) >  Low lung volumes. No change heart mediastinum. Discounting technical   variation no significant change in mild diffuse bilateral interstitial   prominence. Correlate for congestion versus inflammation/infection. No   focal consolidation or pleural effusion. Elevation right hemidiaphragm   similar to prior.    < end of copied text >    Ct scan chest:    ekg;    echo:

## 2018-03-19 NOTE — PROGRESS NOTE ADULT - PROBLEM SELECTOR PLAN 2
Cr base line nl   Cr improved , resolved   diuresis well  repeat BMP in am afebrile , no leucocytosis    BCx negative  c/w  Unasyn Day 7(total 7 days) afebrile , no leucocytosis    BCx negative  c/w  Unasyn Day 7 (total 7 days)

## 2018-03-19 NOTE — PROGRESS NOTE ADULT - SUBJECTIVE AND OBJECTIVE BOX
Meds:  Unasyn 1.5 gm IVPB q 6 hours.    Allergies:  Allergies    No Known Allergies    Intolerances    ROS  [  ] UNABLE TO ELICIT    General:  [  ] None  [  ] Fever  [  ] Chills  [ x ] Malaise    Skin:  [ x ] None [  ] Rash  [  ] Wound  [  ] Ulcer    HEENT:  [ x ] None  [  ] Sore Throat  [  ] Nasal congestion/ runny nose  [  ] Photophobia [  ] Neck pain      Chest:  [  ] None   [ x ] SOB  [ x ] Cough  [  ] None    Cardiovascular:   [  ] None  [  ] CP  [  ] Palpitation [ x ] Orthopnea    Gastrointestinal:  [ x ] None  [  ] Abd pain   [  ] Nausea    [  ] Vomiting   [  ] Diarrhea	     Genitourinary:  [ x ] None [  ] Polyuria   [  ] Urgency  [  ] Frequency  [  ] Dysuria    [  ]  Hematuria       Musculoskeletal:  [  ] None [  ] Back Pain	[  ] Body aches  [  ] Joint pain [ x ] Recurrent fall.    Neurological: [  ] None [  ]Dizziness  [  ]Visual Disturbance  [  ]Headaches   [ x ] Weakness          PHYSICAL EXAM:  Vital Signs Last 24 Hrs  T(C): 37.1 (19 Mar 2018 08:02), Max: 37.1 (19 Mar 2018 08:02)  T(F): 98.7 (19 Mar 2018 08:02), Max: 98.7 (19 Mar 2018 08:02)  HR: 83 (19 Mar 2018 08:02) (60 - 94)  BP: 128/65 (19 Mar 2018 08:02) (110/49 - 137/46)  BP(mean): --  RR: 18 (19 Mar 2018 08:02) (18 - 18)  SpO2: 98% (19 Mar 2018 08:02) (98% - 100%)    Constitutional:    HEENT: [ x ] Wnl  [  ] Pharyngeal congestion    Neck:  [ x ] Supple  [  ]Lymphadenopathy  [ x ] No JVD   [  ] JVD  [  ] Masses   [  ] WNL    CHEST/Respiratory:  [  ]Clear to auscultation  [ x ] Rales   [  ] Rhonchi   [  ] Wheezing     [  ] Chest Tenderness      Cardiovascular:  [ x ] Reg S1 S2   [  ] Irreg S1 S2   [ x ]No Murmur  [  ] +ve Murmurs  [  ]Systolic [  ]Diastolic      Abdomen:  [ x ] Soft  [ x ] No tendrerness  [  ] Tenderness  [  ] Organomegaly  [  ] ABD Distention  [  ] Rigidity                       [ x ] No Regidity                       [ x ] No Rebound Tenderness  [ x ] No Guarding Rigidity  [  ] Rebound Tenderness[  ] Guarding Rigidity                          [ x ]  +ve Bowel Sounds  [  ] Decreased Bowel Sounds    [  ] Absent Bowel Sounds                            Extremities: [  ] No edema [ x ] Edema and erythema both legs(markedly improved), with chronic skin changes. [  ] Clubbing   [  ] Cyanosis                         [ x ] No Tender Calf muscles  [  ] Tender Calf muscles                        [ x ] Palpable peripheral pulses    Neurological: [ x ] Awake  [ x ] Alert  [ x ] Oriented  x  3                           [  ] Confused  [  ] Drowsy  [  ] respond to painful stimuli  [  ] Unresponsive    Skin:  [ x ] Intact [  ] Redness [  ] Thrombophlebitis  [  ] Rashes  [  ] Dry  [  ] Ulcers    Ortho:  [  ] Joint Swelling  [  ] Joint erythema [  ] Joint tenderness                [  ] Increased temp. to touch  [  ] DJD [ x ] WNL          LABS/DIAGNOSTIC TESTS                        10.4   8.8   )-----------( 242      ( 18 Mar 2018 08:51 )             34.3   03-18    141  |  101  |  40<H>  ----------------------------<  108<H>  5.0   |  33<H>  |  1.16    Ca    8.8      18 Mar 2018 08:51          CULTURES:   Culture - Sputum . (03.14.18 @ 18:48)    Gram Stain:   No polymorphonuclear leukocytes per low power field  Moderate Squamous epithelial cells per low power field  Moderate Gram positive cocci in pairs, chains and clusters per oil power  field  Few Gram Positive Rods per oil power field  Results consistent with oropharyngeal contamination    Specimen Source: .Sputum Sputum      Culture - Urine (03.13.18 @ 09:58)    Specimen Source: .Urine Clean Catch (Midstream)    Culture Results:   No growth    Culture - Blood (03.13.18 @ 09:42)    Specimen Source: .Blood Blood-Peripheral    Culture Results:   No growth to date.    Culture - Blood (03.13.18 @ 09:42)    Specimen Source: .Blood Blood-Peripheral    Culture Results:   No growth to date.        RADIOLOGY:    EXAM:  XR CHEST PORTABLE IMMED 1V                            PROCEDURE DATE:  03/17/2018          INTERPRETATION:  Follow-up.    AP chest. Prior dated 3/13/2018.    Low lung volumes. No change heart mediastinum. Discounting technical   variation nosignificant change in mild diffuse bilateral interstitial   prominence. Correlate for congestion versus inflammation/infection. No   focal consolidation or pleural effusion. Elevation right hemidiaphragm   similar to prior.    Impression: As above          EXAM:  CT ABDOMEN AND PELVIS IC                            PROCEDURE DATE:  03/14/2018          INTERPRETATION:  CT of the abdomen and pelvis with IV contrast    Clinical Indication: 4.5 cm retroperitoneal mass on prior chest CTA dated   3/13/2018    Technique: Axial multidetector CT images of the abdomen and pelvis are   acquired following the administration of oral and IV contrast (96 cc   Omnipaque-350 administered, 4 cc discarded).    Comparison: No prior abdominal/pelvic CT is available for comparison.   Reference is made with previous chest CTs dated 3/13/2018 and 7/9/2017.    Findings:    Abdomen: Limited sections through the lung bases demonstrate small   bilateral pleural effusions. Small atelectasis bilaterally. Patchy   groundglass density airspace opacifications in both lungs.     There is asymmetric enlargement of the left psoas muscle and this was   seen on the previous chest CT dated 3/13/2018; this is a new finding   since the previous chest CT dated 7/9/2017.     Gallstones in the gallbladder. No evidence for thickened gallbladder wall   or pericholecystic fluid. Mild stranding adjacent to the gallbladder.    The liver, pancreas, spleen, adrenals and kidneys appear unremarkable.    Colonic diverticulosis without evidence for diverticulitis. No bowel   obstruction, or grossly thickened bowel wall. Mild stranding adjacent to   the duodenum.    Small fat-containing periumbilical hernia.    No evidence for free air, ascites, or enlarged lymph node.    Pelvis: The urinary bladder is within normal limits. Sigmoid   diverticulosis without evidence for diverticulitis. Small pelvic free   fluid.    Sclerotic lesions in L5 vertebra.    Impression: Asymmetric enlargement of the left psoas muscle; this may be   due to muscular hypertrophy, or underlying intramuscular hematoma or   mass. If clinically indicated, lumbar spine MR without and with IV   contrast may be pursued for further evaluation after 24 hours.     Small bilateral pleural perfusions.    Patchy airspace opacifications in both lungs may be due to pneumonia or   pulmonary edema. Clinical correlation is recommended.    Cholelithiasis. Mild stranding adjacent to the gallbladder and the   duodenum may represent acute cholecystitis and/or nonspecific duodenitis.   Clinical correlation is recommended. If clinically indicated, HIDA scan   may be pursued for further evaluation.    Sclerotic lesion in L5 vertebra. If clinically indicated, bone scan may   be pursued to rule out osseous metastasis.    Small pelvic free fluid.            Assessment and Recommendation:   78  y/ o. 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   Patient was admitted for pneumonia and CHF and was started on IV Levaquin, that was subsequently discontinued in view of absent symptoms and procalcitonin level of 0.08.  Patient C/O pain left lower extremity and was started on iv Unasyn for cellulitis.     Problem/Recommendation - 1:  Problem: PNA (Questionable pneumonia).   Recommendation:   1- Continue IV Unasyn for left leg cellulitis till 3/19/18 then discontinue it tomorrow.  2- Cardiac management for CHF.  3- O2 as needed.  4- Pulmonary management.  5- CT abdomen result is noted.  6- Legs elevation in bed.    Problem/Recommendation - 2:  ·  Problem: Anemia.    Recommendation:   1- Anemia profile.  2- Iron supplements.  3- Closely monitor H & H.  4- Observe for bleeding.     Problem/Recommendation - 3:  ·  Problem: Diabetes.    Recommendation:   1- Blood sugar monitoring and control.  2- Accu-Cheks with coverage.  3- 1800 sheldon ADA diet.  4- Follow HB A1C.     Problem/Recommendation - 4:  ·  Problem: Hypertension.    Recommendation:   1- Monitor Blood pressure closely.  2- Blood pressure control.  3- BP. meds as per cardiology and primary care team.       Discussed with medical resident.

## 2018-03-19 NOTE — PROGRESS NOTE ADULT - PROBLEM SELECTOR PLAN 1
acute on chronic edema on legs, BNP is 1112 on admission  In 2017 echo with Ef > 53%, G2DD, S/P 3 L bolus in ED, later was very SOB  ECG NSR @75  Troponin x 3 negative   c/w lasix to PO 40mg bid  diuresis well    repeat ECHO shows EF 55%, moderate AS  US LE negative for DVT   CT angio chest negative for PE  Monitor on tele, aspirin, statin, Cardizem, ramipril  Plan discharge to rehab  on Monday acute on CHF exacerbation, BNP is 1112 on admission  In 2017 echo with Ef > 53%, G2DD, S/P 3 L bolus in ED, later was very SOB  ECG NSR @75, Troponin x 3 negative   c/w lasix to PO 40mg bid, diuresis well    repeated ECHO shows EF 55%, moderate AS  Monitor on tele, aspirin, statin, Cardizem, ramipril  Plan discharge to sub acute rehab, waiting for authorization

## 2018-03-19 NOTE — PROGRESS NOTE ADULT - PROBLEM SELECTOR PLAN 6
changes likely due to pulmonary vascular congestion rather than PNA  discontinued  levaquin; Continue duoneb for now   sputum cx shows moderate gram positive cocci in pairs  blood cx negative  QuantiFeron latent TB negative   b/l small pleural effusion on CT scan -CT abd shows asymmetric enlargement of the left psoas muscle; this may be   due to muscular hypertrophy, or underlying intramuscular hematoma or mass,  Sclerotic lesion in L5 vertebra. If clinically indicated, bone scan may be pursued to rule out osseous metastasis.  -possible due to Fall with possible hematoma around left psoas    -c/w  warm compresses  -Monitor cbc for Hb, stable now  -Pain control with Tylenol On actos at home, hold the med  C/w HSS  HBA1c 7.0

## 2018-03-19 NOTE — PROGRESS NOTE ADULT - PROBLEM SELECTOR PLAN 9
Chronic venous insufficiency, hx of DVT in past on leg, was on Xarelto for long time then was told to stop as he no longer needed it   Right leg > left, warmth  doppler lower ext negative for DVT  Keep leg elevated DVT prophylaxis on heparin Sq

## 2018-03-19 NOTE — PROGRESS NOTE ADULT - ATTENDING COMMENTS
Patient was seen and examined by myself. Case was discussed with house staff in details. I have reviewed and agree with the plan as outlined above with edits where appropriate. Patient was seen and examined by myself. Case was discussed with house staff in details. I have reviewed and agree with the plan as outlined above with edits where appropriate.  1. Acute on chronic heart failure with preserved EF- improved with Lasix.  2. Cellulitis- resolving; complete 7 days of antibiotics  3. Hematoma s/p fall - stable  4. Debility - PT as tolerated; supportive measures.  Other plan as outlined above.  Discharge planning.  Plan discussed with patient and family in details at bedside and all their questions / concerns were addressed.  Discussed with  and nursing staff.

## 2018-03-19 NOTE — PROGRESS NOTE ADULT - PROBLEM SELECTOR PLAN 2
lasix prn  follow up CXR  cont meds  Cardio follow up lasix prn  Follow up CXR showed a bit of improvement on CHF  cont meds  Cardio follow up

## 2018-03-19 NOTE — PROGRESS NOTE ADULT - PROBLEM SELECTOR PLAN 4
-CT abd shows asymmetric enlargement of the left psoas muscle; this may be   due to muscular hypertrophy, or underlying intramuscular hematoma or mass,  Sclerotic lesion in L5 vertebra. If clinically indicated, bone scan may be pursued to rule out osseous metastasis.  -possible due to Fall with possible hematoma around left psoas    -c/w  warm compresses  -Monitor cbc for Hb, stable now  -Pain control with Tylenol acute on chronic edema on legs, BNP is 1112 on admission  In 2017 echo with Ef > 53%, G2DD, S/P 3 L bolus in ED, later was very SOB  ECG NSR @75  Troponin x 3 negative   c/w lasix to PO 40mg bid  diuresis well    repeat ECHO shows EF 55%, moderate AS  US LE negative for DVT   CT angio chest negative for PE  Monitor on tele, aspirin, statin, Cardizem, ramipril  Plan discharge to rehab  on Monday Cr base line nl   Cr improved , resolved

## 2018-03-19 NOTE — PROGRESS NOTE ADULT - PROBLEM SELECTOR PLAN 7
On actos at home, hold the med  C/w HSS  HBA1c 7.0 Bp currently stable, c/w home med ramipril, diltiazem  DASH/TLC diet

## 2018-03-19 NOTE — PROGRESS NOTE ADULT - SUBJECTIVE AND OBJECTIVE BOX
PGY1 Note discussed with supervising resident and primary attending.    Patient is a 78y old  Male who presents with a chief complaint of Fall x 2, worsening SOB (14 Mar 2018 16:06)      INTERVAL HPI/OVERNIGHT EVENTS: no new overnight events    MEDICATIONS  (STANDING):  ALBUTerol/ipratropium for Nebulization 3 milliLiter(s) Nebulizer every 6 hours  ampicillin/sulbactam  IVPB 1.5 Gram(s) IV Intermittent every 6 hours  aspirin  chewable 81 milliGRAM(s) Oral daily  atorvastatin 40 milliGRAM(s) Oral at bedtime  diltiazem    milliGRAM(s) Oral daily  doxazosin 2 milliGRAM(s) Oral at bedtime  ferrous    sulfate 325 milliGRAM(s) Oral daily  finasteride 5 milliGRAM(s) Oral daily  furosemide    Tablet 40 milliGRAM(s) Oral two times a day  gabapentin 100 milliGRAM(s) Oral two times a day  heparin  Injectable 5000 Unit(s) SubCutaneous every 8 hours  insulin lispro (HumaLOG) corrective regimen sliding scale   SubCutaneous three times a day before meals  lisinopril 40 milliGRAM(s) Oral daily    MEDICATIONS  (PRN):  acetaminophen   Tablet. 650 milliGRAM(s) Oral every 6 hours PRN Mild Pain (1 - 3)      Allergies    No Known Allergies    Intolerances        REVIEW OF SYSTEMS:  CONSTITUTIONAL:  fatigue  RESPIRATORY: No cough, wheezing, chills or hemoptysis;  shortness of breath  improving   CARDIOVASCULAR:  chronic lower legs swelling, No chest pain, palpitations, dizziness.  GASTROINTESTINAL: No abdominal or epigastric pain. No nausea, vomiting, or hematemesis;   NEUROLOGICAL: No headaches, memory loss, loss of strength, numbness, or tremors  EXT: LE swelling    SKIN: No itching, burning, rashes, or lesions     Vital Signs Last 24 Hrs  T(C): 36.7 (19 Mar 2018 05:25), Max: 36.8 (18 Mar 2018 20:14)  T(F): 98 (19 Mar 2018 05:25), Max: 98.3 (18 Mar 2018 20:14)  HR: 94 (19 Mar 2018 05:25) (60 - 94)  BP: 130/62 (19 Mar 2018 05:25) (110/49 - 137/46)  BP(mean): --  RR: 18 (19 Mar 2018 05:25) (18 - 18)  SpO2: 99% (19 Mar 2018 05:25) (98% - 100%)    PHYSICAL EXAM:  GENERAL: NAD, well-groomed, well-developed  CHEST/LUNG:  mild  rales b/l lungs   HEART: Regular rate and rhythm; No murmurs, rubs, or gallops  ABDOMEN: Soft, Nontender, Nondistended; Bowel sounds present  NERVOUS SYSTEM:  Alert & Oriented X3, Good concentration; Motor Strength 5/5 right side, 4/5 left side    EXTREMITIES:  2+ Peripheral Pulses, left thigh and lower leg tenderness, chronic lower legs edema with hyperpigmentation    LABS:                        10.4   8.8   )-----------( 242      ( 18 Mar 2018 08:51 )             34.3     03-18    141  |  101  |  40<H>  ----------------------------<  108<H>  5.0   |  33<H>  |  1.16    Ca    8.8      18 Mar 2018 08:51          CAPILLARY BLOOD GLUCOSE      POCT Blood Glucose.: 179 mg/dL (18 Mar 2018 21:11)  POCT Blood Glucose.: 198 mg/dL (18 Mar 2018 17:01)  POCT Blood Glucose.: 167 mg/dL (18 Mar 2018 12:00)  POCT Blood Glucose.: 109 mg/dL (18 Mar 2018 07:56)      RADIOLOGY & ADDITIONAL TESTS:    Imaging Personally Reviewed:  [ x] YES  [ ] NO    Consultant(s) Notes Reviewed:  [ x] YES  [ ] NO

## 2018-03-19 NOTE — PROGRESS NOTE ADULT - PROBLEM SELECTOR PLAN 3
Patient has falls times x 2, Non-focal neuro exam   Ck elevated, got the fluids, for now stop the fluid  CT head negative   CT cervical spine wnl  Xray of that sides negative   syphilis RPR negative  PT recommended sub acute rehab Chronic venous insufficiency, hx of DVT in past on leg, was on Xarelto for long time then was told to stop as he no longer needed it   Right leg > left, warmth  doppler lower ext negative for DVT  Keep leg elevated

## 2018-03-20 LAB
ANION GAP SERPL CALC-SCNC: 8 MMOL/L — SIGNIFICANT CHANGE UP (ref 5–17)
BASOPHILS # BLD AUTO: 0.1 K/UL — SIGNIFICANT CHANGE UP (ref 0–0.2)
BASOPHILS NFR BLD AUTO: 0.8 % — SIGNIFICANT CHANGE UP (ref 0–2)
BUN SERPL-MCNC: 29 MG/DL — HIGH (ref 7–18)
CALCIUM SERPL-MCNC: 8.7 MG/DL — SIGNIFICANT CHANGE UP (ref 8.4–10.5)
CHLORIDE SERPL-SCNC: 96 MMOL/L — SIGNIFICANT CHANGE UP (ref 96–108)
CO2 SERPL-SCNC: 32 MMOL/L — HIGH (ref 22–31)
CREAT SERPL-MCNC: 0.96 MG/DL — SIGNIFICANT CHANGE UP (ref 0.5–1.3)
EOSINOPHIL # BLD AUTO: 0.9 K/UL — HIGH (ref 0–0.5)
EOSINOPHIL NFR BLD AUTO: 7.5 % — HIGH (ref 0–6)
GLUCOSE SERPL-MCNC: 98 MG/DL — SIGNIFICANT CHANGE UP (ref 70–99)
HCT VFR BLD CALC: 35.3 % — LOW (ref 39–50)
HGB BLD-MCNC: 10.7 G/DL — LOW (ref 13–17)
LYMPHOCYTES # BLD AUTO: 0.8 K/UL — LOW (ref 1–3.3)
LYMPHOCYTES # BLD AUTO: 6.7 % — LOW (ref 13–44)
MCHC RBC-ENTMCNC: 25.6 PG — LOW (ref 27–34)
MCHC RBC-ENTMCNC: 30.2 GM/DL — LOW (ref 32–36)
MCV RBC AUTO: 84.9 FL — SIGNIFICANT CHANGE UP (ref 80–100)
MONOCYTES # BLD AUTO: 0.9 K/UL — SIGNIFICANT CHANGE UP (ref 0–0.9)
MONOCYTES NFR BLD AUTO: 7.8 % — SIGNIFICANT CHANGE UP (ref 2–14)
NEUTROPHILS # BLD AUTO: 9.4 K/UL — HIGH (ref 1.8–7.4)
NEUTROPHILS NFR BLD AUTO: 77.2 % — HIGH (ref 43–77)
PLATELET # BLD AUTO: 227 K/UL — SIGNIFICANT CHANGE UP (ref 150–400)
POTASSIUM SERPL-MCNC: 4.3 MMOL/L — SIGNIFICANT CHANGE UP (ref 3.5–5.3)
POTASSIUM SERPL-SCNC: 4.3 MMOL/L — SIGNIFICANT CHANGE UP (ref 3.5–5.3)
RBC # BLD: 4.16 M/UL — LOW (ref 4.2–5.8)
RBC # FLD: 13.4 % — SIGNIFICANT CHANGE UP (ref 10.3–14.5)
SODIUM SERPL-SCNC: 136 MMOL/L — SIGNIFICANT CHANGE UP (ref 135–145)
WBC # BLD: 12.1 K/UL — HIGH (ref 3.8–10.5)
WBC # FLD AUTO: 12.1 K/UL — HIGH (ref 3.8–10.5)

## 2018-03-20 PROCEDURE — 99233 SBSQ HOSP IP/OBS HIGH 50: CPT | Mod: GC

## 2018-03-20 RX ORDER — LISINOPRIL 2.5 MG/1
20 TABLET ORAL DAILY
Qty: 0 | Refills: 0 | Status: DISCONTINUED | OUTPATIENT
Start: 2018-03-20 | End: 2018-03-21

## 2018-03-20 RX ADMIN — FINASTERIDE 5 MILLIGRAM(S): 5 TABLET, FILM COATED ORAL at 12:32

## 2018-03-20 RX ADMIN — Medication 3 MILLILITER(S): at 20:49

## 2018-03-20 RX ADMIN — ATORVASTATIN CALCIUM 40 MILLIGRAM(S): 80 TABLET, FILM COATED ORAL at 21:09

## 2018-03-20 RX ADMIN — Medication 3 MILLILITER(S): at 08:24

## 2018-03-20 RX ADMIN — Medication 40 MILLIGRAM(S): at 06:22

## 2018-03-20 RX ADMIN — AMPICILLIN SODIUM AND SULBACTAM SODIUM 100 GRAM(S): 250; 125 INJECTION, POWDER, FOR SUSPENSION INTRAMUSCULAR; INTRAVENOUS at 06:22

## 2018-03-20 RX ADMIN — Medication 120 MILLIGRAM(S): at 06:22

## 2018-03-20 RX ADMIN — Medication 3 MILLILITER(S): at 14:50

## 2018-03-20 RX ADMIN — Medication 81 MILLIGRAM(S): at 12:32

## 2018-03-20 RX ADMIN — GABAPENTIN 100 MILLIGRAM(S): 400 CAPSULE ORAL at 06:22

## 2018-03-20 RX ADMIN — SENNA PLUS 2 TABLET(S): 8.6 TABLET ORAL at 21:09

## 2018-03-20 RX ADMIN — Medication 1: at 16:53

## 2018-03-20 RX ADMIN — LISINOPRIL 40 MILLIGRAM(S): 2.5 TABLET ORAL at 06:22

## 2018-03-20 RX ADMIN — HEPARIN SODIUM 5000 UNIT(S): 5000 INJECTION INTRAVENOUS; SUBCUTANEOUS at 06:22

## 2018-03-20 RX ADMIN — Medication 2 MILLIGRAM(S): at 21:09

## 2018-03-20 RX ADMIN — Medication 40 MILLIGRAM(S): at 17:48

## 2018-03-20 RX ADMIN — Medication 325 MILLIGRAM(S): at 12:32

## 2018-03-20 RX ADMIN — HEPARIN SODIUM 5000 UNIT(S): 5000 INJECTION INTRAVENOUS; SUBCUTANEOUS at 14:36

## 2018-03-20 RX ADMIN — HEPARIN SODIUM 5000 UNIT(S): 5000 INJECTION INTRAVENOUS; SUBCUTANEOUS at 21:09

## 2018-03-20 RX ADMIN — GABAPENTIN 100 MILLIGRAM(S): 400 CAPSULE ORAL at 17:48

## 2018-03-20 RX ADMIN — Medication 100 MILLIGRAM(S): at 06:22

## 2018-03-20 NOTE — PROGRESS NOTE ADULT - PROBLEM SELECTOR PLAN 1
acute on CHF exacerbation, BNP is 1112 on admission  In 2017 echo with Ef > 53%, G2DD, S/P 3 L bolus in ED, later was very SOB  ECG NSR @75, Troponin x 3 negative   c/w lasix to PO 40mg bid, diuresis well    repeated ECHO shows EF 55%, moderate AS  Monitor on tele, aspirin, statin, Cardizem, ramipril  Plan discharge to sub acute rehab, waiting for authorization

## 2018-03-20 NOTE — PROGRESS NOTE ADULT - SUBJECTIVE AND OBJECTIVE BOX
Patient is a 78y old  Male who presents with a chief complaint of Fall x 2, worsening SOB (14 Mar 2018 16:06)  Awake, alert, comfortable in bed in NAD. Clinically improved.    INTERVAL HPI/OVERNIGHT EVENTS:      VITAL SIGNS:  T(F): 98.4 (03-20-18 @ 11:22)  HR: 84 (03-20-18 @ 11:22)  BP: 100/52 (03-20-18 @ 11:22)  RR: 18 (03-20-18 @ 11:22)  SpO2: 100% (03-20-18 @ 11:22)  Wt(kg): --  I&O's Detail    19 Mar 2018 07:01  -  20 Mar 2018 07:00  --------------------------------------------------------  IN:    Oral Fluid: 1220 mL    Solution: 100 mL  Total IN: 1320 mL    OUT:    Voided: 1300 mL  Total OUT: 1300 mL    Total NET: 20 mL      20 Mar 2018 07:01  -  20 Mar 2018 12:59  --------------------------------------------------------  IN:    Oral Fluid: 200 mL  Total IN: 200 mL    OUT:  Total OUT: 0 mL    Total NET: 200 mL              REVIEW OF SYSTEMS:    CONSTITUTIONAL:  No fevers, chills, sweats    HEENT:  Eyes:  No diplopia or blurred vision. ENT:  No earache, sore throat or runny nose.    CARDIOVASCULAR:  No pressure, squeezing, tightness, or heaviness about the chest; no palpitations.    RESPIRATORY:  Per HPI    GASTROINTESTINAL:  No abdominal pain, nausea, vomiting or diarrhea.    GENITOURINARY:  No dysuria, frequency or urgency.    NEUROLOGIC:  No paresthesias, fasciculations, seizures or weakness.    PSYCHIATRIC:  No disorder of thought or mood.      PHYSICAL EXAM:    Constitutional: Well developed and nourished  Eyes:Perrla  ENMT: normal  Neck:supple  Respiratory: good air entry  Cardiovascular: S1 S2 regular  Gastrointestinal: Soft, Non tender  Extremities: Decreased edema  Vascular:normal  Neurological:Awake, alert,Ox3  Musculoskeletal:Normal      MEDICATIONS  (STANDING):  ALBUTerol/ipratropium for Nebulization 3 milliLiter(s) Nebulizer every 6 hours  aspirin  chewable 81 milliGRAM(s) Oral daily  atorvastatin 40 milliGRAM(s) Oral at bedtime  diltiazem    milliGRAM(s) Oral daily  doxazosin 2 milliGRAM(s) Oral at bedtime  ferrous    sulfate 325 milliGRAM(s) Oral daily  finasteride 5 milliGRAM(s) Oral daily  furosemide    Tablet 40 milliGRAM(s) Oral two times a day  gabapentin 100 milliGRAM(s) Oral two times a day  heparin  Injectable 5000 Unit(s) SubCutaneous every 8 hours  insulin lispro (HumaLOG) corrective regimen sliding scale   SubCutaneous three times a day before meals  lisinopril 40 milliGRAM(s) Oral daily  senna 2 Tablet(s) Oral at bedtime    MEDICATIONS  (PRN):  acetaminophen   Tablet 650 milliGRAM(s) Oral every 6 hours PRN For Temp greater than 38 C (100.4 F)  acetaminophen   Tablet. 650 milliGRAM(s) Oral every 6 hours PRN Mild Pain (1 - 3)      Allergies    No Known Allergies    Intolerances        LABS:                        10.7   12.1  )-----------( 227      ( 20 Mar 2018 08:51 )             35.3     03-20    136  |  96  |  29<H>  ----------------------------<  98  4.3   |  32<H>  |  0.96    Ca    8.7      20 Mar 2018 08:51                CAPILLARY BLOOD GLUCOSE      POCT Blood Glucose.: 198 mg/dL (20 Mar 2018 11:32)  POCT Blood Glucose.: 115 mg/dL (20 Mar 2018 07:35)  POCT Blood Glucose.: 107 mg/dL (19 Mar 2018 21:42)  POCT Blood Glucose.: 121 mg/dL (19 Mar 2018 16:36)        RADIOLOGY & ADDITIONAL TESTS:    CXR:  < from: Xray Chest 1 View-PORTABLE IMMEDIATE (03.17.18 @ 14:27) >  INTERPRETATION:  Follow-up.    AP chest. Prior dated 3/13/2018.    Low lung volumes. No change heart mediastinum. Discounting technical   variation nosignificant change in mild diffuse bilateral interstitial   prominence. Correlate for congestion versus inflammation/infection. No   focal consolidation or pleural effusion. Elevation right hemidiaphragm   similar to prior.    Impression: As above    < end of copied text >    Ct scan chest:    ekg;    echo:

## 2018-03-20 NOTE — PROGRESS NOTE ADULT - ATTENDING COMMENTS
Patient was seen and examined by myself. Case was discussed with house staff in details. I have reviewed and agree with the plan as outlined above with edits where appropriate.  reviewed and agree with the plan as outlined above with edits where appropriate.  1. Acute on chronic diastolic heart failure (with preserved EF) - improved with Lasix. Continue with maintenance dose of Lasix  2. Cellulitis- resolving; complete 7 days total course of antibiotics  3. Hematoma s/p fall - stable hemoglobin; maintain fall precautions  4. Debility  and generalized muscle weakness- continue physical therapy exercises as tolerated; supportive measures.  5. Diabetes- stable on insulin; resume oral hypoglycemic agents upon discharge  Other plan as outlined above.  Discharge planning- discussed with ; awaiting insurance approval for rehab.

## 2018-03-20 NOTE — DIETITIAN INITIAL EVALUATION ADULT. - OTHER INFO
nutrition assessment for length of stay; lives home with family, skin intact; denied GI distress, chewing or swallowing problem at present, food choices obtained; varied intake, 30 to 50% meal intake at times per flow sheet, observed breakfast--75% intake; gaining wt recently due to fluid retention, from usual wt of 205 to 210 lb to 244 lb (3/13/18) upon admission, 1+ pitting edema noted; wt loss since admission to 220 lb 3/20/18, may due to fluid loss from diuretic Rx; Pt not interested in diet education/nutrition information at present

## 2018-03-20 NOTE — DIETITIAN INITIAL EVALUATION ADULT. - NS FNS WEIGHT USED FOR CALC
adjusted/Ht=5' 7"   Adjusted XNZ=543.8 lb     Admission it=007 lb-->current dw=248 lb 3/20/18  ( VKV=[id number])

## 2018-03-20 NOTE — DIETITIAN INITIAL EVALUATION ADULT. - MD RECOMMEND
Add Gluceunice Loftonke 1can bid as medically feasible (440kcal, 20g protein) if persistent poor intake as medically feasible

## 2018-03-20 NOTE — PROGRESS NOTE ADULT - SUBJECTIVE AND OBJECTIVE BOX
Meds:  Unasyn 1.5 gm IVPB q 6 hours.    Allergies:  Allergies    No Known Allergies    Intolerances    ROS  [  ] UNABLE TO ELICIT    General:  [  ] None  [  ] Fever  [  ] Chills  [ x ] Malaise    Skin:  [ x ] None [  ] Rash  [  ] Wound  [  ] Ulcer    HEENT:  [ x ] None  [  ] Sore Throat  [  ] Nasal congestion/ runny nose  [  ] Photophobia [  ] Neck pain      Chest:  [  ] None   [ x ] SOB  [ x ] Cough  [  ] None    Cardiovascular:   [  ] None  [  ] CP  [  ] Palpitation [ x ] Orthopnea    Gastrointestinal:  [ x ] None  [  ] Abd pain   [  ] Nausea    [  ] Vomiting   [  ] Diarrhea	     Genitourinary:  [ x ] None [  ] Polyuria   [  ] Urgency  [  ] Frequency  [  ] Dysuria    [  ]  Hematuria       Musculoskeletal:  [  ] None [  ] Back Pain	[  ] Body aches  [  ] Joint pain [ x ] Recurrent fall.    Neurological: [  ] None [  ]Dizziness  [  ]Visual Disturbance  [  ]Headaches   [ x ] Weakness          PHYSICAL EXAM:  Vital Signs Last 24 Hrs  T(C): 38.3 (19 Mar 2018 19:40), Max: 38.3 (19 Mar 2018 19:40)  T(F): 100.9 (19 Mar 2018 19:40), Max: 100.9 (19 Mar 2018 19:40)  HR: 88 (19 Mar 2018 19:40) (77 - 94)  BP: 127/89 (19 Mar 2018 19:40) (105/60 - 130/62)  BP(mean): --  RR: 18 (19 Mar 2018 19:40) (18 - 18)  SpO2: 97% (19 Mar 2018 19:40) (97% - 100%)    Constitutional:    HEENT: [ x ] Wnl  [  ] Pharyngeal congestion    Neck:  [ x ] Supple  [  ]Lymphadenopathy  [ x ] No JVD   [  ] JVD  [  ] Masses   [  ] WNL    CHEST/Respiratory:  [  ]Clear to auscultation  [ x ] Rales   [  ] Rhonchi   [  ] Wheezing     [  ] Chest Tenderness      Cardiovascular:  [ x ] Reg S1 S2   [  ] Irreg S1 S2   [ x ]No Murmur  [  ] +ve Murmurs  [  ]Systolic [  ]Diastolic      Abdomen:  [ x ] Soft  [ x ] No tendrerness  [  ] Tenderness  [  ] Organomegaly  [  ] ABD Distention  [  ] Rigidity                       [ x ] No Regidity                       [ x ] No Rebound Tenderness  [ x ] No Guarding Rigidity  [  ] Rebound Tenderness[  ] Guarding Rigidity                          [ x ]  +ve Bowel Sounds  [  ] Decreased Bowel Sounds    [  ] Absent Bowel Sounds                            Extremities: [  ] No edema [ x ] Edema and erythema both legs(markedly improved), with chronic skin changes. [  ] Clubbing   [  ] Cyanosis                         [ x ] No Tender Calf muscles  [  ] Tender Calf muscles                        [ x ] Palpable peripheral pulses    Neurological: [ x ] Awake  [ x ] Alert  [ x ] Oriented  x  3                           [  ] Confused  [  ] Drowsy  [  ] respond to painful stimuli  [  ] Unresponsive    Skin:  [ x ] Intact [  ] Redness [  ] Thrombophlebitis  [  ] Rashes  [  ] Dry  [  ] Ulcers    Ortho:  [  ] Joint Swelling  [  ] Joint erythema [  ] Joint tenderness                [  ] Increased temp. to touch  [  ] DJD [ x ] WNL          LABS/DIAGNOSTIC TESTS                        10.4   8.8   )-----------( 242      ( 18 Mar 2018 08:51 )             34.3   03-18    141  |  101  |  40<H>  ----------------------------<  108<H>  5.0   |  33<H>  |  1.16    Ca    8.8      18 Mar 2018 08:51    CAPILLARY BLOOD GLUCOSE      POCT Blood Glucose.: 107 mg/dL (19 Mar 2018 21:42)  POCT Blood Glucose.: 121 mg/dL (19 Mar 2018 16:36)  POCT Blood Glucose.: 274 mg/dL (19 Mar 2018 11:42)  POCT Blood Glucose.: 125 mg/dL (19 Mar 2018 07:43)        CULTURES:   Culture - Sputum . (03.14.18 @ 18:48)    Gram Stain:   No polymorphonuclear leukocytes per low power field  Moderate Squamous epithelial cells per low power field  Moderate Gram positive cocci in pairs, chains and clusters per oil power  field  Few Gram Positive Rods per oil power field  Results consistent with oropharyngeal contamination    Specimen Source: .Sputum Sputum      Culture - Urine (03.13.18 @ 09:58)    Specimen Source: .Urine Clean Catch (Midstream)    Culture Results:   No growth    Culture - Blood (03.13.18 @ 09:42)    Specimen Source: .Blood Blood-Peripheral    Culture Results:   No growth to date.    Culture - Blood (03.13.18 @ 09:42)    Specimen Source: .Blood Blood-Peripheral    Culture Results:   No growth to date.        RADIOLOGY:    EXAM:  XR CHEST PORTABLE IMMED 1V                            PROCEDURE DATE:  03/17/2018          INTERPRETATION:  Follow-up.    AP chest. Prior dated 3/13/2018.    Low lung volumes. No change heart mediastinum. Discounting technical   variation nosignificant change in mild diffuse bilateral interstitial   prominence. Correlate for congestion versus inflammation/infection. No   focal consolidation or pleural effusion. Elevation right hemidiaphragm   similar to prior.    Impression: As above          EXAM:  CT ABDOMEN AND PELVIS IC                            PROCEDURE DATE:  03/14/2018          INTERPRETATION:  CT of the abdomen and pelvis with IV contrast    Clinical Indication: 4.5 cm retroperitoneal mass on prior chest CTA dated   3/13/2018    Technique: Axial multidetector CT images of the abdomen and pelvis are   acquired following the administration of oral and IV contrast (96 cc   Omnipaque-350 administered, 4 cc discarded).    Comparison: No prior abdominal/pelvic CT is available for comparison.   Reference is made with previous chest CTs dated 3/13/2018 and 7/9/2017.    Findings:    Abdomen: Limited sections through the lung bases demonstrate small   bilateral pleural effusions. Small atelectasis bilaterally. Patchy   groundglass density airspace opacifications in both lungs.     There is asymmetric enlargement of the left psoas muscle and this was   seen on the previous chest CT dated 3/13/2018; this is a new finding   since the previous chest CT dated 7/9/2017.     Gallstones in the gallbladder. No evidence for thickened gallbladder wall   or pericholecystic fluid. Mild stranding adjacent to the gallbladder.    The liver, pancreas, spleen, adrenals and kidneys appear unremarkable.    Colonic diverticulosis without evidence for diverticulitis. No bowel   obstruction, or grossly thickened bowel wall. Mild stranding adjacent to   the duodenum.    Small fat-containing periumbilical hernia.    No evidence for free air, ascites, or enlarged lymph node.    Pelvis: The urinary bladder is within normal limits. Sigmoid   diverticulosis without evidence for diverticulitis. Small pelvic free   fluid.    Sclerotic lesions in L5 vertebra.    Impression: Asymmetric enlargement of the left psoas muscle; this may be   due to muscular hypertrophy, or underlying intramuscular hematoma or   mass. If clinically indicated, lumbar spine MR without and with IV   contrast may be pursued for further evaluation after 24 hours.     Small bilateral pleural perfusions.    Patchy airspace opacifications in both lungs may be due to pneumonia or   pulmonary edema. Clinical correlation is recommended.    Cholelithiasis. Mild stranding adjacent to the gallbladder and the   duodenum may represent acute cholecystitis and/or nonspecific duodenitis.   Clinical correlation is recommended. If clinically indicated, HIDA scan   may be pursued for further evaluation.    Sclerotic lesion in L5 vertebra. If clinically indicated, bone scan may   be pursued to rule out osseous metastasis.    Small pelvic free fluid.            Assessment and Recommendation:   78  y/ o. 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   Patient was admitted for pneumonia and CHF and was started on IV Levaquin, that was subsequently discontinued in view of absent symptoms and procalcitonin level of 0.08.  Patient C/O pain left lower extremity and was started on iv Unasyn for cellulitis.     Problem/Recommendation - 1:  Problem: PNA (Questionable pneumonia).   Recommendation:   1- Discontinue IV Unasyn (finished 7 days of IV ABX).  2- Cardiac management for CHF.  3- O2 as needed.  4- Pulmonary management.  5- CT abdomen result is noted.  6- Legs elevation in bed.    Problem/Recommendation - 2:  ·  Problem: Anemia.    Recommendation:   1- Anemia profile.  2- Iron supplements.  3- Closely monitor H & H.  4- Observe for bleeding.     Problem/Recommendation - 3:  ·  Problem: Diabetes.    Recommendation:   1- Blood sugar monitoring and control.  2- Accu-Cheks with coverage.  3- 1800 sheldon ADA diet.  4- Follow HB A1C.     Problem/Recommendation - 4:  ·  Problem: Hypertension.    Recommendation:   1- Monitor Blood pressure closely.  2- Blood pressure control.  3- BP. meds as per cardiology and primary care team.       Discussed with medical resident.

## 2018-03-20 NOTE — PROGRESS NOTE ADULT - SUBJECTIVE AND OBJECTIVE BOX
PGY1 Note discussed with supervising resident and primary attending.    Patient is a 78y old  Male who presents with a chief complaint of Fall x 2, worsening SOB (14 Mar 2018 16:06)      INTERVAL HPI/OVERNIGHT EVENTS: no new overnight events.    MEDICATIONS  (STANDING):  ALBUTerol/ipratropium for Nebulization 3 milliLiter(s) Nebulizer every 6 hours  aspirin  chewable 81 milliGRAM(s) Oral daily  atorvastatin 40 milliGRAM(s) Oral at bedtime  diltiazem    milliGRAM(s) Oral daily  doxazosin 2 milliGRAM(s) Oral at bedtime  ferrous    sulfate 325 milliGRAM(s) Oral daily  finasteride 5 milliGRAM(s) Oral daily  furosemide    Tablet 40 milliGRAM(s) Oral two times a day  gabapentin 100 milliGRAM(s) Oral two times a day  heparin  Injectable 5000 Unit(s) SubCutaneous every 8 hours  insulin lispro (HumaLOG) corrective regimen sliding scale   SubCutaneous three times a day before meals  lisinopril 40 milliGRAM(s) Oral daily  polyethylene glycol 3350 17 Gram(s) Oral daily  senna 2 Tablet(s) Oral at bedtime    MEDICATIONS  (PRN):  acetaminophen   Tablet 650 milliGRAM(s) Oral every 6 hours PRN For Temp greater than 38 C (100.4 F)  acetaminophen   Tablet. 650 milliGRAM(s) Oral every 6 hours PRN Mild Pain (1 - 3)      Allergies    No Known Allergies    Intolerances        REVIEW OF SYSTEMS:  CONSTITUTIONAL:  fatigue  RESPIRATORY: No cough, wheezing, chills or hemoptysis;  shortness of breath  improving   CARDIOVASCULAR:  chronic lower legs swelling, No chest pain, palpitations, dizziness.  GASTROINTESTINAL: No abdominal or epigastric pain. No nausea, vomiting, or hematemesis;   NEUROLOGICAL: No headaches, memory loss, loss of strength, numbness, or tremors  EXT: LE swelling    SKIN: No itching, burning, rashes, or lesions     Vital Signs Last 24 Hrs  T(C): 36.8 (20 Mar 2018 05:23), Max: 38.3 (19 Mar 2018 19:40)  T(F): 98.3 (20 Mar 2018 05:23), Max: 100.9 (19 Mar 2018 19:40)  HR: 77 (20 Mar 2018 05:23) (71 - 88)  BP: 126/55 (20 Mar 2018 05:23) (100/47 - 128/65)  BP(mean): --  RR: 19 (20 Mar 2018 05:23) (17 - 19)  SpO2: 100% (20 Mar 2018 05:23) (97% - 100%)    PHYSICAL EXAM:  GENERAL: NAD, well-groomed, well-developed  CHEST/LUNG:  mild  rales b/l lungs   HEART: Regular rate and rhythm; No murmurs, rubs, or gallops  ABDOMEN: Soft, Nontender, Nondistended; Bowel sounds present  NERVOUS SYSTEM:  Alert & Oriented X3, Good concentration; Motor Strength 5/5 right side, 4/5 left side    EXTREMITIES:  2+ Peripheral Pulses, left thigh and lower leg tenderness, chronic lower legs edema with hyperpigmentation    LABS:                        10.3   10.5  )-----------( 232      ( 19 Mar 2018 09:09 )             33.5     03-19    137  |  97  |  33<H>  ----------------------------<  104<H>  4.4   |  35<H>  |  1.05    Ca    8.9      19 Mar 2018 09:09          CAPILLARY BLOOD GLUCOSE      POCT Blood Glucose.: 107 mg/dL (19 Mar 2018 21:42)  POCT Blood Glucose.: 121 mg/dL (19 Mar 2018 16:36)  POCT Blood Glucose.: 274 mg/dL (19 Mar 2018 11:42)  POCT Blood Glucose.: 125 mg/dL (19 Mar 2018 07:43)      RADIOLOGY & ADDITIONAL TESTS:    Imaging Personally Reviewed:  [x ] YES  [ ] NO    Consultant(s) Notes Reviewed:  [x ] YES  [ ] NO PGY1 Note discussed with supervising resident and primary attending.    Patient is a 78y old  Male who presents with a chief complaint of Fall x 2, worsening SOB (14 Mar 2018 16:06)      INTERVAL HPI/OVERNIGHT EVENTS: feels weak but othetwise no new overnight events.    MEDICATIONS  (STANDING):  ALBUTerol/ipratropium for Nebulization 3 milliLiter(s) Nebulizer every 6 hours  aspirin  chewable 81 milliGRAM(s) Oral daily  atorvastatin 40 milliGRAM(s) Oral at bedtime  diltiazem    milliGRAM(s) Oral daily  doxazosin 2 milliGRAM(s) Oral at bedtime  ferrous    sulfate 325 milliGRAM(s) Oral daily  finasteride 5 milliGRAM(s) Oral daily  furosemide    Tablet 40 milliGRAM(s) Oral two times a day  gabapentin 100 milliGRAM(s) Oral two times a day  heparin  Injectable 5000 Unit(s) SubCutaneous every 8 hours  insulin lispro (HumaLOG) corrective regimen sliding scale   SubCutaneous three times a day before meals  lisinopril 40 milliGRAM(s) Oral daily  polyethylene glycol 3350 17 Gram(s) Oral daily  senna 2 Tablet(s) Oral at bedtime    MEDICATIONS  (PRN):  acetaminophen   Tablet 650 milliGRAM(s) Oral every 6 hours PRN For Temp greater than 38 C (100.4 F)  acetaminophen   Tablet. 650 milliGRAM(s) Oral every 6 hours PRN Mild Pain (1 - 3)      Allergies    No Known Allergies    Intolerances        REVIEW OF SYSTEMS:  CONSTITUTIONAL:  fatigue  RESPIRATORY: No cough, wheezing, chills or hemoptysis;  shortness of breath  improving   CARDIOVASCULAR:  chronic lower legs swelling, No chest pain, palpitations, dizziness.  GASTROINTESTINAL: No abdominal or epigastric pain. No nausea, vomiting, or hematemesis;   NEUROLOGICAL: No headaches, memory loss, loss of strength, numbness, or tremors  EXT: LE swelling    SKIN: No itching, burning, rashes, or lesions     Vital Signs Last 24 Hrs  T(C): 36.8 (20 Mar 2018 05:23), Max: 38.3 (19 Mar 2018 19:40)  T(F): 98.3 (20 Mar 2018 05:23), Max: 100.9 (19 Mar 2018 19:40)  HR: 77 (20 Mar 2018 05:23) (71 - 88)  BP: 126/55 (20 Mar 2018 05:23) (100/47 - 128/65)  BP(mean): --  RR: 19 (20 Mar 2018 05:23) (17 - 19)  SpO2: 100% (20 Mar 2018 05:23) (97% - 100%)    PHYSICAL EXAM:  GENERAL: NAD, well-groomed, well-developed  CHEST/LUNG:  mild  rales b/l lungs - much improved  HEART: Regular rate and rhythm; No murmurs, rubs, or gallops  ABDOMEN: Soft, Nontender, Nondistended; Bowel sounds present  NERVOUS SYSTEM:  Alert & Oriented X3, Good concentration; Motor Strength 5/5 right side, 4/5 left side    EXTREMITIES:  2+ Peripheral Pulses, left thigh and lower leg tenderness, chronic lower legs edema with hyperpigmentation    LABS:                        10.3   10.5  )-----------( 232      ( 19 Mar 2018 09:09 )             33.5     03-19    137  |  97  |  33<H>  ----------------------------<  104<H>  4.4   |  35<H>  |  1.05    Ca    8.9      19 Mar 2018 09:09          CAPILLARY BLOOD GLUCOSE      POCT Blood Glucose.: 107 mg/dL (19 Mar 2018 21:42)  POCT Blood Glucose.: 121 mg/dL (19 Mar 2018 16:36)  POCT Blood Glucose.: 274 mg/dL (19 Mar 2018 11:42)  POCT Blood Glucose.: 125 mg/dL (19 Mar 2018 07:43)      RADIOLOGY & ADDITIONAL TESTS:    Imaging Personally Reviewed:  [x ] YES  [ ] NO    Consultant(s) Notes Reviewed:  [x ] YES  [ ] NO

## 2018-03-21 VITALS
TEMPERATURE: 99 F | OXYGEN SATURATION: 98 % | HEART RATE: 73 BPM | DIASTOLIC BLOOD PRESSURE: 50 MMHG | SYSTOLIC BLOOD PRESSURE: 120 MMHG | RESPIRATION RATE: 18 BRPM

## 2018-03-21 LAB
ANION GAP SERPL CALC-SCNC: 8 MMOL/L — SIGNIFICANT CHANGE UP (ref 5–17)
BASOPHILS # BLD AUTO: 0.1 K/UL — SIGNIFICANT CHANGE UP (ref 0–0.2)
BASOPHILS NFR BLD AUTO: 0.6 % — SIGNIFICANT CHANGE UP (ref 0–2)
BUN SERPL-MCNC: 35 MG/DL — HIGH (ref 7–18)
CALCIUM SERPL-MCNC: 8.4 MG/DL — SIGNIFICANT CHANGE UP (ref 8.4–10.5)
CHLORIDE SERPL-SCNC: 98 MMOL/L — SIGNIFICANT CHANGE UP (ref 96–108)
CO2 SERPL-SCNC: 31 MMOL/L — SIGNIFICANT CHANGE UP (ref 22–31)
CREAT SERPL-MCNC: 0.94 MG/DL — SIGNIFICANT CHANGE UP (ref 0.5–1.3)
EOSINOPHIL # BLD AUTO: 0.8 K/UL — HIGH (ref 0–0.5)
EOSINOPHIL NFR BLD AUTO: 6.6 % — HIGH (ref 0–6)
GLUCOSE SERPL-MCNC: 108 MG/DL — HIGH (ref 70–99)
HCT VFR BLD CALC: 32.8 % — LOW (ref 39–50)
HGB BLD-MCNC: 10.2 G/DL — LOW (ref 13–17)
LYMPHOCYTES # BLD AUTO: 0.7 K/UL — LOW (ref 1–3.3)
LYMPHOCYTES # BLD AUTO: 6.4 % — LOW (ref 13–44)
MCHC RBC-ENTMCNC: 26.1 PG — LOW (ref 27–34)
MCHC RBC-ENTMCNC: 31.1 GM/DL — LOW (ref 32–36)
MCV RBC AUTO: 84 FL — SIGNIFICANT CHANGE UP (ref 80–100)
MONOCYTES # BLD AUTO: 1.1 K/UL — HIGH (ref 0–0.9)
MONOCYTES NFR BLD AUTO: 9.1 % — SIGNIFICANT CHANGE UP (ref 2–14)
NEUTROPHILS # BLD AUTO: 9 K/UL — HIGH (ref 1.8–7.4)
NEUTROPHILS NFR BLD AUTO: 77.3 % — HIGH (ref 43–77)
PLATELET # BLD AUTO: 239 K/UL — SIGNIFICANT CHANGE UP (ref 150–400)
POTASSIUM SERPL-MCNC: 3.8 MMOL/L — SIGNIFICANT CHANGE UP (ref 3.5–5.3)
POTASSIUM SERPL-SCNC: 3.8 MMOL/L — SIGNIFICANT CHANGE UP (ref 3.5–5.3)
RBC # BLD: 3.91 M/UL — LOW (ref 4.2–5.8)
RBC # FLD: 13.3 % — SIGNIFICANT CHANGE UP (ref 10.3–14.5)
SODIUM SERPL-SCNC: 137 MMOL/L — SIGNIFICANT CHANGE UP (ref 135–145)
WBC # BLD: 11.7 K/UL — HIGH (ref 3.8–10.5)
WBC # FLD AUTO: 11.7 K/UL — HIGH (ref 3.8–10.5)

## 2018-03-21 PROCEDURE — 93306 TTE W/DOPPLER COMPLETE: CPT

## 2018-03-21 PROCEDURE — 82378 CARCINOEMBRYONIC ANTIGEN: CPT

## 2018-03-21 PROCEDURE — 73564 X-RAY EXAM KNEE 4 OR MORE: CPT

## 2018-03-21 PROCEDURE — 80061 LIPID PANEL: CPT

## 2018-03-21 PROCEDURE — 82962 GLUCOSE BLOOD TEST: CPT

## 2018-03-21 PROCEDURE — 71275 CT ANGIOGRAPHY CHEST: CPT

## 2018-03-21 PROCEDURE — 87086 URINE CULTURE/COLONY COUNT: CPT

## 2018-03-21 PROCEDURE — 87070 CULTURE OTHR SPECIMN AEROBIC: CPT

## 2018-03-21 PROCEDURE — 70450 CT HEAD/BRAIN W/O DYE: CPT

## 2018-03-21 PROCEDURE — 83970 ASSAY OF PARATHORMONE: CPT

## 2018-03-21 PROCEDURE — 80048 BASIC METABOLIC PNL TOTAL CA: CPT

## 2018-03-21 PROCEDURE — 85610 PROTHROMBIN TIME: CPT

## 2018-03-21 PROCEDURE — 82310 ASSAY OF CALCIUM: CPT

## 2018-03-21 PROCEDURE — 85027 COMPLETE CBC AUTOMATED: CPT

## 2018-03-21 PROCEDURE — 82272 OCCULT BLD FECES 1-3 TESTS: CPT

## 2018-03-21 PROCEDURE — 83880 ASSAY OF NATRIURETIC PEPTIDE: CPT

## 2018-03-21 PROCEDURE — 74177 CT ABD & PELVIS W/CONTRAST: CPT

## 2018-03-21 PROCEDURE — 99239 HOSP IP/OBS DSCHRG MGMT >30: CPT

## 2018-03-21 PROCEDURE — 81001 URINALYSIS AUTO W/SCOPE: CPT

## 2018-03-21 PROCEDURE — 82550 ASSAY OF CK (CPK): CPT

## 2018-03-21 PROCEDURE — 87040 BLOOD CULTURE FOR BACTERIA: CPT

## 2018-03-21 PROCEDURE — 84443 ASSAY THYROID STIM HORMONE: CPT

## 2018-03-21 PROCEDURE — 87798 DETECT AGENT NOS DNA AMP: CPT

## 2018-03-21 PROCEDURE — 73502 X-RAY EXAM HIP UNI 2-3 VIEWS: CPT

## 2018-03-21 PROCEDURE — 80053 COMPREHEN METABOLIC PANEL: CPT

## 2018-03-21 PROCEDURE — 86780 TREPONEMA PALLIDUM: CPT

## 2018-03-21 PROCEDURE — 82164 ANGIOTENSIN I ENZYME TEST: CPT

## 2018-03-21 PROCEDURE — 82306 VITAMIN D 25 HYDROXY: CPT

## 2018-03-21 PROCEDURE — 82746 ASSAY OF FOLIC ACID SERUM: CPT

## 2018-03-21 PROCEDURE — 82728 ASSAY OF FERRITIN: CPT

## 2018-03-21 PROCEDURE — 72125 CT NECK SPINE W/O DYE: CPT

## 2018-03-21 PROCEDURE — 94640 AIRWAY INHALATION TREATMENT: CPT

## 2018-03-21 PROCEDURE — 86304 IMMUNOASSAY TUMOR CA 125: CPT

## 2018-03-21 PROCEDURE — 83036 HEMOGLOBIN GLYCOSYLATED A1C: CPT

## 2018-03-21 PROCEDURE — 84466 ASSAY OF TRANSFERRIN: CPT

## 2018-03-21 PROCEDURE — 84145 PROCALCITONIN (PCT): CPT

## 2018-03-21 PROCEDURE — 84100 ASSAY OF PHOSPHORUS: CPT

## 2018-03-21 PROCEDURE — 82553 CREATINE MB FRACTION: CPT

## 2018-03-21 PROCEDURE — 93005 ELECTROCARDIOGRAM TRACING: CPT

## 2018-03-21 PROCEDURE — 82607 VITAMIN B-12: CPT

## 2018-03-21 PROCEDURE — 83605 ASSAY OF LACTIC ACID: CPT

## 2018-03-21 PROCEDURE — 83735 ASSAY OF MAGNESIUM: CPT

## 2018-03-21 PROCEDURE — 99285 EMERGENCY DEPT VISIT HI MDM: CPT | Mod: 25

## 2018-03-21 PROCEDURE — 97116 GAIT TRAINING THERAPY: CPT

## 2018-03-21 PROCEDURE — 73630 X-RAY EXAM OF FOOT: CPT

## 2018-03-21 PROCEDURE — 87633 RESP VIRUS 12-25 TARGETS: CPT

## 2018-03-21 PROCEDURE — 86301 IMMUNOASSAY TUMOR CA 19-9: CPT

## 2018-03-21 PROCEDURE — 87486 CHLMYD PNEUM DNA AMP PROBE: CPT

## 2018-03-21 PROCEDURE — G0103: CPT

## 2018-03-21 PROCEDURE — 87581 M.PNEUMON DNA AMP PROBE: CPT

## 2018-03-21 PROCEDURE — 87449 NOS EACH ORGANISM AG IA: CPT

## 2018-03-21 PROCEDURE — 73551 X-RAY EXAM OF FEMUR 1: CPT

## 2018-03-21 PROCEDURE — 86480 TB TEST CELL IMMUN MEASURE: CPT

## 2018-03-21 PROCEDURE — 93970 EXTREMITY STUDY: CPT

## 2018-03-21 PROCEDURE — 84484 ASSAY OF TROPONIN QUANT: CPT

## 2018-03-21 PROCEDURE — 97110 THERAPEUTIC EXERCISES: CPT

## 2018-03-21 PROCEDURE — 71045 X-RAY EXAM CHEST 1 VIEW: CPT

## 2018-03-21 PROCEDURE — 83550 IRON BINDING TEST: CPT

## 2018-03-21 RX ORDER — LISINOPRIL 2.5 MG/1
1 TABLET ORAL
Qty: 30 | Refills: 0 | OUTPATIENT
Start: 2018-03-21 | End: 2018-04-19

## 2018-03-21 RX ORDER — RAMIPRIL 5 MG
1 CAPSULE ORAL
Qty: 30 | Refills: 0 | OUTPATIENT
Start: 2018-03-21 | End: 2018-04-19

## 2018-03-21 RX ORDER — FUROSEMIDE 40 MG
1 TABLET ORAL
Qty: 60 | Refills: 0 | OUTPATIENT
Start: 2018-03-21 | End: 2018-04-19

## 2018-03-21 RX ADMIN — Medication 650 MILLIGRAM(S): at 06:07

## 2018-03-21 RX ADMIN — Medication 40 MILLIGRAM(S): at 06:07

## 2018-03-21 RX ADMIN — Medication 81 MILLIGRAM(S): at 12:11

## 2018-03-21 RX ADMIN — Medication 650 MILLIGRAM(S): at 07:32

## 2018-03-21 RX ADMIN — Medication 325 MILLIGRAM(S): at 12:11

## 2018-03-21 RX ADMIN — Medication 3 MILLILITER(S): at 10:34

## 2018-03-21 RX ADMIN — HEPARIN SODIUM 5000 UNIT(S): 5000 INJECTION INTRAVENOUS; SUBCUTANEOUS at 06:07

## 2018-03-21 RX ADMIN — LISINOPRIL 20 MILLIGRAM(S): 2.5 TABLET ORAL at 06:06

## 2018-03-21 RX ADMIN — Medication 1: at 12:11

## 2018-03-21 RX ADMIN — FINASTERIDE 5 MILLIGRAM(S): 5 TABLET, FILM COATED ORAL at 12:11

## 2018-03-21 RX ADMIN — GABAPENTIN 100 MILLIGRAM(S): 400 CAPSULE ORAL at 06:07

## 2018-03-21 RX ADMIN — Medication 120 MILLIGRAM(S): at 06:07

## 2018-03-21 NOTE — PROGRESS NOTE ADULT - PROVIDER SPECIALTY LIST ADULT
Infectious Disease
Internal Medicine
Pulmonology
Internal Medicine

## 2018-03-21 NOTE — PROGRESS NOTE ADULT - PROBLEM SELECTOR PLAN 7
BP stable; Currently on Lisinopril in the hospital as Ramipril is non formulary. Resume Ramipril 5mg on discharge

## 2018-03-21 NOTE — PROGRESS NOTE ADULT - PROBLEM SELECTOR PLAN 1
acute on CHF exacerbation, BNP is 1112 on admission  In 2017 echo with Ef > 53%, G2DD, S/P 3 L bolus in ED, later was very SOB  ECG NSR @75, Troponin x 3 negative   c/w lasix to PO 40mg bid, diuresis well    repeated ECHO shows EF 55%, moderate AS  Monitor on tele, aspirin, statin, Cardizem, lisinopril   Plan discharge to sub acute rehab, waiting for authorization

## 2018-03-21 NOTE — PROGRESS NOTE ADULT - SUBJECTIVE AND OBJECTIVE BOX
MEDICAL ATTENDING NOTE    Patient is a 78y old  Male who presents with a chief complaint of Fall x 2, worsening SOB (14 Mar 2018 16:06)      INTERVAL HPI/OVERNIGHT EVENTS: no new complaints    MEDICATIONS  (STANDING):  ALBUTerol/ipratropium for Nebulization 3 milliLiter(s) Nebulizer every 6 hours  aspirin  chewable 81 milliGRAM(s) Oral daily  atorvastatin 40 milliGRAM(s) Oral at bedtime  diltiazem    milliGRAM(s) Oral daily  doxazosin 2 milliGRAM(s) Oral at bedtime  ferrous    sulfate 325 milliGRAM(s) Oral daily  finasteride 5 milliGRAM(s) Oral daily  furosemide    Tablet 40 milliGRAM(s) Oral two times a day  gabapentin 100 milliGRAM(s) Oral two times a day  heparin  Injectable 5000 Unit(s) SubCutaneous every 8 hours  insulin lispro (HumaLOG) corrective regimen sliding scale   SubCutaneous three times a day before meals  lisinopril 20 milliGRAM(s) Oral daily    MEDICATIONS  (PRN):  acetaminophen   Tablet 650 milliGRAM(s) Oral every 6 hours PRN For Temp greater than 38 C (100.4 F)  acetaminophen   Tablet. 650 milliGRAM(s) Oral every 6 hours PRN Mild Pain (1 - 3)      __________________________________________________  ----------------------------------------------------------------------------------  REVIEW OF SYSTEMS: no fever, no SOB, No Chest pain; feels well      Vital Signs Last 24 Hrs  T(C): 37 (21 Mar 2018 11:14), Max: 37.5 (20 Mar 2018 19:42)  T(F): 98.6 (21 Mar 2018 11:14), Max: 99.5 (20 Mar 2018 19:42)  HR: 73 (21 Mar 2018 11:14) (73 - 107)  BP: 120/50 (21 Mar 2018 11:14) (99/52 - 120/57)  BP(mean): --  RR: 18 (21 Mar 2018 11:14) (18 - 18)  SpO2: 98% (21 Mar 2018 11:14) (95% - 99%)    _________________  PHYSICAL EXAM:  ---------------------------   NAD; Normocephalic;   LUNGS - no wheezing  HEART: S1 S2+   ABDOMEN: Soft, Nontender, non distended  EXTREMITIES: no cyanosis; chronic lymphedema.  NERVOUS SYSTEM:  Awake and alert; no new deficits    _________________________________________________  LABS:                        10.2   11.7  )-----------( 239      ( 21 Mar 2018 08:15 )             32.8     03-21    137  |  98  |  35<H>  ----------------------------<  108<H>  3.8   |  31  |  0.94    Ca    8.4      21 Mar 2018 08:15          CAPILLARY BLOOD GLUCOSE      POCT Blood Glucose.: 188 mg/dL (21 Mar 2018 11:26)  POCT Blood Glucose.: 116 mg/dL (21 Mar 2018 07:36)  POCT Blood Glucose.: 156 mg/dL (20 Mar 2018 21:40)  POCT Blood Glucose.: 153 mg/dL (20 Mar 2018 16:37)            Care Discussed with Consultants :     Plan of care was discussed with patient ; all questions and concerns were addressed and care was aligned with patient's wishes.  Patient has been advised to follow up with PMD upon discharge from the hospital.  Discharge plans discussed with nursing staff , case manger and .

## 2018-03-21 NOTE — PROGRESS NOTE ADULT - SUBJECTIVE AND OBJECTIVE BOX
PGY1 Note discussed with supervising resident and primary attending.    Patient is a 78y old  Male who presents with a chief complaint of Fall x 2, worsening SOB (14 Mar 2018 16:06)      INTERVAL HPI/OVERNIGHT EVENTS: pt BP on lower side 100/50, decrease lisinopril to 20mg daily    MEDICATIONS  (STANDING):  ALBUTerol/ipratropium for Nebulization 3 milliLiter(s) Nebulizer every 6 hours  aspirin  chewable 81 milliGRAM(s) Oral daily  atorvastatin 40 milliGRAM(s) Oral at bedtime  diltiazem    milliGRAM(s) Oral daily  doxazosin 2 milliGRAM(s) Oral at bedtime  ferrous    sulfate 325 milliGRAM(s) Oral daily  finasteride 5 milliGRAM(s) Oral daily  furosemide    Tablet 40 milliGRAM(s) Oral two times a day  gabapentin 100 milliGRAM(s) Oral two times a day  heparin  Injectable 5000 Unit(s) SubCutaneous every 8 hours  insulin lispro (HumaLOG) corrective regimen sliding scale   SubCutaneous three times a day before meals  lisinopril 20 milliGRAM(s) Oral daily    MEDICATIONS  (PRN):  acetaminophen   Tablet 650 milliGRAM(s) Oral every 6 hours PRN For Temp greater than 38 C (100.4 F)  acetaminophen   Tablet. 650 milliGRAM(s) Oral every 6 hours PRN Mild Pain (1 - 3)      Allergies    No Known Allergies    Intolerances        REVIEW OF SYSTEMS:  CONSTITUTIONAL: No fever, weight loss, or fatigue  RESPIRATORY: No cough, wheezing, chills or hemoptysis; No shortness of breath  CARDIOVASCULAR: No chest pain, palpitations, dizziness, or leg swelling  GASTROINTESTINAL: No abdominal or epigastric pain. No nausea, vomiting, or hematemesis; No diarrhea or constipation. No melena or hematochezia.  NEUROLOGICAL: No headaches, memory loss, loss of strength, numbness, or tremors  SKIN: No itching, burning, rashes, or lesions     Vital Signs Last 24 Hrs  T(C): 36.8 (21 Mar 2018 07:24), Max: 37.5 (20 Mar 2018 19:42)  T(F): 98.3 (21 Mar 2018 07:24), Max: 99.5 (20 Mar 2018 19:42)  HR: 80 (21 Mar 2018 07:24) (76 - 107)  BP: 100/50 (21 Mar 2018 07:24) (99/52 - 120/57)  BP(mean): --  RR: 18 (21 Mar 2018 07:24) (18 - 18)  SpO2: 98% (21 Mar 2018 07:24) (95% - 100%)    PHYSICAL EXAM:  GENERAL: NAD, well-groomed, well-developed  HEAD:  Atraumatic, Normocephalic  EYES: EOMI, PERRLA, conjunctiva and sclera clear  NECK: Supple, No JVD, Normal thyroid  CHEST/LUNG: Clear to percussion bilaterally; No rales, rhonchi, wheezing, or rubs  HEART: Regular rate and rhythm; No murmurs, rubs, or gallops  ABDOMEN: Soft, Nontender, Nondistended; Bowel sounds present  NERVOUS SYSTEM:  Alert & Oriented X3, Good concentration; Motor Strength 5/5 B/L   EXTREMITIES:  2+ Peripheral Pulses, No clubbing, cyanosis, or edema  SKIN;    LABS:                        10.7   12.1  )-----------( 227      ( 20 Mar 2018 08:51 )             35.3     03-20    136  |  96  |  29<H>  ----------------------------<  98  4.3   |  32<H>  |  0.96    Ca    8.7      20 Mar 2018 08:51          CAPILLARY BLOOD GLUCOSE      POCT Blood Glucose.: 156 mg/dL (20 Mar 2018 21:40)  POCT Blood Glucose.: 153 mg/dL (20 Mar 2018 16:37)  POCT Blood Glucose.: 198 mg/dL (20 Mar 2018 11:32)  POCT Blood Glucose.: 115 mg/dL (20 Mar 2018 07:35)      RADIOLOGY & ADDITIONAL TESTS:    Imaging Personally Reviewed:  [ ] YES  [ ] NO    Consultant(s) Notes Reviewed:  [ ] YES  [ ] NO PGY1 Note discussed with supervising resident and primary attending.    Patient is a 78y old  Male who presents with a chief complaint of Fall x 2, worsening SOB (14 Mar 2018 16:06)      INTERVAL HPI/OVERNIGHT EVENTS: pt BP on lower side 100/50, decrease lisinopril to 20mg daily, will closely monitor BP.     MEDICATIONS  (STANDING):  ALBUTerol/ipratropium for Nebulization 3 milliLiter(s) Nebulizer every 6 hours  aspirin  chewable 81 milliGRAM(s) Oral daily  atorvastatin 40 milliGRAM(s) Oral at bedtime  diltiazem    milliGRAM(s) Oral daily  doxazosin 2 milliGRAM(s) Oral at bedtime  ferrous    sulfate 325 milliGRAM(s) Oral daily  finasteride 5 milliGRAM(s) Oral daily  furosemide    Tablet 40 milliGRAM(s) Oral two times a day  gabapentin 100 milliGRAM(s) Oral two times a day  heparin  Injectable 5000 Unit(s) SubCutaneous every 8 hours  insulin lispro (HumaLOG) corrective regimen sliding scale   SubCutaneous three times a day before meals  lisinopril 20 milliGRAM(s) Oral daily    MEDICATIONS  (PRN):  acetaminophen   Tablet 650 milliGRAM(s) Oral every 6 hours PRN For Temp greater than 38 C (100.4 F)  acetaminophen   Tablet. 650 milliGRAM(s) Oral every 6 hours PRN Mild Pain (1 - 3)      Allergies    No Known Allergies    Intolerances        REVIEW OF SYSTEMS:  CONSTITUTIONAL:  fatigue  RESPIRATORY: No cough, wheezing, chills or hemoptysis;  shortness of breath  improving   CARDIOVASCULAR:  chronic lower legs swelling, No chest pain, palpitations, dizziness.  GASTROINTESTINAL: No abdominal or epigastric pain. No nausea, vomiting, or hematemesis;   NEUROLOGICAL: No headaches, memory loss, loss of strength, numbness, or tremors  EXT: LE swelling    SKIN: No itching, burning, rashes, or lesions     Vital Signs Last 24 Hrs  T(C): 36.8 (21 Mar 2018 07:24), Max: 37.5 (20 Mar 2018 19:42)  T(F): 98.3 (21 Mar 2018 07:24), Max: 99.5 (20 Mar 2018 19:42)  HR: 80 (21 Mar 2018 07:24) (76 - 107)  BP: 100/50 (21 Mar 2018 07:24) (99/52 - 120/57)  BP(mean): --  RR: 18 (21 Mar 2018 07:24) (18 - 18)  SpO2: 98% (21 Mar 2018 07:24) (95% - 100%)    PHYSICAL EXAM:  GENERAL: NAD, well-groomed, well-developed  CHEST/LUNG:  mild  rales b/l lungs - much improved  HEART: Regular rate and rhythm; No murmurs, rubs, or gallops  ABDOMEN: Soft, Nontender, Nondistended; Bowel sounds present  NERVOUS SYSTEM:  Alert & Oriented X3, Good concentration; Motor Strength 5/5 right side, 4/5 left side    EXTREMITIES:  2+ Peripheral Pulses, left thigh and lower leg tenderness, chronic lower legs edema with hyperpigmentation    LABS:                        10.7   12.1  )-----------( 227      ( 20 Mar 2018 08:51 )             35.3     03-20    136  |  96  |  29<H>  ----------------------------<  98  4.3   |  32<H>  |  0.96    Ca    8.7      20 Mar 2018 08:51          CAPILLARY BLOOD GLUCOSE      POCT Blood Glucose.: 156 mg/dL (20 Mar 2018 21:40)  POCT Blood Glucose.: 153 mg/dL (20 Mar 2018 16:37)  POCT Blood Glucose.: 198 mg/dL (20 Mar 2018 11:32)  POCT Blood Glucose.: 115 mg/dL (20 Mar 2018 07:35)      RADIOLOGY & ADDITIONAL TESTS:    Imaging Personally Reviewed:  [X ] YES  [ ] NO    Consultant(s) Notes Reviewed:  [ X] YES  [ ] NO

## 2018-03-21 NOTE — PROGRESS NOTE ADULT - PROBLEM SELECTOR PLAN 7
Bp currently on low side  c/w home med diltiazem, decreased lisinopril to 20mg daily  DASH/TLC diet  closely monitor BP

## 2018-03-21 NOTE — PROGRESS NOTE ADULT - SUBJECTIVE AND OBJECTIVE BOX
Patient is a 78y old  Male who presents with a chief complaint of Fall x 2, worsening SOB (14 Mar 2018 16:06)  Awake, alert, comfortable in bed in NAD. Clinically improved however remains very weak and unable to stand alone.    INTERVAL HPI/OVERNIGHT EVENTS:      VITAL SIGNS:  T(F): 98.6 (03-21-18 @ 11:14)  HR: 73 (03-21-18 @ 11:14)  BP: 120/50 (03-21-18 @ 11:14)  RR: 18 (03-21-18 @ 11:14)  SpO2: 98% (03-21-18 @ 11:14)  Wt(kg): --  I&O's Detail    20 Mar 2018 07:01  -  21 Mar 2018 07:00  --------------------------------------------------------  IN:    Oral Fluid: 700 mL  Total IN: 700 mL    OUT:    Voided: 650 mL  Total OUT: 650 mL    Total NET: 50 mL      21 Mar 2018 07:01  -  21 Mar 2018 11:54  --------------------------------------------------------  IN:    Oral Fluid: 200 mL  Total IN: 200 mL    OUT:  Total OUT: 0 mL    Total NET: 200 mL              REVIEW OF SYSTEMS:    CONSTITUTIONAL:  No fevers, chills, sweats    HEENT:  Eyes:  No diplopia or blurred vision. ENT:  No earache, sore throat or runny nose.    CARDIOVASCULAR:  No pressure, squeezing, tightness, or heaviness about the chest; no palpitations.    RESPIRATORY:  Per HPI    GASTROINTESTINAL:  No abdominal pain, nausea, vomiting or diarrhea.    GENITOURINARY:  No dysuria, frequency or urgency.    NEUROLOGIC:  No paresthesias, fasciculations, seizures or weakness.    PSYCHIATRIC:  No disorder of thought or mood.      PHYSICAL EXAM:    Constitutional: Well developed and nourished  Eyes:Perrla  ENMT: normal  Neck:supple  Respiratory: good air entry  Cardiovascular: S1 S2 regular  Gastrointestinal: Soft, Non tender  Extremities: Decreased leg edema  Vascular:normal  Neurological:Awake, alert,Ox3  Musculoskeletal:Normal      MEDICATIONS  (STANDING):  ALBUTerol/ipratropium for Nebulization 3 milliLiter(s) Nebulizer every 6 hours  aspirin  chewable 81 milliGRAM(s) Oral daily  atorvastatin 40 milliGRAM(s) Oral at bedtime  diltiazem    milliGRAM(s) Oral daily  doxazosin 2 milliGRAM(s) Oral at bedtime  ferrous    sulfate 325 milliGRAM(s) Oral daily  finasteride 5 milliGRAM(s) Oral daily  furosemide    Tablet 40 milliGRAM(s) Oral two times a day  gabapentin 100 milliGRAM(s) Oral two times a day  heparin  Injectable 5000 Unit(s) SubCutaneous every 8 hours  insulin lispro (HumaLOG) corrective regimen sliding scale   SubCutaneous three times a day before meals  lisinopril 20 milliGRAM(s) Oral daily    MEDICATIONS  (PRN):  acetaminophen   Tablet 650 milliGRAM(s) Oral every 6 hours PRN For Temp greater than 38 C (100.4 F)  acetaminophen   Tablet. 650 milliGRAM(s) Oral every 6 hours PRN Mild Pain (1 - 3)      Allergies    No Known Allergies    Intolerances        LABS:                        10.2   11.7  )-----------( 239      ( 21 Mar 2018 08:15 )             32.8     03-21    137  |  98  |  35<H>  ----------------------------<  108<H>  3.8   |  31  |  0.94    Ca    8.4      21 Mar 2018 08:15                CAPILLARY BLOOD GLUCOSE      POCT Blood Glucose.: 188 mg/dL (21 Mar 2018 11:26)  POCT Blood Glucose.: 116 mg/dL (21 Mar 2018 07:36)  POCT Blood Glucose.: 156 mg/dL (20 Mar 2018 21:40)  POCT Blood Glucose.: 153 mg/dL (20 Mar 2018 16:37)        RADIOLOGY & ADDITIONAL TESTS:    CXR:  < from: Xray Chest 1 View-PORTABLE IMMEDIATE (03.17.18 @ 14:27) >    INTERPRETATION:  Follow-up.    AP chest. Prior dated 3/13/2018.    Low lung volumes. No change heart mediastinum. Discounting technical   variation nosignificant change in mild diffuse bilateral interstitial   prominence. Correlate for congestion versus inflammation/infection. No   focal consolidation or pleural effusion. Elevation right hemidiaphragm   similar to prior.    Impression: As above    < end of copied text >    Ct scan chest:    ekg;    echo:

## 2018-03-21 NOTE — PROGRESS NOTE ADULT - SUBJECTIVE AND OBJECTIVE BOX
Meds:  No ABX.  S/P Unasyn 1.5 gm IVPB q 6 hours x 7 days.    Allergies:  Allergies    No Known Allergies    Intolerances    ROS  [  ] UNABLE TO ELICIT    General:  [  ] None  [  ] Fever  [  ] Chills  [ x ] Malaise    Skin:  [ x ] None [  ] Rash  [  ] Wound  [  ] Ulcer    HEENT:  [ x ] None  [  ] Sore Throat  [  ] Nasal congestion/ runny nose  [  ] Photophobia [  ] Neck pain      Chest:  [  ] None   [ x ] SOB  [ x ] Cough  [  ] None    Cardiovascular:   [  ] None  [  ] CP  [  ] Palpitation [ x ] Orthopnea    Gastrointestinal:  [ x ] None  [  ] Abd pain   [  ] Nausea    [  ] Vomiting   [  ] Diarrhea	     Genitourinary:  [ x ] None [  ] Polyuria   [  ] Urgency  [  ] Frequency  [  ] Dysuria    [  ]  Hematuria       Musculoskeletal:  [  ] None [  ] Back Pain	[  ] Body aches  [  ] Joint pain [ x ] Recurrent fall.    Neurological: [  ] None [  ]Dizziness  [  ]Visual Disturbance  [  ]Headaches   [ x ] Weakness          PHYSICAL EXAM:  Vital Signs Last 24 Hrs  T(C): 36.8 (21 Mar 2018 07:24), Max: 37.5 (20 Mar 2018 19:42)  T(F): 98.3 (21 Mar 2018 07:24), Max: 99.5 (20 Mar 2018 19:42)  HR: 80 (21 Mar 2018 07:24) (76 - 107)  BP: 100/50 (21 Mar 2018 07:24) (99/52 - 120/57)  BP(mean): --  RR: 18 (21 Mar 2018 07:24) (18 - 18)  SpO2: 98% (21 Mar 2018 07:24) (95% - 100%)    Constitutional:    HEENT: [ x ] Wnl  [  ] Pharyngeal congestion    Neck:  [ x ] Supple  [  ]Lymphadenopathy  [ x ] No JVD   [  ] JVD  [  ] Masses   [  ] WNL    CHEST/Respiratory:  [  ]Clear to auscultation  [ x ] Rales   [  ] Rhonchi   [  ] Wheezing     [  ] Chest Tenderness      Cardiovascular:  [ x ] Reg S1 S2   [  ] Irreg S1 S2   [ x ]No Murmur  [  ] +ve Murmurs  [  ]Systolic [  ]Diastolic      Abdomen:  [ x ] Soft  [ x ] No tendrerness  [  ] Tenderness  [  ] Organomegaly  [  ] ABD Distention  [  ] Rigidity                       [ x ] No Regidity                       [ x ] No Rebound Tenderness  [ x ] No Guarding Rigidity  [  ] Rebound Tenderness[  ] Guarding Rigidity                          [ x ]  +ve Bowel Sounds  [  ] Decreased Bowel Sounds    [  ] Absent Bowel Sounds                            Extremities: [  ] No edema [ x ] Edema both legs(markedly improved), with chronic skin changes. [  ] Clubbing   [  ] Cyanosis                         [ x ] No Tender Calf muscles  [  ] Tender Calf muscles                        [ x ] Palpable peripheral pulses    Neurological: [ x ] Awake  [ x ] Alert  [ x ] Oriented  x  3                           [  ] Confused  [  ] Drowsy  [  ] respond to painful stimuli  [  ] Unresponsive    Skin:  [ x ] Intact [  ] Redness [  ] Thrombophlebitis  [  ] Rashes  [  ] Dry  [  ] Ulcers    Ortho:  [  ] Joint Swelling  [  ] Joint erythema [  ] Joint tenderness                [  ] Increased temp. to touch  [  ] DJD [ x ] WNL          LABS/DIAGNOSTIC TESTS                        10.2   11.7  )-----------( 239      ( 21 Mar 2018 08:15 )             32.8   03-21    137  |  98  |  35<H>  ----------------------------<  108<H>  3.8   |  31  |  0.94    Ca    8.4      21 Mar 2018 08:15    CAPILLARY BLOOD GLUCOSE      POCT Blood Glucose.: 116 mg/dL (21 Mar 2018 07:36)  POCT Blood Glucose.: 156 mg/dL (20 Mar 2018 21:40)  POCT Blood Glucose.: 153 mg/dL (20 Mar 2018 16:37)  POCT Blood Glucose.: 198 mg/dL (20 Mar 2018 11:32)        CULTURES:   Culture - Sputum . (03.14.18 @ 18:48)    Gram Stain:   No polymorphonuclear leukocytes per low power field  Moderate Squamous epithelial cells per low power field  Moderate Gram positive cocci in pairs, chains and clusters per oil power  field  Few Gram Positive Rods per oil power field  Results consistent with oropharyngeal contamination    Specimen Source: .Sputum Sputum      Culture - Urine (03.13.18 @ 09:58)    Specimen Source: .Urine Clean Catch (Midstream)    Culture Results:   No growth    Culture - Blood (03.13.18 @ 09:42)    Specimen Source: .Blood Blood-Peripheral    Culture Results:   No growth to date.    Culture - Blood (03.13.18 @ 09:42)    Specimen Source: .Blood Blood-Peripheral    Culture Results:   No growth to date.        RADIOLOGY:    EXAM:  XR CHEST PORTABLE IMMED 1V                            PROCEDURE DATE:  03/17/2018          INTERPRETATION:  Follow-up.    AP chest. Prior dated 3/13/2018.    Low lung volumes. No change heart mediastinum. Discounting technical   variation nosignificant change in mild diffuse bilateral interstitial   prominence. Correlate for congestion versus inflammation/infection. No   focal consolidation or pleural effusion. Elevation right hemidiaphragm   similar to prior.    Impression: As above          EXAM:  CT ABDOMEN AND PELVIS IC                            PROCEDURE DATE:  03/14/2018          INTERPRETATION:  CT of the abdomen and pelvis with IV contrast    Clinical Indication: 4.5 cm retroperitoneal mass on prior chest CTA dated   3/13/2018    Technique: Axial multidetector CT images of the abdomen and pelvis are   acquired following the administration of oral and IV contrast (96 cc   Omnipaque-350 administered, 4 cc discarded).    Comparison: No prior abdominal/pelvic CT is available for comparison.   Reference is made with previous chest CTs dated 3/13/2018 and 7/9/2017.    Findings:    Abdomen: Limited sections through the lung bases demonstrate small   bilateral pleural effusions. Small atelectasis bilaterally. Patchy   groundglass density airspace opacifications in both lungs.     There is asymmetric enlargement of the left psoas muscle and this was   seen on the previous chest CT dated 3/13/2018; this is a new finding   since the previous chest CT dated 7/9/2017.     Gallstones in the gallbladder. No evidence for thickened gallbladder wall   or pericholecystic fluid. Mild stranding adjacent to the gallbladder.    The liver, pancreas, spleen, adrenals and kidneys appear unremarkable.    Colonic diverticulosis without evidence for diverticulitis. No bowel   obstruction, or grossly thickened bowel wall. Mild stranding adjacent to   the duodenum.    Small fat-containing periumbilical hernia.    No evidence for free air, ascites, or enlarged lymph node.    Pelvis: The urinary bladder is within normal limits. Sigmoid   diverticulosis without evidence for diverticulitis. Small pelvic free   fluid.    Sclerotic lesions in L5 vertebra.    Impression: Asymmetric enlargement of the left psoas muscle; this may be   due to muscular hypertrophy, or underlying intramuscular hematoma or   mass. If clinically indicated, lumbar spine MR without and with IV   contrast may be pursued for further evaluation after 24 hours.     Small bilateral pleural perfusions.    Patchy airspace opacifications in both lungs may be due to pneumonia or   pulmonary edema. Clinical correlation is recommended.    Cholelithiasis. Mild stranding adjacent to the gallbladder and the   duodenum may represent acute cholecystitis and/or nonspecific duodenitis.   Clinical correlation is recommended. If clinically indicated, HIDA scan   may be pursued for further evaluation.    Sclerotic lesion in L5 vertebra. If clinically indicated, bone scan may   be pursued to rule out osseous metastasis.    Small pelvic free fluid.            Assessment and Recommendation:   78  y/ o. 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   Patient was admitted for pneumonia and CHF and was started on IV Levaquin, that was subsequently discontinued in view of absent symptoms and procalcitonin level of 0.08.  Patient C/O pain left lower extremity and was started on iv Unasyn for cellulitis.   3/21/18 Patient is afebrile but WBC is mildly elevated and Patient is off ABX.    Problem/Recommendation - 1:  Problem: PNA (Questionable pneumonia).   Recommendation:   1- Closely follow WBC.  2- Cardiac management for CHF.  3- O2 as needed.  4- Pulmonary management.  5- CBC and BMP for follow up.  6- Legs elevation in bed.    Problem/Recommendation - 2:  ·  Problem: Anemia.    Recommendation:   1- Anemia profile.  2- Iron supplements.  3- Closely monitor H & H.  4- Observe for bleeding.     Problem/Recommendation - 3:  ·  Problem: Diabetes.    Recommendation:   1- Blood sugar monitoring and control.  2- Accu-Cheks with coverage.  3- 1800 sheldon ADA diet.  4- Follow HB A1C.     Discussed with medical resident.

## 2018-03-21 NOTE — PROGRESS NOTE ADULT - ASSESSMENT
78  y/ o. male,   PMHx  NIDDM, HTN, Chronic lymphedema of bilateral legs, left knee arthritis , DVT of bilateral legs ( left peroneal vein and right posterior tibial veins, on Xarelto for sometime then stopped), BPH, PSHx of right incarcerated inguinal hernia repair., presented with fall x 2 , worsening SOB from 2 days, productive cough admitted to tele for SOB with concern for CHF , s/p two falls.
78  y/ o. male,   PMHx  NIDDM, HTN, Chronic lymphedema of bilateral legs, left knee arthritis , DVT of bilateral legs ( left peroneal vein and right posterior tibial veins, on Xarelto for sometime then stopped), BPH, PSHx of right incarcerated inguinal hernia repair., presented with fall x 2 , worsening SOB from 2 days, productive cough admitted to tele for SOB with concern for CHF , s/p two falls.
78  y/ o. male,   PMHx  NIDDM, HTN, Chronic lymphedema of bilateral legs, left knee arthritis , DVT of bilateral legs ( left peroneal vein and right posterior tibial veins, on Xarelto for sometime then stopped), BPH, PSHx of right incarcerated inguinal hernia repair., presented with fall x 2 , worsening SOB from 2 days, productive cough admitted to tele for SOB with concern for CHF , had CAP, falls.
78  y/ o. male,   PMHx  NIDDM, HTN, Chronic lymphedema of bilateral legs, left knee arthritis , DVT of bilateral legs ( left peroneal vein and right posterior tibial veins, on Xarelto for sometime then stopped), BPH, PSHx of right incarcerated inguinal hernia repair., presented with fall x 2 , worsening SOB from 2 days, productive cough admitted to tele for SOB with concern for CHF , s/p two falls.

## 2018-06-23 ENCOUNTER — INPATIENT (INPATIENT)
Facility: HOSPITAL | Age: 78
LOS: 5 days | Discharge: HOME | DRG: 291 | End: 2018-06-29
Attending: INTERNAL MEDICINE | Admitting: INTERNAL MEDICINE
Payer: COMMERCIAL

## 2018-06-23 VITALS
SYSTOLIC BLOOD PRESSURE: 137 MMHG | HEART RATE: 82 BPM | OXYGEN SATURATION: 99 % | TEMPERATURE: 97 F | DIASTOLIC BLOOD PRESSURE: 63 MMHG | RESPIRATION RATE: 16 BRPM

## 2018-06-23 DIAGNOSIS — I50.9 HEART FAILURE, UNSPECIFIED: ICD-10-CM

## 2018-06-23 DIAGNOSIS — E11.9 TYPE 2 DIABETES MELLITUS WITHOUT COMPLICATIONS: ICD-10-CM

## 2018-06-23 DIAGNOSIS — I10 ESSENTIAL (PRIMARY) HYPERTENSION: ICD-10-CM

## 2018-06-23 DIAGNOSIS — I89.0 LYMPHEDEMA, NOT ELSEWHERE CLASSIFIED: ICD-10-CM

## 2018-06-23 DIAGNOSIS — Z98.89 OTHER SPECIFIED POSTPROCEDURAL STATES: Chronic | ICD-10-CM

## 2018-06-23 DIAGNOSIS — L03.119 CELLULITIS OF UNSPECIFIED PART OF LIMB: ICD-10-CM

## 2018-06-23 DIAGNOSIS — Z29.9 ENCOUNTER FOR PROPHYLACTIC MEASURES, UNSPECIFIED: ICD-10-CM

## 2018-06-23 LAB
ALBUMIN SERPL ELPH-MCNC: 3.4 G/DL — LOW (ref 3.5–5)
ALP SERPL-CCNC: 85 U/L — SIGNIFICANT CHANGE UP (ref 40–120)
ALT FLD-CCNC: 22 U/L DA — SIGNIFICANT CHANGE UP (ref 10–60)
ANION GAP SERPL CALC-SCNC: 8 MMOL/L — SIGNIFICANT CHANGE UP (ref 5–17)
APPEARANCE UR: CLEAR — SIGNIFICANT CHANGE UP
AST SERPL-CCNC: 25 U/L — SIGNIFICANT CHANGE UP (ref 10–40)
BASOPHILS # BLD AUTO: 0.1 K/UL — SIGNIFICANT CHANGE UP (ref 0–0.2)
BASOPHILS NFR BLD AUTO: 1.1 % — SIGNIFICANT CHANGE UP (ref 0–2)
BILIRUB SERPL-MCNC: 0.5 MG/DL — SIGNIFICANT CHANGE UP (ref 0.2–1.2)
BILIRUB UR-MCNC: NEGATIVE — SIGNIFICANT CHANGE UP
BUN SERPL-MCNC: 21 MG/DL — HIGH (ref 7–18)
CALCIUM SERPL-MCNC: 8.9 MG/DL — SIGNIFICANT CHANGE UP (ref 8.4–10.5)
CHLORIDE SERPL-SCNC: 105 MMOL/L — SIGNIFICANT CHANGE UP (ref 96–108)
CO2 SERPL-SCNC: 27 MMOL/L — SIGNIFICANT CHANGE UP (ref 22–31)
COLOR SPEC: SIGNIFICANT CHANGE UP
CREAT SERPL-MCNC: 1.09 MG/DL — SIGNIFICANT CHANGE UP (ref 0.5–1.3)
DIFF PNL FLD: NEGATIVE — SIGNIFICANT CHANGE UP
EOSINOPHIL # BLD AUTO: 0.3 K/UL — SIGNIFICANT CHANGE UP (ref 0–0.5)
EOSINOPHIL NFR BLD AUTO: 4.6 % — SIGNIFICANT CHANGE UP (ref 0–6)
GLUCOSE SERPL-MCNC: 101 MG/DL — HIGH (ref 70–99)
GLUCOSE UR QL: NEGATIVE — SIGNIFICANT CHANGE UP
HCT VFR BLD CALC: 34.6 % — LOW (ref 39–50)
HGB BLD-MCNC: 10.6 G/DL — LOW (ref 13–17)
KETONES UR-MCNC: NEGATIVE — SIGNIFICANT CHANGE UP
LEUKOCYTE ESTERASE UR-ACNC: NEGATIVE — SIGNIFICANT CHANGE UP
LYMPHOCYTES # BLD AUTO: 0.7 K/UL — LOW (ref 1–3.3)
LYMPHOCYTES # BLD AUTO: 10.7 % — LOW (ref 13–44)
MCHC RBC-ENTMCNC: 26 PG — LOW (ref 27–34)
MCHC RBC-ENTMCNC: 30.5 GM/DL — LOW (ref 32–36)
MCV RBC AUTO: 85.3 FL — SIGNIFICANT CHANGE UP (ref 80–100)
MONOCYTES # BLD AUTO: 0.8 K/UL — SIGNIFICANT CHANGE UP (ref 0–0.9)
MONOCYTES NFR BLD AUTO: 12.6 % — SIGNIFICANT CHANGE UP (ref 2–14)
NEUTROPHILS # BLD AUTO: 4.6 K/UL — SIGNIFICANT CHANGE UP (ref 1.8–7.4)
NEUTROPHILS NFR BLD AUTO: 71 % — SIGNIFICANT CHANGE UP (ref 43–77)
NITRITE UR-MCNC: NEGATIVE — SIGNIFICANT CHANGE UP
NT-PROBNP SERPL-SCNC: 2432 PG/ML — HIGH (ref 0–450)
PH UR: 5 — SIGNIFICANT CHANGE UP (ref 5–8)
PLATELET # BLD AUTO: 190 K/UL — SIGNIFICANT CHANGE UP (ref 150–400)
POTASSIUM SERPL-MCNC: 4 MMOL/L — SIGNIFICANT CHANGE UP (ref 3.5–5.3)
POTASSIUM SERPL-SCNC: 4 MMOL/L — SIGNIFICANT CHANGE UP (ref 3.5–5.3)
PROT SERPL-MCNC: 7.4 G/DL — SIGNIFICANT CHANGE UP (ref 6–8.3)
PROT UR-MCNC: NEGATIVE — SIGNIFICANT CHANGE UP
RBC # BLD: 4.05 M/UL — LOW (ref 4.2–5.8)
RBC # FLD: 15.6 % — HIGH (ref 10.3–14.5)
SODIUM SERPL-SCNC: 140 MMOL/L — SIGNIFICANT CHANGE UP (ref 135–145)
SP GR SPEC: 1 — LOW (ref 1.01–1.02)
TROPONIN I SERPL-MCNC: <0.015 NG/ML — SIGNIFICANT CHANGE UP (ref 0–0.04)
UROBILINOGEN FLD QL: NEGATIVE — SIGNIFICANT CHANGE UP
WBC # BLD: 6.5 K/UL — SIGNIFICANT CHANGE UP (ref 3.8–10.5)
WBC # FLD AUTO: 6.5 K/UL — SIGNIFICANT CHANGE UP (ref 3.8–10.5)

## 2018-06-23 PROCEDURE — 93010 ELECTROCARDIOGRAM REPORT: CPT

## 2018-06-23 PROCEDURE — 74177 CT ABD & PELVIS W/CONTRAST: CPT | Mod: 26

## 2018-06-23 PROCEDURE — 99285 EMERGENCY DEPT VISIT HI MDM: CPT

## 2018-06-23 PROCEDURE — 71045 X-RAY EXAM CHEST 1 VIEW: CPT | Mod: 26

## 2018-06-23 PROCEDURE — 99223 1ST HOSP IP/OBS HIGH 75: CPT | Mod: AI,GC

## 2018-06-23 PROCEDURE — 71275 CT ANGIOGRAPHY CHEST: CPT | Mod: 26

## 2018-06-23 RX ORDER — INSULIN LISPRO 100/ML
VIAL (ML) SUBCUTANEOUS
Qty: 0 | Refills: 0 | Status: DISCONTINUED | OUTPATIENT
Start: 2018-06-23 | End: 2018-06-29

## 2018-06-23 RX ORDER — CEFTRIAXONE 500 MG/1
1 INJECTION, POWDER, FOR SOLUTION INTRAMUSCULAR; INTRAVENOUS EVERY 24 HOURS
Qty: 0 | Refills: 0 | Status: DISCONTINUED | OUTPATIENT
Start: 2018-06-23 | End: 2018-06-26

## 2018-06-23 RX ORDER — VANCOMYCIN HCL 1 G
1000 VIAL (EA) INTRAVENOUS ONCE
Qty: 0 | Refills: 0 | Status: COMPLETED | OUTPATIENT
Start: 2018-06-23 | End: 2018-06-23

## 2018-06-23 RX ORDER — ASPIRIN/CALCIUM CARB/MAGNESIUM 324 MG
81 TABLET ORAL DAILY
Qty: 0 | Refills: 0 | Status: DISCONTINUED | OUTPATIENT
Start: 2018-06-23 | End: 2018-06-27

## 2018-06-23 RX ORDER — DEXTROSE 50 % IN WATER 50 %
15 SYRINGE (ML) INTRAVENOUS ONCE
Qty: 0 | Refills: 0 | Status: DISCONTINUED | OUTPATIENT
Start: 2018-06-23 | End: 2018-06-25

## 2018-06-23 RX ORDER — DEXTROSE 50 % IN WATER 50 %
12.5 SYRINGE (ML) INTRAVENOUS ONCE
Qty: 0 | Refills: 0 | Status: DISCONTINUED | OUTPATIENT
Start: 2018-06-23 | End: 2018-06-25

## 2018-06-23 RX ORDER — LISINOPRIL 2.5 MG/1
40 TABLET ORAL DAILY
Qty: 0 | Refills: 0 | Status: DISCONTINUED | OUTPATIENT
Start: 2018-06-23 | End: 2018-06-29

## 2018-06-23 RX ORDER — ATORVASTATIN CALCIUM 80 MG/1
40 TABLET, FILM COATED ORAL AT BEDTIME
Qty: 0 | Refills: 0 | Status: DISCONTINUED | OUTPATIENT
Start: 2018-06-23 | End: 2018-06-29

## 2018-06-23 RX ORDER — DOXAZOSIN MESYLATE 4 MG
2 TABLET ORAL AT BEDTIME
Qty: 0 | Refills: 0 | Status: DISCONTINUED | OUTPATIENT
Start: 2018-06-23 | End: 2018-06-29

## 2018-06-23 RX ORDER — FINASTERIDE 5 MG/1
5 TABLET, FILM COATED ORAL DAILY
Qty: 0 | Refills: 0 | Status: DISCONTINUED | OUTPATIENT
Start: 2018-06-23 | End: 2018-06-29

## 2018-06-23 RX ORDER — FUROSEMIDE 40 MG
40 TABLET ORAL ONCE
Qty: 0 | Refills: 0 | Status: COMPLETED | OUTPATIENT
Start: 2018-06-23 | End: 2018-06-23

## 2018-06-23 RX ORDER — SODIUM CHLORIDE 9 MG/ML
1000 INJECTION, SOLUTION INTRAVENOUS
Qty: 0 | Refills: 0 | Status: DISCONTINUED | OUTPATIENT
Start: 2018-06-23 | End: 2018-06-29

## 2018-06-23 RX ORDER — ASPIRIN/CALCIUM CARB/MAGNESIUM 324 MG
81 TABLET ORAL ONCE
Qty: 0 | Refills: 0 | Status: COMPLETED | OUTPATIENT
Start: 2018-06-23 | End: 2018-06-23

## 2018-06-23 RX ORDER — DEXTROSE 50 % IN WATER 50 %
25 SYRINGE (ML) INTRAVENOUS ONCE
Qty: 0 | Refills: 0 | Status: DISCONTINUED | OUTPATIENT
Start: 2018-06-23 | End: 2018-06-25

## 2018-06-23 RX ORDER — DILTIAZEM HCL 120 MG
120 CAPSULE, EXT RELEASE 24 HR ORAL DAILY
Qty: 0 | Refills: 0 | Status: DISCONTINUED | OUTPATIENT
Start: 2018-06-23 | End: 2018-06-29

## 2018-06-23 RX ORDER — GLUCAGON INJECTION, SOLUTION 0.5 MG/.1ML
1 INJECTION, SOLUTION SUBCUTANEOUS ONCE
Qty: 0 | Refills: 0 | Status: DISCONTINUED | OUTPATIENT
Start: 2018-06-23 | End: 2018-06-25

## 2018-06-23 RX ORDER — ENOXAPARIN SODIUM 100 MG/ML
100 INJECTION SUBCUTANEOUS
Qty: 0 | Refills: 0 | Status: DISCONTINUED | OUTPATIENT
Start: 2018-06-23 | End: 2018-06-24

## 2018-06-23 RX ORDER — ENOXAPARIN SODIUM 100 MG/ML
40 INJECTION SUBCUTANEOUS DAILY
Qty: 0 | Refills: 0 | Status: DISCONTINUED | OUTPATIENT
Start: 2018-06-23 | End: 2018-06-23

## 2018-06-23 RX ORDER — FUROSEMIDE 40 MG
40 TABLET ORAL DAILY
Qty: 0 | Refills: 0 | Status: DISCONTINUED | OUTPATIENT
Start: 2018-06-24 | End: 2018-06-24

## 2018-06-23 RX ADMIN — Medication 40 MILLIGRAM(S): at 17:34

## 2018-06-23 RX ADMIN — Medication 2 MILLIGRAM(S): at 22:54

## 2018-06-23 RX ADMIN — Medication 250 MILLIGRAM(S): at 15:38

## 2018-06-23 RX ADMIN — Medication 81 MILLIGRAM(S): at 17:34

## 2018-06-23 RX ADMIN — ATORVASTATIN CALCIUM 40 MILLIGRAM(S): 80 TABLET, FILM COATED ORAL at 22:55

## 2018-06-23 NOTE — ED PROVIDER NOTE - OBJECTIVE STATEMENT
79 y/o M pt with PMHx of DM, HTN, and PND presents to ED c/o BLE redness x 1 week and rapidly worsening LE edema, now extending to scrotum, and 30lb weight gain in 3 days. Pt denies fever, chills, chest pain, cough, abd pain, nausea, vomiting, diarrhea, or any other complaints.

## 2018-06-23 NOTE — H&P ADULT - NSHPLABSRESULTS_GEN_ALL_CORE
10.6   6.5   )-----------( 190      ( 23 Jun 2018 14:53 )             34.6     06-23    140  |  105  |  21<H>  ----------------------------<  101<H>  4.0   |  27  |  1.09    Ca    8.9      23 Jun 2018 14:53    TPro  7.4  /  Alb  3.4<L>  /  TBili  0.5  /  DBili  x   /  AST  25  /  ALT  22  /  AlkPhos  85  06-23    < from: Xray Chest 1 View- PORTABLE-Urgent (06.23.18 @ 14:36) >      EXAM:  XR CHEST PORTABLE URGENT 1V                            PROCEDURE DATE:  06/23/2018          INTERPRETATION:  Clinical Information: Edema.    Technique: AP chest image.     Comparison: 03/17/2018    Findings: The heart is unremarkable. The lungs are clear. Bones are   unremarkable for age.    Impression: Clear lungs.          < end of copied text >

## 2018-06-23 NOTE — H&P ADULT - PROBLEM SELECTOR PLAN 7
[] Previous VTE                                                3  [] Thrombophilia                                             2  [] Lower limb paralysis                                   2    [] Current Cancer                                             2   [X] Immobilization > 24 hrs                              1  [] ICU/CCU stay > 24 hours                             1  [X] Age > 60                                                         1    IMPROVE VTE Score: 2

## 2018-06-23 NOTE — H&P ADULT - HISTORY OF PRESENT ILLNESS
77 y/o M with PMHx  of DM, HTN, Chronic lymphedema of bilateral legs, left knee arthritis, DVT of bilateral legs (left peroneal vein and right posterior tibial veins, on Xarelto for sometime then stopped), BPH, and PSHx of right incarcerated inguinal hernia repair presented to the ED with c/o b/l LE swelling and SOB. Patient states that he was last here at [facility] in March 2018 for CHF exacerbation and fall. He was discharged to Rehab. Patient was discharged from Rehab on May 3, 2018. Patient was on xeralto in March, but after discharge from Rehab his PMD discontinued it. No hx of GI bleed. Patient went to see his PMD 2 week ago and noticed that there was a 20 lb weight gain since then. He noticed worsening swelling b/l in his LE with erythema since 2 weeks. No fever or chest pain. Patient complaints of PND and uses 3 pillows to sleep but recently he has been feeling uncomfortable and often sleeps on recliner. Denies SOB at rest. He states being compliant with his medications.     SH: Denies smoking, alcohol or illicit drug use. Lives with wife. Ambulates with cane.   NKDA

## 2018-06-23 NOTE — ED ADULT NURSE REASSESSMENT NOTE - NS ED NURSE REASSESS COMMENT FT1
Patient was received from SAVANNAH Mota. Patient is hemodynamically stable and in no acute distress, speaking in full sentences and able to breathe comfortably in room air. Patient verbalizes no medical complaints. Urine output 160ml, urine is clear and odorless.

## 2018-06-23 NOTE — H&P ADULT - NSHPPHYSICALEXAM_GEN_ALL_CORE
Vital Signs Last 24 Hrs  T(C): 36.7 (23 Jun 2018 15:33), Max: 36.7 (23 Jun 2018 15:33)  T(F): 98.1 (23 Jun 2018 15:33), Max: 98.1 (23 Jun 2018 15:33)  HR: 83 (23 Jun 2018 15:33) (82 - 83)  BP: 135/77 (23 Jun 2018 15:33) (135/77 - 137/63)  BP(mean): --  RR: 20 (23 Jun 2018 15:33) (16 - 20)  SpO2: 100% (23 Jun 2018 15:33) (99% - 100%)    GENERAL: NAD  HEAD:  Atraumatic, Normocephalic  EYES: EOMI, PERRLA, conjunctiva and sclera clear  ENMT: Moist mucous membranes  NECK: Supple  NERVOUS SYSTEM:  Alert & Oriented X3  CHEST/LUNG: Clear to auscultation bilaterally; No rales, rhonchi, wheezing, or rubs  HEART: Regular rate and rhythm; No murmurs, rubs, or gallops  ABDOMEN: Soft, Nontender, Nondistended; Bowel sounds present  EXTREMITIES:  2+ Peripheral Pulses, b/l diffuse LE swelling with erythema and warmth in lower legs. Skin thickening b/l on legs. No ulcers seen

## 2018-06-23 NOTE — H&P ADULT - NSHPREVIEWOFSYSTEMS_GEN_ALL_CORE
REVIEW OF SYSTEMS:  CONSTITUTIONAL: No fever, weight loss, or fatigue  EYES: No eye pain, visual disturbances, or discharge  ENMT:  No difficulty hearing, tinnitus, vertigo; No sinus or throat pain  RESPIRATORY: shortness of breath  CARDIOVASCULAR:  leg swelling  GASTROINTESTINAL: No abdominal or epigastric pain. No nausea, vomiting, or hematemesis; No diarrhea or constipation. No melena or hematochezia.  GENITOURINARY: No dysuria, frequency, hematuria, or incontinence  NEUROLOGICAL: No headaches, memory loss, loss of strength, numbness, or tremors  ENDOCRINE: No heat or cold intolerance; No hair loss  MUSCULOSKELETAL: b/l LE swelling  PSYCHIATRIC: No depression, anxiety, mood swings, or difficulty sleeping  HEME/LYMPH: No easy bruising, or bleeding gums  ALLERY AND IMMUNOLOGIC: No hives or eczema

## 2018-06-23 NOTE — H&P ADULT - PROBLEM SELECTOR PLAN 6
[] Previous VTE                                                3  [] Thrombophilia                                             2  [] Lower limb paralysis                                   2    [] Current Cancer                                             2   [X] Immobilization > 24 hrs                              1  [] ICU/CCU stay > 24 hours                             1  [X] Age > 60                                                         1    IMPROVE VTE Score: 2 c/w diltiazem with parameters

## 2018-06-23 NOTE — H&P ADULT - PROBLEM SELECTOR PLAN 3
On metformin 750mg qday  - f/u A1c  - insulin sliding scale EKG shows atrial fibrillation  Patient here in March 2018 when EKG showed NSR  New onset A fib?  Will give full dose lovenox for now  - f/u ECHO  - f/u CTA chest to r/o PE  - Cardiologist- Dr Dale

## 2018-06-23 NOTE — H&P ADULT - ATTENDING COMMENTS
Patient was examined in the ED yesterday and discussed with Dr. Fu.     he presented with c/o SOB and with LE swelling, worse that usual.  He states that he gained several pounds (of fluid) in the last week.   PMH is positive for lymphedema.  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 He has been managed with massage specific for lymphedema.  He has been prescribed compression stockings, but does not wear them.  PMH is also positive for DM.     Alert, cooperative 79 yo man  Vital Signs Last 24 Hrs  T(C): 37.3 (2018 13:51), Max: 37.4 (2018 11:14)  T(F): 99.2 (2018 13:51), Max: 99.3 (2018 11:14)  HR: 81 (2018 13:51) (61 - 88)  BP: 113/68 (2018 13:51) (107/67 - 138/70)  BP(mean): --  RR: 20 (2018 13:51) (18 - 22)  SpO2: 98% (2018 13:51) (98% - 100%)  Lungs, clear  Cor, irreg  Abdomen, possible fluid wave  Ext lymphedema and diffused erythema and calor in the interstices of scarring.   Neurological, intact                          9.8    6.3   )-----------( 189      ( 2018 10:41 )             32.2       06-24    140  |  103  |  24<H>  ----------------------------<  189<H>  3.7   |  30  |  1.24    Ca    8.3<L>      2018 10:41  Phos  4.1     06-24  Mg     1.7     06-24    TPro  7.4  /  Alb  3.4<L>  /  TBili  0.5  /  DBili  x   /  AST  25  /  ALT  22  /  AlkPhos  85  06-23        Urinalysis Basic - ( 2018 14:52 )    Color: Pale Yellow / Appearance: Clear / S.005 / pH: x  Gluc: x / Ketone: Negative  / Bili: Negative / Urobili: Negative   Blood: x / Protein: Negative / Nitrite: Negative   Leuk Esterase: Negative / RBC: x / WBC x   Sq Epi: x / Non Sq Epi: x / Bacteria: x  CARDIAC MARKERS ( 2018 14:53 )  <0.015 ng/mL / x     / x     / x     / x      POCT Blood Glucose.: 169 mg/dL (2018 12:08)      IMP:  Leg swelling and erythema, c/w cellulitis.  R/o DVT.  R/o lymphatic obstruction. Etiology is most likely streptococcus.           Unclear whether original insult to integrity was filariasis.           Orthopnea is most likely from abdominal fluid decreasing respiratory excursion.  R/o right heart failure.           New onset afib, r/o ACS (less likely).  Plan: Diuresis, leg elevation, ceftriaxone.          CT abdomen, Doppler          Tele/echo/troponin/cardiology consultation.

## 2018-06-23 NOTE — ED ADULT NURSE NOTE - OBJECTIVE STATEMENT
pt biba with swollen lower body, gained 30lbs in last 3 days" denies any chest pain now. pt A&Ox3, vital signs stable

## 2018-06-23 NOTE — ED PROVIDER NOTE - SKIN, MLM
LE 4+ pitting edema to groin, bilaterally. Feet and lower legs erythema, warmth, tender and with thickening of skin.

## 2018-06-23 NOTE — H&P ADULT - PROBLEM SELECTOR PLAN 5
c/w diltiazem with parameters Patient has hx of filariasis?   - f/u filarial antibody  - Discussed with Dr García, concerns for underlyng liver lesion and possible ascites. Will order CT a/p for further evaluation  - compression stockings  - leg elevation

## 2018-06-23 NOTE — ED PROVIDER NOTE - CARE PLAN
Principal Discharge DX:	Acute on chronic congestive heart failure, unspecified heart failure type  Secondary Diagnosis:	Cellulitis of lower extremity, unspecified laterality

## 2018-06-23 NOTE — H&P ADULT - PROBLEM SELECTOR PLAN 1
Worsening PND and LE swelling  CXR- clear  BNP- 2400  EKG- A fib with PVCs  CE- normal  on lasix 20mg qday at home  - Will start lasix 40 IV qday for now  - Monitor I/Os  - Daily weight  - fluid restriction  - f/u ECHO

## 2018-06-23 NOTE — H&P ADULT - PROBLEM SELECTOR PLAN 4
Patient has hx of filariasis?   - f/u filarial antibody  - Discussed with Dr García, concerns for underlyng liver lesion and possible ascites. Will order CT a/p for further evaluation  - compression stockings  - leg elevation On metformin 750mg qday  - f/u A1c  - insulin sliding scale

## 2018-06-23 NOTE — ED ADULT NURSE NOTE - ED STAT RN HANDOFF DETAILS
Report received from HAILEY Layton RN. Patient on tele monitoring. Breathing on 2LPM O2. In no distress.

## 2018-06-23 NOTE — H&P ADULT - PROBLEM SELECTOR PLAN 2
b/l LE swelling, afebrile, no leukocytosis  hx of DVT- not on xeralto at this time, discontinued by PMD in [place] 2018  - f/u doppler LE  - Per attending, will start Ceftriaxone

## 2018-06-24 ENCOUNTER — TRANSCRIPTION ENCOUNTER (OUTPATIENT)
Age: 78
End: 2018-06-24

## 2018-06-24 DIAGNOSIS — I48.91 UNSPECIFIED ATRIAL FIBRILLATION: ICD-10-CM

## 2018-06-24 LAB
ANION GAP SERPL CALC-SCNC: 7 MMOL/L — SIGNIFICANT CHANGE UP (ref 5–17)
BUN SERPL-MCNC: 24 MG/DL — HIGH (ref 7–18)
CALCIUM SERPL-MCNC: 8.3 MG/DL — LOW (ref 8.4–10.5)
CHLORIDE SERPL-SCNC: 103 MMOL/L — SIGNIFICANT CHANGE UP (ref 96–108)
CO2 SERPL-SCNC: 30 MMOL/L — SIGNIFICANT CHANGE UP (ref 22–31)
CREAT SERPL-MCNC: 1.24 MG/DL — SIGNIFICANT CHANGE UP (ref 0.5–1.3)
GLUCOSE SERPL-MCNC: 189 MG/DL — HIGH (ref 70–99)
HBA1C BLD-MCNC: 6.7 % — HIGH (ref 4–5.6)
HCT VFR BLD CALC: 32.2 % — LOW (ref 39–50)
HGB BLD-MCNC: 9.8 G/DL — LOW (ref 13–17)
MAGNESIUM SERPL-MCNC: 1.7 MG/DL — SIGNIFICANT CHANGE UP (ref 1.6–2.6)
MCHC RBC-ENTMCNC: 26 PG — LOW (ref 27–34)
MCHC RBC-ENTMCNC: 30.4 GM/DL — LOW (ref 32–36)
MCV RBC AUTO: 85.3 FL — SIGNIFICANT CHANGE UP (ref 80–100)
PHOSPHATE SERPL-MCNC: 4.1 MG/DL — SIGNIFICANT CHANGE UP (ref 2.5–4.5)
PLATELET # BLD AUTO: 189 K/UL — SIGNIFICANT CHANGE UP (ref 150–400)
POTASSIUM SERPL-MCNC: 3.7 MMOL/L — SIGNIFICANT CHANGE UP (ref 3.5–5.3)
POTASSIUM SERPL-SCNC: 3.7 MMOL/L — SIGNIFICANT CHANGE UP (ref 3.5–5.3)
RBC # BLD: 3.77 M/UL — LOW (ref 4.2–5.8)
RBC # FLD: 15 % — HIGH (ref 10.3–14.5)
SODIUM SERPL-SCNC: 140 MMOL/L — SIGNIFICANT CHANGE UP (ref 135–145)
WBC # BLD: 6.3 K/UL — SIGNIFICANT CHANGE UP (ref 3.8–10.5)
WBC # FLD AUTO: 6.3 K/UL — SIGNIFICANT CHANGE UP (ref 3.8–10.5)

## 2018-06-24 PROCEDURE — 99222 1ST HOSP IP/OBS MODERATE 55: CPT

## 2018-06-24 PROCEDURE — 93306 TTE W/DOPPLER COMPLETE: CPT | Mod: 26

## 2018-06-24 PROCEDURE — 93970 EXTREMITY STUDY: CPT | Mod: 26

## 2018-06-24 PROCEDURE — 99233 SBSQ HOSP IP/OBS HIGH 50: CPT | Mod: GC

## 2018-06-24 RX ORDER — ACETAMINOPHEN 500 MG
650 TABLET ORAL EVERY 6 HOURS
Qty: 0 | Refills: 0 | Status: DISCONTINUED | OUTPATIENT
Start: 2018-06-24 | End: 2018-06-29

## 2018-06-24 RX ORDER — FERROUS SULFATE 325(65) MG
325 TABLET ORAL DAILY
Qty: 0 | Refills: 0 | Status: DISCONTINUED | OUTPATIENT
Start: 2018-06-24 | End: 2018-06-29

## 2018-06-24 RX ORDER — ASCORBIC ACID 60 MG
500 TABLET,CHEWABLE ORAL DAILY
Qty: 0 | Refills: 0 | Status: DISCONTINUED | OUTPATIENT
Start: 2018-06-24 | End: 2018-06-29

## 2018-06-24 RX ORDER — FUROSEMIDE 40 MG
40 TABLET ORAL EVERY 12 HOURS
Qty: 0 | Refills: 0 | Status: DISCONTINUED | OUTPATIENT
Start: 2018-06-24 | End: 2018-06-25

## 2018-06-24 RX ORDER — APIXABAN 2.5 MG/1
5 TABLET, FILM COATED ORAL EVERY 12 HOURS
Qty: 0 | Refills: 0 | Status: DISCONTINUED | OUTPATIENT
Start: 2018-06-24 | End: 2018-06-27

## 2018-06-24 RX ADMIN — Medication 325 MILLIGRAM(S): at 13:41

## 2018-06-24 RX ADMIN — FINASTERIDE 5 MILLIGRAM(S): 5 TABLET, FILM COATED ORAL at 13:06

## 2018-06-24 RX ADMIN — CEFTRIAXONE 100 GRAM(S): 500 INJECTION, POWDER, FOR SOLUTION INTRAMUSCULAR; INTRAVENOUS at 05:45

## 2018-06-24 RX ADMIN — Medication 81 MILLIGRAM(S): at 13:06

## 2018-06-24 RX ADMIN — Medication 500 MILLIGRAM(S): at 13:42

## 2018-06-24 RX ADMIN — ATORVASTATIN CALCIUM 40 MILLIGRAM(S): 80 TABLET, FILM COATED ORAL at 21:37

## 2018-06-24 RX ADMIN — ENOXAPARIN SODIUM 100 MILLIGRAM(S): 100 INJECTION SUBCUTANEOUS at 05:45

## 2018-06-24 RX ADMIN — Medication 40 MILLIGRAM(S): at 05:45

## 2018-06-24 RX ADMIN — Medication 1: at 17:34

## 2018-06-24 RX ADMIN — Medication 650 MILLIGRAM(S): at 12:30

## 2018-06-24 RX ADMIN — APIXABAN 5 MILLIGRAM(S): 2.5 TABLET, FILM COATED ORAL at 17:37

## 2018-06-24 RX ADMIN — Medication 650 MILLIGRAM(S): at 11:30

## 2018-06-24 RX ADMIN — Medication 2 MILLIGRAM(S): at 21:36

## 2018-06-24 NOTE — PROGRESS NOTE ADULT - PROBLEM SELECTOR PLAN 4
On metformin 750mg qday  - f/u A1c  - insulin sliding scale On metformin 750mg qday  -f/u A1c  - insulin sliding scale

## 2018-06-24 NOTE — CONSULT NOTE ADULT - SUBJECTIVE AND OBJECTIVE BOX
CHIEF COMPLAINT:    HPI:    PAST MEDICAL & SURGICAL HISTORY:  PND (paroxysmal nocturnal dyspnea)  Lymphedema of leg  Hypertension  Diabetes  H/O inguinal hernia repair      Allergies    No Known Allergies    Intolerances        MEDICATIONS  (STANDING):  ascorbic acid 500 milliGRAM(s) Oral daily  aspirin  chewable 81 milliGRAM(s) Oral daily  atorvastatin 40 milliGRAM(s) Oral at bedtime  cefTRIAXone   IVPB 1 Gram(s) IV Intermittent every 24 hours  dextrose 5%. 1000 milliLiter(s) (50 mL/Hr) IV Continuous <Continuous>  dextrose 50% Injectable 12.5 Gram(s) IV Push once  dextrose 50% Injectable 25 Gram(s) IV Push once  dextrose 50% Injectable 25 Gram(s) IV Push once  diltiazem    milliGRAM(s) Oral daily  doxazosin 2 milliGRAM(s) Oral at bedtime  enoxaparin Injectable 100 milliGRAM(s) SubCutaneous two times a day  ferrous    sulfate 325 milliGRAM(s) Oral daily  finasteride 5 milliGRAM(s) Oral daily  furosemide   Injectable 40 milliGRAM(s) IV Push every 12 hours  insulin lispro (HumaLOG) corrective regimen sliding scale   SubCutaneous three times a day before meals  lisinopril 40 milliGRAM(s) Oral daily    MEDICATIONS  (PRN):  acetaminophen   Tablet. 650 milliGRAM(s) Oral every 6 hours PRN Mild Pain (1 - 3)  dextrose 40% Gel 15 Gram(s) Oral once PRN Blood Glucose LESS THAN 70 milliGRAM(s)/deciliter  glucagon  Injectable 1 milliGRAM(s) IntraMuscular once PRN Glucose LESS THAN 70 milligrams/deciliter      FAMILY HISTORY:  No pertinent family history in first degree relatives    No family history of premature coronary artery disease or sudden cardiac death    SOCIAL HISTORY:  Smoking-  Alcohol-  Illicit Drug use-    REVIEW OF SYSTEMS:  Constitutional: [ ] fever, [ ]weight loss,  [ ]fatigue  Eyes: [ ] visual changes  Respiratory: [ ]shortness of breath;  [ ] cough, [ ]wheezing, [ ]chills, [ ]hemoptysis  Cardiovascular: [ ] chest pain, [ ]palpitations, [ ]dizziness,  [ ]leg swelling  Gastrointestinal: [ ] abdominal pain, [ ]nausea, [ ]vomiting,  [ ]diarrhea   Genitourinary: [ ] dysuria, [ ] hematuria  Neurologic: [ ] headaches [ ] tremors  [ ] weakness [ ] lightheadedness  Skin: [ ] itching, [ ]burning, [ ] rashes  Endocrine: [ ] heat or cold intolerance  Musculoskeletal: [ ] joint pain or swelling; [ ] muscle, back, or extremity pain  Psychiatric: [ ] depression, [ ]anxiety, [ ]mood swings, or [ ]difficulty sleeping  Hematologic: [ ] easy bruising, [ ] bleeding gums       [ x] All others negative	  [ ] Unable to obtain    Vital Signs Last 24 Hrs  T(C): 37.4 (24 Jun 2018 11:14), Max: 37.4 (24 Jun 2018 11:14)  T(F): 99.3 (24 Jun 2018 11:14), Max: 99.3 (24 Jun 2018 11:14)  HR: 85 (24 Jun 2018 11:14) (61 - 88)  BP: 117/53 (24 Jun 2018 11:14) (107/67 - 138/70)  BP(mean): --  RR: 20 (24 Jun 2018 11:14) (16 - 22)  SpO2: 100% (24 Jun 2018 11:14) (98% - 100%)  I&O's Summary    23 Jun 2018 07:01  -  24 Jun 2018 07:00  --------------------------------------------------------  IN: 0 mL / OUT: 1000 mL / NET: -1000 mL        PHYSICAL EXAM:  General: No acute distress  HEENT: EOMI, PERRL  Neck: Supple, No JVD  Lungs: Clear to auscultation bilaterally; No rales or wheezing  Heart: Regular rate and rhythm; No murmurs, rubs, or gallops  Abdomen: Nontender, bowel sounds present  Extremities: No clubbing, cyanosis, or edema  Nervous system:  Alert & Oriented X3, no focal deficits  Psychiatric: Normal affect  Skin: No rashes or lesions      LABS:  06-24    140  |  103  |  24<H>  ----------------------------<  189<H>  3.7   |  30  |  1.24    Ca    8.3<L>      24 Jun 2018 10:41  Phos  4.1     06-24  Mg     1.7     06-24    TPro  7.4  /  Alb  3.4<L>  /  TBili  0.5  /  DBili  x   /  AST  25  /  ALT  22  /  AlkPhos  85  06-23    Creatinine Trend: 1.24<--, 1.09<--                        9.8    6.3   )-----------( 189      ( 24 Jun 2018 10:41 )             32.2         Lipid Panel:   Cardiac Enzymes: CARDIAC MARKERS ( 23 Jun 2018 14:53 )  <0.015 ng/mL / x     / x     / x     / x          Serum Pro-Brain Natriuretic Peptide: 2432 pg/mL (06-23-18 @ 14:53)        RADIOLOGY:    ECG [my interpretation]:    TELEMETRY:    ECHO:    STRESS TEST:    CATHETERIZATION: CHIEF COMPLAINT: Shortness of breath    HPI: 79 yo M with HFpEF (by echo 03/2018), HTN, HLD, DM, chronic lymphedema, and DVT s/p Xarelto who presented with dyspnea. Patient      In the ED, EKG showed rate-controlled atrial fibrillation    PAST MEDICAL & SURGICAL HISTORY:  As above, also BPH, H/O inguinal hernia repair      Allergies    No Known Allergies      MEDICATIONS  (STANDING):  ascorbic acid 500 milliGRAM(s) Oral daily  aspirin  chewable 81 milliGRAM(s) Oral daily  atorvastatin 40 milliGRAM(s) Oral at bedtime  cefTRIAXone   IVPB 1 Gram(s) IV Intermittent every 24 hours  dextrose 5%. 1000 milliLiter(s) (50 mL/Hr) IV Continuous <Continuous>  dextrose 50% Injectable 12.5 Gram(s) IV Push once  dextrose 50% Injectable 25 Gram(s) IV Push once  dextrose 50% Injectable 25 Gram(s) IV Push once  diltiazem    milliGRAM(s) Oral daily  doxazosin 2 milliGRAM(s) Oral at bedtime  enoxaparin Injectable 100 milliGRAM(s) SubCutaneous two times a day  ferrous    sulfate 325 milliGRAM(s) Oral daily  finasteride 5 milliGRAM(s) Oral daily  furosemide   Injectable 40 milliGRAM(s) IV Push every 12 hours  insulin lispro (HumaLOG) corrective regimen sliding scale   SubCutaneous three times a day before meals  lisinopril 40 milliGRAM(s) Oral daily    MEDICATIONS  (PRN):  acetaminophen   Tablet. 650 milliGRAM(s) Oral every 6 hours PRN Mild Pain (1 - 3)  dextrose 40% Gel 15 Gram(s) Oral once PRN Blood Glucose LESS THAN 70 milliGRAM(s)/deciliter  glucagon  Injectable 1 milliGRAM(s) IntraMuscular once PRN Glucose LESS THAN 70 milligrams/deciliter      FAMILY HISTORY:  No pertinent family history in first degree relatives    No family history of premature coronary artery disease or sudden cardiac death    SOCIAL HISTORY:  Smoking-  Alcohol-  Illicit Drug use-    REVIEW OF SYSTEMS:  Constitutional: [ ] fever, [ ]weight loss,  [ ]fatigue  Eyes: [ ] visual changes  Respiratory: [ ]shortness of breath;  [ ] cough, [ ]wheezing, [ ]chills, [ ]hemoptysis  Cardiovascular: [ ] chest pain, [ ]palpitations, [ ]dizziness,  [ ]leg swelling  Gastrointestinal: [ ] abdominal pain, [ ]nausea, [ ]vomiting,  [ ]diarrhea   Genitourinary: [ ] dysuria, [ ] hematuria  Neurologic: [ ] headaches [ ] tremors  [ ] weakness [ ] lightheadedness  Skin: [ ] itching, [ ]burning, [ ] rashes  Endocrine: [ ] heat or cold intolerance  Musculoskeletal: [ ] joint pain or swelling; [ ] muscle, back, or extremity pain  Psychiatric: [ ] depression, [ ]anxiety, [ ]mood swings, or [ ]difficulty sleeping  Hematologic: [ ] easy bruising, [ ] bleeding gums       [ x] All others negative	  [ ] Unable to obtain    Vital Signs Last 24 Hrs  T(C): 37.4 (24 Jun 2018 11:14), Max: 37.4 (24 Jun 2018 11:14)  T(F): 99.3 (24 Jun 2018 11:14), Max: 99.3 (24 Jun 2018 11:14)  HR: 85 (24 Jun 2018 11:14) (61 - 88)  BP: 117/53 (24 Jun 2018 11:14) (107/67 - 138/70)  BP(mean): --  RR: 20 (24 Jun 2018 11:14) (16 - 22)  SpO2: 100% (24 Jun 2018 11:14) (98% - 100%)  I&O's Summary    23 Jun 2018 07:01  -  24 Jun 2018 07:00  --------------------------------------------------------  IN: 0 mL / OUT: 1000 mL / NET: -1000 mL        PHYSICAL EXAM:  General: No acute distress  HEENT: EOMI, PERRL  Neck: Supple, No JVD  Lungs: Clear to auscultation bilaterally; No rales or wheezing  Heart: Regular rate and rhythm; No murmurs, rubs, or gallops  Abdomen: Nontender, bowel sounds present  Extremities: No clubbing, cyanosis, or edema  Nervous system:  Alert & Oriented X3, no focal deficits  Psychiatric: Normal affect  Skin: No rashes or lesions      LABS:  06-24    140  |  103  |  24<H>  ----------------------------<  189<H>  3.7   |  30  |  1.24    Ca    8.3<L>      24 Jun 2018 10:41  Phos  4.1     06-24  Mg     1.7     06-24    TPro  7.4  /  Alb  3.4<L>  /  TBili  0.5  /  DBili  x   /  AST  25  /  ALT  22  /  AlkPhos  85  06-23    Creatinine Trend: 1.24<--, 1.09<--                        9.8    6.3   )-----------( 189      ( 24 Jun 2018 10:41 )             32.2         Lipid Panel:   Cardiac Enzymes: CARDIAC MARKERS ( 23 Jun 2018 14:53 )  <0.015 ng/mL / x     / x     / x     / x          Serum Pro-Brain Natriuretic Peptide: 2432 pg/mL (06-23-18 @ 14:53)      RADIOLOGY: < from: Xray Chest 1 View- PORTABLE-Urgent (06.23.18 @ 14:36) >  Impression: Clear lungs.    < end of copied text >  < from: CT Angio Chest w/ IV Cont (06.23.18 @ 21:56) >  IMPRESSION:   1. There is some motion artifact which obscures detail of the   subsegmental   branches of the pulmonary arteries. However there is no definite evidence   of   any filling defect suggestive of thromboembolic disease more proximally   in the   diagnostically opacified proximal pulmonary arteries.   2. There is no evidence of thoracic aortic aneurysm nor dissection. Some   degree   of cardiomegaly with no significant pericardial effusion.   3. Variable lung attenuation noted in both lung fields compatible with   mosaic   pattern. Differential includes small airway disease, vascular lung   disease.   Cannot exclude inflammation. Underlying emphysematous changes are likely.   There   is dependent atelectasis throughout the posterior aspect of the lung   fields   particularly the lower portion. Cannot exclude interstitial edema    < end of copied text >      ECG [my interpretation]:    TELEMETRY:    ECHO: < from: Transthoracic Echocardiogram (03.14.18 @ 09:23) >  CONCLUSIONS:  1. Trace mitral regurgitation.  2. Aortic valve not well visualized. Peak transaortic valve  gradient equals 19.4 mm Hg, mean transaortic valve gradient  equals 10 mm Hg, estimated aortic valve area equals 1.4  sqcm (by continuity equation), consistent with moderate  aortic stenosis.  3. Mild concentric left ventricular hypertrophy.  4. Endocardium not well visualized; grossly normal left  ventricular systolic function. Segmental wall motion could  not be assessed.  5. Normal right ventricular size and function.    < end of copied text >      STRESS TEST:     CATHETERIZATION: CHIEF COMPLAINT: Shortness of breath    HPI: 77 yo M with HFpEF (by echo 03/2018), HTN, HLD, DM, chronic lymphedema, and DVT s/p Xarelto who presented with dyspnea. Patient      In the ED, EKG showed rate-controlled atrial fibrillation    PAST MEDICAL & SURGICAL HISTORY:  As above, also BPH, H/O inguinal hernia repair      Allergies    No Known Allergies      MEDICATIONS  (STANDING):  ascorbic acid 500 milliGRAM(s) Oral daily  aspirin  chewable 81 milliGRAM(s) Oral daily  atorvastatin 40 milliGRAM(s) Oral at bedtime  cefTRIAXone   IVPB 1 Gram(s) IV Intermittent every 24 hours  dextrose 5%. 1000 milliLiter(s) (50 mL/Hr) IV Continuous <Continuous>  dextrose 50% Injectable 12.5 Gram(s) IV Push once  dextrose 50% Injectable 25 Gram(s) IV Push once  dextrose 50% Injectable 25 Gram(s) IV Push once  diltiazem    milliGRAM(s) Oral daily  doxazosin 2 milliGRAM(s) Oral at bedtime  enoxaparin Injectable 100 milliGRAM(s) SubCutaneous two times a day  ferrous    sulfate 325 milliGRAM(s) Oral daily  finasteride 5 milliGRAM(s) Oral daily  furosemide   Injectable 40 milliGRAM(s) IV Push every 12 hours  insulin lispro (HumaLOG) corrective regimen sliding scale   SubCutaneous three times a day before meals  lisinopril 40 milliGRAM(s) Oral daily    MEDICATIONS  (PRN):  acetaminophen   Tablet. 650 milliGRAM(s) Oral every 6 hours PRN Mild Pain (1 - 3)  dextrose 40% Gel 15 Gram(s) Oral once PRN Blood Glucose LESS THAN 70 milliGRAM(s)/deciliter  glucagon  Injectable 1 milliGRAM(s) IntraMuscular once PRN Glucose LESS THAN 70 milligrams/deciliter      FAMILY HISTORY:  No pertinent family history in first degree relatives    No family history of premature coronary artery disease or sudden cardiac death    SOCIAL HISTORY:  Smoking-  Alcohol-  Illicit Drug use-    REVIEW OF SYSTEMS:  Constitutional: [ ] fever, [ ]weight loss,  [ ]fatigue  Eyes: [ ] visual changes  Respiratory: [ ]shortness of breath;  [ ] cough, [ ]wheezing, [ ]chills, [ ]hemoptysis  Cardiovascular: [ ] chest pain, [ ]palpitations, [ ]dizziness,  [ ]leg swelling  Gastrointestinal: [ ] abdominal pain, [ ]nausea, [ ]vomiting,  [ ]diarrhea   Genitourinary: [ ] dysuria, [ ] hematuria  Neurologic: [ ] headaches [ ] tremors  [ ] weakness [ ] lightheadedness  Skin: [ ] itching, [ ]burning, [ ] rashes  Endocrine: [ ] heat or cold intolerance  Musculoskeletal: [ ] joint pain or swelling; [ ] muscle, back, or extremity pain  Psychiatric: [ ] depression, [ ]anxiety, [ ]mood swings, or [ ]difficulty sleeping  Hematologic: [ ] easy bruising, [ ] bleeding gums       [ x] All others negative	  [ ] Unable to obtain    Vital Signs Last 24 Hrs  T(C): 37.4 (24 Jun 2018 11:14), Max: 37.4 (24 Jun 2018 11:14)  T(F): 99.3 (24 Jun 2018 11:14), Max: 99.3 (24 Jun 2018 11:14)  HR: 85 (24 Jun 2018 11:14) (61 - 88)  BP: 117/53 (24 Jun 2018 11:14) (107/67 - 138/70)  BP(mean): --  RR: 20 (24 Jun 2018 11:14) (16 - 22)  SpO2: 100% (24 Jun 2018 11:14) (98% - 100%)  I&O's Summary    23 Jun 2018 07:01  -  24 Jun 2018 07:00  --------------------------------------------------------  IN: 0 mL / OUT: 1000 mL / NET: -1000 mL        PHYSICAL EXAM:  General: No acute distress  HEENT: EOMI, PERRL  Neck: Supple, No JVD  Lungs: Clear to auscultation bilaterally; No rales or wheezing  Heart: Regular rate and rhythm; No murmurs, rubs, or gallops  Abdomen: Nontender, bowel sounds present  Extremities: No clubbing, cyanosis, or edema  Nervous system:  Alert & Oriented X3, no focal deficits  Psychiatric: Normal affect  Skin: No rashes or lesions      LABS:  06-24    140  |  103  |  24<H>  ----------------------------<  189<H>  3.7   |  30  |  1.24    Ca    8.3<L>      24 Jun 2018 10:41  Phos  4.1     06-24  Mg     1.7     06-24    TPro  7.4  /  Alb  3.4<L>  /  TBili  0.5  /  DBili  x   /  AST  25  /  ALT  22  /  AlkPhos  85  06-23    Creatinine Trend: 1.24<--, 1.09<--                        9.8    6.3   )-----------( 189      ( 24 Jun 2018 10:41 )             32.2         Lipid Panel:   Cardiac Enzymes: CARDIAC MARKERS ( 23 Jun 2018 14:53 )  <0.015 ng/mL / x     / x     / x     / x          Serum Pro-Brain Natriuretic Peptide: 2432 pg/mL (06-23-18 @ 14:53)      RADIOLOGY: < from: Xray Chest 1 View- PORTABLE-Urgent (06.23.18 @ 14:36) >  Impression: Clear lungs.    < end of copied text >  < from: CT Angio Chest w/ IV Cont (06.23.18 @ 21:56) >  IMPRESSION:   1. There is some motion artifact which obscures detail of the   subsegmental   branches of the pulmonary arteries. However there is no definite evidence   of   any filling defect suggestive of thromboembolic disease more proximally   in the   diagnostically opacified proximal pulmonary arteries.   2. There is no evidence of thoracic aortic aneurysm nor dissection. Some   degree   of cardiomegaly with no significant pericardial effusion.   3. Variable lung attenuation noted in both lung fields compatible with   mosaic   pattern. Differential includes small airway disease, vascular lung   disease.   Cannot exclude inflammation. Underlying emphysematous changes are likely.   There   is dependent atelectasis throughout the posterior aspect of the lung   fields   particularly the lower portion. Cannot exclude interstitial edema    < end of copied text >      ECG [my interpretation]:    TELEMETRY:    ECHO: < from: Transthoracic Echocardiogram (03.14.18 @ 09:23) >  CONCLUSIONS:  1. Trace mitral regurgitation.  2. Aortic valve not well visualized. Peak transaortic valve  gradient equals 19.4 mm Hg, mean transaortic valve gradient  equals 10 mm Hg, estimated aortic valve area equals 1.4  sqcm (by continuity equation), consistent with moderate  aortic stenosis.  3. Mild concentric left ventricular hypertrophy.  4. Endocardium not well visualized; grossly normal left  ventricular systolic function. Segmental wall motion could  not be assessed.  5. Normal right ventricular size and function.    < end of copied text > CHIEF COMPLAINT: Shortness of breath    HPI: 79 yo M with HFpEF (by echo 03/2018), HTN, HLD, DM, chronic lymphedema, and DVT s/p Xarelto who presented with dyspnea. Patient reports he has been having dyspnea for the past 2-3 weeks, and decided to come in since it has not been improving. symptoms are sometimes worsened by him lying flat in bed. He reports that over the past few weeks he had been eating some Chinese food and take-out. Additionally, he admits he may have missed "a few" doses of his furosemide. Currently he denies chest pain, dyspnea, palpitations, or lightheadedness. He states his lower legs are about as swollen as usual, but now there is also swelling of his thighs, which is new.     In the ED, EKG showed rate-controlled atrial fibrillation. Patient does not recall ever being told he had A fib.     PAST MEDICAL & SURGICAL HISTORY:  As above, also BPH, H/O inguinal hernia repair      Allergies    No Known Allergies      MEDICATIONS  (STANDING):  ascorbic acid 500 milliGRAM(s) Oral daily  aspirin  chewable 81 milliGRAM(s) Oral daily  atorvastatin 40 milliGRAM(s) Oral at bedtime  cefTRIAXone   IVPB 1 Gram(s) IV Intermittent every 24 hours  dextrose 5%. 1000 milliLiter(s) (50 mL/Hr) IV Continuous <Continuous>  dextrose 50% Injectable 12.5 Gram(s) IV Push once  dextrose 50% Injectable 25 Gram(s) IV Push once  dextrose 50% Injectable 25 Gram(s) IV Push once  diltiazem    milliGRAM(s) Oral daily  doxazosin 2 milliGRAM(s) Oral at bedtime  enoxaparin Injectable 100 milliGRAM(s) SubCutaneous two times a day  ferrous    sulfate 325 milliGRAM(s) Oral daily  finasteride 5 milliGRAM(s) Oral daily  furosemide   Injectable 40 milliGRAM(s) IV Push every 12 hours  insulin lispro (HumaLOG) corrective regimen sliding scale   SubCutaneous three times a day before meals  lisinopril 40 milliGRAM(s) Oral daily    MEDICATIONS  (PRN):  acetaminophen   Tablet. 650 milliGRAM(s) Oral every 6 hours PRN Mild Pain (1 - 3)  dextrose 40% Gel 15 Gram(s) Oral once PRN Blood Glucose LESS THAN 70 milliGRAM(s)/deciliter  glucagon  Injectable 1 milliGRAM(s) IntraMuscular once PRN Glucose LESS THAN 70 milligrams/deciliter      FAMILY HISTORY:  No pertinent family history in first degree relatives    No family history of premature coronary artery disease or sudden cardiac death    SOCIAL HISTORY:  Smoking- Quit in 1985  Alcohol-rare  Illicit Drug use-denies    REVIEW OF SYSTEMS:  Constitutional: [ ] fever, [ ]weight loss,  [ ]fatigue  Eyes: [ ] visual changes  Respiratory: [x ]shortness of breath;  [ ] cough, [ ]wheezing, [ ]chills, [ ]hemoptysis  Cardiovascular: [ ] chest pain, [ ]palpitations, [ ]dizziness,  [ x]leg swelling  Gastrointestinal: [ ] abdominal pain, [ ]nausea, [ ]vomiting,  [ ]diarrhea   Genitourinary: [ ] dysuria, [ ] hematuria  Neurologic: [ ] headaches [ ] tremors  [ ] weakness [ ] lightheadedness  Skin: [ ] itching, [ ]burning, [ ] rashes  Endocrine: [ ] heat or cold intolerance  Musculoskeletal: [ ] joint pain or swelling; [ ] muscle, back, or extremity pain  Psychiatric: [ ] depression, [ ]anxiety, [ ]mood swings, or [ ]difficulty sleeping  Hematologic: [ ] easy bruising, [ ] bleeding gums       [ x] All others negative	  [ ] Unable to obtain    Vital Signs Last 24 Hrs  T(C): 37.4 (24 Jun 2018 11:14), Max: 37.4 (24 Jun 2018 11:14)  T(F): 99.3 (24 Jun 2018 11:14), Max: 99.3 (24 Jun 2018 11:14)  HR: 85 (24 Jun 2018 11:14) (61 - 88)  BP: 117/53 (24 Jun 2018 11:14) (107/67 - 138/70)  BP(mean): --  RR: 20 (24 Jun 2018 11:14) (16 - 22)  SpO2: 100% (24 Jun 2018 11:14) (98% - 100%)  I&O's Summary    23 Jun 2018 07:01  -  24 Jun 2018 07:00  --------------------------------------------------------  IN: 0 mL / OUT: 1000 mL / NET: -1000 mL        PHYSICAL EXAM:  General: No acute distress  HEENT: EOMI, PERRL  Neck: Supple, No JVD  Lungs: Clear to auscultation bilaterally; No rales or wheezing  Heart: Irregular rate and rhythm; No murmurs, rubs, or gallops  Abdomen: Nontender, bowel sounds present  Extremities: No clubbing, cyanosis; some LE pitting edema at ankles but predominately non-pitting edema up to the thighs   Nervous system:  Alert & Oriented X3, no focal deficits  Psychiatric: Normal affect  Skin: No rashes or lesions      LABS:  06-24    140  |  103  |  24<H>  ----------------------------<  189<H>  3.7   |  30  |  1.24    Ca    8.3<L>      24 Jun 2018 10:41  Phos  4.1     06-24  Mg     1.7     06-24    TPro  7.4  /  Alb  3.4<L>  /  TBili  0.5  /  DBili  x   /  AST  25  /  ALT  22  /  AlkPhos  85  06-23    Creatinine Trend: 1.24<--, 1.09<--                        9.8    6.3   )-----------( 189      ( 24 Jun 2018 10:41 )             32.2       Cardiac Enzymes: CARDIAC MARKERS ( 23 Jun 2018 14:53 )  <0.015 ng/mL / x     / x     / x     / x          Serum Pro-Brain Natriuretic Peptide: 2432 pg/mL (06-23-18 @ 14:53)      RADIOLOGY: < from: Xray Chest 1 View- PORTABLE-Urgent (06.23.18 @ 14:36) >  Impression: Clear lungs.    < end of copied text >  < from: CT Angio Chest w/ IV Cont (06.23.18 @ 21:56) >  IMPRESSION:   1. There is some motion artifact which obscures detail of the   subsegmental   branches of the pulmonary arteries. However there is no definite evidence   of   any filling defect suggestive of thromboembolic disease more proximally   in the   diagnostically opacified proximal pulmonary arteries.   2. There is no evidence of thoracic aortic aneurysm nor dissection. Some   degree   of cardiomegaly with no significant pericardial effusion.   3. Variable lung attenuation noted in both lung fields compatible with   mosaic   pattern. Differential includes small airway disease, vascular lung   disease.   Cannot exclude inflammation. Underlying emphysematous changes are likely.   There   is dependent atelectasis throughout the posterior aspect of the lung   fields   particularly the lower portion. Cannot exclude interstitial edema    < end of copied text >      ECG [my interpretation]: 6/23/2018 @ 13:47: Atrial fibrillation at 81 bpm, occasional PVCs vs. aberrantly conducted beats    TELEMETRY: n/a    ECHO: < from: Transthoracic Echocardiogram (03.14.18 @ 09:23) >  CONCLUSIONS:  1. Trace mitral regurgitation.  2. Aortic valve not well visualized. Peak transaortic valve  gradient equals 19.4 mm Hg, mean transaortic valve gradient  equals 10 mm Hg, estimated aortic valve area equals 1.4  sqcm (by continuity equation), consistent with moderate  aortic stenosis.  3. Mild concentric left ventricular hypertrophy.  4. Endocardium not well visualized; grossly normal left  ventricular systolic function. Segmental wall motion could  not be assessed.  5. Normal right ventricular size and function.    < end of copied text >

## 2018-06-24 NOTE — DISCHARGE NOTE ADULT - PLAN OF CARE
symptoms resolved Please continue lasix 40mg , twice a day and please follow up with your cardiologist within one week after discharge. heart rated controlled Please eliquis 5mg , twice a day and cardizem 120mg daily and please follow up with your cardiologist within one week after discharge. Pt Please follow up with your primary care doctor within one week after discharge. keep Hemoglobin A1c <7 Please continue home medications as instruction and please follow up with your primary care doctor within one week after discharge. keep BP <140/90mmHg Please continue home medications as instruction and please follow up with your primary care doctor within one week after discharge. Please take low salt diet. You were given antibiotics during hospitalization and antibiotics were discontinued due to drug eruption. Please follow up with your primary care doctor within one week after discharge. Please continue lasix 40mg , twice a day and please follow up with your cardiologist within one week after discharge. Please follow up with pulmonologist Dr. Feliciano for sleep study at outpatient. Please follow up with your primary care doctor within one week after discharge and continue compression stocking after discharge. please follow up with GI Dr. Brock within one week after discharge for further workup at outpatient.

## 2018-06-24 NOTE — PROGRESS NOTE ADULT - ASSESSMENT
77 y/o M with PMHx  of DM, HTN, Chronic lymphedema of bilateral legs, left knee arthritis, DVT of bilateral legs (left peroneal vein and right posterior tibial veins, on Xarelto for sometime then stopped), BPH, and PSHx of right incarcerated inguinal hernia repair presented to the ED with c/o b/l LE swelling and SOB.

## 2018-06-24 NOTE — PROGRESS NOTE ADULT - PROBLEM SELECTOR PLAN 2
b/l LE swelling, afebrile, no leukocytosis  hx of DVT- not on xeralto at this time, discontinued by PMD in MAy 2018  - f/u doppler LE  - Per attending, will start Ceftriaxone b/l LE swelling, afebrile, no leukocytosis  hx of DVT- not on xeralto at this time, discontinued by PMD in May 2018  - f/u doppler LE for R/O DVT  -c/w Ceftriaxone b/l LE swelling, afebrile, no leukocytosis  hx of DVT- not on xeralto at this time, discontinued by PMD in May 2018  WBC wnl, afebrile   - f/u doppler LE for R/O DVT  -c/w Ceftriaxone

## 2018-06-24 NOTE — DISCHARGE NOTE ADULT - PROVIDER TOKENS
ARMANDO:'[id number]' TOKADAN:'[id number]',TOKEN:'[id number]' TOKEN:'[id number]:'[id number]'

## 2018-06-24 NOTE — CHART NOTE - NSCHARTNOTEFT_GEN_A_CORE
Called Dr. Bernal Vascular consult for lymphedema of legs , he recommended leg elevation and call vascular PA on Monday due to not emergency for now. Will f/u Vascular consult on Monday. Called Dr. Jiménez Vascular consult for lymphedema of legs , he recommended leg elevation and call vascular PA on Monday due to not emergency for now. Will f/u Vascular consult on Monday.

## 2018-06-24 NOTE — CONSULT NOTE ADULT - ASSESSMENT
77 yo M with noted PMH including HFpEF, DM, HTN, HLD, and lymphedema who presents with mild acute, decompensated, diastolic HF exacerbation.     1. HFpEF: Patient reported as net negative -1L overnight, no I's recorded; please track Is and Os and daily weights  -On furosemide 40mg IV Q12H  -Goal output net negative ~2-3 L/day; will see how patient responds to furosemide over today and may need to increase dose and/or add metolazone tomorrow to achieve target goal    2. Atrial fibrillation:   -Rate controlled on diltiazem 120mg QDay, also lisinopril  -would add on Eliquis 5mg PO BID instead of Lovenox  -Repeat echocardiogram pending     3. HTN: Diltiazem and lisinopril (since he also has DM, on ramipril as OP)    4. HLD: Atorvastatin    ***Note that this is a preliminary note and any recommendations should NOT be carried out until this note is finalized. *** 79 yo M with noted PMH including HFpEF, DM, HTN, HLD, and lymphedema who presents with mild acute, decompensated, diastolic HF exacerbation.     1. Dyspnea/HFpEF: Patient reported as net negative -1L overnight, no I's recorded; please track Is and Os and daily weights  -On furosemide 40mg IV Q12H  -Goal output net negative ~2-3 L/day; will see how patient responds to furosemide over today and may need to increase dose and/or add metolazone tomorrow to achieve target goal  -Patient's imaging is not very impressive for fluid overload, and his dyspnea may be at least in part due to underlying lung disease. Consider evaluation by pulmonology     2. Atrial fibrillation:   -Rate controlled on diltiazem 120mg QDay, also lisinopril  -would add on Eliquis 5mg PO BID instead of Lovenox  -Repeat echocardiogram pending     3. HTN: Diltiazem and lisinopril (since he also has DM, on ramipril as OP)    4. HLD: Atorvastatin    ***Note that this is a preliminary note and any recommendations should NOT be carried out until this note is finalized. *** 79 yo M with noted PMH including HFpEF, DM, HTN, HLD, and lymphedema who presents with mild acute, decompensated, diastolic HF exacerbation.     1. Dyspnea/HFpEF: Patient reported as net negative -1L overnight, no I's recorded; please track Is and Os and daily weights  -On furosemide 40mg IV Q12H  -Goal output net negative ~2-3 L/day; will see how patient responds to furosemide over today   -Patient's imaging is not very impressive for fluid overload, and his dyspnea may be at least in part due to underlying lung disease. Consider evaluation by pulmonology, given Ct findings and smoking history   -His lower extremity edema is consistent with lymphedema, would have lymphedema clinic/wound care/ or vascular surgery evaluate patient.     2. Atrial fibrillation:   -Rate controlled on diltiazem 120mg QDay, also lisinopril  -would add on Eliquis 5mg PO BID instead of Lovenox  -Repeat echocardiogram pending     3. HTN: Diltiazem and lisinopril (since he also has DM, on ramipril as OP)    4. HLD: Atorvastatin    **

## 2018-06-24 NOTE — PROGRESS NOTE ADULT - PROBLEM SELECTOR PLAN 3
EKG shows atrial fibrillation  Patient here in March 2018 when EKG showed NSR  New onset A fib?  Will give full dose lovenox for now  - f/u ECHO  - f/u CTA chest to r/o PE  - Cardiologist- Dr Dale EKG shows atrial fibrillation, new onset   Patient here in March 2018 when EKG showed NSR  on full dose lovenox 100mg bid  - f/u ECHO  - CTA chest negative PE  - Cardiologist- Dr Dale EKG shows atrial fibrillation, new onset   Patient here in March 2018 when EKG showed NSR  changed lovenox 100mg bid to eliquis 5mg bid for AC  c/w cardizem 120mg daily for HR control   - f/u ECHO  - CTA chest negative PE  - Cardiologist- Dr Dale

## 2018-06-24 NOTE — DISCHARGE NOTE ADULT - HOSPITAL COURSE
79 y/o M with PMHx  of DM, HTN, Chronic lymphedema of bilateral legs, left knee arthritis, DVT of bilateral legs (left peroneal vein and right posterior tibial veins, on Xarelto for sometime then stopped), BPH, and PSHx of right incarcerated inguinal hernia repair presented to the ED with c/o b/l LE swelling and SOB.      Problem/Plan - 1:  ·  Problem: CHF exacerbation.  Plan: Worsening PND and LE swelling , improved. Pt on lasix 20mg qday at home  former smoker   S/P IV lasix 40IV in ER  c/w IV lasix 40mg IV q12hrs  CXR- clear  BNP- 2400  EKG- A fib with PVCs, new a fib   CE- normal  Monitor I/O and Daily weight, fluid restriction  f/u repeat ECHO  Cardiologist- Dr Dale  pulmonary consult Dr. Feliciano.     Problem/Plan - 2:  ·  Problem: Cellulitis of lower extremity, unspecified laterality.  Plan: b/l LE swelling, afebrile, no leukocytosis  hx of DVT- not on xeralto at this time, discontinued by PMD in May 2018  WBC wnl, afebrile   - f/u doppler LE for R/O DVT  -c/w Ceftriaxone.      Problem/Plan - 3:  ·  Problem: Atrial fibrillation.  Plan: EKG shows atrial fibrillation, new onset   Patient here in March 2018 when EKG showed NSR  changed lovenox 100mg bid to eliquis 5mg bid for AC  c/w cardizem 120mg daily for HR control   - f/u ECHO  - CTA chest negative PE  - Cardiologist- Dr Dale.      Problem/Plan - 4:  ·  Problem: Diabetes.  Plan: On metformin 750mg qday  -f/u A1c  - insulin sliding scale.      Problem/Plan - 5:  ·  Problem: Lymphedema of leg.  Plan: Patient has hx of filariasis?   - f/u filarial antibody  - CT a/p report noted   -Vascular consult for Lymphedema   -compression stockings  - leg elevation.      Problem/Plan - 6:  Problem: Hypertension. Plan: c/w diltiazem with parameters. 77 y/o M with PMHx  of DM, HTN, Chronic lymphedema of bilateral legs, left knee arthritis, DVT of bilateral legs (left peroneal vein and right posterior tibial veins, on Xarelto for sometime then stopped), BPH, and PSHx of right incarcerated inguinal hernia repair presented to the ED with c/o b/l LE swelling and SOB.  admitted for CHF exacerbation and to rule cellulitis/ filarial elephantiasis    PT eval recommended to home with home PT.       Problem/Plan - 2:  ·  Problem: Drug eruption.  Plan: pt developed diffused rash on LE b/l and back, likely 2/2 drug eruption 2/2 vanco/rocephin/ clindamycin may all cause rash, as can lasix and metolozone.   hold all ABx for now as per Dr. García  closely monitor rash.      Problem/Plan - 3:  ·  Problem: CHF exacerbation.  Plan: Resolving.  On Admission, p/w Worsening PND and LE swelling was on  lasix 20mg qday at home, CXR was clear, BNP- 2400  Changed IV lasix BID to daily and added metolazone to enhance diuresis.   Monitor I/O and Daily weight, fluid restriction.  I&O is net negative -2600  repeat ECHO noted above  held Lasix 40mg bid for now   Cardiologist- Dr Dale  pulmonary consult Dr. Feliciano--Outpatient sleep study.     Problem/Plan - 4:  ·  Problem: R/O Filariasis.  Plan: Patient presented with b/l chronic leg non pitting edema, had hx of filariasis treatment.  -f/u filarial antibody  c/w leg elevation  -Doppler of leg shows no Leg DVT b/l  -Compression Tx- pt need compression stockings post DC  -Vascular consulted for lymphedema.      Problem/Plan - 5:  ·  Problem: R/O Cellulitis.  Plan: Patient also have b/l lower leg extremity swelling with diffused rash ,  less likely cellulitis  afebrile, WBC wnl    s/p rocephin, vanco and clinda   hold all ABx due to drug eruption.      Problem/Plan - 6:  Problem: Atrial fibrillation. Plan: EKG shows atrial fibrillation, likely new onset  as Patient here in March 2018 when EKG showed NSR  held eliquis due to melena  c/w cardizem 120mg daily for HR control   - ECHO noted above  -Telemonitoring shows heart rate control  - CTA chest negative PE  - Cardiologist- Dr Dale following. 79 y/o M with PMHx  of DM, HTN, Chronic lymphedema of bilateral legs, left knee arthritis, DVT of bilateral legs (left peroneal vein and right posterior tibial veins, on Xarelto for sometime then stopped), BPH, and PSHx of right incarcerated inguinal hernia repair presented to the ED with c/o b/l LE swelling and SOB.  Admitted for CHF exacerbation, cellulitis of LE and new onset A fib on EKG. For CHF exacerbation, s/p IV lasix , repeat TTE show no changed compared to previous one on 03/2018, continue with home dose PO lasix 40mg bid. For new A fib, started eliquis for AC and HR controlled with cardizem. For LE cellulitis, s/p rocephin, vanco and clindamycin, pt developed diffused rash , likely due to drug           Problem/Plan - 2:  ·  Problem: Drug eruption.  Plan: pt developed diffused rash on LE b/l and back, likely 2/2 drug eruption 2/2 vanco/rocephin/ clindamycin may all cause rash, as can lasix and metolozone.   hold all ABx for now as per Dr. García  closely monitor rash.      Problem/Plan - 3:  ·  Problem: CHF exacerbation.  Plan: Resolving.  On Admission, p/w Worsening PND and LE swelling was on  lasix 20mg qday at home, CXR was clear, BNP- 2400  Changed IV lasix BID to daily and added metolazone to enhance diuresis.   Monitor I/O and Daily weight, fluid restriction.  I&O is net negative -2600  repeat ECHO noted above  held Lasix 40mg bid for now   Cardiologist- Dr Dale  pulmonary consult Dr. Feliciano--Outpatient sleep study.     Problem/Plan - 4:  ·  Problem: R/O Filariasis.  Plan: Patient presented with b/l chronic leg non pitting edema, had hx of filariasis treatment.  -f/u filarial antibody  c/w leg elevation  -Doppler of leg shows no Leg DVT b/l  -Compression Tx- pt need compression stockings post DC  -Vascular consulted for lymphedema.      Problem/Plan - 5:  ·  Problem: R/O Cellulitis.  Plan: Patient also have b/l lower leg extremity swelling with diffused rash ,  less likely cellulitis  afebrile, WBC wnl    s/p rocephin, vanco and clinda   hold all ABx due to drug eruption.      Problem/Plan - 6:  Problem: Atrial fibrillation. Plan: EKG shows atrial fibrillation, likely new onset  as Patient here in March 2018 when EKG showed NSR  held eliquis due to melena  c/w cardizem 120mg daily for HR control   - ECHO noted above  -Telemonitoring shows heart rate control  - CTA chest negative PE  - Cardiologist- Dr Dale following. 79 y/o M with PMHx  of DM, HTN, Chronic lymphedema of bilateral legs, left knee arthritis, DVT of bilateral legs (left peroneal vein and right posterior tibial veins, on Xarelto for sometime then stopped), BPH, and PSHx of right incarcerated inguinal hernia repair presented to the ED with c/o b/l LE swelling and SOB.  Admitted for CHF exacerbation, cellulitis of LE and new onset A fib on EKG. For CHF exacerbation, CTA chest negative PE, s/p IV lasix , repeat TTE show no changed compared to previous one on 03/2018, continue with home dose PO lasix 40mg bid. For new A fib, started eliquis for AC and HR controlled with cardizem. For LE cellulitis, s/p rocephin, vanco and clindamycin, pt developed diffused rash , likely due to drug eruption, discontinued All ABx and rash improved. For filariasis, Patient presented with b/l chronic leg non pitting edema, had hx of filariasis treatment, filariasis antibody sent, report still pending. Doppler of leg shows no Leg DVT b/l, will continue compression stockings post discharge. For melena, Hb stable, consulted with GI Dr. Brock, no acute intervention at this time, will f/u GI at outpatient for further workup.    Pt is stable to be discharge to home today as per .

## 2018-06-24 NOTE — PROGRESS NOTE ADULT - PROBLEM SELECTOR PLAN 5
Patient has hx of filariasis?   - f/u filarial antibody  - Discussed with Dr García, concerns for underlyng liver lesion and possible ascites. Will order CT a/p for further evaluation  - compression stockings  - leg elevation Patient has hx of filariasis?   - f/u filarial antibody  - CT a/p shows Liver homogeneous  - compression stockings  - leg elevation Patient has hx of filariasis?   - f/u filarial antibody  - CT a/p report noted   -Vascular consult for Lymphedema   -compression stockings  - leg elevation

## 2018-06-24 NOTE — PROGRESS NOTE ADULT - ATTENDING COMMENTS
Patient was examined at the bedside and discussed with Dr Vidal.    He is feeling better.     Alert, in NAD  Vital Signs Last 24 Hrs  T(C): 37.3 (24 Jun 2018 22:10), Max: 37.4 (24 Jun 2018 11:14)  T(F): 99.1 (24 Jun 2018 22:10), Max: 99.4 (24 Jun 2018 17:33)  HR: 84 (24 Jun 2018 22:10) (61 - 87)  BP: 104/54 (24 Jun 2018 22:10) (104/54 - 138/70)  BP(mean): --  RR: 18 (24 Jun 2018 22:10) (17 - 22)  SpO2: 100% (24 Jun 2018 22:10) (98% - 100%)  Lungs, clear  Cor, irreg  Ext, erythema is evolving to darker color.   Cardiology and Pulmonary consultations, appreciated.     IMP: Leg swelling, lymphedema plus fluid overload, possible right heart failure         Cellulitis, responding         afib, controlled rate, needs anticoagulation         lymphedema, history of treatment for filariasis.  Native of [facility], which had mass eradication campaign.   Plan: Leg elevation, antibiotics, eliquis.           Vascular surgery consultation.           PT consultation.          Filaria serology

## 2018-06-24 NOTE — DISCHARGE NOTE ADULT - SECONDARY DIAGNOSIS.
Atrial fibrillation Cellulitis of lower extremity, unspecified laterality Lymphedema of leg Diabetes Hypertension Cellulitis Keisha

## 2018-06-24 NOTE — CONSULT NOTE ADULT - SUBJECTIVE AND OBJECTIVE BOX
Source: Patient, Chart    Reason for Consultation: Leg swelling, SOB    Chief Complaint:    HPI:          MEDICATIONS  (STANDING):  apixaban 5 milliGRAM(s) Oral every 12 hours  ascorbic acid 500 milliGRAM(s) Oral daily  aspirin  chewable 81 milliGRAM(s) Oral daily  atorvastatin 40 milliGRAM(s) Oral at bedtime  cefTRIAXone   IVPB 1 Gram(s) IV Intermittent every 24 hours  dextrose 5%. 1000 milliLiter(s) (50 mL/Hr) IV Continuous <Continuous>  dextrose 50% Injectable 12.5 Gram(s) IV Push once  dextrose 50% Injectable 25 Gram(s) IV Push once  dextrose 50% Injectable 25 Gram(s) IV Push once  diltiazem    milliGRAM(s) Oral daily  doxazosin 2 milliGRAM(s) Oral at bedtime  ferrous    sulfate 325 milliGRAM(s) Oral daily  finasteride 5 milliGRAM(s) Oral daily  furosemide   Injectable 40 milliGRAM(s) IV Push every 12 hours  insulin lispro (HumaLOG) corrective regimen sliding scale   SubCutaneous three times a day before meals  lisinopril 40 milliGRAM(s) Oral daily    MEDICATIONS  (PRN):  acetaminophen   Tablet. 650 milliGRAM(s) Oral every 6 hours PRN Mild Pain (1 - 3)  dextrose 40% Gel 15 Gram(s) Oral once PRN Blood Glucose LESS THAN 70 milliGRAM(s)/deciliter  glucagon  Injectable 1 milliGRAM(s) IntraMuscular once PRN Glucose LESS THAN 70 milligrams/deciliter    Allergies    No Known Allergies    Intolerances    PAST MEDICAL & SURGICAL HISTORY:  PND (paroxysmal nocturnal dyspnea)  Lymphedema of leg  Hypertension  Diabetes  H/O inguinal hernia repair    FAMILY HISTORY:  No pertinent family history in first degree relatives    SOCIAL HISTORY  Smoking History:   Alcohol:  Drugs:  Occupation:    T(C): 37.3 (18 @ 13:51), Max: 37.4 (18 @ 11:14)  HR: 81 (18 @ 13:51) (61 - 88)  BP: 113/68 (18 @ 13:51) (107/67 - 138/70)  RR: 20 (18 @ 13:51) (18 - 22)  SpO2: 98% (18 @ 13:51) (98% - 100%)    LABS:                        9.8    6.3   )-----------( 189      ( 2018 10:41 )             32.2     06-24    140  |  103  |  24<H>  ----------------------------<  189<H>  3.7   |  30  |  1.24    Ca    8.3<L>      2018 10:41  Phos  4.1       Mg     1.7         TPro  7.4  /  Alb  3.4<L>  /  TBili  0.5  /  DBili  x   /  AST  25  /  ALT  22  /  AlkPhos  85      Urinalysis Basic - ( 2018 14:52 )    Color: Pale Yellow / Appearance: Clear / S.005 / pH: x  Gluc: x / Ketone: Negative  / Bili: Negative / Urobili: Negative   Blood: x / Protein: Negative / Nitrite: Negative   Leuk Esterase: Negative / RBC: x / WBC x   Sq Epi: x / Non Sq Epi: x / Bacteria: x    CARDIAC MARKERS ( 2018 14:53 )  <0.015 ng/mL / x     / x     / x     / x        Serum Pro-Brain Natriuretic Peptide: 2432 pg/mL (18 @ 14:53)    Microbiology    RADIOLOGY & ADDITIONAL STUDIES: (My Reading)  CXR- Limited portal film, poor inspiration, no infiltrates  Bilateral lower extremity dopplar- no DVT  CT angio - No PE, mosaic attenuation, no infiltrates, nodules or effusions Source: Patient, Chart    Reason for Consultation: Leg swelling, SOB    Chief Complaint: Leg swelling and shortness of breath    HPI: 77 y/o M with PMHx  of DM, HTN, Chronic lymphedema of bilateral legs, left knee arthritis, DVT of bilateral legs (left peroneal vein and right posterior tibial veins, on Xarelto for sometime then stopped), BPH, and PSHx of right incarcerated inguinal hernia repair presented to the ED with c/o b/l LE swelling and SOB. Patient states that he was last here at [facility] in 2018 for CHF exacerbation and fall. He was discharged to Rehab. Patient was discharged from Rehab on May 3, 2018. Patient was on xeralto in March, but after discharge from Rehab his PMD discontinued it. No hx of GI bleed. Patient went to see his PMD 2 week ago and noticed that there was a 20 lb weight gain since then. He noticed worsening swelling b/l in his LE with erythema since 2 weeks. No fever or chest pain. Patient complaints of PND and uses 3 pillows to sleep but recently he has been feeling uncomfortable and often sleeps on recliner. Denies SOB at rest. He states being compliant with his medications.  He denies any fevers or chills. He has a minimal smoking hx less than 10 years, a few cigarettes per day. +Daytime somnolence, +Snoring    MEDICATIONS  (STANDING):  apixaban 5 milliGRAM(s) Oral every 12 hours  ascorbic acid 500 milliGRAM(s) Oral daily  aspirin  chewable 81 milliGRAM(s) Oral daily  atorvastatin 40 milliGRAM(s) Oral at bedtime  cefTRIAXone   IVPB 1 Gram(s) IV Intermittent every 24 hours  dextrose 5%. 1000 milliLiter(s) (50 mL/Hr) IV Continuous <Continuous>  dextrose 50% Injectable 12.5 Gram(s) IV Push once  dextrose 50% Injectable 25 Gram(s) IV Push once  dextrose 50% Injectable 25 Gram(s) IV Push once  diltiazem    milliGRAM(s) Oral daily  doxazosin 2 milliGRAM(s) Oral at bedtime  ferrous    sulfate 325 milliGRAM(s) Oral daily  finasteride 5 milliGRAM(s) Oral daily  furosemide   Injectable 40 milliGRAM(s) IV Push every 12 hours  insulin lispro (HumaLOG) corrective regimen sliding scale   SubCutaneous three times a day before meals  lisinopril 40 milliGRAM(s) Oral daily    MEDICATIONS  (PRN):  acetaminophen   Tablet. 650 milliGRAM(s) Oral every 6 hours PRN Mild Pain (1 - 3)  dextrose 40% Gel 15 Gram(s) Oral once PRN Blood Glucose LESS THAN 70 milliGRAM(s)/deciliter  glucagon  Injectable 1 milliGRAM(s) IntraMuscular once PRN Glucose LESS THAN 70 milligrams/deciliter    Allergies    No Known Allergies    Intolerances    PAST MEDICAL & SURGICAL HISTORY:  PND (paroxysmal nocturnal dyspnea)  Lymphedema of leg  Hypertension  Diabetes  H/O inguinal hernia repair    FAMILY HISTORY:  No pertinent family history in first degree relatives    SOCIAL HISTORY  Smoking History:   Alcohol:  Drugs:  Occupation:    T(C): 37.3 (18 @ 13:51), Max: 37.4 (18 @ 11:14)  HR: 81 (18 @ 13:51) (61 - 88)  BP: 113/68 (18 @ 13:51) (107/67 - 138/70)  RR: 20 (18 @ 13:51) (18 - 22)  SpO2: 98% (18 @ 13:51) (98% - 100%)    LABS:                        9.8    6.3   )-----------( 189      ( 2018 10:41 )             32.2     -24    140  |  103  |  24<H>  ----------------------------<  189<H>  3.7   |  30  |  1.24    Ca    8.3<L>      2018 10:41  Phos  4.1       Mg     1.7         TPro  7.4  /  Alb  3.4<L>  /  TBili  0.5  /  DBili  x   /  AST  25  /  ALT  22  /  AlkPhos  85      Urinalysis Basic - ( 2018 14:52 )    Color: Pale Yellow / Appearance: Clear / S.005 / pH: x  Gluc: x / Ketone: Negative  / Bili: Negative / Urobili: Negative   Blood: x / Protein: Negative / Nitrite: Negative   Leuk Esterase: Negative / RBC: x / WBC x   Sq Epi: x / Non Sq Epi: x / Bacteria: x    CARDIAC MARKERS ( 2018 14:53 )  <0.015 ng/mL / x     / x     / x     / x        Serum Pro-Brain Natriuretic Peptide: 2432 pg/mL (18 @ 14:53)    Microbiology    RADIOLOGY & ADDITIONAL STUDIES: (My Reading)  CXR- Limited portal film, poor inspiration, no infiltrates  Bilateral lower extremity dopplar- no DVT  CT angio - No PE, mosaic attenuation, no infiltrates, nodules or effusions Source: Patient, Chart, [facility] records    Reason for Consultation: Leg swelling, SOB    Chief Complaint: Leg swelling and shortness of breath    HPI: 77 y/o M with PMHx  of DM, HTN, Chronic lymphedema of bilateral legs, left knee arthritis, DVT of bilateral legs (left peroneal vein and right posterior tibial veins, on Xarelto for sometime then stopped), BPH, and PSHx of right incarcerated inguinal hernia repair presented to the ED with c/o b/l LE swelling and SOB. Patient states that he was last here at [facility] in 2018 for CHF exacerbation and fall. He was discharged to Rehab. Patient was discharged from Rehab on May 3, 2018. Patient was on xeralto in March, but after discharge from Rehab his PMD discontinued it. No hx of GI bleed. Patient went to see his PMD 2 week ago and noticed that there was a 20 lb weight gain since then. He noticed worsening swelling b/l in his LE with erythema since 2 weeks. No fever or chest pain. Patient complaints of PND and uses 3 pillows to sleep but recently he has been feeling uncomfortable and often sleeps on recliner. Denies SOB at rest. He states being compliant with his medications.  He denies any fevers or chills. He has a minimal smoking hx less than 10 years, a few cigarettes per day. +Daytime somnolence, +Snoring. He has had no prior admissions or ED visits for respiratory infections. He is not very active and doesnt leave his house often due to the steps in his front.    MEDICATIONS  (STANDING):  apixaban 5 milliGRAM(s) Oral every 12 hours  ascorbic acid 500 milliGRAM(s) Oral daily  aspirin  chewable 81 milliGRAM(s) Oral daily  atorvastatin 40 milliGRAM(s) Oral at bedtime  cefTRIAXone   IVPB 1 Gram(s) IV Intermittent every 24 hours  dextrose 5%. 1000 milliLiter(s) (50 mL/Hr) IV Continuous <Continuous>  dextrose 50% Injectable 12.5 Gram(s) IV Push once  dextrose 50% Injectable 25 Gram(s) IV Push once  dextrose 50% Injectable 25 Gram(s) IV Push once  diltiazem    milliGRAM(s) Oral daily  doxazosin 2 milliGRAM(s) Oral at bedtime  ferrous    sulfate 325 milliGRAM(s) Oral daily  finasteride 5 milliGRAM(s) Oral daily  furosemide   Injectable 40 milliGRAM(s) IV Push every 12 hours  insulin lispro (HumaLOG) corrective regimen sliding scale   SubCutaneous three times a day before meals  lisinopril 40 milliGRAM(s) Oral daily    MEDICATIONS  (PRN):  acetaminophen   Tablet. 650 milliGRAM(s) Oral every 6 hours PRN Mild Pain (1 - 3)  dextrose 40% Gel 15 Gram(s) Oral once PRN Blood Glucose LESS THAN 70 milliGRAM(s)/deciliter  glucagon  Injectable 1 milliGRAM(s) IntraMuscular once PRN Glucose LESS THAN 70 milligrams/deciliter    Allergies    No Known Allergies    Intolerances    PAST MEDICAL & SURGICAL HISTORY:  PND (paroxysmal nocturnal dyspnea)  Lymphedema of leg  Hypertension  Diabetes  H/O inguinal hernia repair    FAMILY HISTORY:  No pertinent family history in first degree relatives    SOCIAL HISTORY  Smoking History: Former smoker. See HPI  Alcohol: Social drinker  Drugs: No drugs  Occupation: Clerical worker in the past    T(C): 37.3 (18 @ 13:51), Max: 37.4 (18 @ 11:14)  HR: 81 (18 @ 13:51) (61 - 88)  BP: 113/68 (18 @ 13:51) (107/67 - 138/70)  RR: 20 (18 @ 13:51) (18 - 22)  SpO2: 98% (18 @ 13:51) (98% - 100%)    LABS:                        9.8    6.3   )-----------( 189      ( 2018 10:41 )             32.2     -    140  |  103  |  24<H>  ----------------------------<  189<H>  3.7   |  30  |  1.24    Ca    8.3<L>      2018 10:41  Phos  4.1     06-  Mg     1.7     -24    TPro  7.4  /  Alb  3.4<L>  /  TBili  0.5  /  DBili  x   /  AST  25  /  ALT  22  /  AlkPhos  85      Urinalysis Basic - ( 2018 14:52 )    Color: Pale Yellow / Appearance: Clear / S.005 / pH: x  Gluc: x / Ketone: Negative  / Bili: Negative / Urobili: Negative   Blood: x / Protein: Negative / Nitrite: Negative   Leuk Esterase: Negative / RBC: x / WBC x   Sq Epi: x / Non Sq Epi: x / Bacteria: x    CARDIAC MARKERS ( 2018 14:53 )  <0.015 ng/mL / x     / x     / x     / x        Serum Pro-Brain Natriuretic Peptide: 2432 pg/mL (18 @ 14:53)    Microbiology    RADIOLOGY & ADDITIONAL STUDIES: (My Reading)  CXR- Limited portal film, poor inspiration, no infiltrates  Bilateral lower extremity dopplar- no DVT  CT angio - No PE, mosaic attenuation, no infiltrates, nodules or effusions

## 2018-06-24 NOTE — DISCHARGE NOTE ADULT - PATIENT PORTAL LINK FT
You can access the [facility] Patient Portal, offered by [facility], by registering with the following website: [email or web address]

## 2018-06-24 NOTE — PROGRESS NOTE ADULT - SUBJECTIVE AND OBJECTIVE BOX
PGY1 Note discussed with supervising resident and primary attending.    Patient is a 78y old  Male who presents with a chief complaint of b/l LE swelling (2018 18:10)      INTERVAL HPI/OVERNIGHT EVENTS:    MEDICATIONS  (STANDING):  aspirin  chewable 81 milliGRAM(s) Oral daily  atorvastatin 40 milliGRAM(s) Oral at bedtime  cefTRIAXone   IVPB 1 Gram(s) IV Intermittent every 24 hours  dextrose 5%. 1000 milliLiter(s) (50 mL/Hr) IV Continuous <Continuous>  dextrose 50% Injectable 12.5 Gram(s) IV Push once  dextrose 50% Injectable 25 Gram(s) IV Push once  dextrose 50% Injectable 25 Gram(s) IV Push once  diltiazem    milliGRAM(s) Oral daily  doxazosin 2 milliGRAM(s) Oral at bedtime  enoxaparin Injectable 100 milliGRAM(s) SubCutaneous two times a day  finasteride 5 milliGRAM(s) Oral daily  furosemide   Injectable 40 milliGRAM(s) IV Push every 12 hours  insulin lispro (HumaLOG) corrective regimen sliding scale   SubCutaneous three times a day before meals  lisinopril 40 milliGRAM(s) Oral daily    MEDICATIONS  (PRN):  dextrose 40% Gel 15 Gram(s) Oral once PRN Blood Glucose LESS THAN 70 milliGRAM(s)/deciliter  glucagon  Injectable 1 milliGRAM(s) IntraMuscular once PRN Glucose LESS THAN 70 milligrams/deciliter      Allergies    No Known Allergies    Intolerances        REVIEW OF SYSTEMS:  CONSTITUTIONAL: No fever, weight loss, or fatigue  RESPIRATORY: No cough, wheezing, chills or hemoptysis; No shortness of breath  CARDIOVASCULAR: No chest pain, palpitations, dizziness, or leg swelling  GASTROINTESTINAL: No abdominal or epigastric pain. No nausea, vomiting, or hematemesis; No diarrhea or constipation. No melena or hematochezia.  NEUROLOGICAL: No headaches, memory loss, loss of strength, numbness, or tremors  SKIN: No itching, burning, rashes, or lesions     Vital Signs Last 24 Hrs  T(C): 36.9 (2018 05:44), Max: 36.9 (2018 23:41)  T(F): 98.4 (2018 05:44), Max: 98.4 (2018 23:41)  HR: 81 (2018 05:44) (61 - 88)  BP: 107/67 (2018 05:44) (107/67 - 138/70)  BP(mean): --  RR: 21 (2018 05:44) (16 - 22)  SpO2: 100% (2018 05:44) (98% - 100%)    PHYSICAL EXAM:  GENERAL: NAD, well-groomed, well-developed  HEAD:  Atraumatic, Normocephalic  EYES: EOMI, PERRLA, conjunctiva and sclera clear  NECK: Supple, No JVD, Normal thyroid  CHEST/LUNG: Clear to percussion bilaterally; No rales, rhonchi, wheezing, or rubs  HEART: Regular rate and rhythm; No murmurs, rubs, or gallops  ABDOMEN: Soft, Nontender, Nondistended; Bowel sounds present  NERVOUS SYSTEM:  Alert & Oriented X3, Good concentration; Motor Strength 5/5 B/L   EXTREMITIES:  2+ Peripheral Pulses, No clubbing, cyanosis, or edema  SKIN;    LABS:                        10.6   6.5   )-----------( 190      ( 2018 14:53 )             34.6     06-23    140  |  105  |  21<H>  ----------------------------<  101<H>  4.0   |  27  |  1.09    Ca    8.9      2018 14:53    TPro  7.4  /  Alb  3.4<L>  /  TBili  0.5  /  DBili  x   /  AST  25  /  ALT  22  /  AlkPhos  85  06-23      Urinalysis Basic - ( 2018 14:52 )    Color: Pale Yellow / Appearance: Clear / S.005 / pH: x  Gluc: x / Ketone: Negative  / Bili: Negative / Urobili: Negative   Blood: x / Protein: Negative / Nitrite: Negative   Leuk Esterase: Negative / RBC: x / WBC x   Sq Epi: x / Non Sq Epi: x / Bacteria: x      CAPILLARY BLOOD GLUCOSE          RADIOLOGY & ADDITIONAL TESTS:    Imaging Personally Reviewed:  [ ] YES  [ ] NO    Consultant(s) Notes Reviewed:  [ ] YES  [ ] NO PGY1 Note discussed with supervising resident and primary attending.    Patient is a 78y old  Male who presents with a chief complaint of b/l LE swelling (2018 18:10)      INTERVAL HPI/OVERNIGHT EVENTS: no new overnight events. SOB improved on NC 3L , LE edema (R>L)    MEDICATIONS  (STANDING):  aspirin  chewable 81 milliGRAM(s) Oral daily  atorvastatin 40 milliGRAM(s) Oral at bedtime  cefTRIAXone   IVPB 1 Gram(s) IV Intermittent every 24 hours  dextrose 5%. 1000 milliLiter(s) (50 mL/Hr) IV Continuous <Continuous>  dextrose 50% Injectable 12.5 Gram(s) IV Push once  dextrose 50% Injectable 25 Gram(s) IV Push once  dextrose 50% Injectable 25 Gram(s) IV Push once  diltiazem    milliGRAM(s) Oral daily  doxazosin 2 milliGRAM(s) Oral at bedtime  enoxaparin Injectable 100 milliGRAM(s) SubCutaneous two times a day  finasteride 5 milliGRAM(s) Oral daily  furosemide   Injectable 40 milliGRAM(s) IV Push every 12 hours  insulin lispro (HumaLOG) corrective regimen sliding scale   SubCutaneous three times a day before meals  lisinopril 40 milliGRAM(s) Oral daily    MEDICATIONS  (PRN):  dextrose 40% Gel 15 Gram(s) Oral once PRN Blood Glucose LESS THAN 70 milliGRAM(s)/deciliter  glucagon  Injectable 1 milliGRAM(s) IntraMuscular once PRN Glucose LESS THAN 70 milligrams/deciliter      Allergies    No Known Allergies    Intolerances        REVIEW OF SYSTEMS:  CONSTITUTIONAL: No fever, weight loss, or fatigue  RESPIRATORY: No cough, wheezing, chills or hemoptysis; No shortness of breath  CARDIOVASCULAR: No chest pain, palpitations, dizziness, or leg swelling  GASTROINTESTINAL: No abdominal or epigastric pain. No nausea, vomiting, or hematemesis;   EXT: LE swelling     Vital Signs Last 24 Hrs  T(C): 36.9 (2018 05:44), Max: 36.9 (2018 23:41)  T(F): 98.4 (2018 05:44), Max: 98.4 (2018 23:41)  HR: 81 (2018 05:44) (61 - 88)  BP: 107/67 (2018 05:44) (107/67 - 138/70)  BP(mean): --  RR: 21 (2018 05:44) (16 - 22)  SpO2: 100% (2018 05:44) (98% - 100%)    PHYSICAL EXAM:  GENERAL: NAD, well-groomed, well-developed  CHEST/LUNG: No rales, rhonchi, wheezing, or rubs  HEART: Regular rate and rhythm; No murmurs, rubs, or gallops  ABDOMEN: Soft, Nontender, Nondistended; Bowel sounds present  NERVOUS SYSTEM:  Alert & Oriented X2, Good concentration  EXTREMITIES:  LE edema (R>L)      LABS:                        10.6   6.5   )-----------( 190      ( 2018 14:53 )             34.6     06-23    140  |  105  |  21<H>  ----------------------------<  101<H>  4.0   |  27  |  1.09    Ca    8.9      2018 14:53    TPro  7.4  /  Alb  3.4<L>  /  TBili  0.5  /  DBili  x   /  AST  25  /  ALT  22  /  AlkPhos  85  06-23      Urinalysis Basic - ( 2018 14:52 )    Color: Pale Yellow / Appearance: Clear / S.005 / pH: x  Gluc: x / Ketone: Negative  / Bili: Negative / Urobili: Negative   Blood: x / Protein: Negative / Nitrite: Negative   Leuk Esterase: Negative / RBC: x / WBC x   Sq Epi: x / Non Sq Epi: x / Bacteria: x      CAPILLARY BLOOD GLUCOSE          RADIOLOGY & ADDITIONAL TESTS:    Imaging Personally Reviewed:  [X ] YES  [ ] NO    Consultant(s) Notes Reviewed:  [X ] YES  [ ] NO

## 2018-06-24 NOTE — DISCHARGE NOTE ADULT - MEDICATION SUMMARY - MEDICATIONS TO TAKE
I will START or STAY ON the medications listed below when I get home from the hospital:    finasteride 5 mg oral tablet  -- 1 tab(s) by mouth once a day  -- Indication: For BPH    aspirin 81 mg oral tablet, chewable  -- 1 tab(s) by mouth once a day  -- Indication: For prophylactic measure    ramipril 10 mg oral capsule  -- 1 cap(s) by mouth once a day  -- Indication: For Hypertension    doxazosin 2 mg oral tablet  -- 1 tab(s) by mouth once a day (at bedtime)  -- Indication: For BPH    dilTIAZem 120 mg/24 hours oral capsule, extended release  -- 1 cap(s) by mouth once a day  -- Indication: For Atrial fibrillation    apixaban 5 mg oral tablet  -- 1 tab(s) by mouth every 12 hours  -- Indication: For Atrial fibrillation    metFORMIN 750 mg oral tablet, extended release  -- 1 tab(s) by mouth once a day  -- Indication: For Diabetes    atorvastatin 40 mg oral tablet  -- 1 tab(s) by mouth once a day (at bedtime)  -- Indication: For HLD    furosemide 40 mg oral tablet  -- 1 tab(s) by mouth 2 times a day  -- Indication: For CHF exacerbation    metOLazone 5 mg oral tablet  -- 1 tab(s) by mouth once a day  -- Indication: For CHF exacerbation    ferrous sulfate 325 mg (65 mg elemental iron) oral tablet  -- 1 tab(s) by mouth once a day   -- Indication: For iron deficiency anemia    senna oral tablet  -- 2 tab(s) by mouth once a day (at bedtime)  -- Indication: For Contipation    docusate sodium 100 mg oral capsule  -- 1 cap(s) by mouth 2 times a day  -- Indication: For Contipation    ascorbic acid 500 mg oral tablet  -- 1 tab(s) by mouth once a day  -- Indication: For supplements I will START or STAY ON the medications listed below when I get home from the hospital:    compression stocking   -- Indication: For Lymphedema of leg    finasteride 5 mg oral tablet  -- 1 tab(s) by mouth once a day  -- Indication: For BPH    aspirin 81 mg oral tablet, chewable  -- 1 tab(s) by mouth once a day  -- Indication: For prophylactic measure    ramipril 10 mg oral capsule  -- 1 cap(s) by mouth once a day  -- Indication: For Hypertension    doxazosin 2 mg oral tablet  -- 1 tab(s) by mouth once a day (at bedtime)  -- Indication: For BPH    dilTIAZem 120 mg/24 hours oral capsule, extended release  -- 1 cap(s) by mouth once a day  -- Indication: For Atrial fibrillation    apixaban 5 mg oral tablet  -- 1 tab(s) by mouth every 12 hours  -- Indication: For Atrial fibrillation    metFORMIN 750 mg oral tablet, extended release  -- 1 tab(s) by mouth once a day  -- Indication: For Diabetes    atorvastatin 40 mg oral tablet  -- 1 tab(s) by mouth once a day (at bedtime)  -- Indication: For HLD    furosemide 40 mg oral tablet  -- 1 tab(s) by mouth 2 times a day  -- Indication: For CHF exacerbation    metOLazone 5 mg oral tablet  -- 1 tab(s) by mouth once a day  -- Indication: For CHF exacerbation    ferrous sulfate 325 mg (65 mg elemental iron) oral tablet  -- 1 tab(s) by mouth once a day   -- Indication: For iron deficiency anemia    senna oral tablet  -- 2 tab(s) by mouth once a day (at bedtime)  -- Indication: For Contipation    docusate sodium 100 mg oral capsule  -- 1 cap(s) by mouth 2 times a day  -- Indication: For Contipation    ascorbic acid 500 mg oral tablet  -- 1 tab(s) by mouth once a day  -- Indication: For supplements

## 2018-06-24 NOTE — PROGRESS NOTE ADULT - PROBLEM SELECTOR PLAN 1
Worsening PND and LE swelling  CXR- clear  BNP- 2400  EKG- A fib with PVCs  CE- normal  on lasix 20mg qday at home  - Will start lasix 40 IV qday for now  - Monitor I/Os  - Daily weight  - fluid restriction  - f/u ECHO Worsening PND and LE swelling , improved   S/P IV lasix   CXR- clear  BNP- 2400  EKG- A fib with PVCs  CE- normal  on lasix 20mg qday at home  - Will start lasix 40 IV qday for now  - Monitor I/Os  - Daily weight  - fluid restriction  - f/u ECHO Worsening PND and LE swelling , improved. Pt on lasix 20mg qday at home  S/P IV lasix 40IV in ER  c/w IV lasix 40mg IV q12hrs  CXR- clear  BNP- 2400  EKG- A fib with PVCs, new a fib   CE- normal  Monitor I/O and Daily weight, fluid restriction  f/u ECHO  Cardiologist- Dr Dale Worsening PND and LE swelling , improved. Pt on lasix 20mg qday at home  S/P IV lasix 40IV in ER  c/w IV lasix 40mg IV q12hrs  CXR- clear  BNP- 2400  EKG- A fib with PVCs, new a fib   CE- normal  Monitor I/O and Daily weight, fluid restriction  f/u repeat ECHO  Cardiologist- Dr Dale  pulmonary consult Dr. Feliciano Worsening PND and LE swelling , improved. Pt on lasix 20mg qday at home  former smoker   S/P IV lasix 40IV in ER  c/w IV lasix 40mg IV q12hrs  CXR- clear  BNP- 2400  EKG- A fib with PVCs, new a fib   CE- normal  Monitor I/O and Daily weight, fluid restriction  f/u repeat ECHO  Cardiologist- Dr Dale  pulmonary consult Dr. Feliciano

## 2018-06-24 NOTE — PROGRESS NOTE ADULT - PROBLEM SELECTOR PLAN 7
[] Previous VTE                                                3  [] Thrombophilia                                             2  [] Lower limb paralysis                                   2    [] Current Cancer                                             2   [X] Immobilization > 24 hrs                              1  [] ICU/CCU stay > 24 hours                             1  [X] Age > 60                                                         1    IMPROVE VTE Score: 2 IMPROVE VTE Score: 2  on full dose lovenox for A fib

## 2018-06-24 NOTE — DISCHARGE NOTE ADULT - CARE PROVIDER_API CALL
Merlin Fang), Cardiovascular Disease; Internal Medicine  [address] [place] [address]  Phone: [phone]  Fax: [phone] Merlin Fang), Cardiovascular Disease; Internal Medicine  [address] [place] [address]  Phone: [phone]  Fax: [phone]    Juan Perez), Gastroenterology; Internal Medicine  [address] [place] [address]  Phone: [phone]  Fax: [phone] Merlin Fang), Cardiovascular Disease; Internal Medicine  [address] [place] [address]  Phone: [phone]  Fax: [phone]    Juan Perez), Gastroenterology; Internal Medicine  [address] [place] [address]  Phone: [phone]  Fax: [phone]    Cameron Feliciano), Critical Care Medicine; Internal Medicine; Pulmonary Disease  [address] [place] [address]  Phone: [phone]  Fax: [phone]

## 2018-06-24 NOTE — CONSULT NOTE ADULT - ASSESSMENT
1) Fluid overload  2) Chronic venous stasis  3) Possible underlying sleep disordered breathing    The patient is clinically stable, no evidence of hypoxia  Minimal smoking history, low suspicion for airway disease. I dont appreciate any emyphysematous changes on current CT reported by radiologist. Likely related to the mosaic pattern which is a nonspecific finding. The patient had a prior CT angio in March of 2018 with no reported emphysematous changes.  Dyspnea likely related to deconditioning plus fluid retention  The patient may have underlying sleep disordered breathing (ie JOSIE) which may add to his arrythmias and fluid retention. He would benefit from a sleep study if he can be compliant with CPAP if JOSIE found  His PCP Dr Guidry put in an outpatient pulmonary referral already for a sleep evaluation  OOB to chair  Ambulation as tolerated 1) Fluid overload  2) Chronic venous stasis  3) Possible underlying sleep disordered breathing    The patient is clinically stable, no evidence of hypoxia  Minimal smoking history, low suspicion for airway disease. I dont appreciate any emyphysematous changes on current CT reported by radiologist. Likely related to the mosaic pattern which is a nonspecific finding. The patient had a prior CT angio in March of 2018 with no reported emphysematous changes.  Dyspnea likely related to deconditioning plus fluid retention  There is no evidence of pulmonary HTN on prior echocardiogram from 3/18 though the patient does have moderate aortic stensosis which may compound his fluid retention  The patient may have underlying sleep disordered breathing (ie JOSIE) which may add to his arrythmias and fluid retention. He would benefit from a sleep study if he can be compliant with CPAP if JOSIE found  His PCP Dr Guidry put in an outpatient pulmonary referral already for a sleep evaluation  OOB to chair  Ambulation as tolerated

## 2018-06-24 NOTE — DISCHARGE NOTE ADULT - CARE PROVIDERS DIRECT ADDRESSES
,DirectAddress_Unknown ,DirectAddress_Unknown,[email or web address] ,DirectAddress_Unknown,[email or web address],DirectAddress_Unknown

## 2018-06-25 DIAGNOSIS — L03.90 CELLULITIS, UNSPECIFIED: ICD-10-CM

## 2018-06-25 LAB
ANION GAP SERPL CALC-SCNC: 7 MMOL/L — SIGNIFICANT CHANGE UP (ref 5–17)
BASOPHILS # BLD AUTO: 0 K/UL — SIGNIFICANT CHANGE UP (ref 0–0.2)
BASOPHILS NFR BLD AUTO: 0.2 % — SIGNIFICANT CHANGE UP (ref 0–2)
BUN SERPL-MCNC: 26 MG/DL — HIGH (ref 7–18)
CALCIUM SERPL-MCNC: 8.2 MG/DL — LOW (ref 8.4–10.5)
CHLORIDE SERPL-SCNC: 103 MMOL/L — SIGNIFICANT CHANGE UP (ref 96–108)
CO2 SERPL-SCNC: 30 MMOL/L — SIGNIFICANT CHANGE UP (ref 22–31)
CREAT SERPL-MCNC: 1.21 MG/DL — SIGNIFICANT CHANGE UP (ref 0.5–1.3)
EOSINOPHIL # BLD AUTO: 0.6 K/UL — HIGH (ref 0–0.5)
EOSINOPHIL NFR BLD AUTO: 7.7 % — HIGH (ref 0–6)
GLUCOSE SERPL-MCNC: 108 MG/DL — HIGH (ref 70–99)
HCT VFR BLD CALC: 34.9 % — LOW (ref 39–50)
HGB BLD-MCNC: 10.6 G/DL — LOW (ref 13–17)
LYMPHOCYTES # BLD AUTO: 0.4 K/UL — LOW (ref 1–3.3)
LYMPHOCYTES # BLD AUTO: 5.3 % — LOW (ref 13–44)
MCHC RBC-ENTMCNC: 26.2 PG — LOW (ref 27–34)
MCHC RBC-ENTMCNC: 30.4 GM/DL — LOW (ref 32–36)
MCV RBC AUTO: 86.2 FL — SIGNIFICANT CHANGE UP (ref 80–100)
MONOCYTES # BLD AUTO: 0.6 K/UL — SIGNIFICANT CHANGE UP (ref 0–0.9)
MONOCYTES NFR BLD AUTO: 7.1 % — SIGNIFICANT CHANGE UP (ref 2–14)
NEUTROPHILS # BLD AUTO: 6.2 K/UL — SIGNIFICANT CHANGE UP (ref 1.8–7.4)
NEUTROPHILS NFR BLD AUTO: 79.7 % — HIGH (ref 43–77)
PLATELET # BLD AUTO: 199 K/UL — SIGNIFICANT CHANGE UP (ref 150–400)
POTASSIUM SERPL-MCNC: 3.9 MMOL/L — SIGNIFICANT CHANGE UP (ref 3.5–5.3)
POTASSIUM SERPL-SCNC: 3.9 MMOL/L — SIGNIFICANT CHANGE UP (ref 3.5–5.3)
RBC # BLD: 4.05 M/UL — LOW (ref 4.2–5.8)
RBC # FLD: 14.9 % — HIGH (ref 10.3–14.5)
SODIUM SERPL-SCNC: 140 MMOL/L — SIGNIFICANT CHANGE UP (ref 135–145)
WBC # BLD: 7.8 K/UL — SIGNIFICANT CHANGE UP (ref 3.8–10.5)
WBC # FLD AUTO: 7.8 K/UL — SIGNIFICANT CHANGE UP (ref 3.8–10.5)

## 2018-06-25 PROCEDURE — 99232 SBSQ HOSP IP/OBS MODERATE 35: CPT

## 2018-06-25 PROCEDURE — 99233 SBSQ HOSP IP/OBS HIGH 50: CPT | Mod: GC

## 2018-06-25 RX ORDER — SENNA PLUS 8.6 MG/1
2 TABLET ORAL AT BEDTIME
Qty: 0 | Refills: 0 | Status: DISCONTINUED | OUTPATIENT
Start: 2018-06-25 | End: 2018-06-29

## 2018-06-25 RX ORDER — DOCUSATE SODIUM 100 MG
100 CAPSULE ORAL
Qty: 0 | Refills: 0 | Status: DISCONTINUED | OUTPATIENT
Start: 2018-06-25 | End: 2018-06-29

## 2018-06-25 RX ORDER — POLYETHYLENE GLYCOL 3350 17 G/17G
17 POWDER, FOR SOLUTION ORAL DAILY
Qty: 0 | Refills: 0 | Status: DISCONTINUED | OUTPATIENT
Start: 2018-06-25 | End: 2018-06-29

## 2018-06-25 RX ORDER — FUROSEMIDE 40 MG
40 TABLET ORAL DAILY
Qty: 0 | Refills: 0 | Status: DISCONTINUED | OUTPATIENT
Start: 2018-06-25 | End: 2018-06-26

## 2018-06-25 RX ORDER — FUROSEMIDE 40 MG
40 TABLET ORAL DAILY
Qty: 0 | Refills: 0 | Status: DISCONTINUED | OUTPATIENT
Start: 2018-06-25 | End: 2018-06-25

## 2018-06-25 RX ADMIN — Medication 100 MILLIGRAM(S): at 22:11

## 2018-06-25 RX ADMIN — Medication 500 MILLIGRAM(S): at 12:18

## 2018-06-25 RX ADMIN — Medication 1: at 18:14

## 2018-06-25 RX ADMIN — LISINOPRIL 40 MILLIGRAM(S): 2.5 TABLET ORAL at 06:35

## 2018-06-25 RX ADMIN — Medication 100 MILLIGRAM(S): at 22:10

## 2018-06-25 RX ADMIN — Medication 81 MILLIGRAM(S): at 12:18

## 2018-06-25 RX ADMIN — ATORVASTATIN CALCIUM 40 MILLIGRAM(S): 80 TABLET, FILM COATED ORAL at 22:09

## 2018-06-25 RX ADMIN — Medication 100 MILLIGRAM(S): at 18:16

## 2018-06-25 RX ADMIN — POLYETHYLENE GLYCOL 3350 17 GRAM(S): 17 POWDER, FOR SOLUTION ORAL at 22:10

## 2018-06-25 RX ADMIN — APIXABAN 5 MILLIGRAM(S): 2.5 TABLET, FILM COATED ORAL at 18:17

## 2018-06-25 RX ADMIN — FINASTERIDE 5 MILLIGRAM(S): 5 TABLET, FILM COATED ORAL at 12:18

## 2018-06-25 RX ADMIN — APIXABAN 5 MILLIGRAM(S): 2.5 TABLET, FILM COATED ORAL at 05:45

## 2018-06-25 RX ADMIN — Medication 325 MILLIGRAM(S): at 12:17

## 2018-06-25 RX ADMIN — Medication 40 MILLIGRAM(S): at 18:34

## 2018-06-25 RX ADMIN — Medication 40 MILLIGRAM(S): at 05:44

## 2018-06-25 RX ADMIN — Medication 2 MILLIGRAM(S): at 22:09

## 2018-06-25 RX ADMIN — CEFTRIAXONE 100 GRAM(S): 500 INJECTION, POWDER, FOR SOLUTION INTRAMUSCULAR; INTRAVENOUS at 05:45

## 2018-06-25 RX ADMIN — Medication 120 MILLIGRAM(S): at 06:35

## 2018-06-25 RX ADMIN — SENNA PLUS 2 TABLET(S): 8.6 TABLET ORAL at 22:09

## 2018-06-25 NOTE — PROGRESS NOTE ADULT - ASSESSMENT
79 y/o M with PMHx  of DM, HTN, Chronic lymphedema of bilateral legs, left knee arthritis, DVT of bilateral legs (left peroneal vein and right posterior tibial veins, on Xarelto for sometime then stopped), BPH, and PSHx of right incarcerated inguinal hernia repair presented to the ED with c/o b/l LE swelling and SOB.  admitted for CHF exacerbation and to rule cellulitis/ filarial elephantiasis

## 2018-06-25 NOTE — CONSULT NOTE ADULT - ASSESSMENT
chronic b/l LE lymphadema likely with underlying venous insufficiency  1. no acute vascular surgery intervention at this time  2. rec outpatient f/u with Dr. Ramirez  3. D/w Dr. Ramirez and agreed

## 2018-06-25 NOTE — PROGRESS NOTE ADULT - SUBJECTIVE AND OBJECTIVE BOX
Interval Events: The patient complains of legs feeling tight along with constipation. He is not sure the diuretic is working well as he is not peeing much. He also has persistent scrotal edema.    OBJECTIVE:    T(C): 37.2 (2018 04:54), Max: 37.4 (2018 11:14)  T(F): 99 (2018 04:54), Max: 99.4 (2018 17:33)  HR: 93 (2018 04:54) (81 - 93)  BP: 133/58 (2018 04:54) (104/54 - 133/58)  RR: 18 (2018 04:54) (17 - 20)  SpO2: 100% (2018 04:54) (98% - 100%) 2LNC     @ 07:01  -   @ 07:00  --------------------------------------------------------  IN: 875 mL / OUT: 2075 mL / NET: -1200 mL     @ 07:01  -   @ 09:07  --------------------------------------------------------  IN: 0 mL / OUT: 400 mL / NET: -400 mL    CAPILLARY BLOOD GLUCOSE    POCT Blood Glucose.: 112 mg/dL (2018 07:57)    PHYSICAL EXAM:  General: Awake, alert, oriented X 3.   HEENT: Atraumatic, PERRL, Anicteric.   Lymph Nodes: No palpable lymphadenopathy  Respiratory: Decreased BS at bases  Cardiovascular: S1 S2 normal. No murmurs, rubs or gallops.   Abdomen: Soft, non-tender, non-distended. No organomegaly.  - Enlarged scrotal sac likely hydrocele  Extremities: 2+ pitting edema bilateral up to knees  Skin: Chronic stasis dermatitis changes in both legs  Neurological: No focal deficits.  Psychiatry: Appropriate mood and affect.    HOSPITAL MEDICATIONS:  MEDICATIONS  (STANDING):  apixaban 5 milliGRAM(s) Oral every 12 hours  ascorbic acid 500 milliGRAM(s) Oral daily  aspirin  chewable 81 milliGRAM(s) Oral daily  atorvastatin 40 milliGRAM(s) Oral at bedtime  cefTRIAXone   IVPB 1 Gram(s) IV Intermittent every 24 hours  dextrose 5%. 1000 milliLiter(s) (50 mL/Hr) IV Continuous <Continuous>  diltiazem    milliGRAM(s) Oral daily  doxazosin 2 milliGRAM(s) Oral at bedtime  ferrous    sulfate 325 milliGRAM(s) Oral daily  finasteride 5 milliGRAM(s) Oral daily  furosemide   Injectable 40 milliGRAM(s) IV Push every 12 hours  insulin lispro (HumaLOG) corrective regimen sliding scale   SubCutaneous three times a day before meals  lisinopril 40 milliGRAM(s) Oral daily    MEDICATIONS  (PRN):  acetaminophen   Tablet. 650 milliGRAM(s) Oral every 6 hours PRN Mild Pain (1 - 3)    LABS:                        10.6   7.8   )-----------( 199      ( 2018 07:11 )             34.9     06-    140  |  103  |  26<H>  ----------------------------<  108<H>  3.9   |  30  |  1.21    Ca    8.2<L>      2018 07:11  Phos  4.1     06-24  Mg     1.7     06-24    TPro  7.4  /  Alb  3.4<L>  /  TBili  0.5  /  DBili  x   /  AST  25  /  ALT  22  /  AlkPhos  85      Urinalysis Basic - ( 2018 14:52 )    Color: Pale Yellow / Appearance: Clear / S.005 / pH: x  Gluc: x / Ketone: Negative  / Bili: Negative / Urobili: Negative   Blood: x / Protein: Negative / Nitrite: Negative   Leuk Esterase: Negative / RBC: x / WBC x   Sq Epi: x / Non Sq Epi: x / Bacteria: x    MICROBIOLOGY:     RADIOLOGY:  [ ] Reviewed and interpreted by me

## 2018-06-25 NOTE — PROGRESS NOTE ADULT - ASSESSMENT
77 yo M with noted PMH including HFpEF, DM, HTN, HLD, and lymphedema who presents with mild acute, decompensated, diastolic HF exacerbation.     1. Dyspnea/HFpEF: Patient reported as net negative -1L overnight, no I's recorded; please track Is and Os and daily weights  -On furosemide 40mg IV Q12H, would add metolazone 5mg PO daily, give 30 minutes before furosemide dose, to help potentiate diuresis.   -Goal output net negative ~2-3 L/day  -Patient evaluated by pulmonology, outpatient sleep study is recommended   -His lower extremity edema is consistent with lymphedema, would have lymphedema clinic/wound care/ or vascular surgery evaluate patient.     2. Atrial fibrillation:   -Rate controlled on diltiazem 120mg QDay, also lisinopril  -would add on Eliquis 5mg PO BID instead of Lovenox  -Repeat echocardiogram pending     3. HTN: Diltiazem and lisinopril (since he also has DM, on ramipril as OP)    4. HLD: Atorvastatin    ***Note that this is a preliminary note and any recommendations should NOT be carried out until this note is finalized. *** 79 yo M with noted PMH including HFpEF, DM, HTN, HLD, and lymphedema who presents with mild acute, decompensated, diastolic HF exacerbation.     1. Dyspnea/HFpEF: Patient reported as net negative -1L overnight, no I's recorded; please track Is and Os and daily weights  -On furosemide 40mg IV Q12H, would add metolazone 5mg PO daily, give 30 minutes before furosemide dose, to help potentiate diuresis.   -Goal output net negative ~2-3 L/day  -Patient evaluated by pulmonology, outpatient sleep study is recommended   -His lower extremity edema is consistent with lymphedema, would have lymphedema clinic/wound care/ or vascular surgery evaluate patient.     2. Atrial fibrillation:   -Rate controlled on diltiazem 120mg QDay, also lisinopril  -would add on Eliquis 5mg PO BID instead of Lovenox  -Repeat echocardiogram pending   -If EF is preserved on echo, can discontinue telemetry monitoring    3. HTN: Diltiazem and lisinopril (since he also has DM, on ramipril as OP)    4. HLD: Atorvastatin

## 2018-06-25 NOTE — PROGRESS NOTE ADULT - ATTENDING COMMENTS
Patient was examined at the bedside and discussed with Dr. Waldron.   Vital Signs Last 24 Hrs  T(C): 36.4 (25 Jun 2018 13:37), Max: 37.3 (24 Jun 2018 22:10)  T(F): 97.6 (25 Jun 2018 13:37), Max: 99.1 (24 Jun 2018 22:10)  HR: 84 (25 Jun 2018 13:37) (74 - 93)  BP: 126/63 (25 Jun 2018 13:37) (104/54 - 133/58)  BP(mean): --  RR: 16 (25 Jun 2018 13:37) (16 - 18)  SpO2: 96% (25 Jun 2018 13:37) (96% - 100%)  Lungs, clear  Cor, irreg  Ext, swelling persists.  Erythema is increased from yesterday.                        10.6   7.8   )-----------( 199      ( 25 Jun 2018 07:11 )             34.9   06-25    140  |  103  |  26<H>  ----------------------------<  108<H>  3.9   |  30  |  1.21    Ca    8.2<L>      25 Jun 2018 07:11  Phos  4.1     06-24  Mg     1.7     06-24    < from: Transthoracic Echocardiogram (06.24.18 @ 07:11) >    CONCLUSIONS:  1. Normal mitral valve. Trace mitral regurgitation.  2. Aortic valve not well visualized.  Mild aortic stenosis.  No aortic valve regurgitation seen.  3. Normal aortic root.  4. Normal left atrium.  5. Normal left ventricular internal dimensions and wall  thicknesses.  6. Endocardium not well visualized; grossly normal left  ventricular systolic function.  7. Diastolic function incompletely assessed.  8. Right ventricle not well visualized, probably normal  function.  9. RV systolic pressure is mild to moderately increased at  41 mm Hg.  10. Tricuspid valve not well visualized, probably normal.  Trace tricuspid regurgitation.  11. Pulmonic valve not well seen. Trace pulmonic  insufficiency is noted.  12. No pericardial effusion.    Erythema of his leg is not improving since yesterday.    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           Leg elevation           f/u vascular surgery consultation for long-term management

## 2018-06-25 NOTE — PROGRESS NOTE ADULT - PROBLEM SELECTOR PLAN 1
Resolving.  On Admission, p/w Worsening PND and LE swelling was on  lasix 20mg qday at home, CXR was clear, BNP- 2400  Changed IV lasix BID to daily and added metolazone to enhance diuresis.   Monitor I/O and Daily weight, fluid restriction.  I&O is net negative -2600  repeat ECHO noted above  Cardiologist- Dr Dale  pulmonary consult Dr. Feliciano

## 2018-06-25 NOTE — PROGRESS NOTE ADULT - SUBJECTIVE AND OBJECTIVE BOX
PRESENTING CC: Shortness of breath    SUBJ:     In summary, this is a 77 yo M with HFpEF (by echo 03/2018), HTN, HLD, DM, chronic lymphedema, and DVT s/p Xarelto who presented with dyspnea due to heart failure in the setting of dietary and medication non-compliance.     Overnight, there were no acute events. Patient was net negative -1.2L.       ----------------------  Description of patient's presenting symptoms from my prior note: Patient reports he has been having dyspnea for the past 2-3 weeks, and decided to come in since it has not been improving. Symptoms are sometimes worsened by him lying flat in bed. He reports that over the past few weeks he had been eating some Chinese food and take-out. Additionally, he admits he may have missed "a few" doses of his furosemide. Currently he denies chest pain, dyspnea, palpitations, or lightheadedness.     PMH: As above, also BPH, H/O inguinal hernia repair    Allergies    No Known Allergies      MEDICATIONS  (STANDING):  apixaban 5 milliGRAM(s) Oral every 12 hours  ascorbic acid 500 milliGRAM(s) Oral daily  aspirin  chewable 81 milliGRAM(s) Oral daily  atorvastatin 40 milliGRAM(s) Oral at bedtime  cefTRIAXone   IVPB 1 Gram(s) IV Intermittent every 24 hours  dextrose 5%. 1000 milliLiter(s) (50 mL/Hr) IV Continuous <Continuous>  diltiazem    milliGRAM(s) Oral daily  doxazosin 2 milliGRAM(s) Oral at bedtime  ferrous    sulfate 325 milliGRAM(s) Oral daily  finasteride 5 milliGRAM(s) Oral daily  furosemide   Injectable 40 milliGRAM(s) IV Push daily  insulin lispro (HumaLOG) corrective regimen sliding scale   SubCutaneous three times a day before meals  lisinopril 40 milliGRAM(s) Oral daily    MEDICATIONS  (PRN):  acetaminophen   Tablet. 650 milliGRAM(s) Oral every 6 hours PRN Mild Pain (1 - 3)      FAMILY HISTORY:  No pertinent family history in first degree relatives    Reviewed; no change from my prior note    SOCIAL HISTORY:  Reviewed, no change from my prior note    REVIEW OF SYSTEMS:  Constitutional: [ ] fever, [ ]weight loss,  [ ]fatigue  Eyes: [ ] visual changes  Respiratory: [ ]shortness of breath;  [ ] cough, [ ]wheezing, [ ]chills, [ ]hemoptysis  Cardiovascular: [ ] chest pain, [ ]palpitations, [ ]dizziness,  [ ]leg swelling  Gastrointestinal: [ ] abdominal pain, [ ]nausea, [ ]vomiting,  [ ]diarrhea   Genitourinary: [ ] dysuria, [ ] hematuria  Neurologic: [ ] headaches [ ] tremors [ ] weakness [ ] lightheadedness  Skin: [ ] itching, [ ]burning, [ ] rashes  Endocrine: [ ] heat or cold intolerance  Musculoskeletal: [ ] joint pain or swelling; [ ] muscle, back, or extremity pain  Psychiatric: [ ] depression, [ ]anxiety, [ ]mood swings, or [ ]difficulty sleeping  Hematologic: [ ] easy bruising, [ ] bleeding gums    [x] All remaining systems negative except as per above.   [  ] Unable to obtain    Vital Signs Last 24 Hrs  T(C): 37.2 (25 Jun 2018 04:54), Max: 37.4 (24 Jun 2018 11:14)  T(F): 99 (25 Jun 2018 04:54), Max: 99.4 (24 Jun 2018 17:33)  HR: 93 (25 Jun 2018 04:54) (81 - 93)  BP: 133/58 (25 Jun 2018 04:54) (104/54 - 133/58)  BP(mean): --  RR: 18 (25 Jun 2018 04:54) (17 - 20)  SpO2: 100% (25 Jun 2018 04:54) (98% - 100%)  I&O's Summary    24 Jun 2018 07:01  -  25 Jun 2018 07:00  --------------------------------------------------------  IN: 875 mL / OUT: 2075 mL / NET: -1200 mL    25 Jun 2018 07:01  -  25 Jun 2018 09:45  --------------------------------------------------------  IN: 0 mL / OUT: 400 mL / NET: -400 mL        PHYSICAL EXAM:  General: No acute distress  HEENT: EOMI, PERRL  Neck: Supple, No JVD  Lungs: Clear to auscultation bilaterally; No rales or wheezing  Heart: Regular rate and rhythm; No murmurs, rubs, or gallops  Abdomen: Nontender, bowel sounds present  Extremities: No clubbing, cyanosis, or edema  Nervous system:  Alert & Oriented X3, no focal deficits  Psychiatric: Normal affect  Skin: No rashes or lesions    LABS:  06-25    140  |  103  |  26<H>  ----------------------------<  108<H>  3.9   |  30  |  1.21    Ca    8.2<L>      25 Jun 2018 07:11  Phos  4.1     06-24  Mg     1.7     06-24    TPro  7.4  /  Alb  3.4<L>  /  TBili  0.5  /  DBili  x   /  AST  25  /  ALT  22  /  AlkPhos  85  06-23    Creatinine Trend: 1.21<--, 1.24<--, 1.09<--                        10.6   7.8   )-----------( 199      ( 25 Jun 2018 07:11 )             34.9       Cardiac Enzymes: CARDIAC MARKERS ( 23 Jun 2018 14:53 )  <0.015 ng/mL / x     / x     / x     / x        Serum Pro-Brain Natriuretic Peptide: 2432 pg/mL (06-23-18 @ 14:53)    RADIOLOGY: < from: US Duplex Venous Lower Ext Complete, Bilateral (06.24.18 @ 13:39) >  IMPRESSION:     No evidence of bilateral lower extremity deep venous thrombosis.    < end of copied text >      TELEMETRY: PRESENTING CC: Shortness of breath    SUBJ:     In summary, this is a 77 yo M with HFpEF (by echo 03/2018), HTN, HLD, DM, chronic lymphedema, and DVT s/p Xarelto who presented with dyspnea due to heart failure in the setting of dietary and medication non-compliance.     Overnight, there were no acute events. Patient was net negative -1.2L. Denies any dyspnea while walking around with PT earlier today.     ----------------------  Description of patient's presenting symptoms from my prior note: Patient reports he has been having dyspnea for the past 2-3 weeks, and decided to come in since it has not been improving. Symptoms are sometimes worsened by him lying flat in bed. He reports that over the past few weeks he had been eating some Chinese food and take-out. Additionally, he admits he may have missed "a few" doses of his furosemide. Currently he denies chest pain, dyspnea, palpitations, or lightheadedness.     PMH: As above, also BPH, H/O inguinal hernia repair    Allergies    No Known Allergies      MEDICATIONS  (STANDING):  apixaban 5 milliGRAM(s) Oral every 12 hours  ascorbic acid 500 milliGRAM(s) Oral daily  aspirin  chewable 81 milliGRAM(s) Oral daily  atorvastatin 40 milliGRAM(s) Oral at bedtime  cefTRIAXone   IVPB 1 Gram(s) IV Intermittent every 24 hours  dextrose 5%. 1000 milliLiter(s) (50 mL/Hr) IV Continuous <Continuous>  diltiazem    milliGRAM(s) Oral daily  doxazosin 2 milliGRAM(s) Oral at bedtime  ferrous    sulfate 325 milliGRAM(s) Oral daily  finasteride 5 milliGRAM(s) Oral daily  furosemide   Injectable 40 milliGRAM(s) IV Push daily  insulin lispro (HumaLOG) corrective regimen sliding scale   SubCutaneous three times a day before meals  lisinopril 40 milliGRAM(s) Oral daily    MEDICATIONS  (PRN):  acetaminophen   Tablet. 650 milliGRAM(s) Oral every 6 hours PRN Mild Pain (1 - 3)      FAMILY HISTORY:  No pertinent family history in first degree relatives    Reviewed; no change from my prior note    SOCIAL HISTORY:  Reviewed, no change from my prior note    REVIEW OF SYSTEMS:  Constitutional: [ ] fever, [ ]weight loss,  [ ]fatigue  Eyes: [ ] visual changes  Respiratory: [ ]shortness of breath;  [ ] cough, [ ]wheezing, [ ]chills, [ ]hemoptysis  Cardiovascular: [ ] chest pain, [ ]palpitations, [ ]dizziness,  [ ]leg swelling  Gastrointestinal: [ ] abdominal pain, [ ]nausea, [ ]vomiting,  [ ]diarrhea   Genitourinary: [ ] dysuria, [ ] hematuria  Neurologic: [ ] headaches [ ] tremors [ ] weakness [ ] lightheadedness  Skin: [ ] itching, [ ]burning, [ ] rashes  Endocrine: [ ] heat or cold intolerance  Musculoskeletal: [ ] joint pain or swelling; [ ] muscle, back, or extremity pain  Psychiatric: [ ] depression, [ ]anxiety, [ ]mood swings, or [ ]difficulty sleeping  Hematologic: [ ] easy bruising, [ ] bleeding gums    [x] All remaining systems negative except as per above.   [  ] Unable to obtain    Vital Signs Last 24 Hrs  T(C): 37.2 (25 Jun 2018 04:54), Max: 37.4 (24 Jun 2018 11:14)  T(F): 99 (25 Jun 2018 04:54), Max: 99.4 (24 Jun 2018 17:33)  HR: 93 (25 Jun 2018 04:54) (81 - 93)  BP: 133/58 (25 Jun 2018 04:54) (104/54 - 133/58)  BP(mean): --  RR: 18 (25 Jun 2018 04:54) (17 - 20)  SpO2: 100% (25 Jun 2018 04:54) (98% - 100%)  I&O's Summary    24 Jun 2018 07:01  -  25 Jun 2018 07:00  --------------------------------------------------------  IN: 875 mL / OUT: 2075 mL / NET: -1200 mL    25 Jun 2018 07:01  -  25 Jun 2018 09:45  --------------------------------------------------------  IN: 0 mL / OUT: 400 mL / NET: -400 mL        PHYSICAL EXAM:  General: No acute distress  HEENT: EOMI, PERRL  Neck: Supple, No JVD  Lungs: Clear to auscultation bilaterally; No rales or wheezing  Heart: Regular rate and rhythm; No murmurs, rubs, or gallops  Abdomen: Nontender, bowel sounds present  Extremities: No clubbing, cyanosis, or edema  Nervous system:  Alert & Oriented X3, no focal deficits  Psychiatric: Normal affect  Skin: No rashes or lesions    LABS:  06-25    140  |  103  |  26<H>  ----------------------------<  108<H>  3.9   |  30  |  1.21    Ca    8.2<L>      25 Jun 2018 07:11  Phos  4.1     06-24  Mg     1.7     06-24    TPro  7.4  /  Alb  3.4<L>  /  TBili  0.5  /  DBili  x   /  AST  25  /  ALT  22  /  AlkPhos  85  06-23    Creatinine Trend: 1.21<--, 1.24<--, 1.09<--                        10.6   7.8   )-----------( 199      ( 25 Jun 2018 07:11 )             34.9       Cardiac Enzymes: CARDIAC MARKERS ( 23 Jun 2018 14:53 )  <0.015 ng/mL / x     / x     / x     / x        Serum Pro-Brain Natriuretic Peptide: 2432 pg/mL (06-23-18 @ 14:53)    RADIOLOGY: < from: US Duplex Venous Lower Ext Complete, Bilateral (06.24.18 @ 13:39) >  IMPRESSION:     No evidence of bilateral lower extremity deep venous thrombosis.    < end of copied text >      TELEMETRY: Atrial fibrillation with occasional PVCs vs. aberrantly conducted complexes. HR in the 80s

## 2018-06-25 NOTE — CONSULT NOTE ADULT - ATTENDING COMMENTS
As above  CVI/edema/post phlebitic sequealea  US nog for current DVT    Cont med mgmt  LE elev  Compression Tx- will need compression stockings post DC    FU outpt
Thank you for the courtesy of a consultation, please contact me for any additional questions

## 2018-06-25 NOTE — PROGRESS NOTE ADULT - PROBLEM SELECTOR PLAN 4
On metformin 750mg qday  - A1c:6.7  - insulin sliding scale EKG shows atrial fibrillation, likely new onset  as Patient here in March 2018 when EKG showed NSR  c/w eliquis as it is nonvalvular Afib noticed on Echo  c/w cardizem 120mg daily for HR control   - ECHO noted above  -Telemonitoring shows heart rate control  - CTA chest negative PE  - Cardiologist- Dr Dale following

## 2018-06-25 NOTE — PROGRESS NOTE ADULT - SUBJECTIVE AND OBJECTIVE BOX
Patient is a 78y old  Male who presents with a chief complaint of b/l LE swelling (2018 14:59)      INTERVAL HPI/OVERNIGHT EVENTS: No major overnight events, Patient seen at bedside, reports of feeling improved, less dyspnea. His chronic b/l leg swelling is same.     MEDICATIONS  (STANDING):  apixaban 5 milliGRAM(s) Oral every 12 hours  ascorbic acid 500 milliGRAM(s) Oral daily  aspirin  chewable 81 milliGRAM(s) Oral daily  atorvastatin 40 milliGRAM(s) Oral at bedtime  cefTRIAXone   IVPB 1 Gram(s) IV Intermittent every 24 hours  dextrose 5%. 1000 milliLiter(s) (50 mL/Hr) IV Continuous <Continuous>  diltiazem    milliGRAM(s) Oral daily  doxazosin 2 milliGRAM(s) Oral at bedtime  ferrous    sulfate 325 milliGRAM(s) Oral daily  finasteride 5 milliGRAM(s) Oral daily  furosemide   Injectable 40 milliGRAM(s) IV Push daily  insulin lispro (HumaLOG) corrective regimen sliding scale   SubCutaneous three times a day before meals  lisinopril 40 milliGRAM(s) Oral daily  metolazone 5 milliGRAM(s) Oral daily    MEDICATIONS  (PRN):  acetaminophen   Tablet. 650 milliGRAM(s) Oral every 6 hours PRN Mild Pain (1 - 3)      Allergies    No Known Allergies    Intolerances        REVIEW OF SYSTEMS:  CONSTITUTIONAL: No fever, weight loss, or fatigue  RESPIRATORY: No cough, wheezing, chills or hemoptysis; No shortness of breath  CARDIOVASCULAR: No chest pain, palpitations, dizziness, or leg swelling  GASTROINTESTINAL: No abdominal or epigastric pain. No nausea, vomiting, or hematemesis;   NEUROLOGICAL: No headaches, memory loss, loss of strength, numbness, or tremors  SKIN: No itching, burning, rashes, or lesions   EXTREMITIES: C    Vital Signs Last 24 Hrs  T(C): 36.4 (2018 13:37), Max: 37.4 (2018 17:33)  T(F): 97.6 (2018 13:37), Max: 99.4 (2018 17:33)  HR: 84 (2018 13:37) (74 - 93)  BP: 126/63 (2018 13:37) (104/54 - 133/58)  BP(mean): --  RR: 16 (2018 13:37) (16 - 18)  SpO2: 96% (2018 13:37) (96% - 100%)    PHYSICAL EXAM:  GENERAL: NAD, well-groomed, well-developed  HEAD:  Atraumatic, Normocephalic  EYES: EOMI, PERRLA, conjunctiva and sclera clear  NECK: Supple, No JVD, Normal thyroid  CHEST/LUNG: Clear to percussion bilaterally; No rales, rhonchi, wheezing, or rubs  HEART: Regular rate and rhythm; No murmurs, rubs, or gallops  ABDOMEN: Soft, Nontender, Nondistended; Bowel sounds present  NERVOUS SYSTEM:  Alert & Oriented X3, Good concentration; Motor Strength 5/5 B/L   EXTREMITIES:  2+ Peripheral Pulses, No clubbing, cyanosis, or edema  SKIN;    LABS:                        10.6   7.8   )-----------( 199      ( 2018 07:11 )             34.9     06-25    140  |  103  |  26<H>  ----------------------------<  108<H>  3.9   |  30  |  1.21    Ca    8.2<L>      2018 07:11  Phos  4.1     06-24  Mg     1.7     06-24    TPro  7.4  /  Alb  3.4<L>  /  TBili  0.5  /  DBili  x   /  AST  25  /  ALT  22  /  AlkPhos  85  06-23      Urinalysis Basic - ( 2018 14:52 )    Color: Pale Yellow / Appearance: Clear / S.005 / pH: x  Gluc: x / Ketone: Negative  / Bili: Negative / Urobili: Negative   Blood: x / Protein: Negative / Nitrite: Negative   Leuk Esterase: Negative / RBC: x / WBC x   Sq Epi: x / Non Sq Epi: x / Bacteria: x      CAPILLARY BLOOD GLUCOSE      POCT Blood Glucose.: 131 mg/dL (2018 12:14)  POCT Blood Glucose.: 112 mg/dL (2018 07:57)  POCT Blood Glucose.: 124 mg/dL (2018 21:29)  POCT Blood Glucose.: 189 mg/dL (2018 16:43)      RADIOLOGY & ADDITIONAL TESTS:    Imaging Personally Reviewed:  [ ] YES  [ ] NO    Consultant(s) Notes Reviewed:  [ ] YES  [ ] NO Patient is a 78y old  Male who presents with a chief complaint of b/l LE swelling (2018 14:59)      INTERVAL HPI/OVERNIGHT EVENTS: No major overnight events, Patient seen at bedside, reports of feeling improved, less dyspnea. His chronic b/l leg swelling is same.     MEDICATIONS  (STANDING):  apixaban 5 milliGRAM(s) Oral every 12 hours  ascorbic acid 500 milliGRAM(s) Oral daily  aspirin  chewable 81 milliGRAM(s) Oral daily  atorvastatin 40 milliGRAM(s) Oral at bedtime  cefTRIAXone   IVPB 1 Gram(s) IV Intermittent every 24 hours  dextrose 5%. 1000 milliLiter(s) (50 mL/Hr) IV Continuous <Continuous>  diltiazem    milliGRAM(s) Oral daily  doxazosin 2 milliGRAM(s) Oral at bedtime  ferrous    sulfate 325 milliGRAM(s) Oral daily  finasteride 5 milliGRAM(s) Oral daily  furosemide   Injectable 40 milliGRAM(s) IV Push daily  insulin lispro (HumaLOG) corrective regimen sliding scale   SubCutaneous three times a day before meals  lisinopril 40 milliGRAM(s) Oral daily  metolazone 5 milliGRAM(s) Oral daily    MEDICATIONS  (PRN):  acetaminophen   Tablet. 650 milliGRAM(s) Oral every 6 hours PRN Mild Pain (1 - 3)      Allergies    No Known Allergies    Intolerances        REVIEW OF SYSTEMS:  CONSTITUTIONAL: No fever, weight loss, or fatigue  RESPIRATORY: No cough, wheezing, chills or hemoptysis; No shortness of breath  CARDIOVASCULAR: No chest pain, palpitations, dizziness, or leg swelling  GASTROINTESTINAL: No abdominal or epigastric pain. No nausea, vomiting, or hematemesis;   NEUROLOGICAL: No headaches, memory loss, loss of strength, numbness, or tremors  SKIN: No itching, burning, rashes, or lesions   EXTREMITIES: B/l Lower leg extremities swelling L >R     Vital Signs Last 24 Hrs  T(C): 36.4 (2018 13:37), Max: 37.4 (2018 17:33)  T(F): 97.6 (2018 13:37), Max: 99.4 (2018 17:33)  HR: 84 (2018 13:37) (74 - 93)  BP: 126/63 (2018 13:37) (104/54 - 133/58)  BP(mean): --  RR: 16 (2018 13:37) (16 - 18)  SpO2: 96% (2018 13:37) (96% - 100%)    PHYSICAL EXAM:  GENERAL: NAD,   HEAD:  Atraumatic, Normocephalic  EYES:, conjunctiva and sclera clear  NECK: Supple, No JVD, Normal thyroid  CHEST/LUNG: Clear to auscultation, Clear to percussion bilaterally; No rales, rhonchi, wheezing, or rubs  HEART: Regular rate and rhythm; No murmurs, rubs, or gallops  ABDOMEN: Soft, Nontender, Nondistended; Bowel sounds present  NERVOUS SYSTEM:  Alert & Oriented X3,  EXTREMITIES: B/l Lower extremity swelling upto groin, non pitting, 2+ Peripheral Pulses, No clubbing, cyanosis,   SKIN; warm, dry    LABS:                        10.6   7.8   )-----------( 199      ( 2018 07:11 )             34.9     06-25    140  |  103  |  26<H>  ----------------------------<  108<H>  3.9   |  30  |  1.21    Ca    8.2<L>      2018 07:11  Phos  4.1     06-24  Mg     1.7     06-24    TPro  7.4  /  Alb  3.4<L>  /  TBili  0.5  /  DBili  x   /  AST  25  /  ALT  22  /  AlkPhos  85  06-23      Urinalysis Basic - ( 2018 14:52 )    Color: Pale Yellow / Appearance: Clear / S.005 / pH: x  Gluc: x / Ketone: Negative  / Bili: Negative / Urobili: Negative   Blood: x / Protein: Negative / Nitrite: Negative   Leuk Esterase: Negative / RBC: x / WBC x   Sq Epi: x / Non Sq Epi: x / Bacteria: x      CAPILLARY BLOOD GLUCOSE      POCT Blood Glucose.: 131 mg/dL (2018 12:14)  POCT Blood Glucose.: 112 mg/dL (2018 07:57)  POCT Blood Glucose.: 124 mg/dL (2018 21:29)  POCT Blood Glucose.: 189 mg/dL (2018 16:43)      RADIOLOGY & ADDITIONAL TESTS: < from: Transthoracic Echocardiogram (18 @ 07:11) >  CONCLUSIONS:  1. Normal mitral valve. Trace mitral regurgitation.  2. Aortic valve not well visualized.  Mild aortic stenosis.  No aortic valve regurgitation seen.  3. Normal aortic root.  4. Normal left atrium.  5. Normal left ventricular internal dimensions and wall  thicknesses.  6. Endocardium not well visualized; grossly normal left  ventricular systolic function.  7. Diastolic function incompletely assessed.  8. Right ventricle not well visualized, probably normal  function.  9. RV systolic pressure is mild to moderately increased at  41 mm Hg.  10. Tricuspid valve not well visualized, probably normal.  Trace tricuspid regurgitation.  11. Pulmonic valve not well seen. Trace pulmonic  insufficiency is noted.  12. No pericardial effusion.    < end of copied text >      Imaging Personally Reviewed:  [ ] YES  [ ] NO    Consultant(s) Notes Reviewed:  [ ] YES  [ ] NO Patient is a 78y old  Male who presents with a chief complaint of b/l LE swelling (2018 14:59)      INTERVAL HPI/OVERNIGHT EVENTS: No major overnight events, Patient seen at bedside, reports of feeling improved, less dyspnea. His chronic b/l leg swelling is same.     MEDICATIONS  (STANDING):  apixaban 5 milliGRAM(s) Oral every 12 hours  ascorbic acid 500 milliGRAM(s) Oral daily  aspirin  chewable 81 milliGRAM(s) Oral daily  atorvastatin 40 milliGRAM(s) Oral at bedtime  cefTRIAXone   IVPB 1 Gram(s) IV Intermittent every 24 hours  dextrose 5%. 1000 milliLiter(s) (50 mL/Hr) IV Continuous <Continuous>  diltiazem    milliGRAM(s) Oral daily  doxazosin 2 milliGRAM(s) Oral at bedtime  ferrous    sulfate 325 milliGRAM(s) Oral daily  finasteride 5 milliGRAM(s) Oral daily  furosemide   Injectable 40 milliGRAM(s) IV Push daily  insulin lispro (HumaLOG) corrective regimen sliding scale   SubCutaneous three times a day before meals  lisinopril 40 milliGRAM(s) Oral daily  metolazone 5 milliGRAM(s) Oral daily    MEDICATIONS  (PRN):  acetaminophen   Tablet. 650 milliGRAM(s) Oral every 6 hours PRN Mild Pain (1 - 3)      Allergies    No Known Allergies    Intolerances        REVIEW OF SYSTEMS:  CONSTITUTIONAL: No fever, weight loss, or fatigue  RESPIRATORY: No cough, wheezing, chills or hemoptysis; No shortness of breath  CARDIOVASCULAR: No chest pain, palpitations, dizziness, or leg swelling  GASTROINTESTINAL: No abdominal or epigastric pain. No nausea, vomiting, or hematemesis;   NEUROLOGICAL: No headaches, memory loss, loss of strength, numbness, or tremors  SKIN: No itching, burning, rashes, or lesions   EXTREMITIES: B/l Lower leg extremities swelling L >R     Vital Signs Last 24 Hrs  T(C): 36.4 (2018 13:37), Max: 37.4 (2018 17:33)  T(F): 97.6 (2018 13:37), Max: 99.4 (2018 17:33)  HR: 84 (2018 13:37) (74 - 93)  BP: 126/63 (2018 13:37) (104/54 - 133/58)  BP(mean): --  RR: 16 (2018 13:37) (16 - 18)  SpO2: 96% (2018 13:37) (96% - 100%)    PHYSICAL EXAM:  GENERAL: NAD,   HEAD:  Atraumatic, Normocephalic  EYES:, conjunctiva and sclera clear  NECK: Supple, No JVD, Normal thyroid  CHEST/LUNG: Clear to auscultation, Clear to percussion bilaterally; No rales, rhonchi, wheezing, or rubs  HEART: Regular rate and rhythm; No murmurs, rubs, or gallops  ABDOMEN: Soft, Nontender, Nondistended; Bowel sounds present  NERVOUS SYSTEM:  Alert & Oriented X3,  EXTREMITIES: B/l Lower extremity swelling up to groin with mild erythema, , non pitting, 2+ Peripheral Pulses, No clubbing, cyanosis,   SKIN; warm, dry    LABS:                        10.6   7.8   )-----------( 199      ( 2018 07:11 )             34.9     06-25    140  |  103  |  26<H>  ----------------------------<  108<H>  3.9   |  30  |  1.21    Ca    8.2<L>      2018 07:11  Phos  4.1     06-24  Mg     1.7     06-24    TPro  7.4  /  Alb  3.4<L>  /  TBili  0.5  /  DBili  x   /  AST  25  /  ALT  22  /  AlkPhos  85  06-23      Urinalysis Basic - ( 2018 14:52 )    Color: Pale Yellow / Appearance: Clear / S.005 / pH: x  Gluc: x / Ketone: Negative  / Bili: Negative / Urobili: Negative   Blood: x / Protein: Negative / Nitrite: Negative   Leuk Esterase: Negative / RBC: x / WBC x   Sq Epi: x / Non Sq Epi: x / Bacteria: x      CAPILLARY BLOOD GLUCOSE      POCT Blood Glucose.: 131 mg/dL (2018 12:14)  POCT Blood Glucose.: 112 mg/dL (2018 07:57)  POCT Blood Glucose.: 124 mg/dL (2018 21:29)  POCT Blood Glucose.: 189 mg/dL (2018 16:43)      RADIOLOGY & ADDITIONAL TESTS: < from: Transthoracic Echocardiogram (18 @ 07:11) >  CONCLUSIONS:  1. Normal mitral valve. Trace mitral regurgitation.  2. Aortic valve not well visualized.  Mild aortic stenosis.  No aortic valve regurgitation seen.  3. Normal aortic root.  4. Normal left atrium.  5. Normal left ventricular internal dimensions and wall  thicknesses.  6. Endocardium not well visualized; grossly normal left  ventricular systolic function.  7. Diastolic function incompletely assessed.  8. Right ventricle not well visualized, probably normal  function.  9. RV systolic pressure is mild to moderately increased at  41 mm Hg.  10. Tricuspid valve not well visualized, probably normal.  Trace tricuspid regurgitation.  11. Pulmonic valve not well seen. Trace pulmonic  insufficiency is noted.  12. No pericardial effusion.    < end of copied text >      Imaging Personally Reviewed:  [ ] YES  [ ] NO    Consultant(s) Notes Reviewed:  [ ] YES  [ ] NO

## 2018-06-25 NOTE — PROGRESS NOTE ADULT - ASSESSMENT
1) Fluid overload  2) Chronic venous stasis  3) Possible underlying sleep disordered breathing  4) Scrotal edema likely from a hydrocele  5) Constipation    Stable from a pulmonary standpoint  Multiple complaints including constipation and scrotal edema  Difficult to determine adequacy of diuresis, can consider metalozone to help augment lasix  Consider bowel regimen and scrotal elevation  PT/OT  Outpatient sleep study.  Can assess room air sats off oxygen and on ambulation to determine if he'll need home o2

## 2018-06-25 NOTE — CONSULT NOTE ADULT - SUBJECTIVE AND OBJECTIVE BOX
Vascular Surgery  Consultation Note    Patient is a 78y old  Male who presents with a chief complaint of b/l LE swelling (24 Jun 2018 14:59)      HPI:  79 y/o M with PMHx  of DM, HTN, Chronic lymphedema of bilateral legs, left knee arthritis, DVT of bilateral legs previously on Xarelto, PSHx of right incarcerated inguinal hernia repair presented to the ED with c/o b/l LE swelling and SOB. PT states with chronic swelling of LE for several months.   NO other complaints. Pt given multiple doses of lasix for fluid overload.  Currently on antibiotics for treatment of cellulitis.           PAST MEDICAL & SURGICAL HISTORY:  PND (paroxysmal nocturnal dyspnea)  Lymphedema of leg  Hypertension  Diabetes  H/O inguinal hernia repair      Allergies    No Known Allergies    Intolerances        MEDICATIONS  (STANDING):  apixaban 5 milliGRAM(s) Oral every 12 hours  ascorbic acid 500 milliGRAM(s) Oral daily  aspirin  chewable 81 milliGRAM(s) Oral daily  atorvastatin 40 milliGRAM(s) Oral at bedtime  cefTRIAXone   IVPB 1 Gram(s) IV Intermittent every 24 hours  clindamycin IVPB 600 milliGRAM(s) IV Intermittent once  clindamycin IVPB 600 milliGRAM(s) IV Intermittent every 8 hours  clindamycin IVPB      dextrose 5%. 1000 milliLiter(s) (50 mL/Hr) IV Continuous <Continuous>  diltiazem    milliGRAM(s) Oral daily  doxazosin 2 milliGRAM(s) Oral at bedtime  ferrous    sulfate 325 milliGRAM(s) Oral daily  finasteride 5 milliGRAM(s) Oral daily  furosemide   Injectable 40 milliGRAM(s) IV Push daily  insulin lispro (HumaLOG) corrective regimen sliding scale   SubCutaneous three times a day before meals  lisinopril 40 milliGRAM(s) Oral daily  metolazone 5 milliGRAM(s) Oral daily    MEDICATIONS  (PRN):  acetaminophen   Tablet. 650 milliGRAM(s) Oral every 6 hours PRN Mild Pain (1 - 3)      Vital Signs Last 24 Hrs  T(C): 36.4 (25 Jun 2018 13:37), Max: 37.4 (24 Jun 2018 17:33)  T(F): 97.6 (25 Jun 2018 13:37), Max: 99.4 (24 Jun 2018 17:33)  HR: 84 (25 Jun 2018 13:37) (74 - 93)  BP: 126/63 (25 Jun 2018 13:37) (104/54 - 133/58)  BP(mean): --  RR: 16 (25 Jun 2018 13:37) (16 - 18)  SpO2: 96% (25 Jun 2018 13:37) (96% - 100%)    Physical Exam:  Gen: awake, alert oriented NAD  Abd: obese soft NT ND  Pelvic: scrotal edema, otherwise nl external genitalia  Ext: b/l LE edema, hypertrophy of skin, ++ erythema from toes to knees bilaterally    Labs:                          10.6   7.8   )-----------( 199      ( 25 Jun 2018 07:11 )             34.9     06-25    140  |  103  |  26<H>  ----------------------------<  108<H>  3.9   |  30  |  1.21    Ca    8.2<L>      25 Jun 2018 07:11  Phos  4.1     06-24  Mg     1.7     06-24            Radiological Exams:  < from: CT Abdomen and Pelvis w/ IV Cont (06.23.18 @ 21:56) >  IMPRESSION:     Anasarca. No significant abdominopelvic ascites.    Chronic stable findings as above.    < end of copied text >

## 2018-06-25 NOTE — PROGRESS NOTE ADULT - PROBLEM SELECTOR PLAN 3
EKG shows atrial fibrillation, likely new onset  as Patient here in March 2018 when EKG showed NSR  c/w eliquis as it is nonvalvular Afib noticed on Echo  c/w cardizem 120mg daily for HR control   - ECHO noted above  -Telemonitoring shows heart rate control  - CTA chest negative PE  - Cardiologist- Dr Dale following Patient also have b/l lower leg extremity swelling, with worsening cellulitis  started clindamycin today  c/w rocephin Day 3

## 2018-06-25 NOTE — PROGRESS NOTE ADULT - PROBLEM SELECTOR PLAN 2
Patient presented with b/l chronic leg non pitting edema, had hx of filariasis treatment.  - f/u filarial antibody  c/w leg elevation,   - Also on rocephin for possible cellulitis day 3, afebrile/ no leukocytosis  -Doppler of leg shows no Leg DVT b/l  -Vascular consulted for lymphedema

## 2018-06-26 DIAGNOSIS — L27.0 GENERALIZED SKIN ERUPTION DUE TO DRUGS AND MEDICAMENTS TAKEN INTERNALLY: ICD-10-CM

## 2018-06-26 LAB
BASOPHILS # BLD AUTO: 0 K/UL — SIGNIFICANT CHANGE UP (ref 0–0.2)
BASOPHILS NFR BLD AUTO: 0.5 % — SIGNIFICANT CHANGE UP (ref 0–2)
EOSINOPHIL # BLD AUTO: 1 K/UL — HIGH (ref 0–0.5)
EOSINOPHIL NFR BLD AUTO: 10.3 % — HIGH (ref 0–6)
HCT VFR BLD CALC: 33.1 % — LOW (ref 39–50)
HGB BLD-MCNC: 10.6 G/DL — LOW (ref 13–17)
LYMPHOCYTES # BLD AUTO: 0.5 K/UL — LOW (ref 1–3.3)
LYMPHOCYTES # BLD AUTO: 5.2 % — LOW (ref 13–44)
MCHC RBC-ENTMCNC: 27.1 PG — SIGNIFICANT CHANGE UP (ref 27–34)
MCHC RBC-ENTMCNC: 31.8 GM/DL — LOW (ref 32–36)
MCV RBC AUTO: 85 FL — SIGNIFICANT CHANGE UP (ref 80–100)
MONOCYTES # BLD AUTO: 0.7 K/UL — SIGNIFICANT CHANGE UP (ref 0–0.9)
MONOCYTES NFR BLD AUTO: 6.8 % — SIGNIFICANT CHANGE UP (ref 2–14)
NEUTROPHILS # BLD AUTO: 7.8 K/UL — HIGH (ref 1.8–7.4)
NEUTROPHILS NFR BLD AUTO: 77.2 % — HIGH (ref 43–77)
OB PNL STL: POSITIVE
PLATELET # BLD AUTO: 208 K/UL — SIGNIFICANT CHANGE UP (ref 150–400)
RBC # BLD: 3.9 M/UL — LOW (ref 4.2–5.8)
RBC # FLD: 14.7 % — HIGH (ref 10.3–14.5)
WBC # BLD: 10.1 K/UL — SIGNIFICANT CHANGE UP (ref 3.8–10.5)
WBC # FLD AUTO: 10.1 K/UL — SIGNIFICANT CHANGE UP (ref 3.8–10.5)

## 2018-06-26 PROCEDURE — 99232 SBSQ HOSP IP/OBS MODERATE 35: CPT

## 2018-06-26 PROCEDURE — 99233 SBSQ HOSP IP/OBS HIGH 50: CPT | Mod: GC

## 2018-06-26 RX ORDER — FUROSEMIDE 40 MG
40 TABLET ORAL
Qty: 0 | Refills: 0 | Status: DISCONTINUED | OUTPATIENT
Start: 2018-06-27 | End: 2018-06-27

## 2018-06-26 RX ORDER — LACTULOSE 10 G/15ML
30 SOLUTION ORAL ONCE
Qty: 0 | Refills: 0 | Status: COMPLETED | OUTPATIENT
Start: 2018-06-26 | End: 2018-06-26

## 2018-06-26 RX ADMIN — Medication 325 MILLIGRAM(S): at 12:31

## 2018-06-26 RX ADMIN — Medication 500 MILLIGRAM(S): at 12:31

## 2018-06-26 RX ADMIN — APIXABAN 5 MILLIGRAM(S): 2.5 TABLET, FILM COATED ORAL at 05:40

## 2018-06-26 RX ADMIN — ATORVASTATIN CALCIUM 40 MILLIGRAM(S): 80 TABLET, FILM COATED ORAL at 22:06

## 2018-06-26 RX ADMIN — Medication 1: at 12:31

## 2018-06-26 RX ADMIN — LACTULOSE 30 GRAM(S): 10 SOLUTION ORAL at 11:51

## 2018-06-26 RX ADMIN — Medication 100 MILLIGRAM(S): at 05:43

## 2018-06-26 RX ADMIN — Medication 100 MILLIGRAM(S): at 05:40

## 2018-06-26 RX ADMIN — CEFTRIAXONE 100 GRAM(S): 500 INJECTION, POWDER, FOR SOLUTION INTRAMUSCULAR; INTRAVENOUS at 05:05

## 2018-06-26 RX ADMIN — LISINOPRIL 40 MILLIGRAM(S): 2.5 TABLET ORAL at 05:43

## 2018-06-26 RX ADMIN — Medication 2 MILLIGRAM(S): at 22:06

## 2018-06-26 RX ADMIN — Medication 40 MILLIGRAM(S): at 05:41

## 2018-06-26 RX ADMIN — Medication 120 MILLIGRAM(S): at 05:40

## 2018-06-26 RX ADMIN — FINASTERIDE 5 MILLIGRAM(S): 5 TABLET, FILM COATED ORAL at 12:30

## 2018-06-26 RX ADMIN — Medication 81 MILLIGRAM(S): at 12:31

## 2018-06-26 NOTE — PROGRESS NOTE ADULT - PROBLEM SELECTOR PLAN 3
Patient also have b/l lower leg extremity swelling, with worsening cellulitis  c/w rocephin Day 4 and clindamycin Day 2 Patient also have b/l lower leg extremity swelling, with worsening cellulitis  c/w rocephin Day 4   stopped clindamycin  due to drug reaction Patient presented with b/l chronic leg non pitting edema, had hx of filariasis treatment.  -f/u filarial antibody  c/w leg elevation  -Doppler of leg shows no Leg DVT b/l  -Compression Tx- pt need compression stockings post DC  -Vascular consulted for lymphedema

## 2018-06-26 NOTE — PROGRESS NOTE ADULT - SUBJECTIVE AND OBJECTIVE BOX
Patient is a 78y old  Male who presents with a chief complaint of b/l LE swelling (2018 14:59)      INTERVAL HPI/OVERNIGHT EVENTS: No major overnight events, Patient seen at bedside, reports of feeling improved, less dyspnea. His chronic b/l leg swelling is same.     MEDICATIONS  (STANDING):  apixaban 5 milliGRAM(s) Oral every 12 hours  ascorbic acid 500 milliGRAM(s) Oral daily  aspirin  chewable 81 milliGRAM(s) Oral daily  atorvastatin 40 milliGRAM(s) Oral at bedtime  cefTRIAXone   IVPB 1 Gram(s) IV Intermittent every 24 hours  dextrose 5%. 1000 milliLiter(s) (50 mL/Hr) IV Continuous <Continuous>  diltiazem    milliGRAM(s) Oral daily  doxazosin 2 milliGRAM(s) Oral at bedtime  ferrous    sulfate 325 milliGRAM(s) Oral daily  finasteride 5 milliGRAM(s) Oral daily  furosemide   Injectable 40 milliGRAM(s) IV Push daily  insulin lispro (HumaLOG) corrective regimen sliding scale   SubCutaneous three times a day before meals  lisinopril 40 milliGRAM(s) Oral daily  metolazone 5 milliGRAM(s) Oral daily    MEDICATIONS  (PRN):  acetaminophen   Tablet. 650 milliGRAM(s) Oral every 6 hours PRN Mild Pain (1 - 3)      Allergies    No Known Allergies    Intolerances        REVIEW OF SYSTEMS:  CONSTITUTIONAL: No fever, weight loss, or fatigue  RESPIRATORY: No cough, wheezing, chills or hemoptysis; No shortness of breath  CARDIOVASCULAR: No chest pain, palpitations, dizziness, or leg swelling  GASTROINTESTINAL: No abdominal or epigastric pain. No nausea, vomiting, or hematemesis;   NEUROLOGICAL: No headaches, memory loss, loss of strength, numbness, or tremors  SKIN: No itching, burning, rashes, or lesions   EXTREMITIES: B/l Lower leg extremities swelling L >R     Vital Signs Last 24 Hrs  T(C): 36.4 (2018 13:37), Max: 37.4 (2018 17:33)  T(F): 97.6 (2018 13:37), Max: 99.4 (2018 17:33)  HR: 84 (2018 13:37) (74 - 93)  BP: 126/63 (2018 13:37) (104/54 - 133/58)  BP(mean): --  RR: 16 (2018 13:37) (16 - 18)  SpO2: 96% (2018 13:37) (96% - 100%)    PHYSICAL EXAM:  GENERAL: NAD,   HEAD:  Atraumatic, Normocephalic  EYES:, conjunctiva and sclera clear  NECK: Supple, No JVD, Normal thyroid  CHEST/LUNG: Clear to auscultation, Clear to percussion bilaterally; No rales, rhonchi, wheezing, or rubs  HEART: Regular rate and rhythm; No murmurs, rubs, or gallops  ABDOMEN: Soft, Nontender, Nondistended; Bowel sounds present  NERVOUS SYSTEM:  Alert & Oriented X3,  EXTREMITIES: B/l Lower extremity swelling up to groin with mild erythema, , non pitting, 2+ Peripheral Pulses, No clubbing, cyanosis,   SKIN; warm, dry    LABS:                        10.6   7.8   )-----------( 199      ( 2018 07:11 )             34.9     06-25    140  |  103  |  26<H>  ----------------------------<  108<H>  3.9   |  30  |  1.21    Ca    8.2<L>      2018 07:11  Phos  4.1     06-24  Mg     1.7     06-24    TPro  7.4  /  Alb  3.4<L>  /  TBili  0.5  /  DBili  x   /  AST  25  /  ALT  22  /  AlkPhos  85  06-23      Urinalysis Basic - ( 2018 14:52 )    Color: Pale Yellow / Appearance: Clear / S.005 / pH: x  Gluc: x / Ketone: Negative  / Bili: Negative / Urobili: Negative   Blood: x / Protein: Negative / Nitrite: Negative   Leuk Esterase: Negative / RBC: x / WBC x   Sq Epi: x / Non Sq Epi: x / Bacteria: x      CAPILLARY BLOOD GLUCOSE      POCT Blood Glucose.: 131 mg/dL (2018 12:14)  POCT Blood Glucose.: 112 mg/dL (2018 07:57)  POCT Blood Glucose.: 124 mg/dL (2018 21:29)  POCT Blood Glucose.: 189 mg/dL (2018 16:43)      RADIOLOGY & ADDITIONAL TESTS: < from: Transthoracic Echocardiogram (18 @ 07:11) >  CONCLUSIONS:  1. Normal mitral valve. Trace mitral regurgitation.  2. Aortic valve not well visualized.  Mild aortic stenosis.  No aortic valve regurgitation seen.  3. Normal aortic root.  4. Normal left atrium.  5. Normal left ventricular internal dimensions and wall  thicknesses.  6. Endocardium not well visualized; grossly normal left  ventricular systolic function.  7. Diastolic function incompletely assessed.  8. Right ventricle not well visualized, probably normal  function.  9. RV systolic pressure is mild to moderately increased at  41 mm Hg.  10. Tricuspid valve not well visualized, probably normal.  Trace tricuspid regurgitation.  11. Pulmonic valve not well seen. Trace pulmonic  insufficiency is noted.  12. No pericardial effusion.    < end of copied text >      Imaging Personally Reviewed:  [ ] YES  [ ] NO    Consultant(s) Notes Reviewed:  [ ] YES  [ ] NO Patient is a 78y old  Male who presents with a chief complaint of b/l LE swelling (2018 14:59)      INTERVAL HPI/OVERNIGHT EVENTS: No major overnight events, Patient seen at bedside, reports of feeling improved, less dyspnea. His chronic b/l leg swelling is same.     MEDICATIONS  (STANDING):  apixaban 5 milliGRAM(s) Oral every 12 hours  ascorbic acid 500 milliGRAM(s) Oral daily  aspirin  chewable 81 milliGRAM(s) Oral daily  atorvastatin 40 milliGRAM(s) Oral at bedtime  cefTRIAXone   IVPB 1 Gram(s) IV Intermittent every 24 hours  dextrose 5%. 1000 milliLiter(s) (50 mL/Hr) IV Continuous <Continuous>  diltiazem    milliGRAM(s) Oral daily  doxazosin 2 milliGRAM(s) Oral at bedtime  ferrous    sulfate 325 milliGRAM(s) Oral daily  finasteride 5 milliGRAM(s) Oral daily  furosemide   Injectable 40 milliGRAM(s) IV Push daily  insulin lispro (HumaLOG) corrective regimen sliding scale   SubCutaneous three times a day before meals  lisinopril 40 milliGRAM(s) Oral daily  metolazone 5 milliGRAM(s) Oral daily    MEDICATIONS  (PRN):  acetaminophen   Tablet. 650 milliGRAM(s) Oral every 6 hours PRN Mild Pain (1 - 3)      Allergies    No Known Allergies    Intolerances        REVIEW OF SYSTEMS:  CONSTITUTIONAL: No fever, weight loss, or fatigue  RESPIRATORY: No cough, wheezing, chills or hemoptysis; No shortness of breath  CARDIOVASCULAR: No chest pain, palpitations, dizziness, or leg swelling  GASTROINTESTINAL: No abdominal or epigastric pain. No nausea, vomiting, or hematemesis;   NEUROLOGICAL: No headaches, memory loss, loss of strength, numbness, or tremors  SKIN: No itching, burning, rashes, or lesions   EXTREMITIES: B/l Lower leg extremities swelling L >R     Vital Signs Last 24 Hrs  T(C): 36.4 (2018 13:37), Max: 37.4 (2018 17:33)  T(F): 97.6 (2018 13:37), Max: 99.4 (2018 17:33)  HR: 84 (2018 13:37) (74 - 93)  BP: 126/63 (2018 13:37) (104/54 - 133/58)  BP(mean): --  RR: 16 (2018 13:37) (16 - 18)  SpO2: 96% (2018 13:37) (96% - 100%)    PHYSICAL EXAM:  GENERAL: NAD,   HEAD:  Atraumatic, Normocephalic  EYES:, conjunctiva and sclera clear  NECK: Supple, No JVD, Normal thyroid  CHEST/LUNG: Clear to auscultation, Clear to percussion bilaterally; No rales, rhonchi, wheezing, or rubs  HEART: Regular rate and rhythm; No murmurs, rubs, or gallops  ABDOMEN: Soft, Nontender, Nondistended; Bowel sounds present  NERVOUS SYSTEM:  Alert & Oriented X3,  EXTREMITIES: B/l Lower extremity swelling up to groin with mild erythema, , non pitting, 2+ Peripheral Pulses, No clubbing, cyanosis,   SKIN; warm, dry    LABS:                        10.6   7.8   )-----------( 199      ( 2018 07:11 )             34.9     06-25    140  |  103  |  26<H>  ----------------------------<  108<H>  3.9   |  30  |  1.21    Ca    8.2<L>      2018 07:11  Phos  4.1     06-24  Mg     1.7     06-24    TPro  7.4  /  Alb  3.4<L>  /  TBili  0.5  /  DBili  x   /  AST  25  /  ALT  22  /  AlkPhos  85  06-23      Urinalysis Basic - ( 2018 14:52 )    Color: Pale Yellow / Appearance: Clear / S.005 / pH: x  Gluc: x / Ketone: Negative  / Bili: Negative / Urobili: Negative   Blood: x / Protein: Negative / Nitrite: Negative   Leuk Esterase: Negative / RBC: x / WBC x   Sq Epi: x / Non Sq Epi: x / Bacteria: x      CAPILLARY BLOOD GLUCOSE      POCT Blood Glucose.: 131 mg/dL (2018 12:14)  POCT Blood Glucose.: 112 mg/dL (2018 07:57)  POCT Blood Glucose.: 124 mg/dL (2018 21:29)  POCT Blood Glucose.: 189 mg/dL (2018 16:43)      RADIOLOGY & ADDITIONAL TESTS: < from: Transthoracic Echocardiogram (18 @ 07:11) >  CONCLUSIONS:  1. Normal mitral valve. Trace mitral regurgitation.  2. Aortic valve not well visualized.  Mild aortic stenosis.  No aortic valve regurgitation seen.  3. Normal aortic root.  4. Normal left atrium.  5. Normal left ventricular internal dimensions and wall  thicknesses.  6. Endocardium not well visualized; grossly normal left  ventricular systolic function.  7. Diastolic function incompletely assessed.  8. Right ventricle not well visualized, probably normal  function.  9. RV systolic pressure is mild to moderately increased at  41 mm Hg.  10. Tricuspid valve not well visualized, probably normal.  Trace tricuspid regurgitation.  11. Pulmonic valve not well seen. Trace pulmonic  insufficiency is noted.  12. No pericardial effusion.    < end of copied text >      Imaging Personally Reviewed:  [ X] YES  [ ] NO    Consultant(s) Notes Reviewed:  [X ] YES  [ ] NO Patient is a 78y old  Male who presents with a chief complaint of b/l LE swelling (2018 14:59)      INTERVAL HPI/OVERNIGHT EVENTS: No major overnight events, Patient seen at bedside, reports of feeling improved, less dyspnea. His chronic b/l leg swelling is same.  Net negative, pt has diffused rash on back , likely due to drug reaction, stopped clindamycin.     MEDICATIONS  (STANDING):  apixaban 5 milliGRAM(s) Oral every 12 hours  ascorbic acid 500 milliGRAM(s) Oral daily  aspirin  chewable 81 milliGRAM(s) Oral daily  atorvastatin 40 milliGRAM(s) Oral at bedtime  cefTRIAXone   IVPB 1 Gram(s) IV Intermittent every 24 hours  dextrose 5%. 1000 milliLiter(s) (50 mL/Hr) IV Continuous <Continuous>  diltiazem    milliGRAM(s) Oral daily  doxazosin 2 milliGRAM(s) Oral at bedtime  ferrous    sulfate 325 milliGRAM(s) Oral daily  finasteride 5 milliGRAM(s) Oral daily  furosemide   Injectable 40 milliGRAM(s) IV Push daily  insulin lispro (HumaLOG) corrective regimen sliding scale   SubCutaneous three times a day before meals  lisinopril 40 milliGRAM(s) Oral daily  metolazone 5 milliGRAM(s) Oral daily    MEDICATIONS  (PRN):  acetaminophen   Tablet. 650 milliGRAM(s) Oral every 6 hours PRN Mild Pain (1 - 3)      Allergies    No Known Allergies    Intolerances        REVIEW OF SYSTEMS:  CONSTITUTIONAL: No fever, weight loss, or fatigue  RESPIRATORY: No cough, wheezing, chills or hemoptysis; No shortness of breath  CARDIOVASCULAR: No chest pain, palpitations, dizziness, or leg swelling  GASTROINTESTINAL: No abdominal or epigastric pain. No nausea, vomiting, or hematemesis;   NEUROLOGICAL: No headaches, memory loss, loss of strength, numbness, or tremors  SKIN: No itching, burning, rashes, or lesions   EXTREMITIES: B/l Lower leg extremities swelling L >R     Vital Signs Last 24 Hrs  T(C): 36.4 (2018 13:37), Max: 37.4 (2018 17:33)  T(F): 97.6 (2018 13:37), Max: 99.4 (2018 17:33)  HR: 84 (2018 13:37) (74 - 93)  BP: 126/63 (2018 13:37) (104/54 - 133/58)  BP(mean): --  RR: 16 (2018 13:37) (16 - 18)  SpO2: 96% (2018 13:37) (96% - 100%)    PHYSICAL EXAM:  GENERAL: NAD,   HEAD:  Atraumatic, Normocephalic  EYES:, conjunctiva and sclera clear  NECK: Supple, No JVD, Normal thyroid  CHEST/LUNG: Clear to auscultation, Clear to percussion bilaterally; No rales, rhonchi, wheezing, or rubs  HEART: Regular rate and rhythm; No murmurs, rubs, or gallops  ABDOMEN: Soft, Nontender, Nondistended; Bowel sounds present  NERVOUS SYSTEM:  Alert & Oriented X3,  EXTREMITIES: B/l Lower extremity swelling up to groin with mild erythema, , non pitting, 2+ Peripheral Pulses, No clubbing, cyanosis,   SKIN; warm, dry    LABS:                        10.6   7.8   )-----------( 199      ( 2018 07:11 )             34.9     06-25    140  |  103  |  26<H>  ----------------------------<  108<H>  3.9   |  30  |  1.21    Ca    8.2<L>      2018 07:11  Phos  4.1     06-24  Mg     1.7     06-24    TPro  7.4  /  Alb  3.4<L>  /  TBili  0.5  /  DBili  x   /  AST  25  /  ALT  22  /  AlkPhos  85  06-23      Urinalysis Basic - ( 2018 14:52 )    Color: Pale Yellow / Appearance: Clear / S.005 / pH: x  Gluc: x / Ketone: Negative  / Bili: Negative / Urobili: Negative   Blood: x / Protein: Negative / Nitrite: Negative   Leuk Esterase: Negative / RBC: x / WBC x   Sq Epi: x / Non Sq Epi: x / Bacteria: x      CAPILLARY BLOOD GLUCOSE      POCT Blood Glucose.: 131 mg/dL (2018 12:14)  POCT Blood Glucose.: 112 mg/dL (2018 07:57)  POCT Blood Glucose.: 124 mg/dL (2018 21:29)  POCT Blood Glucose.: 189 mg/dL (2018 16:43)      RADIOLOGY & ADDITIONAL TESTS: < from: Transthoracic Echocardiogram (18 @ 07:11) >  CONCLUSIONS:  1. Normal mitral valve. Trace mitral regurgitation.  2. Aortic valve not well visualized.  Mild aortic stenosis.  No aortic valve regurgitation seen.  3. Normal aortic root.  4. Normal left atrium.  5. Normal left ventricular internal dimensions and wall  thicknesses.  6. Endocardium not well visualized; grossly normal left  ventricular systolic function.  7. Diastolic function incompletely assessed.  8. Right ventricle not well visualized, probably normal  function.  9. RV systolic pressure is mild to moderately increased at  41 mm Hg.  10. Tricuspid valve not well visualized, probably normal.  Trace tricuspid regurgitation.  11. Pulmonic valve not well seen. Trace pulmonic  insufficiency is noted.  12. No pericardial effusion.    < end of copied text >      Imaging Personally Reviewed:  [ X] YES  [ ] NO    Consultant(s) Notes Reviewed:  [X ] YES  [ ] NO Patient is a 78y old  Male who presents with a chief complaint of b/l LE swelling (2018 14:59)      INTERVAL HPI/OVERNIGHT EVENTS: No major overnight events, net negative. Pt has diffused rash , likely due to drug eruption, stopped clindamycin and rocephin.     MEDICATIONS  (STANDING):  apixaban 5 milliGRAM(s) Oral every 12 hours  ascorbic acid 500 milliGRAM(s) Oral daily  aspirin  chewable 81 milliGRAM(s) Oral daily  atorvastatin 40 milliGRAM(s) Oral at bedtime  cefTRIAXone   IVPB 1 Gram(s) IV Intermittent every 24 hours  dextrose 5%. 1000 milliLiter(s) (50 mL/Hr) IV Continuous <Continuous>  diltiazem    milliGRAM(s) Oral daily  doxazosin 2 milliGRAM(s) Oral at bedtime  ferrous    sulfate 325 milliGRAM(s) Oral daily  finasteride 5 milliGRAM(s) Oral daily  furosemide   Injectable 40 milliGRAM(s) IV Push daily  insulin lispro (HumaLOG) corrective regimen sliding scale   SubCutaneous three times a day before meals  lisinopril 40 milliGRAM(s) Oral daily  metolazone 5 milliGRAM(s) Oral daily    MEDICATIONS  (PRN):  acetaminophen   Tablet. 650 milliGRAM(s) Oral every 6 hours PRN Mild Pain (1 - 3)      Allergies    No Known Allergies    Intolerances        REVIEW OF SYSTEMS:  CONSTITUTIONAL: No fever, weight loss, or fatigue  RESPIRATORY: No cough, wheezing, chills or hemoptysis; No shortness of breath  CARDIOVASCULAR: No chest pain, palpitations, dizziness, or leg swelling  GASTROINTESTINAL: No abdominal or epigastric pain. No nausea, vomiting, or hematemesis;   NEUROLOGICAL: No headaches, memory loss, loss of strength, numbness, or tremors  SKIN: No itching, burning, rashes, or lesions   EXTREMITIES: B/l Lower leg extremities swelling L >R     Vital Signs Last 24 Hrs  T(C): 36.4 (2018 13:37), Max: 37.4 (2018 17:33)  T(F): 97.6 (2018 13:37), Max: 99.4 (2018 17:33)  HR: 84 (2018 13:37) (74 - 93)  BP: 126/63 (2018 13:37) (104/54 - 133/58)  BP(mean): --  RR: 16 (2018 13:37) (16 - 18)  SpO2: 96% (2018 13:37) (96% - 100%)    PHYSICAL EXAM:  GENERAL: NAD,   HEAD:  Atraumatic, Normocephalic  EYES:, conjunctiva and sclera clear  NECK: Supple, No JVD, Normal thyroid  CHEST/LUNG: Clear to auscultation, Clear to percussion bilaterally; No rales, rhonchi, wheezing, or rubs  HEART: Regular rate and rhythm; No murmurs, rubs, or gallops  ABDOMEN: Soft, Nontender, Nondistended; Bowel sounds present  NERVOUS SYSTEM:  Alert & Oriented X3,  EXTREMITIES: B/l Lower extremity swelling up to groin with mild erythema, , non pitting, 2+ Peripheral Pulses, No clubbing, cyanosis,   SKIN; warm, dry    LABS:                        10.6   7.8   )-----------( 199      ( 2018 07:11 )             34.9     06-    140  |  103  |  26<H>  ----------------------------<  108<H>  3.9   |  30  |  1.21    Ca    8.2<L>      2018 07:11  Phos  4.1     -24  Mg     1.7     06-24    TPro  7.4  /  Alb  3.4<L>  /  TBili  0.5  /  DBili  x   /  AST  25  /  ALT  22  /  AlkPhos  85  06-23      Urinalysis Basic - ( 2018 14:52 )    Color: Pale Yellow / Appearance: Clear / S.005 / pH: x  Gluc: x / Ketone: Negative  / Bili: Negative / Urobili: Negative   Blood: x / Protein: Negative / Nitrite: Negative   Leuk Esterase: Negative / RBC: x / WBC x   Sq Epi: x / Non Sq Epi: x / Bacteria: x      CAPILLARY BLOOD GLUCOSE      POCT Blood Glucose.: 131 mg/dL (2018 12:14)  POCT Blood Glucose.: 112 mg/dL (2018 07:57)  POCT Blood Glucose.: 124 mg/dL (2018 21:29)  POCT Blood Glucose.: 189 mg/dL (2018 16:43)      RADIOLOGY & ADDITIONAL TESTS: < from: Transthoracic Echocardiogram (18 @ 07:11) >  CONCLUSIONS:  1. Normal mitral valve. Trace mitral regurgitation.  2. Aortic valve not well visualized.  Mild aortic stenosis.  No aortic valve regurgitation seen.  3. Normal aortic root.  4. Normal left atrium.  5. Normal left ventricular internal dimensions and wall  thicknesses.  6. Endocardium not well visualized; grossly normal left  ventricular systolic function.  7. Diastolic function incompletely assessed.  8. Right ventricle not well visualized, probably normal  function.  9. RV systolic pressure is mild to moderately increased at  41 mm Hg.  10. Tricuspid valve not well visualized, probably normal.  Trace tricuspid regurgitation.  11. Pulmonic valve not well seen. Trace pulmonic  insufficiency is noted.  12. No pericardial effusion.    < end of copied text >      Imaging Personally Reviewed:  [ X] YES  [ ] NO    Consultant(s) Notes Reviewed:  [X ] YES  [ ] NO

## 2018-06-26 NOTE — PROGRESS NOTE ADULT - PROBLEM SELECTOR PLAN 1
Resolving.  On Admission, p/w Worsening PND and LE swelling was on  lasix 20mg qday at home, CXR was clear, BNP- 2400  Changed IV lasix BID to daily and added metolazone to enhance diuresis.   Monitor I/O and Daily weight, fluid restriction.  I&O is net negative -2600  repeat ECHO noted above  Cardiologist- Dr Dale  pulmonary consult Dr. Feliciano Resolving.  On Admission, p/w Worsening PND and LE swelling was on  lasix 20mg qday at home, CXR was clear, BNP- 2400  Changed IV lasix BID to daily and added metolazone to enhance diuresis.   Monitor I/O and Daily weight, fluid restriction.  I&O is net negative -2600  repeat ECHO noted above  Cardiologist- Dr Dale  pulmonary consult Dr. Feliciano--Outpatient sleep study pt developed diffused rash on LE b/l and back, likely 2/2 drug eruption 2/2 vanco/rocephin/ clindamycin   hold all ABx for now as per Dr. García  closely monitor rash pt developed diffused rash on LE b/l and back, likely 2/2 drug eruption 2/2 vanco/rocephin/ clindamycin may all cause rash, as can lasix and metolozone.   hold all ABx for now as per Dr. García  closely monitor rash

## 2018-06-26 NOTE — PROGRESS NOTE ADULT - ASSESSMENT
77 y/o M with PMHx  of DM, HTN, Chronic lymphedema of bilateral legs, left knee arthritis, DVT of bilateral legs (left peroneal vein and right posterior tibial veins, on Xarelto for sometime then stopped), BPH, and PSHx of right incarcerated inguinal hernia repair presented to the ED with c/o b/l LE swelling and SOB.  admitted for CHF exacerbation and to rule cellulitis/ filarial elephantiasis 79 y/o M with PMHx  of DM, HTN, Chronic lymphedema of bilateral legs, left knee arthritis, DVT of bilateral legs (left peroneal vein and right posterior tibial veins, on Xarelto for sometime then stopped), BPH, and PSHx of right incarcerated inguinal hernia repair presented to the ED with c/o b/l LE swelling and SOB.  admitted for CHF exacerbation and to rule cellulitis/ filarial elephantiasis    PT eval recommended to home with home 77 y/o M with PMHx  of DM, HTN, Chronic lymphedema of bilateral legs, left knee arthritis, DVT of bilateral legs (left peroneal vein and right posterior tibial veins, on Xarelto for sometime then stopped), BPH, and PSHx of right incarcerated inguinal hernia repair presented to the ED with c/o b/l LE swelling and SOB.  admitted for CHF exacerbation and to rule cellulitis/ filarial elephantiasis    PT eval recommended to home with home PT.

## 2018-06-26 NOTE — PROGRESS NOTE ADULT - PROBLEM SELECTOR PLAN 2
Patient presented with b/l chronic leg non pitting edema, had hx of filariasis treatment.  -f/u filarial antibody  c/w leg elevation,   -on rocephin and clindamycin for cellulitis,  afebrile/ no leukocytosis  -Doppler of leg shows no Leg DVT b/l  -LE elev  -Compression Tx- pt need compression stockings post DC  -Vascular consulted for lymphedema Patient presented with b/l chronic leg non pitting edema, had hx of filariasis treatment.  -f/u filarial antibody  c/w leg elevation,   -on rocephin  for cellulitis,  afebrile/ no leukocytosis  -Doppler of leg shows no Leg DVT b/l  -LE elev  -Compression Tx- pt need compression stockings post DC  -Vascular consulted for lymphedema Resolving.  On Admission, p/w Worsening PND and LE swelling was on  lasix 20mg qday at home, CXR was clear, BNP- 2400  Changed IV lasix BID to daily and added metolazone to enhance diuresis.   Monitor I/O and Daily weight, fluid restriction.  I&O is net negative -2600  repeat ECHO noted above  Cardiologist- Dr Dale  pulmonary consult Dr. Feliciano--Outpatient sleep study

## 2018-06-26 NOTE — PROGRESS NOTE ADULT - ATTENDING COMMENTS
Patient has developed an erythematous papular eruption on his legs and extremities.    It is slightly pruritic. Patient has developed an erythematous papular eruption on his legs and extremities.    It is slightly pruritic.  This was likely beginning yesterday, when I thought LE erythema was worsening.     Alert 79 yo man, sitting in chair  Vital Signs Last 24 Hrs  T(C): 36.6 (26 Jun 2018 13:45), Max: 37.3 (25 Jun 2018 21:25)  T(F): 97.8 (26 Jun 2018 13:45), Max: 99.2 (25 Jun 2018 21:25)  HR: 77 (26 Jun 2018 13:45) (70 - 84)  BP: 108/56 (26 Jun 2018 13:45) (102/43 - 125/46)  BP(mean): --  RR: 15 (26 Jun 2018 13:45) (15 - 16)  SpO2: 95% (26 Jun 2018 13:45) (95% - 97%)  Skin: erythema on left forearm, trunk, and LE.   Lungs, clear  Cor, irreg  Abdomen, soft  Ext:  Lymphedema, with darkening erythema in the interstices.     WBC 7.8  creat, 1.21    IMP:  New erythematous rash may be a drug eruption.  Clindamycin, ceftriaxone, vancomycin, lasix, and metolazone are all possible causes of erythematous rash.  Afib, stable.   fluid overload, responding to treatment.   Cellulitis, was improving.  Increased erythema might be from drug rash.   Plan:  Daily weight           Continue diuresis           Discontinue clindamycin and ceftriaxone

## 2018-06-26 NOTE — PROGRESS NOTE ADULT - PROBLEM SELECTOR PLAN 5
On metformin 750mg qday  - A1c:6.7  - insulin sliding scale On metformin 750mg qday  - A1c 6.7  - insulin sliding scale EKG shows atrial fibrillation, likely new onset  as Patient here in March 2018 when EKG showed NSR  c/w eliquis as it is nonvalvular Afib noticed on Echo  c/w cardizem 120mg daily for HR control   - ECHO noted above  -Telemonitoring shows heart rate control  - CTA chest negative PE  - Cardiologist- Dr Dale following

## 2018-06-26 NOTE — PROGRESS NOTE ADULT - ASSESSMENT
79 yo M with noted PMH including HFpEF, DM, HTN, HLD, and lymphedema who presents with mild acute, decompensated, diastolic HF exacerbation.     1. Dyspnea/HFpEF: Patient net negative almost 2L overnight, feels better  -Can probably switch to PO furosemide at this point  -Patient was evaluated by pulmonology, outpatient sleep study is recommended   -His lower extremity edema is consistent with lymphedema, will follow outpatient with vascular surgery, compression stockings    2. Atrial fibrillation:   -Rate controlled on diltiazem 120mg QDay, also lisinopril  -would add on Eliquis 5mg PO BID instead of Lovenox  -If EF is preserved on echo, can discontinue telemetry monitoring    3. HTN: Diltiazem and lisinopril (since he also has DM, on ramipril as OP)    4. HLD: Atorvastatin    ***Note that this is a preliminary note and any recommendations should NOT be carried out until this note is finalized. *** 77 yo M with noted PMH including HFpEF, DM, HTN, HLD, and lymphedema who presents with mild acute, decompensated, diastolic HF exacerbation.     1. Dyspnea/HFpEF: Patient net negative almost 2L overnight, feels better  -Can probably switch to home dose of PO furosemide at this point, can hold off metolazone  -Patient was evaluated by pulmonology, outpatient sleep study is recommended   -His lower extremity edema is consistent with lymphedema, will follow outpatient with vascular surgery, compression stockings    2. Atrial fibrillation:   -Rate controlled on diltiazem 120mg QDay, also lisinopril  -would add on Eliquis 5mg PO BID instead of Lovenox  -If EF is preserved on echo, can discontinue telemetry monitoring    3. HTN: Diltiazem and lisinopril (since he also has DM, on ramipril as OP)    4. HLD: Atorvastatin

## 2018-06-26 NOTE — PROGRESS NOTE ADULT - SUBJECTIVE AND OBJECTIVE BOX
PRESENTING CC: Shortness of breath    SUBJ:     In summary, this is a 77 yo M with HFpEF (by echo 03/2018), HTN, HLD, DM, chronic lymphedema, and DVT s/p Xarelto who presented with dyspnea due to heart failure in the setting of dietary and medication non-compliance.     Overnight, there were no acute events. Patient was net negative -1.9L. Denies any dyspnea.    ----------------------  Description of patient's presenting symptoms from my prior note: Patient reports he has been having dyspnea for the past 2-3 weeks, and decided to come in since it has not been improving. Symptoms are sometimes worsened by him lying flat in bed. He reports that over the past few weeks he had been eating some Chinese food and take-out. Additionally, he admits he may have missed "a few" doses of his furosemide. Currently he denies chest pain, dyspnea, palpitations, or lightheadedness.     PMH: As above, also BPH, H/O inguinal hernia repair    Allergies    No Known Allergies      MEDICATIONS  (STANDING):  apixaban 5 milliGRAM(s) Oral every 12 hours  ascorbic acid 500 milliGRAM(s) Oral daily  aspirin  chewable 81 milliGRAM(s) Oral daily  atorvastatin 40 milliGRAM(s) Oral at bedtime  cefTRIAXone   IVPB 1 Gram(s) IV Intermittent every 24 hours  clindamycin IVPB 600 milliGRAM(s) IV Intermittent every 8 hours  clindamycin IVPB      dextrose 5%. 1000 milliLiter(s) (50 mL/Hr) IV Continuous <Continuous>  diltiazem    milliGRAM(s) Oral daily  docusate sodium 100 milliGRAM(s) Oral two times a day  doxazosin 2 milliGRAM(s) Oral at bedtime  ferrous    sulfate 325 milliGRAM(s) Oral daily  finasteride 5 milliGRAM(s) Oral daily  furosemide   Injectable 40 milliGRAM(s) IV Push daily  insulin lispro (HumaLOG) corrective regimen sliding scale   SubCutaneous three times a day before meals  lactulose Syrup 30 Gram(s) Oral once  lisinopril 40 milliGRAM(s) Oral daily  metolazone 5 milliGRAM(s) Oral daily  polyethylene glycol 3350 17 Gram(s) Oral daily  senna 2 Tablet(s) Oral at bedtime    MEDICATIONS  (PRN):  acetaminophen   Tablet. 650 milliGRAM(s) Oral every 6 hours PRN Mild Pain (1 - 3)      FAMILY HISTORY:  No pertinent family history in first degree relatives    Reviewed; no change from my prior note    SOCIAL HISTORY:  Reviewed, no change from my prior note    REVIEW OF SYSTEMS:  Constitutional: [ ] fever, [ ]weight loss,  [ ]fatigue  Eyes: [ ] visual changes  Respiratory: [ ]shortness of breath;  [ ] cough, [ ]wheezing, [ ]chills, [ ]hemoptysis  Cardiovascular: [ ] chest pain, [ ]palpitations, [ ]dizziness,  [ ]leg swelling  Gastrointestinal: [ ] abdominal pain, [ ]nausea, [ ]vomiting,  [ ]diarrhea   Genitourinary: [ ] dysuria, [ ] hematuria  Neurologic: [ ] headaches [ ] tremors [ ] weakness [ ] lightheadedness  Skin: [ ] itching, [ ]burning, [ ] rashes  Endocrine: [ ] heat or cold intolerance  Musculoskeletal: [ ] joint pain or swelling; [ ] muscle, back, or extremity pain  Psychiatric: [ ] depression, [ ]anxiety, [ ]mood swings, or [ ]difficulty sleeping  Hematologic: [ ] easy bruising, [ ] bleeding gums    [x] All remaining systems negative except as per above.   [  ] Unable to obtain    Vital Signs Last 24 Hrs  T(C): 36.7 (26 Jun 2018 05:31), Max: 37.3 (25 Jun 2018 21:25)  T(F): 98 (26 Jun 2018 05:31), Max: 99.2 (25 Jun 2018 21:25)  HR: 70 (26 Jun 2018 05:31) (70 - 84)  BP: 125/46 (26 Jun 2018 05:31) (102/43 - 126/63)  BP(mean): --  RR: 16 (26 Jun 2018 05:31) (16 - 17)  SpO2: 95% (26 Jun 2018 05:31) (95% - 100%)  I&O's Summary    25 Jun 2018 07:01  -  26 Jun 2018 07:00  --------------------------------------------------------  IN: 240 mL / OUT: 1885 mL / NET: -1645 mL    26 Jun 2018 07:01  -  26 Jun 2018 09:55  --------------------------------------------------------  IN: 0 mL / OUT: 350 mL / NET: -350 mL        PHYSICAL EXAM:  General: No acute distress  HEENT: EOMI, PERRL  Neck: Supple, No JVD  Lungs: Clear to auscultation bilaterally; No rales or wheezing  Heart: Regular rate and rhythm; No murmurs, rubs, or gallops  Abdomen: Nontender, bowel sounds present  Extremities: No clubbing, cyanosis; CVI changes with non-pitting edema up to thighs  Nervous system:  Alert & Oriented X3, no focal deficits  Psychiatric: Normal affect  Skin: No rashes or lesions    LABS:  06-25    140  |  103  |  26<H>  ----------------------------<  108<H>  3.9   |  30  |  1.21    Ca    8.2<L>      25 Jun 2018 07:11  Phos  4.1     06-24  < from: Transthoracic Echocardiogram (06.24.18 @ 07:11) >  CONCLUSIONS:  1. Normal mitral valve. Trace mitral regurgitation.  2. Aortic valve not well visualized.  Mild aortic stenosis.  No aortic valve regurgitation seen.  3. Normal aortic root.  4. Normal left atrium.  5. Normal left ventricular internal dimensions and wall  thicknesses.  6. Endocardium not well visualized; grossly normal left  ventricular systolic function.  7. Diastolic function incompletely assessed.  8. Right ventricle not well visualized, probably normal  function.  9. RV systolic pressure is mild to moderately increased at  41 mm Hg.  10. Tricuspid valve not well visualized, probably normal.  Trace tricuspid regurgitation.  11. Pulmonic valve not well seen. Trace pulmonic  insufficiency is noted.  12. No pericardial effusion.    *** Compared with echocardiogram report of 3/14/2018, no  significant changes noted.    < end of copied text >  Mg     1.7     06-24      Creatinine Trend: 1.21<--, 1.24<--, 1.09<--                        10.6   7.8   )-----------( 199      ( 25 Jun 2018 07:11 )             34.9     RADIOLOGY:    < from: US Duplex Venous Lower Ext Complete, Bilateral (06.24.18 @ 13:39) >  No evidence of bilateral lower extremity deep venous thrombosis.    < end of copied text >

## 2018-06-27 DIAGNOSIS — K92.1 MELENA: ICD-10-CM

## 2018-06-27 LAB
ANION GAP SERPL CALC-SCNC: 5 MMOL/L — SIGNIFICANT CHANGE UP (ref 5–17)
ANION GAP SERPL CALC-SCNC: 5 MMOL/L — SIGNIFICANT CHANGE UP (ref 5–17)
BASOPHILS # BLD AUTO: 0.1 K/UL — SIGNIFICANT CHANGE UP (ref 0–0.2)
BASOPHILS # BLD AUTO: 0.1 K/UL — SIGNIFICANT CHANGE UP (ref 0–0.2)
BASOPHILS NFR BLD AUTO: 0.6 % — SIGNIFICANT CHANGE UP (ref 0–2)
BASOPHILS NFR BLD AUTO: 1.1 % — SIGNIFICANT CHANGE UP (ref 0–2)
BUN SERPL-MCNC: 29 MG/DL — HIGH (ref 7–18)
BUN SERPL-MCNC: 34 MG/DL — HIGH (ref 7–18)
CALCIUM SERPL-MCNC: 8.6 MG/DL — SIGNIFICANT CHANGE UP (ref 8.4–10.5)
CALCIUM SERPL-MCNC: 8.7 MG/DL — SIGNIFICANT CHANGE UP (ref 8.4–10.5)
CHLORIDE SERPL-SCNC: 100 MMOL/L — SIGNIFICANT CHANGE UP (ref 96–108)
CHLORIDE SERPL-SCNC: 102 MMOL/L — SIGNIFICANT CHANGE UP (ref 96–108)
CO2 SERPL-SCNC: 31 MMOL/L — SIGNIFICANT CHANGE UP (ref 22–31)
CO2 SERPL-SCNC: 33 MMOL/L — HIGH (ref 22–31)
CREAT SERPL-MCNC: 1.22 MG/DL — SIGNIFICANT CHANGE UP (ref 0.5–1.3)
CREAT SERPL-MCNC: 1.26 MG/DL — SIGNIFICANT CHANGE UP (ref 0.5–1.3)
EOSINOPHIL # BLD AUTO: 1 K/UL — HIGH (ref 0–0.5)
EOSINOPHIL # BLD AUTO: 1 K/UL — HIGH (ref 0–0.5)
EOSINOPHIL NFR BLD AUTO: 11.9 % — HIGH (ref 0–6)
EOSINOPHIL NFR BLD AUTO: 12.2 % — HIGH (ref 0–6)
GLUCOSE SERPL-MCNC: 109 MG/DL — HIGH (ref 70–99)
GLUCOSE SERPL-MCNC: 114 MG/DL — HIGH (ref 70–99)
HCT VFR BLD CALC: 33.8 % — LOW (ref 39–50)
HCT VFR BLD CALC: 34.2 % — LOW (ref 39–50)
HGB BLD-MCNC: 10.6 G/DL — LOW (ref 13–17)
HGB BLD-MCNC: 10.7 G/DL — LOW (ref 13–17)
INR BLD: 1.26 RATIO — HIGH (ref 0.88–1.16)
LYMPHOCYTES # BLD AUTO: 0.3 K/UL — LOW (ref 1–3.3)
LYMPHOCYTES # BLD AUTO: 0.6 K/UL — LOW (ref 1–3.3)
LYMPHOCYTES # BLD AUTO: 4 % — LOW (ref 13–44)
LYMPHOCYTES # BLD AUTO: 7.4 % — LOW (ref 13–44)
MCHC RBC-ENTMCNC: 26.4 PG — LOW (ref 27–34)
MCHC RBC-ENTMCNC: 26.4 PG — LOW (ref 27–34)
MCHC RBC-ENTMCNC: 31.3 GM/DL — LOW (ref 32–36)
MCHC RBC-ENTMCNC: 31.3 GM/DL — LOW (ref 32–36)
MCV RBC AUTO: 84.3 FL — SIGNIFICANT CHANGE UP (ref 80–100)
MCV RBC AUTO: 84.4 FL — SIGNIFICANT CHANGE UP (ref 80–100)
MONOCYTES # BLD AUTO: 0.7 K/UL — SIGNIFICANT CHANGE UP (ref 0–0.9)
MONOCYTES # BLD AUTO: 0.9 K/UL — SIGNIFICANT CHANGE UP (ref 0–0.9)
MONOCYTES NFR BLD AUTO: 11.8 % — SIGNIFICANT CHANGE UP (ref 2–14)
MONOCYTES NFR BLD AUTO: 8.5 % — SIGNIFICANT CHANGE UP (ref 2–14)
NEUTROPHILS # BLD AUTO: 5.6 K/UL — SIGNIFICANT CHANGE UP (ref 1.8–7.4)
NEUTROPHILS # BLD AUTO: 5.9 K/UL — SIGNIFICANT CHANGE UP (ref 1.8–7.4)
NEUTROPHILS NFR BLD AUTO: 71 % — SIGNIFICANT CHANGE UP (ref 43–77)
NEUTROPHILS NFR BLD AUTO: 71.5 % — SIGNIFICANT CHANGE UP (ref 43–77)
PLATELET # BLD AUTO: 215 K/UL — SIGNIFICANT CHANGE UP (ref 150–400)
PLATELET # BLD AUTO: 217 K/UL — SIGNIFICANT CHANGE UP (ref 150–400)
POTASSIUM SERPL-MCNC: 3.9 MMOL/L — SIGNIFICANT CHANGE UP (ref 3.5–5.3)
POTASSIUM SERPL-MCNC: 4.3 MMOL/L — SIGNIFICANT CHANGE UP (ref 3.5–5.3)
POTASSIUM SERPL-SCNC: 3.9 MMOL/L — SIGNIFICANT CHANGE UP (ref 3.5–5.3)
POTASSIUM SERPL-SCNC: 4.3 MMOL/L — SIGNIFICANT CHANGE UP (ref 3.5–5.3)
PROTHROM AB SERPL-ACNC: 13.8 SEC — HIGH (ref 9.8–12.7)
RBC # BLD: 4 M/UL — LOW (ref 4.2–5.8)
RBC # BLD: 4.06 M/UL — LOW (ref 4.2–5.8)
RBC # FLD: 14.8 % — HIGH (ref 10.3–14.5)
RBC # FLD: 15 % — HIGH (ref 10.3–14.5)
SODIUM SERPL-SCNC: 138 MMOL/L — SIGNIFICANT CHANGE UP (ref 135–145)
SODIUM SERPL-SCNC: 138 MMOL/L — SIGNIFICANT CHANGE UP (ref 135–145)
WBC # BLD: 7.9 K/UL — SIGNIFICANT CHANGE UP (ref 3.8–10.5)
WBC # BLD: 8.2 K/UL — SIGNIFICANT CHANGE UP (ref 3.8–10.5)
WBC # FLD AUTO: 7.9 K/UL — SIGNIFICANT CHANGE UP (ref 3.8–10.5)
WBC # FLD AUTO: 8.2 K/UL — SIGNIFICANT CHANGE UP (ref 3.8–10.5)

## 2018-06-27 PROCEDURE — 99233 SBSQ HOSP IP/OBS HIGH 50: CPT | Mod: GC

## 2018-06-27 PROCEDURE — 99222 1ST HOSP IP/OBS MODERATE 55: CPT

## 2018-06-27 RX ORDER — PANTOPRAZOLE SODIUM 20 MG/1
40 TABLET, DELAYED RELEASE ORAL EVERY 12 HOURS
Qty: 0 | Refills: 0 | Status: DISCONTINUED | OUTPATIENT
Start: 2018-06-27 | End: 2018-06-27

## 2018-06-27 RX ORDER — APIXABAN 2.5 MG/1
5 TABLET, FILM COATED ORAL EVERY 12 HOURS
Qty: 0 | Refills: 0 | Status: DISCONTINUED | OUTPATIENT
Start: 2018-06-27 | End: 2018-06-29

## 2018-06-27 RX ORDER — FUROSEMIDE 40 MG
40 TABLET ORAL
Qty: 0 | Refills: 0 | Status: DISCONTINUED | OUTPATIENT
Start: 2018-06-28 | End: 2018-06-29

## 2018-06-27 RX ORDER — PANTOPRAZOLE SODIUM 20 MG/1
40 TABLET, DELAYED RELEASE ORAL
Qty: 0 | Refills: 0 | Status: DISCONTINUED | OUTPATIENT
Start: 2018-06-28 | End: 2018-06-29

## 2018-06-27 RX ORDER — PANTOPRAZOLE SODIUM 20 MG/1
8 TABLET, DELAYED RELEASE ORAL
Qty: 80 | Refills: 0 | Status: DISCONTINUED | OUTPATIENT
Start: 2018-06-27 | End: 2018-06-27

## 2018-06-27 RX ADMIN — FINASTERIDE 5 MILLIGRAM(S): 5 TABLET, FILM COATED ORAL at 11:44

## 2018-06-27 RX ADMIN — Medication 325 MILLIGRAM(S): at 11:44

## 2018-06-27 RX ADMIN — APIXABAN 5 MILLIGRAM(S): 2.5 TABLET, FILM COATED ORAL at 16:57

## 2018-06-27 RX ADMIN — LISINOPRIL 40 MILLIGRAM(S): 2.5 TABLET ORAL at 06:09

## 2018-06-27 RX ADMIN — ATORVASTATIN CALCIUM 40 MILLIGRAM(S): 80 TABLET, FILM COATED ORAL at 21:46

## 2018-06-27 RX ADMIN — Medication 1: at 16:57

## 2018-06-27 RX ADMIN — PANTOPRAZOLE SODIUM 40 MILLIGRAM(S): 20 TABLET, DELAYED RELEASE ORAL at 04:39

## 2018-06-27 RX ADMIN — Medication 120 MILLIGRAM(S): at 06:09

## 2018-06-27 RX ADMIN — Medication 2 MILLIGRAM(S): at 21:45

## 2018-06-27 RX ADMIN — Medication 40 MILLIGRAM(S): at 06:09

## 2018-06-27 RX ADMIN — Medication 500 MILLIGRAM(S): at 11:44

## 2018-06-27 NOTE — CONSULT NOTE ADULT - ASSESSMENT
78 year old male with chronic anemia on iron supplements presents for BL extremity swelling thoughts to be secondary to CHF exacerbation. His rectal exam shows no evince of melena and his hemoglobin has been stable throughout htis whole admission.     Plan:  treat underlying issues related to primary admission   Continue AC   Outpatient workup of anemia can be considered.

## 2018-06-27 NOTE — CHART NOTE - NSCHARTNOTEFT_GEN_A_CORE
There was a concern for anemia , hence Occult was sent , which came out to be positive.  As per RN , patient having black stools, will be concerned for Upper Gi bleed.   Hemodynamically stable.  Last hb: 10.6  Will dc aspirin and elliquis for now.   Will keep NPO and start on Protonix 40 bid.   To be resumed after Gi bleed is ruled out.

## 2018-06-27 NOTE — PROGRESS NOTE ADULT - PROBLEM SELECTOR PLAN 1
pt had melena x2 yesterday and FOBT positive , Hb stable 10.6.  Held aspirin and elliquis for now  keep NPO and start on Protonix 40 bid   Will f/u GI consult. pt had melena x2 yesterday and FOBT positive , Hb stable 10.6.  Held aspirin and elliquis for now  keep NPO and start on Protonix drip  GI consult

## 2018-06-27 NOTE — PROGRESS NOTE ADULT - PROBLEM SELECTOR PLAN 2
pt developed diffused rash on LE b/l and back, likely 2/2 drug eruption 2/2 vanco/rocephin/ clindamycin may all cause rash, as can lasix and metolozone.   hold all ABx for now as per Dr. García  closely monitor rash

## 2018-06-27 NOTE — PROGRESS NOTE ADULT - PROBLEM SELECTOR PLAN 4
Patient presented with b/l chronic leg non pitting edema, had hx of filariasis treatment.  -f/u filarial antibody  c/w leg elevation  -Doppler of leg shows no Leg DVT b/l  -Compression Tx- pt need compression stockings post DC  -Vascular consulted for lymphedema

## 2018-06-27 NOTE — PROGRESS NOTE ADULT - PROBLEM SELECTOR PLAN 6
EKG shows atrial fibrillation, likely new onset  as Patient here in March 2018 when EKG showed NSR  c/w eliquis as it is nonvalvular Afib noticed on Echo  c/w cardizem 120mg daily for HR control   - ECHO noted above  -Telemonitoring shows heart rate control  - CTA chest negative PE  - Cardiologist- Dr Dale following EKG shows atrial fibrillation, likely new onset  as Patient here in March 2018 when EKG showed NSR  held eliquis due to melena  c/w cardizem 120mg daily for HR control   - ECHO noted above  -Telemonitoring shows heart rate control  - CTA chest negative PE  - Cardiologist- Dr Dale following

## 2018-06-27 NOTE — CONSULT NOTE ADULT - SUBJECTIVE AND OBJECTIVE BOX
Patient is a 78y old  Male who presents with a chief complaint of b/l LE swelling (2018 14:59)    78 year old male with a history as below presents with   lower extremity swelling. Patient was found to have anemia and as per night nurse episodes of dark stool. Patient states he is on chronic Iron and is up to date on his colorectal cancer screening. He has never had an EGD.    REVIEW OF SYSTEMS  Constitutional:   No fever, no fatigue, no pallor, no night sweats, no weight loss.  HEENT:   No eye pain, no vision changes, no icterus, no mouth ulcers.  Respiratory:   No shortness of breath, no cough, no respiratory distress.   Cardiovascular:   No chest pain, no palpitations.   Gastrointestinal: No abdominal pain, no nausea, no vomiting , no diarrheal no constipation, no hematochezia, no melena. + dark stools   Skin:   No rashes, no jaundice, no eczema.   Musculoskeletal:   No joint pain, no swelling, no myalgia.   Neurologic:   No headache, no seizure, no weakness.     Allergies    No Known Allergies    Intolerances      MEDICATIONS  (STANDING):  apixaban 5 milliGRAM(s) Oral every 12 hours  ascorbic acid 500 milliGRAM(s) Oral daily  atorvastatin 40 milliGRAM(s) Oral at bedtime  dextrose 5%. 1000 milliLiter(s) (50 mL/Hr) IV Continuous <Continuous>  diltiazem    milliGRAM(s) Oral daily  docusate sodium 100 milliGRAM(s) Oral two times a day  doxazosin 2 milliGRAM(s) Oral at bedtime  ferrous    sulfate 325 milliGRAM(s) Oral daily  finasteride 5 milliGRAM(s) Oral daily  furosemide    Tablet 40 milliGRAM(s) Oral two times a day  insulin lispro (HumaLOG) corrective regimen sliding scale   SubCutaneous three times a day before meals  lisinopril 40 milliGRAM(s) Oral daily  metolazone 5 milliGRAM(s) Oral daily  pantoprazole    Tablet 40 milliGRAM(s) Oral before breakfast  polyethylene glycol 3350 17 Gram(s) Oral daily  senna 2 Tablet(s) Oral at bedtime    MEDICATIONS  (PRN):  acetaminophen   Tablet. 650 milliGRAM(s) Oral every 6 hours PRN Mild Pain (1 - 3)      PAST MEDICAL & SURGICAL HISTORY:  PND (paroxysmal nocturnal dyspnea)  Lymphedema of leg  Hypertension  Diabetes  H/O inguinal hernia repair    FAMILY HISTORY:  No pertinent family history in first degree relatives    Social History: No history of : Tobacco use, IVDA, EToH  ______________________________________________________________________________________    PHYSICAL EXAM    Daily     Daily Weight in k.1 (2018 05:03)  BMI: 34.5 (06- @ 20:23)  Change in Weight:  Vital Signs Last 24 Hrs  T(C): 36.9 (2018 05:03), Max: 37.1 (2018 14:26)  T(F): 98.5 (2018 05:03), Max: 98.8 (2018 14:26)  HR: 77 (2018 05:03) (77 - 87)  BP: 118/46 (2018 05:03) (118/46 - 120/57)  BP(mean): --  RR: 17 (2018 05:03) (17 - 18)  SpO2: 95% (2018 05:03) (95% - 99%)    General:  Well developed, well nourished, alert and active, no pallor, NAD.  HEENT:    Normal appearance of conjunctiva, ears, nose, lips, oropharynx, and oral mucosa, anicteric.  Neck:  No masses, no asymmetry.  Lymph Nodes:  No lymphadenopathy.   Cardiovascular:  RRR normal S1/S2, no murmur.  Respiratory:  CTA B/L, normal respiratory effort.   Abdominal:   soft, no masses or tenderness, normoactive BS, NT/ND, no HSM.  Rectum: Good rectal tone. Dark/green stools.   _______________________________________________________________________________________________  Lab Results:                          10.3   8.0   )-----------( 221      ( 2018 06:31 )             32.6     -    138  |  100  |  28<H>  ----------------------------<  98  3.7   |  30  |  0.99    Ca    8.7      2018 06:31        PT/INR - ( 2018 10:55 )   PT: 13.8 sec;   INR: 1.26 ratio                 Stool Results:          RADIOLOGY RESULTS:    SURGICAL PATHOLOGY:

## 2018-06-27 NOTE — PROGRESS NOTE ADULT - ASSESSMENT
77 y/o M with PMHx  of DM, HTN, Chronic lymphedema of bilateral legs, left knee arthritis, DVT of bilateral legs (left peroneal vein and right posterior tibial veins, on Xarelto for sometime then stopped), BPH, and PSHx of right incarcerated inguinal hernia repair presented to the ED with c/o b/l LE swelling and SOB.  admitted for CHF exacerbation and to rule cellulitis/ filarial elephantiasis    PT eval recommended to home with home PT.

## 2018-06-27 NOTE — PROGRESS NOTE ADULT - ATTENDING COMMENTS
Patient was examined at the bedside and discussed with Dr. Vidal.   He c/o feeling hungry.   Chart note has described melena and heme positive stool.     Alert, WD man in NAD  Vital Signs Last 24 Hrs  T(C): 37.1 (27 Jun 2018 14:26), Max: 37.1 (27 Jun 2018 14:26)  T(F): 98.8 (27 Jun 2018 14:26), Max: 98.8 (27 Jun 2018 14:26)  HR: 87 (27 Jun 2018 14:26) (77 - 87)  BP: 120/57 (27 Jun 2018 14:26) (117/52 - 120/57)  BP(mean): --  RR: 18 (27 Jun 2018 14:26) (18 - 19)  SpO2: 98% (27 Jun 2018 14:26) (98% - 100%)  Lungs, clear  Cor, irreg  Abdomen, soft  Ext lymphangitis  Skin:  diffuse erythema                        10.6   8.2   )-----------( 217      ( 27 Jun 2018 10:55 )             33.8   06-27    138  |  100  |  29<H>  ----------------------------<  109<H>  3.9   |  33<H>  |  1.22    Ca    8.7      27 Jun 2018 10:55    IMP:  New onset afib, no evidence of ACS          Cellulitis, improved.           Rash, improving          Melena ? spurious finding.  H/H stable.  GI consult has advised outpatient w/u    Plan: Continue diuresis, Hold antibiotics          Follow vs. and h/h.           Discharge in AM, if stable.  Home nursing and PT.

## 2018-06-27 NOTE — PROGRESS NOTE ADULT - PROBLEM SELECTOR PLAN 3
Resolving.  On Admission, p/w Worsening PND and LE swelling was on  lasix 20mg qday at home, CXR was clear, BNP- 2400  Changed IV lasix BID to daily and added metolazone to enhance diuresis.   Monitor I/O and Daily weight, fluid restriction.  I&O is net negative -2600  repeat ECHO noted above  Cardiologist- Dr Dale  pulmonary consult Dr. Feliciano--Outpatient sleep study Resolving.  On Admission, p/w Worsening PND and LE swelling was on  lasix 20mg qday at home, CXR was clear, BNP- 2400  Changed IV lasix BID to daily and added metolazone to enhance diuresis.   Monitor I/O and Daily weight, fluid restriction.  I&O is net negative -2600  repeat ECHO noted above  held Lasix 40mg bid for now   Cardiologist- Dr Dale  pulmonary consult Dr. Feliciano--Outpatient sleep study

## 2018-06-27 NOTE — PROGRESS NOTE ADULT - PROBLEM SELECTOR PLAN 5
Patient also have b/l lower leg extremity swelling with diffused rash ,  less likely cellulitis  afebrile, WBC wnl    s/p rocephin, vanco and clinda   hold all ABx due to drug eruption

## 2018-06-27 NOTE — PROGRESS NOTE ADULT - SUBJECTIVE AND OBJECTIVE BOX
PGY1 Note discussed with supervising resident and primary attending.    Patient is a 78y old  Male who presents with a chief complaint of b/l LE swelling (24 Jun 2018 14:59)      INTERVAL HPI/OVERNIGHT EVENTS:    MEDICATIONS  (STANDING):  ascorbic acid 500 milliGRAM(s) Oral daily  atorvastatin 40 milliGRAM(s) Oral at bedtime  dextrose 5%. 1000 milliLiter(s) (50 mL/Hr) IV Continuous <Continuous>  diltiazem    milliGRAM(s) Oral daily  docusate sodium 100 milliGRAM(s) Oral two times a day  doxazosin 2 milliGRAM(s) Oral at bedtime  ferrous    sulfate 325 milliGRAM(s) Oral daily  finasteride 5 milliGRAM(s) Oral daily  furosemide    Tablet 40 milliGRAM(s) Oral two times a day  insulin lispro (HumaLOG) corrective regimen sliding scale   SubCutaneous three times a day before meals  lisinopril 40 milliGRAM(s) Oral daily  metolazone 5 milliGRAM(s) Oral daily  pantoprazole  Injectable 40 milliGRAM(s) IV Push every 12 hours  polyethylene glycol 3350 17 Gram(s) Oral daily  senna 2 Tablet(s) Oral at bedtime    MEDICATIONS  (PRN):  acetaminophen   Tablet. 650 milliGRAM(s) Oral every 6 hours PRN Mild Pain (1 - 3)      Allergies    No Known Allergies    Intolerances        REVIEW OF SYSTEMS:  CONSTITUTIONAL: No fever, weight loss, or fatigue  RESPIRATORY: No cough, wheezing, chills or hemoptysis; No shortness of breath  CARDIOVASCULAR: No chest pain, palpitations, dizziness, or leg swelling  GASTROINTESTINAL: No abdominal or epigastric pain. No nausea, vomiting, or hematemesis; No diarrhea or constipation. No melena or hematochezia.  NEUROLOGICAL: No headaches, memory loss, loss of strength, numbness, or tremors  SKIN: No itching, burning, rashes, or lesions     Vital Signs Last 24 Hrs  T(C): 36.7 (27 Jun 2018 05:33), Max: 36.9 (26 Jun 2018 21:09)  T(F): 98.1 (27 Jun 2018 05:33), Max: 98.5 (26 Jun 2018 21:09)  HR: 77 (27 Jun 2018 05:33) (77 - 85)  BP: 117/52 (27 Jun 2018 05:33) (108/56 - 117/55)  BP(mean): --  RR: 18 (27 Jun 2018 05:33) (15 - 19)  SpO2: 98% (27 Jun 2018 05:33) (95% - 100%)    PHYSICAL EXAM:  GENERAL: NAD, well-groomed, well-developed  HEAD:  Atraumatic, Normocephalic  EYES: EOMI, PERRLA, conjunctiva and sclera clear  NECK: Supple, No JVD, Normal thyroid  CHEST/LUNG: Clear to percussion bilaterally; No rales, rhonchi, wheezing, or rubs  HEART: Regular rate and rhythm; No murmurs, rubs, or gallops  ABDOMEN: Soft, Nontender, Nondistended; Bowel sounds present  NERVOUS SYSTEM:  Alert & Oriented X3, Good concentration; Motor Strength 5/5 B/L   EXTREMITIES:  2+ Peripheral Pulses, No clubbing, cyanosis, or edema  SKIN;    LABS:                        10.7   7.9   )-----------( 215      ( 27 Jun 2018 04:54 )             34.2     06-27    138  |  102  |  34<H>  ----------------------------<  114<H>  4.3   |  31  |  1.26    Ca    8.6      27 Jun 2018 04:54          CAPILLARY BLOOD GLUCOSE      POCT Blood Glucose.: 169 mg/dL (26 Jun 2018 21:42)  POCT Blood Glucose.: 138 mg/dL (26 Jun 2018 16:37)  POCT Blood Glucose.: 152 mg/dL (26 Jun 2018 12:24)  POCT Blood Glucose.: 128 mg/dL (26 Jun 2018 08:00)      RADIOLOGY & ADDITIONAL TESTS:    Imaging Personally Reviewed:  [ ] YES  [ ] NO    Consultant(s) Notes Reviewed:  [ ] YES  [ ] NO PGY1 Note discussed with supervising resident and primary attending.    Patient is a 78y old  Male who presents with a chief complaint of b/l LE swelling (24 Jun 2018 14:59)      INTERVAL HPI/OVERNIGHT EVENTS: pt had melena x2 yesterday and FOBT positive , Hb stable. Held aspirin and elliquis for now, NPO and start on Protonix 40 bid by Night team PGY1.  Will f/u GI consult.     MEDICATIONS  (STANDING):  ascorbic acid 500 milliGRAM(s) Oral daily  atorvastatin 40 milliGRAM(s) Oral at bedtime  dextrose 5%. 1000 milliLiter(s) (50 mL/Hr) IV Continuous <Continuous>  diltiazem    milliGRAM(s) Oral daily  docusate sodium 100 milliGRAM(s) Oral two times a day  doxazosin 2 milliGRAM(s) Oral at bedtime  ferrous    sulfate 325 milliGRAM(s) Oral daily  finasteride 5 milliGRAM(s) Oral daily  furosemide    Tablet 40 milliGRAM(s) Oral two times a day  insulin lispro (HumaLOG) corrective regimen sliding scale   SubCutaneous three times a day before meals  lisinopril 40 milliGRAM(s) Oral daily  metolazone 5 milliGRAM(s) Oral daily  pantoprazole  Injectable 40 milliGRAM(s) IV Push every 12 hours  polyethylene glycol 3350 17 Gram(s) Oral daily  senna 2 Tablet(s) Oral at bedtime    MEDICATIONS  (PRN):  acetaminophen   Tablet. 650 milliGRAM(s) Oral every 6 hours PRN Mild Pain (1 - 3)      Allergies    No Known Allergies    Intolerances        REVIEW OF SYSTEMS:  CONSTITUTIONAL: No fever, weight loss, or fatigue  RESPIRATORY: No cough, wheezing, chills or hemoptysis; No shortness of breath  CARDIOVASCULAR: No chest pain, palpitations, dizziness. Severe leg swelling b/l  GASTROINTESTINAL: No abdominal or epigastric pain. No nausea, vomiting, or hematemesis.  melena x2  NEUROLOGICAL: No headaches, memory loss, loss of strength, numbness, or tremors  SKIN: diffused rash LE b/l and back    Vital Signs Last 24 Hrs  T(C): 36.7 (27 Jun 2018 05:33), Max: 36.9 (26 Jun 2018 21:09)  T(F): 98.1 (27 Jun 2018 05:33), Max: 98.5 (26 Jun 2018 21:09)  HR: 77 (27 Jun 2018 05:33) (77 - 85)  BP: 117/52 (27 Jun 2018 05:33) (108/56 - 117/55)  BP(mean): --  RR: 18 (27 Jun 2018 05:33) (15 - 19)  SpO2: 98% (27 Jun 2018 05:33) (95% - 100%)    PHYSICAL EXAM:  GENERAL: NAD, well-groomed, well-developed  CHEST/LUNG: Clear to percussion bilaterally; No rales, rhonchi, wheezing, or rubs  HEART: Regular rate and rhythm; No murmurs, rubs, or gallops  ABDOMEN: Soft, Nontender, Nondistended; Bowel sounds present  NERVOUS SYSTEM:  Alert & Oriented X2, Good concentration; Motor Strength 4/5 B/L   EXTREMITIES:  LE edema B/L  SKIN; diffused rash LE b/l and back     LABS:                        10.7   7.9   )-----------( 215      ( 27 Jun 2018 04:54 )             34.2     06-27    138  |  102  |  34<H>  ----------------------------<  114<H>  4.3   |  31  |  1.26    Ca    8.6      27 Jun 2018 04:54          CAPILLARY BLOOD GLUCOSE      POCT Blood Glucose.: 169 mg/dL (26 Jun 2018 21:42)  POCT Blood Glucose.: 138 mg/dL (26 Jun 2018 16:37)  POCT Blood Glucose.: 152 mg/dL (26 Jun 2018 12:24)  POCT Blood Glucose.: 128 mg/dL (26 Jun 2018 08:00)      RADIOLOGY & ADDITIONAL TESTS:    Imaging Personally Reviewed:  [X ] YES  [ ] NO    Consultant(s) Notes Reviewed:  [X ] YES  [ ] NO PGY1 Note discussed with supervising resident and primary attending.    Patient is a 78y old  Male who presents with a chief complaint of b/l LE swelling (24 Jun 2018 14:59)      INTERVAL HPI/OVERNIGHT EVENTS: pt had melena x 4 overnight and FOBT positive , Hb stable. Held aspirin and elliquis for now, NPO and start on Protonix 40 bid by Night team PGY1.  Will f/u GI consulted with Dr. Brock .     MEDICATIONS  (STANDING):  ascorbic acid 500 milliGRAM(s) Oral daily  atorvastatin 40 milliGRAM(s) Oral at bedtime  dextrose 5%. 1000 milliLiter(s) (50 mL/Hr) IV Continuous <Continuous>  diltiazem    milliGRAM(s) Oral daily  docusate sodium 100 milliGRAM(s) Oral two times a day  doxazosin 2 milliGRAM(s) Oral at bedtime  ferrous    sulfate 325 milliGRAM(s) Oral daily  finasteride 5 milliGRAM(s) Oral daily  furosemide    Tablet 40 milliGRAM(s) Oral two times a day  insulin lispro (HumaLOG) corrective regimen sliding scale   SubCutaneous three times a day before meals  lisinopril 40 milliGRAM(s) Oral daily  metolazone 5 milliGRAM(s) Oral daily  pantoprazole  Injectable 40 milliGRAM(s) IV Push every 12 hours  polyethylene glycol 3350 17 Gram(s) Oral daily  senna 2 Tablet(s) Oral at bedtime    MEDICATIONS  (PRN):  acetaminophen   Tablet. 650 milliGRAM(s) Oral every 6 hours PRN Mild Pain (1 - 3)      Allergies    No Known Allergies    Intolerances        REVIEW OF SYSTEMS:  CONSTITUTIONAL: No fever, weight loss, or fatigue  RESPIRATORY: No cough, wheezing, chills or hemoptysis; No shortness of breath  CARDIOVASCULAR: No chest pain, palpitations, dizziness. Severe leg swelling b/l  GASTROINTESTINAL: No abdominal or epigastric pain. No nausea, vomiting, or hematemesis.  melena x2  NEUROLOGICAL: No headaches, memory loss, loss of strength, numbness, or tremors  SKIN: diffused rash LE b/l and back    Vital Signs Last 24 Hrs  T(C): 36.7 (27 Jun 2018 05:33), Max: 36.9 (26 Jun 2018 21:09)  T(F): 98.1 (27 Jun 2018 05:33), Max: 98.5 (26 Jun 2018 21:09)  HR: 77 (27 Jun 2018 05:33) (77 - 85)  BP: 117/52 (27 Jun 2018 05:33) (108/56 - 117/55)  BP(mean): --  RR: 18 (27 Jun 2018 05:33) (15 - 19)  SpO2: 98% (27 Jun 2018 05:33) (95% - 100%)    PHYSICAL EXAM:  GENERAL: NAD, well-groomed, well-developed  CHEST/LUNG: Clear to percussion bilaterally; No rales, rhonchi, wheezing, or rubs  HEART: Regular rate and rhythm; No murmurs, rubs, or gallops  ABDOMEN: Soft, Nontender, Nondistended; Bowel sounds present  NERVOUS SYSTEM:  Alert & Oriented X2, Good concentration; Motor Strength 4/5 B/L   EXTREMITIES:  LE edema B/L  SKIN; diffused rash LE b/l and back     LABS:                        10.7   7.9   )-----------( 215      ( 27 Jun 2018 04:54 )             34.2     06-27    138  |  102  |  34<H>  ----------------------------<  114<H>  4.3   |  31  |  1.26    Ca    8.6      27 Jun 2018 04:54          CAPILLARY BLOOD GLUCOSE      POCT Blood Glucose.: 169 mg/dL (26 Jun 2018 21:42)  POCT Blood Glucose.: 138 mg/dL (26 Jun 2018 16:37)  POCT Blood Glucose.: 152 mg/dL (26 Jun 2018 12:24)  POCT Blood Glucose.: 128 mg/dL (26 Jun 2018 08:00)      RADIOLOGY & ADDITIONAL TESTS:    Imaging Personally Reviewed:  [X ] YES  [ ] NO    Consultant(s) Notes Reviewed:  [X ] YES  [ ] NO

## 2018-06-28 LAB
ANION GAP SERPL CALC-SCNC: 8 MMOL/L — SIGNIFICANT CHANGE UP (ref 5–17)
BUN SERPL-MCNC: 28 MG/DL — HIGH (ref 7–18)
CALCIUM SERPL-MCNC: 8.7 MG/DL — SIGNIFICANT CHANGE UP (ref 8.4–10.5)
CHLORIDE SERPL-SCNC: 100 MMOL/L — SIGNIFICANT CHANGE UP (ref 96–108)
CO2 SERPL-SCNC: 30 MMOL/L — SIGNIFICANT CHANGE UP (ref 22–31)
CREAT SERPL-MCNC: 0.99 MG/DL — SIGNIFICANT CHANGE UP (ref 0.5–1.3)
GLUCOSE SERPL-MCNC: 98 MG/DL — SIGNIFICANT CHANGE UP (ref 70–99)
HCT VFR BLD CALC: 32.6 % — LOW (ref 39–50)
HGB BLD-MCNC: 10.3 G/DL — LOW (ref 13–17)
MCHC RBC-ENTMCNC: 26.7 PG — LOW (ref 27–34)
MCHC RBC-ENTMCNC: 31.5 GM/DL — LOW (ref 32–36)
MCV RBC AUTO: 84.7 FL — SIGNIFICANT CHANGE UP (ref 80–100)
PLATELET # BLD AUTO: 221 K/UL — SIGNIFICANT CHANGE UP (ref 150–400)
POTASSIUM SERPL-MCNC: 3.7 MMOL/L — SIGNIFICANT CHANGE UP (ref 3.5–5.3)
POTASSIUM SERPL-SCNC: 3.7 MMOL/L — SIGNIFICANT CHANGE UP (ref 3.5–5.3)
RBC # BLD: 3.85 M/UL — LOW (ref 4.2–5.8)
RBC # FLD: 14.3 % — SIGNIFICANT CHANGE UP (ref 10.3–14.5)
SODIUM SERPL-SCNC: 138 MMOL/L — SIGNIFICANT CHANGE UP (ref 135–145)
WBC # BLD: 8 K/UL — SIGNIFICANT CHANGE UP (ref 3.8–10.5)
WBC # FLD AUTO: 8 K/UL — SIGNIFICANT CHANGE UP (ref 3.8–10.5)

## 2018-06-28 PROCEDURE — 99233 SBSQ HOSP IP/OBS HIGH 50: CPT | Mod: GC

## 2018-06-28 RX ORDER — ASCORBIC ACID 60 MG
1 TABLET,CHEWABLE ORAL
Qty: 0 | Refills: 0 | DISCHARGE
Start: 2018-06-28

## 2018-06-28 RX ORDER — DILTIAZEM HCL 120 MG
1 CAPSULE, EXT RELEASE 24 HR ORAL
Qty: 30 | Refills: 0
Start: 2018-06-28 | End: 2018-07-27

## 2018-06-28 RX ORDER — FUROSEMIDE 40 MG
20 TABLET ORAL
Qty: 0 | Refills: 0 | COMMUNITY

## 2018-06-28 RX ORDER — ATORVASTATIN CALCIUM 80 MG/1
1 TABLET, FILM COATED ORAL
Qty: 0 | Refills: 0 | COMMUNITY

## 2018-06-28 RX ORDER — DILTIAZEM HCL 120 MG
120 CAPSULE, EXT RELEASE 24 HR ORAL
Qty: 0 | Refills: 0 | COMMUNITY

## 2018-06-28 RX ORDER — FUROSEMIDE 40 MG
1 TABLET ORAL
Qty: 60 | Refills: 0
Start: 2018-06-28 | End: 2018-07-27

## 2018-06-28 RX ORDER — FINASTERIDE 5 MG/1
5 TABLET, FILM COATED ORAL
Qty: 0 | Refills: 0 | COMMUNITY

## 2018-06-28 RX ORDER — SENNA PLUS 8.6 MG/1
2 TABLET ORAL
Qty: 0 | Refills: 0 | DISCHARGE
Start: 2018-06-28

## 2018-06-28 RX ORDER — FERROUS SULFATE 325(65) MG
1 TABLET ORAL
Qty: 30 | Refills: 0
Start: 2018-06-28 | End: 2018-07-27

## 2018-06-28 RX ORDER — APIXABAN 2.5 MG/1
1 TABLET, FILM COATED ORAL
Qty: 60 | Refills: 0
Start: 2018-06-28 | End: 2018-07-27

## 2018-06-28 RX ORDER — FUROSEMIDE 40 MG
0.5 TABLET ORAL
Qty: 0 | Refills: 0 | DISCHARGE
Start: 2018-06-28 | End: 2018-07-27

## 2018-06-28 RX ORDER — DOXAZOSIN MESYLATE 4 MG
1 TABLET ORAL
Qty: 0 | Refills: 0 | COMMUNITY

## 2018-06-28 RX ADMIN — APIXABAN 5 MILLIGRAM(S): 2.5 TABLET, FILM COATED ORAL at 05:49

## 2018-06-28 RX ADMIN — Medication 325 MILLIGRAM(S): at 12:01

## 2018-06-28 RX ADMIN — Medication 2 MILLIGRAM(S): at 22:26

## 2018-06-28 RX ADMIN — ATORVASTATIN CALCIUM 40 MILLIGRAM(S): 80 TABLET, FILM COATED ORAL at 22:26

## 2018-06-28 RX ADMIN — PANTOPRAZOLE SODIUM 40 MILLIGRAM(S): 20 TABLET, DELAYED RELEASE ORAL at 05:49

## 2018-06-28 RX ADMIN — Medication 40 MILLIGRAM(S): at 17:14

## 2018-06-28 RX ADMIN — Medication 500 MILLIGRAM(S): at 12:01

## 2018-06-28 RX ADMIN — Medication 40 MILLIGRAM(S): at 05:47

## 2018-06-28 RX ADMIN — Medication 1: at 12:22

## 2018-06-28 RX ADMIN — APIXABAN 5 MILLIGRAM(S): 2.5 TABLET, FILM COATED ORAL at 17:14

## 2018-06-28 RX ADMIN — Medication 1: at 17:14

## 2018-06-28 RX ADMIN — FINASTERIDE 5 MILLIGRAM(S): 5 TABLET, FILM COATED ORAL at 12:01

## 2018-06-28 NOTE — PROGRESS NOTE ADULT - NSHPATTENDINGPLANDISCUSS_GEN_ALL_CORE
medicine team resident Dr. Vidal
medicine team resident Dr. Waldron
Dr. Vidal RN.
Dr. Waldron, patient
Patient, Dr. Vidal.
Patient, Dr. Vidal
Dr. Vidal.

## 2018-06-28 NOTE — PROGRESS NOTE ADULT - ASSESSMENT
77 y/o M with PMHx  of DM, HTN, Chronic lymphedema of bilateral legs, left knee arthritis, DVT of bilateral legs (left peroneal vein and right posterior tibial veins, on Xarelto for sometime then stopped), BPH, and PSHx of right incarcerated inguinal hernia repair presented to the ED with c/o b/l LE swelling and SOB.  admitted for CHF exacerbation and to rule cellulitis/ filarial elephantiasis    PT eval recommended to home with home PT.  Plan for discharge to home with home PT today.

## 2018-06-28 NOTE — PROGRESS NOTE ADULT - PROBLEM SELECTOR PLAN 6
EKG shows atrial fibrillation, likely new onset  as Patient here in March 2018 when EKG showed NSR  on eliquis for AC  c/w cardizem 120mg daily for HR control   - ECHO noted above  -Telemonitoring shows heart rate control  - CTA chest negative PE  - Cardiologist- Dr Dale following

## 2018-06-28 NOTE — PROGRESS NOTE ADULT - PROVIDER SPECIALTY LIST ADULT
Cardiology
Internal Medicine
Pulmonology
Cardiology
Internal Medicine

## 2018-06-28 NOTE — PROGRESS NOTE ADULT - ATTENDING COMMENTS
Patient was examined at the bedside and discussed with Dr. Vidal.     He is alert and less SOB.     Vital Signs Last 24 Hrs  T(C): 36.6 (28 Jun 2018 14:41), Max: 37 (27 Jun 2018 21:25)  T(F): 97.8 (28 Jun 2018 14:41), Max: 98.6 (27 Jun 2018 21:25)  HR: 68 (28 Jun 2018 14:41) (68 - 81)  BP: 130/65 (28 Jun 2018 14:41) (118/46 - 130/65)  BP(mean): --  RR: 18 (28 Jun 2018 14:41) (17 - 18)  SpO2: 87% (28 Jun 2018 14:41) (87% - 99%)  Lungs, clear  Cor, irreg  Ext:  lymphedema persists, but erythema is resolving.     IMP:  Patient with SOB, new onset afib, no ACS, responding to diuretics with 6 kg negative.          Cellulitis, resolving.          BRENDAN, resolving.  CKD 2.   Plan: Continue lasix and metolozone.  Continue eliquis.           Patient prefers to go home tomorrow.

## 2018-06-28 NOTE — PROGRESS NOTE ADULT - PROBLEM SELECTOR PLAN 3
Resolving.  On Admission, p/w Worsening PND and LE swelling was on  lasix 20mg qday at home, CXR was clear, BNP- 2400  Changed IV lasix BID to daily and added metolazone to enhance diuresis.   Monitor I/O and Daily weight, fluid restriction.  I&O is net negative -2600  repeat ECHO noted above  held Lasix 40mg bid for now   Cardiologist- Dr Dale  pulmonary consult Dr. Feliciano--Outpatient sleep study

## 2018-06-28 NOTE — PROGRESS NOTE ADULT - SUBJECTIVE AND OBJECTIVE BOX
PGY1 Note discussed with supervising resident and primary attending.    Patient is a 78y old  Male who presents with a chief complaint of b/l LE swelling (24 Jun 2018 14:59)      INTERVAL HPI/OVERNIGHT EVENTS: no new overnight events. Rash on LE and back improved.     MEDICATIONS  (STANDING):  ascorbic acid 500 milliGRAM(s) Oral daily  atorvastatin 40 milliGRAM(s) Oral at bedtime  dextrose 5%. 1000 milliLiter(s) (50 mL/Hr) IV Continuous <Continuous>  diltiazem    milliGRAM(s) Oral daily  docusate sodium 100 milliGRAM(s) Oral two times a day  doxazosin 2 milliGRAM(s) Oral at bedtime  ferrous    sulfate 325 milliGRAM(s) Oral daily  finasteride 5 milliGRAM(s) Oral daily  furosemide    Tablet 40 milliGRAM(s) Oral two times a day  insulin lispro (HumaLOG) corrective regimen sliding scale   SubCutaneous three times a day before meals  lisinopril 40 milliGRAM(s) Oral daily  metolazone 5 milliGRAM(s) Oral daily  pantoprazole  Injectable 40 milliGRAM(s) IV Push every 12 hours  polyethylene glycol 3350 17 Gram(s) Oral daily  senna 2 Tablet(s) Oral at bedtime    MEDICATIONS  (PRN):  acetaminophen   Tablet. 650 milliGRAM(s) Oral every 6 hours PRN Mild Pain (1 - 3)      Allergies    No Known Allergies    Intolerances        REVIEW OF SYSTEMS:  CONSTITUTIONAL: No fever, weight loss, or fatigue  RESPIRATORY: No cough, wheezing, chills or hemoptysis; No shortness of breath  CARDIOVASCULAR: No chest pain, palpitations, dizziness. Severe leg swelling b/l  GASTROINTESTINAL: No abdominal or epigastric pain. No nausea, vomiting, or hematemesis.  melena x2  NEUROLOGICAL: No headaches, memory loss, loss of strength, numbness, or tremors  SKIN: diffused rash LE b/l and back    Vital Signs Last 24 Hrs  T(C): 36.7 (27 Jun 2018 05:33), Max: 36.9 (26 Jun 2018 21:09)  T(F): 98.1 (27 Jun 2018 05:33), Max: 98.5 (26 Jun 2018 21:09)  HR: 77 (27 Jun 2018 05:33) (77 - 85)  BP: 117/52 (27 Jun 2018 05:33) (108/56 - 117/55)  BP(mean): --  RR: 18 (27 Jun 2018 05:33) (15 - 19)  SpO2: 98% (27 Jun 2018 05:33) (95% - 100%)    PHYSICAL EXAM:  GENERAL: NAD, well-groomed, well-developed  CHEST/LUNG: Clear to percussion bilaterally; No rales, rhonchi, wheezing, or rubs  HEART: Regular rate and rhythm; No murmurs, rubs, or gallops  ABDOMEN: Soft, Nontender, Nondistended; Bowel sounds present  NERVOUS SYSTEM:  Alert & Oriented X2, Good concentration; Motor Strength 4/5 B/L   EXTREMITIES:  LE edema B/L  SKIN; diffused rash LE b/l and back     LABS:                        10.7   7.9   )-----------( 215      ( 27 Jun 2018 04:54 )             34.2     06-27    138  |  102  |  34<H>  ----------------------------<  114<H>  4.3   |  31  |  1.26    Ca    8.6      27 Jun 2018 04:54          CAPILLARY BLOOD GLUCOSE      POCT Blood Glucose.: 169 mg/dL (26 Jun 2018 21:42)  POCT Blood Glucose.: 138 mg/dL (26 Jun 2018 16:37)  POCT Blood Glucose.: 152 mg/dL (26 Jun 2018 12:24)  POCT Blood Glucose.: 128 mg/dL (26 Jun 2018 08:00)      RADIOLOGY & ADDITIONAL TESTS:    Imaging Personally Reviewed:  [X ] YES  [ ] NO    Consultant(s) Notes Reviewed:  [X ] YES  [ ] NO

## 2018-06-28 NOTE — PROGRESS NOTE ADULT - PROBLEM SELECTOR PLAN 2
pt had melena x2 yesterday and FOBT positive , Hb stable 10.6.  GI consult  advised outpatient workup.

## 2018-06-29 VITALS — WEIGHT: 162.7 LBS

## 2018-06-29 LAB
CULTURE RESULTS: SIGNIFICANT CHANGE UP
CULTURE RESULTS: SIGNIFICANT CHANGE UP
SPECIMEN SOURCE: SIGNIFICANT CHANGE UP
SPECIMEN SOURCE: SIGNIFICANT CHANGE UP

## 2018-06-29 PROCEDURE — 99285 EMERGENCY DEPT VISIT HI MDM: CPT | Mod: 25

## 2018-06-29 PROCEDURE — 80048 BASIC METABOLIC PNL TOTAL CA: CPT

## 2018-06-29 PROCEDURE — 85027 COMPLETE CBC AUTOMATED: CPT

## 2018-06-29 PROCEDURE — 97116 GAIT TRAINING THERAPY: CPT

## 2018-06-29 PROCEDURE — 85610 PROTHROMBIN TIME: CPT

## 2018-06-29 PROCEDURE — 93970 EXTREMITY STUDY: CPT

## 2018-06-29 PROCEDURE — 83036 HEMOGLOBIN GLYCOSYLATED A1C: CPT

## 2018-06-29 PROCEDURE — 93005 ELECTROCARDIOGRAM TRACING: CPT

## 2018-06-29 PROCEDURE — 86900 BLOOD TYPING SEROLOGIC ABO: CPT

## 2018-06-29 PROCEDURE — 83880 ASSAY OF NATRIURETIC PEPTIDE: CPT

## 2018-06-29 PROCEDURE — 97110 THERAPEUTIC EXERCISES: CPT

## 2018-06-29 PROCEDURE — 97530 THERAPEUTIC ACTIVITIES: CPT

## 2018-06-29 PROCEDURE — 71275 CT ANGIOGRAPHY CHEST: CPT

## 2018-06-29 PROCEDURE — 93306 TTE W/DOPPLER COMPLETE: CPT

## 2018-06-29 PROCEDURE — 84484 ASSAY OF TROPONIN QUANT: CPT

## 2018-06-29 PROCEDURE — 86682 HELMINTH ANTIBODY: CPT

## 2018-06-29 PROCEDURE — 81003 URINALYSIS AUTO W/O SCOPE: CPT

## 2018-06-29 PROCEDURE — 71045 X-RAY EXAM CHEST 1 VIEW: CPT

## 2018-06-29 PROCEDURE — 82272 OCCULT BLD FECES 1-3 TESTS: CPT

## 2018-06-29 PROCEDURE — 86901 BLOOD TYPING SEROLOGIC RH(D): CPT

## 2018-06-29 PROCEDURE — 84100 ASSAY OF PHOSPHORUS: CPT

## 2018-06-29 PROCEDURE — 80053 COMPREHEN METABOLIC PANEL: CPT

## 2018-06-29 PROCEDURE — 97161 PT EVAL LOW COMPLEX 20 MIN: CPT

## 2018-06-29 PROCEDURE — 83735 ASSAY OF MAGNESIUM: CPT

## 2018-06-29 PROCEDURE — 87040 BLOOD CULTURE FOR BACTERIA: CPT

## 2018-06-29 PROCEDURE — 74177 CT ABD & PELVIS W/CONTRAST: CPT

## 2018-06-29 PROCEDURE — 82962 GLUCOSE BLOOD TEST: CPT

## 2018-06-29 PROCEDURE — 86850 RBC ANTIBODY SCREEN: CPT

## 2018-06-29 RX ADMIN — Medication 325 MILLIGRAM(S): at 12:39

## 2018-06-29 RX ADMIN — APIXABAN 5 MILLIGRAM(S): 2.5 TABLET, FILM COATED ORAL at 05:58

## 2018-06-29 RX ADMIN — LISINOPRIL 40 MILLIGRAM(S): 2.5 TABLET ORAL at 05:59

## 2018-06-29 RX ADMIN — FINASTERIDE 5 MILLIGRAM(S): 5 TABLET, FILM COATED ORAL at 12:39

## 2018-06-29 RX ADMIN — Medication 40 MILLIGRAM(S): at 06:00

## 2018-06-29 RX ADMIN — PANTOPRAZOLE SODIUM 40 MILLIGRAM(S): 20 TABLET, DELAYED RELEASE ORAL at 05:58

## 2018-06-29 RX ADMIN — Medication 1: at 12:43

## 2018-06-29 RX ADMIN — Medication 500 MILLIGRAM(S): at 12:39

## 2018-06-29 RX ADMIN — Medication 120 MILLIGRAM(S): at 05:58

## 2018-06-29 NOTE — DIETITIAN INITIAL EVALUATION ADULT. - NS FNS WEIGHT USED FOR CALC
ideal/Ht=5' 7"  (per pt)    YSA=373 lb   Adjusted JIM=545.8 lb    Current km=785.7 lb 6/29/18   BMI=33.6

## 2018-06-29 NOTE — DIETITIAN INITIAL EVALUATION ADULT. - OTHER INFO
nutrition assessment for length of stay; lives home with family PTA; skin intact; appetite good, 100% meal consumed observed, denied GI distress, swallowing problem at present, food choices obtained: usual wt 205 to 210 lb, gaining wt recently due to fluid retention, wants to know more nutrition information for diet Rx; cw=731.5 lb 6/24/18-->214.7 lb 6/29/18, on diuretic Rx

## 2018-06-29 NOTE — DIETITIAN INITIAL EVALUATION ADULT. - FACTORS AFF FOOD INTAKE
poor dentition, but able to tolerate soft food/difficulty chewing/[employer]/ethnic/cultural/personal food preferences

## 2018-07-05 LAB
FILARIA IGG SER-ACNC: 0.36 — SIGNIFICANT CHANGE UP
FILARIA IGG SER-ACNC: 0.36 — SIGNIFICANT CHANGE UP

## 2018-07-10 PROBLEM — R06.00 DYSPNEA, UNSPECIFIED: Chronic | Status: ACTIVE | Noted: 2018-06-23

## 2018-07-24 ENCOUNTER — APPOINTMENT (OUTPATIENT)
Dept: CARDIOLOGY | Facility: CLINIC | Age: 78
End: 2018-07-24
Payer: MEDICARE

## 2018-07-24 VITALS
HEIGHT: 67.5 IN | TEMPERATURE: 97.6 F | DIASTOLIC BLOOD PRESSURE: 62 MMHG | WEIGHT: 200 LBS | BODY MASS INDEX: 31.02 KG/M2 | SYSTOLIC BLOOD PRESSURE: 104 MMHG | OXYGEN SATURATION: 95 % | HEART RATE: 83 BPM

## 2018-07-24 DIAGNOSIS — Z86.79 PERSONAL HISTORY OF OTHER DISEASES OF THE CIRCULATORY SYSTEM: ICD-10-CM

## 2018-07-24 DIAGNOSIS — Z78.9 OTHER SPECIFIED HEALTH STATUS: ICD-10-CM

## 2018-07-24 DIAGNOSIS — Z86.39 PERSONAL HISTORY OF OTHER ENDOCRINE, NUTRITIONAL AND METABOLIC DISEASE: ICD-10-CM

## 2018-07-24 PROCEDURE — 99214 OFFICE O/P EST MOD 30 MIN: CPT

## 2018-07-24 RX ORDER — ATORVASTATIN CALCIUM 40 MG/1
40 TABLET, FILM COATED ORAL
Refills: 0 | Status: ACTIVE | COMMUNITY

## 2018-07-24 RX ORDER — APIXABAN 5 MG/1
5 TABLET, FILM COATED ORAL
Refills: 0 | Status: DISCONTINUED | COMMUNITY
End: 2018-07-24

## 2018-07-24 RX ORDER — METFORMIN ER 750 MG 750 MG/1
750 TABLET ORAL
Refills: 0 | Status: ACTIVE | COMMUNITY

## 2018-09-05 NOTE — PROGRESS NOTE ADULT - PROBLEM SELECTOR PLAN 4
Detail Level: Simple Detail Level: Detailed EKG shows atrial fibrillation, likely new onset  as Patient here in March 2018 when EKG showed NSR  c/w eliquis as it is nonvalvular Afib noticed on Echo  c/w cardizem 120mg daily for HR control   - ECHO noted above  -Telemonitoring shows heart rate control  - CTA chest negative PE  - Cardiologist- Dr Dale following Patient also have b/l lower leg extremity swelling with diffused rash ,  less likely cellulitis  afebrile, WBC wnl    s/p rocephin, vanco and clinda   hold all ABx due to drug eruption

## 2018-09-07 ENCOUNTER — APPOINTMENT (OUTPATIENT)
Dept: VASCULAR SURGERY | Facility: CLINIC | Age: 78
End: 2018-09-07
Payer: MEDICARE

## 2018-09-07 VITALS
HEART RATE: 57 BPM | BODY MASS INDEX: 30.56 KG/M2 | TEMPERATURE: 97.8 F | SYSTOLIC BLOOD PRESSURE: 117 MMHG | DIASTOLIC BLOOD PRESSURE: 76 MMHG | WEIGHT: 197 LBS | HEIGHT: 67.5 IN

## 2018-09-07 PROCEDURE — 99204 OFFICE O/P NEW MOD 45 MIN: CPT

## 2018-10-05 ENCOUNTER — APPOINTMENT (OUTPATIENT)
Dept: VASCULAR SURGERY | Facility: CLINIC | Age: 78
End: 2018-10-05

## 2018-10-30 ENCOUNTER — EMERGENCY (EMERGENCY)
Facility: HOSPITAL | Age: 78
LOS: 1 days | Discharge: HOME | End: 2018-10-30
Attending: EMERGENCY MEDICINE
Payer: COMMERCIAL

## 2018-10-30 VITALS
TEMPERATURE: 98 F | HEART RATE: 79 BPM | SYSTOLIC BLOOD PRESSURE: 132 MMHG | RESPIRATION RATE: 18 BRPM | DIASTOLIC BLOOD PRESSURE: 70 MMHG | OXYGEN SATURATION: 100 %

## 2018-10-30 VITALS
SYSTOLIC BLOOD PRESSURE: 166 MMHG | HEART RATE: 78 BPM | OXYGEN SATURATION: 100 % | TEMPERATURE: 97 F | RESPIRATION RATE: 18 BRPM | DIASTOLIC BLOOD PRESSURE: 75 MMHG

## 2018-10-30 DIAGNOSIS — Z98.89 OTHER SPECIFIED POSTPROCEDURAL STATES: Chronic | ICD-10-CM

## 2018-10-30 LAB
ALBUMIN SERPL ELPH-MCNC: 3.6 G/DL — SIGNIFICANT CHANGE UP (ref 3.5–5)
ALP SERPL-CCNC: 118 U/L — SIGNIFICANT CHANGE UP (ref 40–120)
ALT FLD-CCNC: 32 U/L DA — SIGNIFICANT CHANGE UP (ref 10–60)
ANION GAP SERPL CALC-SCNC: 6 MMOL/L — SIGNIFICANT CHANGE UP (ref 5–17)
AST SERPL-CCNC: 21 U/L — SIGNIFICANT CHANGE UP (ref 10–40)
BASOPHILS # BLD AUTO: 0.1 K/UL — SIGNIFICANT CHANGE UP (ref 0–0.2)
BASOPHILS NFR BLD AUTO: 0.9 % — SIGNIFICANT CHANGE UP (ref 0–2)
BILIRUB SERPL-MCNC: 0.4 MG/DL — SIGNIFICANT CHANGE UP (ref 0.2–1.2)
BUN SERPL-MCNC: 28 MG/DL — HIGH (ref 7–18)
CALCIUM SERPL-MCNC: 9.2 MG/DL — SIGNIFICANT CHANGE UP (ref 8.4–10.5)
CHLORIDE SERPL-SCNC: 110 MMOL/L — HIGH (ref 96–108)
CO2 SERPL-SCNC: 29 MMOL/L — SIGNIFICANT CHANGE UP (ref 22–31)
CREAT SERPL-MCNC: 1.21 MG/DL — SIGNIFICANT CHANGE UP (ref 0.5–1.3)
EOSINOPHIL # BLD AUTO: 0.4 K/UL — SIGNIFICANT CHANGE UP (ref 0–0.5)
EOSINOPHIL NFR BLD AUTO: 5.4 % — SIGNIFICANT CHANGE UP (ref 0–6)
GLUCOSE SERPL-MCNC: 129 MG/DL — HIGH (ref 70–99)
HCT VFR BLD CALC: 41 % — SIGNIFICANT CHANGE UP (ref 39–50)
HGB BLD-MCNC: 12.7 G/DL — LOW (ref 13–17)
LYMPHOCYTES # BLD AUTO: 1.2 K/UL — SIGNIFICANT CHANGE UP (ref 1–3.3)
LYMPHOCYTES # BLD AUTO: 15.5 % — SIGNIFICANT CHANGE UP (ref 13–44)
MCHC RBC-ENTMCNC: 25.7 PG — LOW (ref 27–34)
MCHC RBC-ENTMCNC: 31.1 GM/DL — LOW (ref 32–36)
MCV RBC AUTO: 82.8 FL — SIGNIFICANT CHANGE UP (ref 80–100)
MONOCYTES # BLD AUTO: 0.7 K/UL — SIGNIFICANT CHANGE UP (ref 0–0.9)
MONOCYTES NFR BLD AUTO: 8.7 % — SIGNIFICANT CHANGE UP (ref 2–14)
NEUTROPHILS # BLD AUTO: 5.4 K/UL — SIGNIFICANT CHANGE UP (ref 1.8–7.4)
NEUTROPHILS NFR BLD AUTO: 69.4 % — SIGNIFICANT CHANGE UP (ref 43–77)
PLATELET # BLD AUTO: 247 K/UL — SIGNIFICANT CHANGE UP (ref 150–400)
POTASSIUM SERPL-MCNC: 5 MMOL/L — SIGNIFICANT CHANGE UP (ref 3.5–5.3)
POTASSIUM SERPL-SCNC: 5 MMOL/L — SIGNIFICANT CHANGE UP (ref 3.5–5.3)
PROT SERPL-MCNC: 7.5 G/DL — SIGNIFICANT CHANGE UP (ref 6–8.3)
RBC # BLD: 4.96 M/UL — SIGNIFICANT CHANGE UP (ref 4.2–5.8)
RBC # FLD: 13.8 % — SIGNIFICANT CHANGE UP (ref 10.3–14.5)
SODIUM SERPL-SCNC: 145 MMOL/L — SIGNIFICANT CHANGE UP (ref 135–145)
WBC # BLD: 7.8 K/UL — SIGNIFICANT CHANGE UP (ref 3.8–10.5)
WBC # FLD AUTO: 7.8 K/UL — SIGNIFICANT CHANGE UP (ref 3.8–10.5)

## 2018-10-30 PROCEDURE — 99284 EMERGENCY DEPT VISIT MOD MDM: CPT

## 2018-10-30 PROCEDURE — 73590 X-RAY EXAM OF LOWER LEG: CPT

## 2018-10-30 PROCEDURE — 73590 X-RAY EXAM OF LOWER LEG: CPT | Mod: 26,RT

## 2018-10-30 PROCEDURE — 85027 COMPLETE CBC AUTOMATED: CPT

## 2018-10-30 PROCEDURE — 99283 EMERGENCY DEPT VISIT LOW MDM: CPT | Mod: 25

## 2018-10-30 PROCEDURE — 80053 COMPREHEN METABOLIC PANEL: CPT

## 2018-10-30 NOTE — ED ADULT NURSE NOTE - OBJECTIVE STATEMENT
Pt came in for an open blister on the right lower leg that burst 3 days ago. Pt has bilateral lymphedema more on the right leg.

## 2018-10-30 NOTE — ED ADULT NURSE NOTE - NSIMPLEMENTINTERV_GEN_ALL_ED
Implemented All Fall with Harm Risk Interventions:  [place] to call system. Call bell, personal items and telephone within reach. Instruct patient to call for assistance. Room bathroom lighting operational. Non-slip footwear when patient is off stretcher. Physically safe environment: no spills, clutter or unnecessary equipment. Stretcher in lowest position, wheels locked, appropriate side rails in place. Provide visual cue, wrist band, yellow gown, etc. Monitor gait and stability. Monitor for mental status changes and reorient to person, place, and time. Review medications for side effects contributing to fall risk. Reinforce activity limits and safety measures with patient and family. Provide visual clues: red socks.

## 2018-10-30 NOTE — ED PROVIDER NOTE - MEDICAL DECISION MAKING DETAILS
77 y/o M presents with ruptured blister and persistent wound. There are no signs of cellulitis. Non-tender, however will obtain X-ray to rule out soft tissue gas and osteomyelitis. Will obtain basic labs looking for leukocytosis or any other electrolyte abnormality. If normal will discharge with instructions to follow up with wound care.

## 2018-10-30 NOTE — ED PROVIDER NOTE - OBJECTIVE STATEMENT
79 y/o M with PMHx of DM, HTN, PND, lymphedema of left and PSHx of inguinal hernia repair presents to the ED with complaints of right lower extremity wound x 3 days. Patient reports blister to the leg that burst three days ago. Patient has been applying Neosporin to wound without improvement. Patient reports serous with occasionally blood tinged discharge. Patient denies pain, increased swelling, difficulty breathing, fever, redness or any other complaints. NKDA.

## 2018-10-30 NOTE — ED PROVIDER NOTE - PHYSICAL EXAMINATION
General: pt lying in stretcher, appears stated age and is not in distress  HEENT: AT/NC, pink conjunctiva, anicteric sclerae, EOMI, PERRLA, TMs smooth, grey, intact, with normal landmarks, nasal mucosa pink, no discharge, turbinates not enlarged; moist mucus membranes, tongue well-papillated, good dentition; posterior pharynx shows no erythema or exudates;   Neck: supple, full ROM, trachea midline, no JVD, no cervical LAD, no midline ttp or stepoffs  Lungs: symmetric excursion, b/l clear vesicular breath sounds with no wheezes, crackles, or rhonchi  Heart: rrr, S1, S2 normal; no S3 or S4; no murmurs or rubs  Abd: normal bowel sounds; soft, nontender; negative McBurney's point tenderness, negative Stewart's sign, no rebound, no guarding, spleen non-palpable; no hepatomegaly, no masses  Back: no midline spinal tenderness or stepoffs, no costovertebral angle tenderness  Extremities: no clubbing, cyanosis, or edema; no palpable deformities or fractures  Skin: good turgor; no rashes, petechiae, ecchymoses, or jaundice  Pulses: radial, posterior tibialis (PT), dorsalis pedis (DP) all 2+ & symmetric  Neuro: awake, alert, responsive; oriented to person, place and time; cranial nerves intact, EOMI, intact jaw movement, intact facial sensation, no facial asymmetry, hearing intact; no nystagmus, tongue midline; Motor: Normal tone in upper and lower extremities bilaterally strength 5/5; Sensory: intact to pinprick and light touch; Cerebellar: finger-to-nose intact; normal steady gait; negative Romberg’s sign; DTR: biceps, triceps, patellar, 2+, no pronator drift General: pt lying in stretcher, appears stated age and is not in distress  HEENT: AT/NC, pink conjunctiva, anicteric sclerae, EOMI, PERRLA, TMs smooth, grey, intact, with normal landmarks, nasal mucosa pink, no discharge, turbinates not enlarged; moist mucus membranes,   Neck: supple, full ROM, trachea midline, no JVD, no cervical LAD, no midline ttp or stepoffs  Lungs: symmetric excursion, b/l clear vesicular breath sounds with no wheezes, crackles, or rhonchi  Heart: rrr, S1, S2 normal; no S3 or S4; no murmurs or rubs  Abd: normal bowel sounds; soft, nontender; negative McBurney's point tenderness, negative Stewart's sign, no rebound, no guarding, spleen non-palpable; no hepatomegaly, no masses  Back: no midline spinal tenderness or stepoffs, no costovertebral angle tenderness  Extremities: b/l chronic edema and skin changes; no palpable deformities or fractures; RLE mid anterior tibial region w/ 3x3 cm ruptured blister draining serous material w/ no surrounding erythema or tenderness  Skin: see extremities  Neuro: awake, alert, responsive; oriented to person, place and time; cranial nerves intact, EOMI, intact jaw movement, intact facial sensation, no facial asymmetry, hearing intact; no nystagmus, tongue midline; Motor: Normal tone in upper and lower extremities bilaterally; Sensory: intact normal steady gait;

## 2018-11-01 ENCOUNTER — APPOINTMENT (OUTPATIENT)
Dept: WOUND CARE | Facility: CLINIC | Age: 78
End: 2018-11-01

## 2018-11-01 ENCOUNTER — OUTPATIENT (OUTPATIENT)
Dept: OUTPATIENT SERVICES | Facility: HOSPITAL | Age: 78
LOS: 1 days | End: 2018-11-01
Payer: COMMERCIAL

## 2018-11-01 VITALS
WEIGHT: 197 LBS | TEMPERATURE: 97.3 F | RESPIRATION RATE: 18 BRPM | BODY MASS INDEX: 32.82 KG/M2 | OXYGEN SATURATION: 99 % | HEART RATE: 65 BPM | HEIGHT: 65 IN | DIASTOLIC BLOOD PRESSURE: 75 MMHG | SYSTOLIC BLOOD PRESSURE: 126 MMHG

## 2018-11-01 DIAGNOSIS — Z00.00 ENCOUNTER FOR GENERAL ADULT MEDICAL EXAMINATION WITHOUT ABNORMAL FINDINGS: ICD-10-CM

## 2018-11-01 DIAGNOSIS — Z98.89 OTHER SPECIFIED POSTPROCEDURAL STATES: Chronic | ICD-10-CM

## 2018-11-01 PROCEDURE — 11042 DBRDMT SUBQ TIS 1ST 20SQCM/<: CPT

## 2018-11-01 RX ORDER — SILVER SULFADIAZINE 10 MG/G
1 CREAM TOPICAL DAILY
Qty: 1 | Refills: 0 | Status: ACTIVE | COMMUNITY
Start: 2018-11-01 | End: 1900-01-01

## 2018-11-02 DIAGNOSIS — L97.312 NON-PRESSURE CHRONIC ULCER OF RIGHT ANKLE WITH FAT LAYER EXPOSED: ICD-10-CM

## 2018-11-02 DIAGNOSIS — I73.9 PERIPHERAL VASCULAR DISEASE, UNSPECIFIED: ICD-10-CM

## 2018-11-08 ENCOUNTER — OUTPATIENT (OUTPATIENT)
Dept: OUTPATIENT SERVICES | Facility: HOSPITAL | Age: 78
LOS: 1 days | End: 2018-11-08
Payer: COMMERCIAL

## 2018-11-08 ENCOUNTER — APPOINTMENT (OUTPATIENT)
Dept: WOUND CARE | Facility: CLINIC | Age: 78
End: 2018-11-08

## 2018-11-08 VITALS
HEIGHT: 65 IN | SYSTOLIC BLOOD PRESSURE: 130 MMHG | DIASTOLIC BLOOD PRESSURE: 75 MMHG | HEART RATE: 67 BPM | WEIGHT: 197 LBS | TEMPERATURE: 97.7 F | BODY MASS INDEX: 32.82 KG/M2

## 2018-11-08 DIAGNOSIS — Z98.89 OTHER SPECIFIED POSTPROCEDURAL STATES: Chronic | ICD-10-CM

## 2018-11-08 DIAGNOSIS — Z00.00 ENCOUNTER FOR GENERAL ADULT MEDICAL EXAMINATION WITHOUT ABNORMAL FINDINGS: ICD-10-CM

## 2018-11-08 PROCEDURE — 11042 DBRDMT SUBQ TIS 1ST 20SQCM/<: CPT

## 2018-11-09 DIAGNOSIS — I89.0 LYMPHEDEMA, NOT ELSEWHERE CLASSIFIED: ICD-10-CM

## 2018-11-09 DIAGNOSIS — L97.312 NON-PRESSURE CHRONIC ULCER OF RIGHT ANKLE WITH FAT LAYER EXPOSED: ICD-10-CM

## 2018-11-15 ENCOUNTER — OUTPATIENT (OUTPATIENT)
Dept: OUTPATIENT SERVICES | Facility: HOSPITAL | Age: 78
LOS: 1 days | End: 2018-11-15
Payer: COMMERCIAL

## 2018-11-15 ENCOUNTER — APPOINTMENT (OUTPATIENT)
Dept: WOUND CARE | Facility: CLINIC | Age: 78
End: 2018-11-15

## 2018-11-15 VITALS
HEART RATE: 68 BPM | HEIGHT: 65 IN | DIASTOLIC BLOOD PRESSURE: 77 MMHG | RESPIRATION RATE: 18 BRPM | SYSTOLIC BLOOD PRESSURE: 131 MMHG | TEMPERATURE: 97.7 F | BODY MASS INDEX: 32.82 KG/M2 | WEIGHT: 197 LBS | OXYGEN SATURATION: 99 %

## 2018-11-15 DIAGNOSIS — Z00.00 ENCOUNTER FOR GENERAL ADULT MEDICAL EXAMINATION WITHOUT ABNORMAL FINDINGS: ICD-10-CM

## 2018-11-15 DIAGNOSIS — Z98.89 OTHER SPECIFIED POSTPROCEDURAL STATES: Chronic | ICD-10-CM

## 2018-11-15 PROCEDURE — G0463: CPT

## 2018-11-16 DIAGNOSIS — L97.121 NON-PRESSURE CHRONIC ULCER OF LEFT THIGH LIMITED TO BREAKDOWN OF SKIN: ICD-10-CM

## 2018-11-16 DIAGNOSIS — R30.0 DYSURIA: ICD-10-CM

## 2018-11-29 ENCOUNTER — APPOINTMENT (OUTPATIENT)
Dept: WOUND CARE | Facility: CLINIC | Age: 78
End: 2018-11-29

## 2018-11-29 ENCOUNTER — OUTPATIENT (OUTPATIENT)
Dept: OUTPATIENT SERVICES | Facility: HOSPITAL | Age: 78
LOS: 1 days | End: 2018-11-29
Payer: COMMERCIAL

## 2018-11-29 VITALS
RESPIRATION RATE: 18 BRPM | HEIGHT: 65 IN | HEART RATE: 86 BPM | WEIGHT: 198 LBS | DIASTOLIC BLOOD PRESSURE: 68 MMHG | OXYGEN SATURATION: 99 % | BODY MASS INDEX: 32.99 KG/M2 | SYSTOLIC BLOOD PRESSURE: 122 MMHG | TEMPERATURE: 97.6 F

## 2018-11-29 DIAGNOSIS — Z00.00 ENCOUNTER FOR GENERAL ADULT MEDICAL EXAMINATION WITHOUT ABNORMAL FINDINGS: ICD-10-CM

## 2018-11-29 DIAGNOSIS — Z98.89 OTHER SPECIFIED POSTPROCEDURAL STATES: Chronic | ICD-10-CM

## 2018-11-29 PROCEDURE — G0463: CPT

## 2018-11-30 DIAGNOSIS — I89.0 LYMPHEDEMA, NOT ELSEWHERE CLASSIFIED: ICD-10-CM

## 2018-12-04 NOTE — ED PROVIDER NOTE - PHYSICAL EXAMINATION
Called patient  to f/u on her 3 month smoking cessation quit status. Pt stated she is still smoking, but only 1-3 cigarettes per day. Informed her she has benefits available and is able to rejoin. Pt not ready to make appointment, stated she is not able to follow up in clinic due to transportation issues. Stated she can only make appointments when she has other doctor's appointments on that same day. Informed her of benefit period, phone follow ups, and contact information. Will complete 3 month smart form and call back in 3 months for 6 month follow up.   right anterior thigh tenderness to palpation. right leg with massive lymphedema, chronic. ambulatory with cane

## 2018-12-20 ENCOUNTER — APPOINTMENT (OUTPATIENT)
Dept: WOUND CARE | Facility: CLINIC | Age: 78
End: 2018-12-20

## 2019-02-05 ENCOUNTER — APPOINTMENT (OUTPATIENT)
Dept: CARDIOLOGY | Facility: CLINIC | Age: 79
End: 2019-02-05
Payer: MEDICARE

## 2019-02-05 VITALS
WEIGHT: 205 LBS | TEMPERATURE: 97.5 F | BODY MASS INDEX: 34.16 KG/M2 | HEIGHT: 65 IN | HEART RATE: 81 BPM | SYSTOLIC BLOOD PRESSURE: 104 MMHG | DIASTOLIC BLOOD PRESSURE: 66 MMHG | OXYGEN SATURATION: 96 %

## 2019-02-05 PROCEDURE — 99214 OFFICE O/P EST MOD 30 MIN: CPT

## 2019-02-05 RX ORDER — RAMIPRIL 5 MG/1
5 CAPSULE ORAL DAILY
Qty: 90 | Refills: 3 | Status: ACTIVE | COMMUNITY
Start: 1900-01-01 | End: 1900-01-01

## 2019-02-05 NOTE — HISTORY OF PRESENT ILLNESS
[FreeTextEntry1] : 78-year-old male with HFpEF (echo 03/2018 showing preserved EF, mild AS), hypertension, hyperlipidemia, diabetes, chronic lymphedema, DVT s/p Xarelto, and atrial fibrillation on Eliquis (causing HF exacerbation in 06/2018), who presents followup visit. He denies any chest pain, or PND. He has mild orthopnea, and his wife gave patient a support pillow for his neck, with which he is able to sleep through the night. Patient is compliant with all medications, notes occasional dry cough. \par \par Patient evaluated by vascular surgery for lymphedema, referred to lymphedema clinic, is pending obtaining leg pumps for lymphedema. His furosemide was increased form 20 to 40 mg since his last visit, otherwise patient has been status quo compared to his last visit.

## 2019-02-05 NOTE — ASSESSMENT
[FreeTextEntry1] : 78-year-old male with noted PMH including HFpEF, hypertension, hyperlipidemia, diabetes, chronic lymphedema, DVT s/p Xarelto, and atrial fibrillation on Eliquis, who presents for office followup.\par \par 1.HFpEF: Patient appears euvolemic on exam compared to his hospitalization, has minimal crackles on auscultation\par -Will continue patient's furosemide at 40 and metolazone at current dose\par \par 2. Atrial fibrillation:\par -Continue anticoagulation with Eliquis (CHADS2-Vasc score 4)\par -Rate controlled on diltiazem\par \par 3. Hypertension: Patient is on ramipril and diltiazem.\par -Given slightly low BPs and occasional episodes of LH, will decrease ramipril from 10 to 5mg (will try to keep low-dose ACEi in setting of DM, diltiazem important for rate control)\par -Discussed with patient his dry cough may be due to ACEi and offered switch to ARB; patient will try low dose ACEi first to see if symptoms improve, states cough does not bother him too much\par \par 4. Lymphedema: Patient asked re: lymphedema pumps affecting his heart failure. I discussed with patient that this is theoretically possible as increased venous return to the heart might cause some degree of volume overload; however in that case the pumps can be stopped and extra furosemide may be administered; we discussed the possibility of hospitalization requiring intravenous furosemide. On the other hand, patient tolerated knee-high compression stocking and the lymphedema pumps will be to the same level. Since patient is on furosemide and metolazone, perhaps any excess fluid mobilized will be excreted and his fluid status may improve. \par \par 5. Possible sleep apnea: Patient given referral to follow up with pulmonologist\par \par FUV 3-6 months or PRN

## 2019-02-19 ENCOUNTER — APPOINTMENT (OUTPATIENT)
Dept: WOUND CARE | Facility: CLINIC | Age: 79
End: 2019-02-19

## 2019-02-19 ENCOUNTER — OUTPATIENT (OUTPATIENT)
Dept: OUTPATIENT SERVICES | Facility: HOSPITAL | Age: 79
LOS: 1 days | End: 2019-02-19
Payer: MEDICARE

## 2019-02-19 VITALS
HEIGHT: 65 IN | HEART RATE: 73 BPM | WEIGHT: 205 LBS | BODY MASS INDEX: 34.16 KG/M2 | DIASTOLIC BLOOD PRESSURE: 62 MMHG | SYSTOLIC BLOOD PRESSURE: 91 MMHG

## 2019-02-19 DIAGNOSIS — Z98.89 OTHER SPECIFIED POSTPROCEDURAL STATES: Chronic | ICD-10-CM

## 2019-02-19 DIAGNOSIS — Z00.00 ENCOUNTER FOR GENERAL ADULT MEDICAL EXAMINATION WITHOUT ABNORMAL FINDINGS: ICD-10-CM

## 2019-02-19 DIAGNOSIS — I89.0 LYMPHEDEMA, NOT ELSEWHERE CLASSIFIED: ICD-10-CM

## 2019-02-19 PROCEDURE — 11055 PARING/CUTG B9 HYPRKER LES 1: CPT

## 2019-02-19 PROCEDURE — G0463: CPT

## 2019-02-19 NOTE — ASSESSMENT
[FreeTextEntry1] : MARISOL:\par Derm: No open wounds or interdigital macerations noted, HPK lesion noted to Left posterior heel\par Vasc: DP/PT pulses non palpable 2/2 BLE edema, non-pitting edema noted to BLE\par Neuro: Protective sensation diminished to the level of digits \par MSK: No pain on palpation of HPK\par \par A:\par b/l lymphedema\par Left posterior heel HPK\par Right anterior leg ulcer - healed\par \par P:\par Patient evaluated and chart reviewed\par Rx lymphedema pumps for patient - pending\par \par Applied ACE compression to BLE\par Ordered Dynaflex to be applied at next visit\par pt will f/u with cardio\par RTC in 1 week

## 2019-02-19 NOTE — HISTORY OF PRESENT ILLNESS
[FreeTextEntry1] : 78 diabetic male with lymphedema returns to clinic for ulcer on the Right leg. Pt states the ulcer has healed since the last visit 3 months ago. Pts wife has noticed a callus on the Left posterior heel. Pt has recently seen a cardiologist who changed his medications to improve the swelling to BLE. Patient states he has had lymphedema since he was 14 years old. Pt states the lymphedema pumps are expensive and not covered by insurance and patient would like to pursue other options first. Patient denies any fever, nausea, vomiting, SOB.

## 2019-02-27 ENCOUNTER — APPOINTMENT (OUTPATIENT)
Dept: WOUND CARE | Facility: CLINIC | Age: 79
End: 2019-02-27

## 2019-02-27 ENCOUNTER — OUTPATIENT (OUTPATIENT)
Dept: OUTPATIENT SERVICES | Facility: HOSPITAL | Age: 79
LOS: 1 days | End: 2019-02-27
Payer: MEDICARE

## 2019-02-27 VITALS
SYSTOLIC BLOOD PRESSURE: 99 MMHG | TEMPERATURE: 97.6 F | WEIGHT: 205 LBS | DIASTOLIC BLOOD PRESSURE: 64 MMHG | OXYGEN SATURATION: 99 % | HEART RATE: 74 BPM | HEIGHT: 65 IN | BODY MASS INDEX: 34.16 KG/M2 | RESPIRATION RATE: 18 BRPM

## 2019-02-27 DIAGNOSIS — Z00.00 ENCOUNTER FOR GENERAL ADULT MEDICAL EXAMINATION WITHOUT ABNORMAL FINDINGS: ICD-10-CM

## 2019-02-27 DIAGNOSIS — Z98.89 OTHER SPECIFIED POSTPROCEDURAL STATES: Chronic | ICD-10-CM

## 2019-02-27 PROCEDURE — G0463: CPT

## 2019-02-27 NOTE — HISTORY OF PRESENT ILLNESS
[FreeTextEntry1] : 79 diabetic male with lymphedema returns to clinic for ulcer on the Right leg. Pt states the ulcer has healed since the last visit 3 months ago. Pts wife has noticed a callus on the Left posterior heel. Pt has recently seen a cardiologist who changed his medications to improve the swelling to BLE. Patient states he has had lymphedema since he was 14 years old. Pt states the lymphedema pumps are expensive and not covered by insurance and patient would like to pursue other options first. Patient denies any fever, nausea, vomiting, SOB.

## 2019-02-27 NOTE — ASSESSMENT
[FreeTextEntry1] : MARISOL:\par Derm: No open wounds or interdigital macerations noted, HPK lesion noted to Left posterior heel\par Vasc: DP/PT pulses non palpable 2/2 BLE edema, non-pitting edema noted to BLE\par Neuro: Protective sensation diminished to the level of digits \par MSK: No pain on palpation of HPK\par \par A:\par b/l lymphedema\par Left posterior heel HPK\par Right anterior leg ulcer - healed\par \par P:\par Patient evaluated and chart reviewed\par Dynaflex applied B/L\par Recommend lymphedema pumps\par Ordered Dynaflex to be applied at next visit\par pt will f/u with cardiology\par RTC in 1 week

## 2019-03-01 DIAGNOSIS — I73.9 PERIPHERAL VASCULAR DISEASE, UNSPECIFIED: ICD-10-CM

## 2019-03-01 DIAGNOSIS — I89.0 LYMPHEDEMA, NOT ELSEWHERE CLASSIFIED: ICD-10-CM

## 2019-03-06 ENCOUNTER — APPOINTMENT (OUTPATIENT)
Dept: WOUND CARE | Facility: CLINIC | Age: 79
End: 2019-03-06

## 2019-03-06 ENCOUNTER — OUTPATIENT (OUTPATIENT)
Dept: OUTPATIENT SERVICES | Facility: HOSPITAL | Age: 79
LOS: 1 days | End: 2019-03-06
Payer: MEDICARE

## 2019-03-06 VITALS
HEART RATE: 85 BPM | SYSTOLIC BLOOD PRESSURE: 113 MMHG | BODY MASS INDEX: 34.16 KG/M2 | HEIGHT: 65 IN | WEIGHT: 205 LBS | DIASTOLIC BLOOD PRESSURE: 72 MMHG | TEMPERATURE: 97.8 F

## 2019-03-06 DIAGNOSIS — Z98.89 OTHER SPECIFIED POSTPROCEDURAL STATES: Chronic | ICD-10-CM

## 2019-03-06 DIAGNOSIS — Z00.00 ENCOUNTER FOR GENERAL ADULT MEDICAL EXAMINATION WITHOUT ABNORMAL FINDINGS: ICD-10-CM

## 2019-03-06 PROCEDURE — G0463: CPT

## 2019-03-06 PROCEDURE — 29580 STRAPPING UNNA BOOT: CPT

## 2019-03-06 NOTE — HISTORY OF PRESENT ILLNESS
[FreeTextEntry1] : 79 diabetic male with lymphedema returns to clinic for ulcer on the Right leg. Pt states the ulcer has healed since the last visit 3 months ago. Pts wife has noticed a callus on the Left posterior heel. Pt has recently seen a cardiologist who changed his medications to improve the swelling to BLE. Patient states he has had lymphedema since he was 14 years old. Pt states the lymphedema pumps are expensive and not covered by insurance and patient would like to pursue other options first. Patient denies any fever, nausea, vomiting, SOB. Pt wishes to just have ACE compression vs dynaflex this week so he is able to remove it and take a shower

## 2019-03-06 NOTE — ASSESSMENT
[FreeTextEntry1] : MARISOL:\par Derm: No open wounds or interdigital macerations noted, HPK lesion noted to Left posterior heel\par Vasc: DP/PT pulses non palpable 2/2 BLE edema, non-pitting edema noted to BLE\par Neuro: Protective sensation diminished to the level of digits \par MSK: No pain on palpation of HPK\par \par A:\par b/l lymphedema\par Left posterior heel HPK\par Right anterior leg ulcer - healed\par \par P:\par Patient evaluated and chart reviewed\par Applied A&D ointment b/l\par Applied b/l ACE compression \par Recommend lymphedema pumps\par Ordered Dynaflex to be applied at next visit\par pt will f/u with cardiology\par RTC in 1 week

## 2019-03-13 ENCOUNTER — APPOINTMENT (OUTPATIENT)
Dept: WOUND CARE | Facility: CLINIC | Age: 79
End: 2019-03-13

## 2019-03-13 ENCOUNTER — OUTPATIENT (OUTPATIENT)
Dept: OUTPATIENT SERVICES | Facility: HOSPITAL | Age: 79
LOS: 1 days | End: 2019-03-13
Payer: MEDICARE

## 2019-03-13 VITALS
DIASTOLIC BLOOD PRESSURE: 64 MMHG | WEIGHT: 205 LBS | HEART RATE: 83 BPM | SYSTOLIC BLOOD PRESSURE: 106 MMHG | BODY MASS INDEX: 32.95 KG/M2 | HEIGHT: 66 IN | TEMPERATURE: 97.6 F

## 2019-03-13 DIAGNOSIS — Z00.00 ENCOUNTER FOR GENERAL ADULT MEDICAL EXAMINATION WITHOUT ABNORMAL FINDINGS: ICD-10-CM

## 2019-03-13 DIAGNOSIS — Z98.89 OTHER SPECIFIED POSTPROCEDURAL STATES: Chronic | ICD-10-CM

## 2019-03-13 PROCEDURE — 29580 STRAPPING UNNA BOOT: CPT

## 2019-03-13 PROCEDURE — G0463: CPT

## 2019-03-13 NOTE — ASSESSMENT
[FreeTextEntry1] : MARISOL:\par Derm: No open wounds or interdigital macerations noted, HPK lesion noted to Left posterior heel\par Vasc: DP/PT pulses non palpable 2/2 BLE edema, non-pitting edema noted to BLE\par Neuro: Protective sensation diminished to the level of digits \par MSK: No pain on palpation of HPK\par \par A:\par b/l lymphedema\par Left posterior heel HPK\par Right anterior leg ulcer - healed\par \par P:\par Patient evaluated and chart reviewed\par Applied A&D ointment b/l\par Applied Dynaflex b/l \par Recommend start using lymphedema pumps - patient to contact rep \par pt will f/u with cardiology\par RTC in 1 week

## 2019-03-13 NOTE — HISTORY OF PRESENT ILLNESS
CC: PAF, CAD, Type II MI    Interval History:     MEDICATIONS:  acetaminophen   Tablet. 650 milliGRAM(s) Oral every 6 hours PRN  amiodarone    Tablet 200 milliGRAM(s) Oral daily  amLODIPine   Tablet 10 milliGRAM(s) Oral daily  bisacodyl 5 milliGRAM(s) Oral at bedtime  buDESOnide   0.5 milliGRAM(s) Respule 0.5 milliGRAM(s) Inhalation two times a day  dextrose 5%. 1000 milliLiter(s) IV Continuous <Continuous>  dextrose 50% Injectable 12.5 Gram(s) IV Push once  dextrose 50% Injectable 25 Gram(s) IV Push once  dextrose 50% Injectable 25 Gram(s) IV Push once  dextrose Gel 1 Dose(s) Oral once PRN  glucagon  Injectable 1 milliGRAM(s) IntraMuscular once PRN  heparin  Infusion.  Unit(s)/Hr IV Continuous <Continuous>  heparin  Injectable 6000 Unit(s) IV Push every 6 hours PRN  heparin  Injectable 3000 Unit(s) IV Push every 6 hours PRN  insulin glargine Injectable (LANTUS) 7 Unit(s) SubCutaneous at bedtime  insulin lispro (HumaLOG) corrective regimen sliding scale   SubCutaneous three times a day before meals  insulin lispro (HumaLOG) corrective regimen sliding scale   SubCutaneous at bedtime  levalbuterol Inhalation 0.63 milliGRAM(s) Inhalation every 8 hours  levothyroxine 75 MICROGram(s) Oral daily  metoprolol     tartrate 12.5 milliGRAM(s) Oral two times a day  warfarin 3 milliGRAM(s) Oral once      LABS:      133<L>  |  99  |  50<H>  ----------------------------<  108<H>  4.8   |  21<L>  |  2.24<H>    Ca    9.0      2017 07:22                            10.5   8.65  )-----------( 324      ( 2017 07:28 )             31.0     PT/INR - ( 2017 07:13 )   PT: 11.7 sec;   INR: 1.03 ratio         PTT - ( 2017 07:38 )  PTT:60.9 sec          VITAL SIGNS:   T(C): 36.7 (17 @ 11:53), Max: 36.8 (17 @ 20:44)  HR: 57 (17 @ 11:53) (54 - 57)  BP: 144/68 (17 @ 11:53) (144/68 - 166/70)  RR: 18 (17 @ 11:53) (18 - 18)  SpO2: 98% (17 @ 11:53) (97% - 100%)  Daily     Daily Weight in k (2017 15:13)  I&O's Summary      TELE: CC: PAF, CAD, Type II MI    Interval History: No significant cardiac events overnight.     MEDICATIONS:  acetaminophen   Tablet. 650 milliGRAM(s) Oral every 6 hours PRN  amiodarone    Tablet 200 milliGRAM(s) Oral daily  amLODIPine   Tablet 10 milliGRAM(s) Oral daily  bisacodyl 5 milliGRAM(s) Oral at bedtime  buDESOnide   0.5 milliGRAM(s) Respule 0.5 milliGRAM(s) Inhalation two times a day  dextrose 5%. 1000 milliLiter(s) IV Continuous <Continuous>  dextrose 50% Injectable 12.5 Gram(s) IV Push once  dextrose 50% Injectable 25 Gram(s) IV Push once  dextrose 50% Injectable 25 Gram(s) IV Push once  dextrose Gel 1 Dose(s) Oral once PRN  glucagon  Injectable 1 milliGRAM(s) IntraMuscular once PRN  heparin  Infusion.  Unit(s)/Hr IV Continuous <Continuous>  heparin  Injectable 6000 Unit(s) IV Push every 6 hours PRN  heparin  Injectable 3000 Unit(s) IV Push every 6 hours PRN  insulin glargine Injectable (LANTUS) 7 Unit(s) SubCutaneous at bedtime  insulin lispro (HumaLOG) corrective regimen sliding scale   SubCutaneous three times a day before meals  insulin lispro (HumaLOG) corrective regimen sliding scale   SubCutaneous at bedtime  levalbuterol Inhalation 0.63 milliGRAM(s) Inhalation every 8 hours  levothyroxine 75 MICROGram(s) Oral daily  metoprolol     tartrate 12.5 milliGRAM(s) Oral two times a day  warfarin 3 milliGRAM(s) Oral once      LABS:      133<L>  |  99  |  50<H>  ----------------------------<  108<H>  4.8   |  21<L>  |  2.24<H>    Ca    9.0      2017 07:22                            10.5   8.65  )-----------( 324      ( 2017 07:28 )             31.0     PT/INR - ( 2017 07:13 )   PT: 11.7 sec;   INR: 1.03 ratio         PTT - ( 2017 07:38 )  PTT:60.9 sec          VITAL SIGNS:   T(C): 36.7 (11-07-17 @ 11:53), Max: 36.8 (17 @ 20:44)  HR: 57 (17 @ 11:53) (54 - 57)  BP: 144/68 (17 @ 11:53) (144/68 - 166/70)  RR: 18 (17 @ 11:53) (18 - 18)  SpO2: 98% (17 @ 11:53) (97% - 100%)  Daily     Daily Weight in k (2017 15:13)  I&O's Summary      TELE: NSR    Stress: < from: Nuclear Stress Test-Pharmacologic (17 @ 16:22) >  * The left ventricle was mildly enlarged. There are large,  mild to moderate defects in the inferior, inferoapical,  inferolateral, and apical lateral walls that are mostly  reversible suggestive of ischemia with partial scarring.  Adjacent extracardiac tracer uptake may be contributing to  some of these defects.  * Post-stress gated wall motion analysis was performed  (LVEF = 62 %;LVEDV = 103 ml.) revealing mildly reduced  systolic thickening of the inferior, inferoapical,  inferolateral, and apical lateral walls with normal  overall left ventricular ejection fraction.    < end of copied text > [FreeTextEntry1] : 79 diabetic male with lymphedema returns to clinic for ulcer on the Right leg. Pt states the ulcer has healed since the last visit 3 months ago. Pts wife has noticed a callus on the Left posterior heel. Pt has recently seen a cardiologist who changed his medications to improve the swelling to BLE. Patient states he has had lymphedema since he was 14 years old. Pt states the lymphedema pumps are expensive and not covered by insurance and patient would like to pursue other options first. Patient denies any fever, nausea, vomiting, SOB. Pt states he noticed more swelling and redness to his legs this week. Patient and wife wish to start using the pneumatic compression pumps

## 2019-03-14 DIAGNOSIS — E11.621 TYPE 2 DIABETES MELLITUS WITH FOOT ULCER: ICD-10-CM

## 2019-03-14 DIAGNOSIS — I87.2 VENOUS INSUFFICIENCY (CHRONIC) (PERIPHERAL): ICD-10-CM

## 2019-03-15 DIAGNOSIS — I87.2 VENOUS INSUFFICIENCY (CHRONIC) (PERIPHERAL): ICD-10-CM

## 2019-03-15 DIAGNOSIS — E11.40 TYPE 2 DIABETES MELLITUS WITH DIABETIC NEUROPATHY, UNSPECIFIED: ICD-10-CM

## 2019-03-20 ENCOUNTER — APPOINTMENT (OUTPATIENT)
Dept: WOUND CARE | Facility: CLINIC | Age: 79
End: 2019-03-20

## 2019-03-20 ENCOUNTER — OUTPATIENT (OUTPATIENT)
Dept: OUTPATIENT SERVICES | Facility: HOSPITAL | Age: 79
LOS: 1 days | End: 2019-03-20
Payer: MEDICARE

## 2019-03-20 VITALS
SYSTOLIC BLOOD PRESSURE: 120 MMHG | HEIGHT: 66 IN | DIASTOLIC BLOOD PRESSURE: 75 MMHG | TEMPERATURE: 97.7 F | HEART RATE: 90 BPM | WEIGHT: 205 LBS | BODY MASS INDEX: 32.95 KG/M2

## 2019-03-20 DIAGNOSIS — Z00.00 ENCOUNTER FOR GENERAL ADULT MEDICAL EXAMINATION WITHOUT ABNORMAL FINDINGS: ICD-10-CM

## 2019-03-20 DIAGNOSIS — M21.40 FLAT FOOT [PES PLANUS] (ACQUIRED), UNSPECIFIED FOOT: ICD-10-CM

## 2019-03-20 DIAGNOSIS — Z98.89 OTHER SPECIFIED POSTPROCEDURAL STATES: Chronic | ICD-10-CM

## 2019-03-20 DIAGNOSIS — I89.0 LYMPHEDEMA, NOT ELSEWHERE CLASSIFIED: ICD-10-CM

## 2019-03-20 PROCEDURE — G0463: CPT

## 2019-03-20 PROCEDURE — 29580 STRAPPING UNNA BOOT: CPT

## 2019-03-20 NOTE — HISTORY OF PRESENT ILLNESS
[FreeTextEntry1] : 79 diabetic male with lymphedema returns to clinic for ulcer on the Right leg. Pt states the ulcer has healed since the last visit 3 months ago. Pts wife has noticed a callus on the Left posterior heel. Pt has recently seen a cardiologist who changed his medications to improve the swelling to BLE. Patient states he has had lymphedema since he was 14 years old. Pt states the lymphedema pumps are expensive and not covered by insurance and patient would like to pursue other options first. Patient denies any fever, nausea, vomiting, SOB. Pt states he noticed more swelling and redness to his legs this week. Patient and wife wish to start using the pneumatic compression pumps

## 2019-03-20 NOTE — ASSESSMENT
[FreeTextEntry1] : MARISOL:\par Derm: No open wounds or interdigital macerations noted, HPK lesion noted to Left posterior heel\par Vasc: DP/PT pulses non palpable 2/2 BLE edema, non-pitting edema noted to BLE\par Neuro: Protective sensation diminished to the level of digits \par MSK: No pain on palpation of HPK\par \par A:\par b/l lymphedema\par Left posterior heel HPK\par Right anterior leg ulcer - healed\par Right pes planovalgus deformity\par \par P:\par Patient evaluated and chart reviewed\par Applied A&D ointment b/l\par Applied Dynaflex b/l \par Recommend start using lymphedema pumps - patient to contact rep to begin making monthly payments towards the device\par pt will f/u with cardiology\par Recommended RLE CAM walker to improve ambulation for unstable right ankle for a period of greater than 6 months \par RTC in 1 week

## 2019-03-27 ENCOUNTER — APPOINTMENT (OUTPATIENT)
Dept: WOUND CARE | Facility: CLINIC | Age: 79
End: 2019-03-27

## 2019-03-27 ENCOUNTER — OUTPATIENT (OUTPATIENT)
Dept: OUTPATIENT SERVICES | Facility: HOSPITAL | Age: 79
LOS: 1 days | End: 2019-03-27
Payer: MEDICARE

## 2019-03-27 VITALS
BODY MASS INDEX: 33.11 KG/M2 | RESPIRATION RATE: 18 BRPM | TEMPERATURE: 97.6 F | SYSTOLIC BLOOD PRESSURE: 117 MMHG | DIASTOLIC BLOOD PRESSURE: 66 MMHG | HEIGHT: 66 IN | OXYGEN SATURATION: 99 % | WEIGHT: 206 LBS | HEART RATE: 86 BPM

## 2019-03-27 DIAGNOSIS — Z00.00 ENCOUNTER FOR GENERAL ADULT MEDICAL EXAMINATION WITHOUT ABNORMAL FINDINGS: ICD-10-CM

## 2019-03-27 DIAGNOSIS — Z98.89 OTHER SPECIFIED POSTPROCEDURAL STATES: Chronic | ICD-10-CM

## 2019-03-27 PROCEDURE — 29580 STRAPPING UNNA BOOT: CPT

## 2019-03-27 NOTE — ASSESSMENT
[FreeTextEntry1] : MARISOL:\par Derm: No open wounds or interdigital macerations noted, HPK lesion noted to Left posterior heel. Elongated, discolored, and dystrophic toenails x10\par Vasc: DP/PT pulses non palpable 2/2 BLE edema, non-pitting edema noted to BLE\par Neuro: Protective sensation diminished to the level of digits \par MSK: No pain on palpation of HPK\par \par A:\par b/l lymphedema\par Left posterior heel HPK\par Right anterior leg ulcer - healed\par Right pes planovalgus deformity\par \par P:\par Patient evaluated and chart reviewed\par Applied A&D ointment b/l\par Applied Dynaflex b/l \par Recommend start using lymphedema pumps - patient to contact rep to begin making monthly payments towards the device\par Aseptic debridement of toenails x10 using sterile nippers \par pt will f/u with cardiology\par Recommended RLE CAM walker to improve ambulation for unstable right ankle for a period of greater than 6 months \par RTC in 1 week

## 2019-03-28 DIAGNOSIS — I87.311 CHRONIC VENOUS HYPERTENSION (IDIOPATHIC) WITH ULCER OF RIGHT LOWER EXTREMITY: ICD-10-CM

## 2019-03-28 DIAGNOSIS — I87.312 CHRONIC VENOUS HYPERTENSION (IDIOPATHIC) WITH ULCER OF LEFT LOWER EXTREMITY: ICD-10-CM

## 2019-03-28 DIAGNOSIS — B35.1 TINEA UNGUIUM: ICD-10-CM

## 2019-04-01 ENCOUNTER — RX RENEWAL (OUTPATIENT)
Age: 79
End: 2019-04-01

## 2019-04-01 RX ORDER — APIXABAN 5 MG/1
5 TABLET, FILM COATED ORAL
Qty: 180 | Refills: 3 | Status: ACTIVE | COMMUNITY
Start: 2018-07-24 | End: 1900-01-01

## 2019-04-03 ENCOUNTER — APPOINTMENT (OUTPATIENT)
Dept: WOUND CARE | Facility: CLINIC | Age: 79
End: 2019-04-03

## 2019-04-03 ENCOUNTER — OUTPATIENT (OUTPATIENT)
Dept: OUTPATIENT SERVICES | Facility: HOSPITAL | Age: 79
LOS: 1 days | End: 2019-04-03
Payer: MEDICARE

## 2019-04-03 VITALS
HEIGHT: 66 IN | SYSTOLIC BLOOD PRESSURE: 122 MMHG | DIASTOLIC BLOOD PRESSURE: 64 MMHG | BODY MASS INDEX: 33.11 KG/M2 | HEART RATE: 63 BPM | TEMPERATURE: 98.5 F | WEIGHT: 206 LBS | RESPIRATION RATE: 18 BRPM | OXYGEN SATURATION: 97 %

## 2019-04-03 DIAGNOSIS — Z98.89 OTHER SPECIFIED POSTPROCEDURAL STATES: Chronic | ICD-10-CM

## 2019-04-03 DIAGNOSIS — Z00.00 ENCOUNTER FOR GENERAL ADULT MEDICAL EXAMINATION WITHOUT ABNORMAL FINDINGS: ICD-10-CM

## 2019-04-03 PROCEDURE — G0463: CPT

## 2019-04-03 PROCEDURE — 29580 STRAPPING UNNA BOOT: CPT

## 2019-04-03 NOTE — ASSESSMENT
[FreeTextEntry1] : AMRISOL:\par Derm: No open wounds or interdigital macerations noted, HPK lesion noted to Left posterior heel. Elongated, discolored, and dystrophic toenails x10\par Vasc: DP/PT pulses non palpable 2/2 BLE edema, non-pitting edema noted to BLE\par Neuro: Protective sensation diminished to the level of digits \par MSK: No pain on palpation of HPK\par \par A:\par b/l lymphedema\par Left posterior heel HPK\par Right anterior leg ulcer - healed\par Right pes planovalgus deformity\par \par P:\par Patient evaluated and chart reviewed\par Applied A&D ointment b/l\par Applied Dynaflex b/l \par Recommend start using lymphedema pumps - patient to contact rep to begin making monthly payments towards the device\par Rx compression stockings with zipper\par Recommend referral to lymphedema clinic\par pt will f/u with cardiology\par Recommended RLE CAM walker to improve ambulation for unstable right ankle for a period of greater than 6 months \par RTC in 1 week

## 2019-04-04 DIAGNOSIS — I87.311 CHRONIC VENOUS HYPERTENSION (IDIOPATHIC) WITH ULCER OF RIGHT LOWER EXTREMITY: ICD-10-CM

## 2019-04-04 DIAGNOSIS — I87.312 CHRONIC VENOUS HYPERTENSION (IDIOPATHIC) WITH ULCER OF LEFT LOWER EXTREMITY: ICD-10-CM

## 2019-04-10 ENCOUNTER — APPOINTMENT (OUTPATIENT)
Dept: WOUND CARE | Facility: CLINIC | Age: 79
End: 2019-04-10

## 2019-04-10 ENCOUNTER — OUTPATIENT (OUTPATIENT)
Dept: OUTPATIENT SERVICES | Facility: HOSPITAL | Age: 79
LOS: 1 days | End: 2019-04-10
Payer: MEDICARE

## 2019-04-10 VITALS
SYSTOLIC BLOOD PRESSURE: 112 MMHG | TEMPERATURE: 98.3 F | OXYGEN SATURATION: 96 % | DIASTOLIC BLOOD PRESSURE: 71 MMHG | BODY MASS INDEX: 33.11 KG/M2 | RESPIRATION RATE: 18 BRPM | WEIGHT: 206 LBS | HEIGHT: 66 IN | HEART RATE: 79 BPM

## 2019-04-10 DIAGNOSIS — Z00.00 ENCOUNTER FOR GENERAL ADULT MEDICAL EXAMINATION WITHOUT ABNORMAL FINDINGS: ICD-10-CM

## 2019-04-10 DIAGNOSIS — Z98.89 OTHER SPECIFIED POSTPROCEDURAL STATES: Chronic | ICD-10-CM

## 2019-04-10 PROCEDURE — 29580 STRAPPING UNNA BOOT: CPT

## 2019-04-10 PROCEDURE — G0463: CPT

## 2019-04-10 NOTE — ASSESSMENT
[FreeTextEntry1] : MARISOL:\par Derm: No open wounds or interdigital macerations noted, HPK lesion noted to Left posterior heel. Elongated, discolored, and dystrophic toenails x10\par Vasc: DP/PT pulses non palpable 2/2 BLE edema, non-pitting edema noted to BLE\par Neuro: Protective sensation diminished to the level of digits \par MSK: No pain on palpation of HPK\par \par A:\par b/l lymphedema\par Left posterior heel HPK\par Right anterior leg ulcer - healed\par Right pes planovalgus deformity\par \par P:\par Patient evaluated and chart reviewed\par Applied A&D ointment b/l\par Applied Dynaflex b/l \par Recommend start using lymphedema pumps - patient to contact rep to begin making monthly payments towards the device\par follow up compression stockings with zipper\par Recommend referral to lymphedema clinic\par pt will f/u with cardiology\par Recommended RLE CAM walker to improve ambulation for unstable right ankle for a period of greater than 6 months \par RTC in 1 week

## 2019-04-11 DIAGNOSIS — I87.312 CHRONIC VENOUS HYPERTENSION (IDIOPATHIC) WITH ULCER OF LEFT LOWER EXTREMITY: ICD-10-CM

## 2019-04-11 DIAGNOSIS — I87.311 CHRONIC VENOUS HYPERTENSION (IDIOPATHIC) WITH ULCER OF RIGHT LOWER EXTREMITY: ICD-10-CM

## 2019-04-17 ENCOUNTER — OUTPATIENT (OUTPATIENT)
Dept: OUTPATIENT SERVICES | Facility: HOSPITAL | Age: 79
LOS: 1 days | End: 2019-04-17
Payer: MEDICARE

## 2019-04-17 ENCOUNTER — APPOINTMENT (OUTPATIENT)
Dept: WOUND CARE | Facility: CLINIC | Age: 79
End: 2019-04-17

## 2019-04-17 VITALS
DIASTOLIC BLOOD PRESSURE: 50 MMHG | TEMPERATURE: 97.8 F | HEART RATE: 70 BPM | WEIGHT: 206 LBS | OXYGEN SATURATION: 99 % | BODY MASS INDEX: 33.11 KG/M2 | HEIGHT: 66 IN | SYSTOLIC BLOOD PRESSURE: 103 MMHG | RESPIRATION RATE: 18 BRPM

## 2019-04-17 DIAGNOSIS — Z00.00 ENCOUNTER FOR GENERAL ADULT MEDICAL EXAMINATION WITHOUT ABNORMAL FINDINGS: ICD-10-CM

## 2019-04-17 DIAGNOSIS — Z98.89 OTHER SPECIFIED POSTPROCEDURAL STATES: Chronic | ICD-10-CM

## 2019-04-17 PROCEDURE — G0463: CPT

## 2019-04-17 NOTE — ASSESSMENT
[FreeTextEntry1] : MARISOL:\par Derm: No open wounds or interdigital macerations noted, HPK lesion noted to Left posterior heel. Elongated, discolored, and dystrophic toenails x10\par Vasc: DP/PT pulses non palpable 2/2 BLE edema, non-pitting edema noted to BLE, improving\par Neuro: Protective sensation diminished to the level of digits \par MSK: No pain on palpation of HPK\par \par A:\par b/l lymphedema\par Left posterior heel HPK\par Right anterior leg ulcer - healed\par Right pes planovalgus deformity\par \par P:\par Patient evaluated and chart reviewed\par Applied A&D ointment b/l\par Applied Dynaflex to right, ACE to left \par Recommend start using lymphedema pumps - patient to contact rep to begin making monthly payments towards the device\par follow up compression stockings with zipper\par Recommend referral to lymphedema clinic\par Recommended RLE CAM walker to improve ambulation for unstable right ankle for a period of greater than 6 months \par RTC in 1 week

## 2019-04-17 NOTE — HISTORY OF PRESENT ILLNESS
[FreeTextEntry1] : 79 diabetic male with lymphedema returns to clinic for ulcer on the Right leg. Pt states the ulcer has healed  3 months ago. Pt has recently seen a cardiologist who changed his medications to improve the swelling to BLE. Patient states he has had lymphedema since he was 14 years old. Pt states the lymphedema pumps are expensive and not covered by insurance and patient would like to pursue other options first. Pt states he ordered compression stockings and they are in the mail and will arrive in 2 weeks.Patient denies any fever, nausea, vomiting, SOB. Pt states he has noticed improvement in both legs since using Dynaflex

## 2019-04-18 DIAGNOSIS — E11.621 TYPE 2 DIABETES MELLITUS WITH FOOT ULCER: ICD-10-CM

## 2019-04-18 DIAGNOSIS — L97.312 NON-PRESSURE CHRONIC ULCER OF RIGHT ANKLE WITH FAT LAYER EXPOSED: ICD-10-CM

## 2019-04-24 ENCOUNTER — APPOINTMENT (OUTPATIENT)
Dept: WOUND CARE | Facility: CLINIC | Age: 79
End: 2019-04-24

## 2019-05-01 ENCOUNTER — APPOINTMENT (OUTPATIENT)
Dept: WOUND CARE | Facility: CLINIC | Age: 79
End: 2019-05-01

## 2019-05-01 ENCOUNTER — OUTPATIENT (OUTPATIENT)
Dept: OUTPATIENT SERVICES | Facility: HOSPITAL | Age: 79
LOS: 1 days | End: 2019-05-01
Payer: MEDICARE

## 2019-05-01 VITALS
RESPIRATION RATE: 18 BRPM | WEIGHT: 206 LBS | DIASTOLIC BLOOD PRESSURE: 66 MMHG | TEMPERATURE: 97.7 F | HEIGHT: 66 IN | HEART RATE: 65 BPM | OXYGEN SATURATION: 99 % | BODY MASS INDEX: 33.11 KG/M2 | SYSTOLIC BLOOD PRESSURE: 97 MMHG

## 2019-05-01 DIAGNOSIS — Z00.00 ENCOUNTER FOR GENERAL ADULT MEDICAL EXAMINATION WITHOUT ABNORMAL FINDINGS: ICD-10-CM

## 2019-05-01 DIAGNOSIS — Z98.89 OTHER SPECIFIED POSTPROCEDURAL STATES: Chronic | ICD-10-CM

## 2019-05-01 PROCEDURE — 11042 DBRDMT SUBQ TIS 1ST 20SQCM/<: CPT

## 2019-05-02 DIAGNOSIS — I87.311 CHRONIC VENOUS HYPERTENSION (IDIOPATHIC) WITH ULCER OF RIGHT LOWER EXTREMITY: ICD-10-CM

## 2019-05-02 DIAGNOSIS — L97.322 NON-PRESSURE CHRONIC ULCER OF LEFT ANKLE WITH FAT LAYER EXPOSED: ICD-10-CM

## 2019-05-02 DIAGNOSIS — I87.312 CHRONIC VENOUS HYPERTENSION (IDIOPATHIC) WITH ULCER OF LEFT LOWER EXTREMITY: ICD-10-CM

## 2019-05-02 NOTE — ASSESSMENT
[FreeTextEntry1] : MARISOL:\par Derm: No open wounds or interdigital macerations noted, HPK lesion noted to Left posterior heel. Elongated, discolored, and dystrophic toenails x10\par Vasc: DP/PT pulses non palpable 2/2 BLE edema, non-pitting edema noted to BLE, improving\par Neuro: Protective sensation diminished to the level of digits \par MSK: No pain on palpation of HPK\par \par A:\par b/l lymphedema\par Left posterior heel HPK\par Right anterior leg ulcer - healed\par Right pes planovalgus deformity\par \par P:\par Patient evaluated and chart reviewed\par Applied A&D ointment b/l\par Drained blister left leg using sterile #15 blade \par Continue with compression stockings\par Recommend start using lymphedema pumps - patient to contact rep to begin making monthly payments towards the device\par follow up compression stockings with zipper\par Recommend referral to lymphedema clinic\par Recommended RLE CAM walker to improve ambulation for unstable right ankle for a period of greater than 6 months \par RTC in 2 weeks

## 2019-05-08 ENCOUNTER — APPOINTMENT (OUTPATIENT)
Dept: CARDIOLOGY | Facility: CLINIC | Age: 79
End: 2019-05-08
Payer: MEDICARE

## 2019-05-08 VITALS
SYSTOLIC BLOOD PRESSURE: 93 MMHG | HEART RATE: 68 BPM | TEMPERATURE: 97.5 F | OXYGEN SATURATION: 97 % | WEIGHT: 206 LBS | BODY MASS INDEX: 33.11 KG/M2 | DIASTOLIC BLOOD PRESSURE: 53 MMHG | HEIGHT: 66 IN

## 2019-05-08 PROCEDURE — 99214 OFFICE O/P EST MOD 30 MIN: CPT

## 2019-05-08 NOTE — HISTORY OF PRESENT ILLNESS
[FreeTextEntry1] : 79-year-old male with HFpEF (echo 03/2018 showing preserved EF, mild AS), hypertension, hyperlipidemia, diabetes, chronic lymphedema, DVT s/p Xarelto, and atrial fibrillation on Eliquis (causing HF exacerbation in 06/2018), who presents followup visit. \par \par Patient evaluated by vascular surgery for lymphedema, referred to lymphedema clinic, is pending obtaining leg pumps for lymphedema. His furosemide was increased 60 mg with metolazone since his last visit. Reports his LE edema improved on the left side. States episodes of lightheadedness improved with decrease of ramipril from 10 to 5 mg. Still has not followed up with pulmonologist. Reports daytime somnolence. Denies chest pain, dyspnea, palpitations, lightheadedness, or syncope. \par

## 2019-05-08 NOTE — ASSESSMENT
[FreeTextEntry1] : 79 year-old male with noted PMH including HFpEF, hypertension, hyperlipidemia, diabetes, chronic lymphedema, DVT s/p Xarelto, and atrial fibrillation on Eliquis, who presents for office followup.\par \par 1.HFpEF: Patient appears euvolemic on exam, has no complaints of dyspnea. \par -Will continue patient's furosemide at 60 and metolazone at current dose\par \par 2. Atrial fibrillation:\par -Continue anticoagulation with Eliquis (CHADS2-Vasc score 4)\par -Rate controlled on diltiazem\par \par 3. Hypertension: Patient is on ramipril and diltiazem.\par -Improvement in symptoms of LH; if needed can decrease diltiazem dose as long as patient remains rate controlled\par \par 4. Lymphedema: Saw vascular, follows with podiatry\par \par 5. Possible sleep apnea: Patient given another referral to follow up with pulmonologist\par \par FUV 3-6 months or PRN

## 2019-05-13 LAB
ALBUMIN SERPL ELPH-MCNC: 4.3 G/DL
ALP BLD-CCNC: 95 U/L
ALT SERPL-CCNC: 25 U/L
ANION GAP SERPL CALC-SCNC: 18 MMOL/L
AST SERPL-CCNC: 21 U/L
BILIRUB DIRECT SERPL-MCNC: 0.2 MG/DL
BILIRUB INDIRECT SERPL-MCNC: 0.2 MG/DL
BILIRUB SERPL-MCNC: 0.4 MG/DL
BUN SERPL-MCNC: 76 MG/DL
CALCIUM SERPL-MCNC: 9.8 MG/DL
CHLORIDE SERPL-SCNC: 92 MMOL/L
CHOLEST SERPL-MCNC: 112 MG/DL
CHOLEST/HDLC SERPL: 2.4 RATIO
CO2 SERPL-SCNC: 28 MMOL/L
CREAT SERPL-MCNC: 1.82 MG/DL
ESTIMATED AVERAGE GLUCOSE: 171 MG/DL
GLUCOSE SERPL-MCNC: 113 MG/DL
HBA1C MFR BLD HPLC: 7.6 %
HDLC SERPL-MCNC: 47 MG/DL
LDLC SERPL CALC-MCNC: 46 MG/DL
POTASSIUM SERPL-SCNC: 3.9 MMOL/L
PROT SERPL-MCNC: 7.2 G/DL
SODIUM SERPL-SCNC: 138 MMOL/L
TRIGL SERPL-MCNC: 96 MG/DL
TSH SERPL-ACNC: 3.21 UIU/ML

## 2019-05-16 ENCOUNTER — OUTPATIENT (OUTPATIENT)
Dept: OUTPATIENT SERVICES | Facility: HOSPITAL | Age: 79
LOS: 1 days | End: 2019-05-16
Payer: MEDICARE

## 2019-05-16 ENCOUNTER — APPOINTMENT (OUTPATIENT)
Dept: WOUND CARE | Facility: CLINIC | Age: 79
End: 2019-05-16

## 2019-05-16 VITALS
RESPIRATION RATE: 18 BRPM | BODY MASS INDEX: 33.11 KG/M2 | HEIGHT: 66 IN | TEMPERATURE: 98.4 F | OXYGEN SATURATION: 99 % | DIASTOLIC BLOOD PRESSURE: 79 MMHG | SYSTOLIC BLOOD PRESSURE: 111 MMHG | HEART RATE: 84 BPM | WEIGHT: 206 LBS

## 2019-05-16 DIAGNOSIS — Z00.00 ENCOUNTER FOR GENERAL ADULT MEDICAL EXAMINATION WITHOUT ABNORMAL FINDINGS: ICD-10-CM

## 2019-05-16 DIAGNOSIS — Z98.89 OTHER SPECIFIED POSTPROCEDURAL STATES: Chronic | ICD-10-CM

## 2019-05-16 PROCEDURE — G0463: CPT

## 2019-05-16 NOTE — ASSESSMENT
[FreeTextEntry1] : MARISOL:\par Derm: No open wounds or interdigital macerations noted, HPK lesion noted to Left posterior heel. Elongated, discolored, and dystrophic toenails x10\par Vasc: DP/PT pulses non palpable 2/2 BLE edema, non-pitting edema noted to BLE, improving\par Neuro: Protective sensation diminished to the level of digits \par MSK: No pain on palpation of HPK\par \par A:\par b/l lymphedema\par Left posterior heel HPK\par Right anterior leg ulcer - healed\par Right pes planovalgus deformity\par \par P:\par Patient evaluated and chart reviewed\par Applied A&D ointment b/l\par Left leg blister - healing \par Continue with compression stockings\par Recommend start using lymphedema pumps - patient to contact rep to begin making monthly payments towards the device\par continue with compression stockings - patient has found relief from compression stockings and a decrease in fluid \par Recommend referral to lymphedema clinic\par Applied AxD ointment\par RTC in 2 weeks

## 2019-05-17 DIAGNOSIS — I89.0 LYMPHEDEMA, NOT ELSEWHERE CLASSIFIED: ICD-10-CM

## 2019-05-17 LAB
ANION GAP SERPL CALC-SCNC: 14 MMOL/L
BUN SERPL-MCNC: 56 MG/DL
CALCIUM SERPL-MCNC: 10.1 MG/DL
CHLORIDE SERPL-SCNC: 95 MMOL/L
CO2 SERPL-SCNC: 30 MMOL/L
CREAT SERPL-MCNC: 1.66 MG/DL
GLUCOSE SERPL-MCNC: 146 MG/DL
POTASSIUM SERPL-SCNC: 4.2 MMOL/L
SODIUM SERPL-SCNC: 139 MMOL/L

## 2019-06-11 ENCOUNTER — APPOINTMENT (OUTPATIENT)
Dept: CARDIOLOGY | Facility: CLINIC | Age: 79
End: 2019-06-11
Payer: MEDICARE

## 2019-06-11 VITALS
HEIGHT: 66 IN | WEIGHT: 181 LBS | DIASTOLIC BLOOD PRESSURE: 72 MMHG | OXYGEN SATURATION: 95 % | TEMPERATURE: 97.9 F | BODY MASS INDEX: 29.09 KG/M2 | HEART RATE: 83 BPM | SYSTOLIC BLOOD PRESSURE: 110 MMHG

## 2019-06-11 DIAGNOSIS — G47.30 SLEEP APNEA, UNSPECIFIED: ICD-10-CM

## 2019-06-11 PROCEDURE — 99214 OFFICE O/P EST MOD 30 MIN: CPT

## 2019-06-11 RX ORDER — DILTIAZEM HYDROCHLORIDE 120 MG/1
120 TABLET ORAL
Refills: 0 | Status: DISCONTINUED | COMMUNITY
End: 2019-06-11

## 2019-06-11 RX ORDER — DOXAZOSIN 2 MG/1
2 TABLET ORAL
Refills: 0 | Status: DISCONTINUED | COMMUNITY
End: 2019-06-11

## 2019-06-11 NOTE — HISTORY OF PRESENT ILLNESS
[FreeTextEntry1] : 79-year-old male with HFpEF (echo 03/2018 showing preserved EF, mild AS), hypertension, hyperlipidemia, diabetes, chronic lymphedema, DVT s/p Xarelto, and atrial fibrillation on Eliquis (causing HF exacerbation in 06/2018), who presents followup visit. Since his last visit, patient developed worsening symptomatic hypotension, including lightheadedness. His diltiazem has been discontinued, as well as his doxazosin. He reports significant improvement in symptoms since that time. No longer gets lightheaded. Patient also reports there has not been any palpitations since the diltiazem was discontinued. Patient notes decreased appetite over the past few months, and he has lost some weight. As a result of this, he states that his sleep apnea symptoms seem to have improved; he experiences less daytime somnolence and does not snore at night.\par \par Denies chest pain, dyspnea, palpitations, lightheadedness, or syncope. \par

## 2019-06-11 NOTE — REASON FOR VISIT
[Follow-Up - Clinic] : a clinic follow-up of [Hypertension] : hypertension [Atrial Fibrillation] : atrial fibrillation

## 2019-06-11 NOTE — ASSESSMENT
[FreeTextEntry1] : 79 year-old male with noted PMH including HFpEF, hypertension, hyperlipidemia, diabetes, chronic lymphedema, DVT s/p Xarelto, and atrial fibrillation on Eliquis, who presents for office followup.\par \par 1.HFpEF: Patient appears euvolemic on exam, has no complaints of dyspnea. \par -Will continue patient's furosemide at 40 and metolazone at current dose\par \par 2. Atrial fibrillation:\par -Continue anticoagulation with Eliquis (CHADS2-Vasc score 4)\par -Rate controlled even off diltiazem; discussed possibility of digoxin for rate control if needed in the future\par \par 3. Hypertension: Patient is on ramipril and off diltiazem and doxazosin, now normotensive and with improvement in symptoms. \par \par 4. Lymphedema: significantly improved with compression stockings, follows with podiatry\par \par 5. Possible sleep apnea: Symptoms appear to have improved, patient want sot hold off seeing pulmonologist\par \par 6. Weight loss: Uncertain etiology, recent TSH WNL. I D/W patient and his wife that there are multiple possible etiologies, ranging from mood (patient is not depressed based on my exam and asking questions about his interests) to occult malignancy to metabolic issues. Instructed to follow up with his PMD for additional evaluation. \par \par FUV 3 months

## 2019-07-24 ENCOUNTER — INPATIENT (INPATIENT)
Facility: HOSPITAL | Age: 79
LOS: 6 days | Discharge: NURSING, REHAB OR LONG-TERM CARE | DRG: 603 | End: 2019-07-31
Attending: STUDENT IN AN ORGANIZED HEALTH CARE EDUCATION/TRAINING PROGRAM | Admitting: STUDENT IN AN ORGANIZED HEALTH CARE EDUCATION/TRAINING PROGRAM
Payer: MEDICARE

## 2019-07-24 VITALS
HEART RATE: 60 BPM | HEIGHT: 67 IN | TEMPERATURE: 98 F | DIASTOLIC BLOOD PRESSURE: 64 MMHG | RESPIRATION RATE: 16 BRPM | OXYGEN SATURATION: 98 % | SYSTOLIC BLOOD PRESSURE: 97 MMHG | WEIGHT: 179.02 LBS

## 2019-07-24 DIAGNOSIS — Z98.89 OTHER SPECIFIED POSTPROCEDURAL STATES: Chronic | ICD-10-CM

## 2019-07-24 DIAGNOSIS — L03.90 CELLULITIS, UNSPECIFIED: ICD-10-CM

## 2019-07-24 DIAGNOSIS — I10 ESSENTIAL (PRIMARY) HYPERTENSION: ICD-10-CM

## 2019-07-24 DIAGNOSIS — I89.0 LYMPHEDEMA, NOT ELSEWHERE CLASSIFIED: ICD-10-CM

## 2019-07-24 DIAGNOSIS — R06.00 DYSPNEA, UNSPECIFIED: ICD-10-CM

## 2019-07-24 DIAGNOSIS — E11.9 TYPE 2 DIABETES MELLITUS WITHOUT COMPLICATIONS: ICD-10-CM

## 2019-07-24 DIAGNOSIS — Z29.9 ENCOUNTER FOR PROPHYLACTIC MEASURES, UNSPECIFIED: ICD-10-CM

## 2019-07-24 DIAGNOSIS — N17.9 ACUTE KIDNEY FAILURE, UNSPECIFIED: ICD-10-CM

## 2019-07-24 LAB
ALBUMIN SERPL ELPH-MCNC: 3.3 G/DL — LOW (ref 3.5–5)
ALP SERPL-CCNC: 254 U/L — HIGH (ref 40–120)
ALT FLD-CCNC: 112 U/L DA — HIGH (ref 10–60)
ANION GAP SERPL CALC-SCNC: 7 MMOL/L — SIGNIFICANT CHANGE UP (ref 5–17)
APPEARANCE UR: CLEAR — SIGNIFICANT CHANGE UP
APTT BLD: 35.2 SEC — SIGNIFICANT CHANGE UP (ref 27.5–36.3)
AST SERPL-CCNC: 96 U/L — HIGH (ref 10–40)
BACTERIA # UR AUTO: ABNORMAL /HPF
BASOPHILS # BLD AUTO: 0.05 K/UL — SIGNIFICANT CHANGE UP (ref 0–0.2)
BASOPHILS NFR BLD AUTO: 0.5 % — SIGNIFICANT CHANGE UP (ref 0–2)
BILIRUB SERPL-MCNC: 0.8 MG/DL — SIGNIFICANT CHANGE UP (ref 0.2–1.2)
BILIRUB UR-MCNC: NEGATIVE — SIGNIFICANT CHANGE UP
BUN SERPL-MCNC: 45 MG/DL — HIGH (ref 7–18)
CALCIUM SERPL-MCNC: 9.2 MG/DL — SIGNIFICANT CHANGE UP (ref 8.4–10.5)
CHLORIDE SERPL-SCNC: 100 MMOL/L — SIGNIFICANT CHANGE UP (ref 96–108)
CO2 SERPL-SCNC: 30 MMOL/L — SIGNIFICANT CHANGE UP (ref 22–31)
COLOR SPEC: YELLOW — SIGNIFICANT CHANGE UP
CREAT ?TM UR-MCNC: 56 MG/DL — SIGNIFICANT CHANGE UP
CREAT SERPL-MCNC: 1.67 MG/DL — HIGH (ref 0.5–1.3)
DIFF PNL FLD: NEGATIVE — SIGNIFICANT CHANGE UP
EOSINOPHIL # BLD AUTO: 0.18 K/UL — SIGNIFICANT CHANGE UP (ref 0–0.5)
EOSINOPHIL NFR BLD AUTO: 1.7 % — SIGNIFICANT CHANGE UP (ref 0–6)
EPI CELLS # UR: ABNORMAL /HPF
GLUCOSE BLDC GLUCOMTR-MCNC: 107 MG/DL — HIGH (ref 70–99)
GLUCOSE BLDC GLUCOMTR-MCNC: 151 MG/DL — HIGH (ref 70–99)
GLUCOSE SERPL-MCNC: 118 MG/DL — HIGH (ref 70–99)
GLUCOSE UR QL: 1000 MG/DL
HCT VFR BLD CALC: 41.7 % — SIGNIFICANT CHANGE UP (ref 39–50)
HGB BLD-MCNC: 13.2 G/DL — SIGNIFICANT CHANGE UP (ref 13–17)
IMM GRANULOCYTES NFR BLD AUTO: 0.6 % — SIGNIFICANT CHANGE UP (ref 0–1.5)
INR BLD: 1.86 RATIO — HIGH (ref 0.88–1.16)
KETONES UR-MCNC: NEGATIVE — SIGNIFICANT CHANGE UP
LACTATE SERPL-SCNC: 1.6 MMOL/L — SIGNIFICANT CHANGE UP (ref 0.7–2)
LEUKOCYTE ESTERASE UR-ACNC: NEGATIVE — SIGNIFICANT CHANGE UP
LYMPHOCYTES # BLD AUTO: 0.96 K/UL — LOW (ref 1–3.3)
LYMPHOCYTES # BLD AUTO: 8.9 % — LOW (ref 13–44)
MCHC RBC-ENTMCNC: 26.7 PG — LOW (ref 27–34)
MCHC RBC-ENTMCNC: 31.7 GM/DL — LOW (ref 32–36)
MCV RBC AUTO: 84.4 FL — SIGNIFICANT CHANGE UP (ref 80–100)
MONOCYTES # BLD AUTO: 1.17 K/UL — HIGH (ref 0–0.9)
MONOCYTES NFR BLD AUTO: 10.8 % — SIGNIFICANT CHANGE UP (ref 2–14)
NEUTROPHILS # BLD AUTO: 8.39 K/UL — HIGH (ref 1.8–7.4)
NEUTROPHILS NFR BLD AUTO: 77.5 % — HIGH (ref 43–77)
NITRITE UR-MCNC: NEGATIVE — SIGNIFICANT CHANGE UP
NRBC # BLD: 0 /100 WBCS — SIGNIFICANT CHANGE UP (ref 0–0)
PH UR: 6 — SIGNIFICANT CHANGE UP (ref 5–8)
PLATELET # BLD AUTO: 264 K/UL — SIGNIFICANT CHANGE UP (ref 150–400)
POTASSIUM SERPL-MCNC: 4.3 MMOL/L — SIGNIFICANT CHANGE UP (ref 3.5–5.3)
POTASSIUM SERPL-SCNC: 4.3 MMOL/L — SIGNIFICANT CHANGE UP (ref 3.5–5.3)
POTASSIUM UR-SCNC: 27 MMOL/L — SIGNIFICANT CHANGE UP (ref 25–125)
PROT ?TM UR-MCNC: 19 MG/DL — HIGH (ref 0–12)
PROT SERPL-MCNC: 7.9 G/DL — SIGNIFICANT CHANGE UP (ref 6–8.3)
PROT UR-MCNC: 15
PROTHROM AB SERPL-ACNC: 21 SEC — HIGH (ref 10–12.9)
RBC # BLD: 4.94 M/UL — SIGNIFICANT CHANGE UP (ref 4.2–5.8)
RBC # FLD: 14 % — SIGNIFICANT CHANGE UP (ref 10.3–14.5)
RBC CASTS # UR COMP ASSIST: SIGNIFICANT CHANGE UP /HPF (ref 0–2)
SODIUM SERPL-SCNC: 137 MMOL/L — SIGNIFICANT CHANGE UP (ref 135–145)
SODIUM UR-SCNC: 69 MMOL/L — SIGNIFICANT CHANGE UP (ref 40–220)
SP GR SPEC: 1.01 — SIGNIFICANT CHANGE UP (ref 1.01–1.02)
UROBILINOGEN FLD QL: NEGATIVE — SIGNIFICANT CHANGE UP
WBC # BLD: 10.82 K/UL — HIGH (ref 3.8–10.5)
WBC # FLD AUTO: 10.82 K/UL — HIGH (ref 3.8–10.5)
WBC UR QL: SIGNIFICANT CHANGE UP /HPF (ref 0–5)

## 2019-07-24 PROCEDURE — 71045 X-RAY EXAM CHEST 1 VIEW: CPT | Mod: 26

## 2019-07-24 PROCEDURE — 73630 X-RAY EXAM OF FOOT: CPT | Mod: 26,RT

## 2019-07-24 PROCEDURE — 99223 1ST HOSP IP/OBS HIGH 75: CPT | Mod: GC

## 2019-07-24 PROCEDURE — 99285 EMERGENCY DEPT VISIT HI MDM: CPT

## 2019-07-24 RX ORDER — LATANOPROST 0.05 MG/ML
1 SOLUTION/ DROPS OPHTHALMIC; TOPICAL
Qty: 0 | Refills: 0 | DISCHARGE

## 2019-07-24 RX ORDER — FUROSEMIDE 40 MG
20 TABLET ORAL DAILY
Refills: 0 | Status: DISCONTINUED | OUTPATIENT
Start: 2019-07-24 | End: 2019-07-31

## 2019-07-24 RX ORDER — ATORVASTATIN CALCIUM 80 MG/1
40 TABLET, FILM COATED ORAL AT BEDTIME
Refills: 0 | Status: DISCONTINUED | OUTPATIENT
Start: 2019-07-24 | End: 2019-07-25

## 2019-07-24 RX ORDER — LATANOPROST 0.05 MG/ML
1 SOLUTION/ DROPS OPHTHALMIC; TOPICAL AT BEDTIME
Refills: 0 | Status: DISCONTINUED | OUTPATIENT
Start: 2019-07-24 | End: 2019-07-31

## 2019-07-24 RX ORDER — PIPERACILLIN AND TAZOBACTAM 4; .5 G/20ML; G/20ML
3.38 INJECTION, POWDER, LYOPHILIZED, FOR SOLUTION INTRAVENOUS EVERY 8 HOURS
Refills: 0 | Status: DISCONTINUED | OUTPATIENT
Start: 2019-07-24 | End: 2019-07-25

## 2019-07-24 RX ORDER — DEXTROSE 50 % IN WATER 50 %
25 SYRINGE (ML) INTRAVENOUS ONCE
Refills: 0 | Status: DISCONTINUED | OUTPATIENT
Start: 2019-07-24 | End: 2019-07-31

## 2019-07-24 RX ORDER — APIXABAN 2.5 MG/1
2.5 TABLET, FILM COATED ORAL EVERY 12 HOURS
Refills: 0 | Status: DISCONTINUED | OUTPATIENT
Start: 2019-07-24 | End: 2019-07-24

## 2019-07-24 RX ORDER — APIXABAN 2.5 MG/1
2.5 TABLET, FILM COATED ORAL EVERY 12 HOURS
Refills: 0 | Status: DISCONTINUED | OUTPATIENT
Start: 2019-07-24 | End: 2019-07-31

## 2019-07-24 RX ORDER — DAPAGLIFLOZIN AND METFORMIN HYDROCHLORIDE 10; 1000 MG/1; MG/1
1 TABLET, FILM COATED, EXTENDED RELEASE ORAL
Qty: 0 | Refills: 0 | DISCHARGE

## 2019-07-24 RX ORDER — METFORMIN HYDROCHLORIDE 850 MG/1
1 TABLET ORAL
Qty: 0 | Refills: 0 | DISCHARGE

## 2019-07-24 RX ORDER — GLUCAGON INJECTION, SOLUTION 0.5 MG/.1ML
1 INJECTION, SOLUTION SUBCUTANEOUS ONCE
Refills: 0 | Status: DISCONTINUED | OUTPATIENT
Start: 2019-07-24 | End: 2019-07-31

## 2019-07-24 RX ORDER — INSULIN LISPRO 100/ML
VIAL (ML) SUBCUTANEOUS
Refills: 0 | Status: DISCONTINUED | OUTPATIENT
Start: 2019-07-24 | End: 2019-07-31

## 2019-07-24 RX ORDER — DILTIAZEM HCL 120 MG
60 CAPSULE, EXT RELEASE 24 HR ORAL DAILY
Refills: 0 | Status: DISCONTINUED | OUTPATIENT
Start: 2019-07-24 | End: 2019-07-24

## 2019-07-24 RX ORDER — DEXTROSE 50 % IN WATER 50 %
12.5 SYRINGE (ML) INTRAVENOUS ONCE
Refills: 0 | Status: DISCONTINUED | OUTPATIENT
Start: 2019-07-24 | End: 2019-07-31

## 2019-07-24 RX ORDER — DILTIAZEM HCL 120 MG
30 CAPSULE, EXT RELEASE 24 HR ORAL EVERY 12 HOURS
Refills: 0 | Status: DISCONTINUED | OUTPATIENT
Start: 2019-07-24 | End: 2019-07-31

## 2019-07-24 RX ORDER — RAMIPRIL 5 MG
0.5 CAPSULE ORAL
Qty: 0 | Refills: 0 | DISCHARGE

## 2019-07-24 RX ORDER — SODIUM CHLORIDE 9 MG/ML
1000 INJECTION, SOLUTION INTRAVENOUS
Refills: 0 | Status: DISCONTINUED | OUTPATIENT
Start: 2019-07-24 | End: 2019-07-31

## 2019-07-24 RX ORDER — DEXTROSE 50 % IN WATER 50 %
15 SYRINGE (ML) INTRAVENOUS ONCE
Refills: 0 | Status: DISCONTINUED | OUTPATIENT
Start: 2019-07-24 | End: 2019-07-31

## 2019-07-24 RX ORDER — RAMIPRIL 5 MG
1 CAPSULE ORAL
Qty: 0 | Refills: 0 | DISCHARGE

## 2019-07-24 RX ORDER — PIPERACILLIN AND TAZOBACTAM 4; .5 G/20ML; G/20ML
3.38 INJECTION, POWDER, LYOPHILIZED, FOR SOLUTION INTRAVENOUS ONCE
Refills: 0 | Status: COMPLETED | OUTPATIENT
Start: 2019-07-24 | End: 2019-07-24

## 2019-07-24 RX ADMIN — APIXABAN 2.5 MILLIGRAM(S): 2.5 TABLET, FILM COATED ORAL at 18:27

## 2019-07-24 RX ADMIN — LATANOPROST 1 DROP(S): 0.05 SOLUTION/ DROPS OPHTHALMIC; TOPICAL at 21:27

## 2019-07-24 RX ADMIN — Medication 1: at 21:49

## 2019-07-24 RX ADMIN — ATORVASTATIN CALCIUM 40 MILLIGRAM(S): 80 TABLET, FILM COATED ORAL at 21:27

## 2019-07-24 RX ADMIN — PIPERACILLIN AND TAZOBACTAM 200 GRAM(S): 4; .5 INJECTION, POWDER, LYOPHILIZED, FOR SOLUTION INTRAVENOUS at 13:06

## 2019-07-24 RX ADMIN — PIPERACILLIN AND TAZOBACTAM 25 GRAM(S): 4; .5 INJECTION, POWDER, LYOPHILIZED, FOR SOLUTION INTRAVENOUS at 23:23

## 2019-07-24 NOTE — ED PROVIDER NOTE - CLINICAL SUMMARY MEDICAL DECISION MAKING FREE TEXT BOX
Patient with foot pain and tenderness. Suspicion of cellulitis with history of Lymphedema and DVT. Will do labs, XR of foot and admit for IV antibiotics.

## 2019-07-24 NOTE — H&P ADULT - ASSESSMENT
Patient is 79 year old male has pmhx of htn, dm, ?afib, right leg lymphedema came in with intense pain in his right foot for 1 day.

## 2019-07-24 NOTE — H&P ADULT - NSHPPHYSICALEXAM_GEN_ALL_CORE
PHYSICAL EXAM:  GENERAL: NAD, well-developed  HEAD:  Atraumatic, Normocephalic  EYES: EOMI, PERRLA, conjunctiva and sclera clear  NECK: Supple, No JVD  CHEST/LUNG: Clear to auscultation bilaterally; No wheeze  HEART: irregular rate, +s1 +s2   ABDOMEN: Soft, Nontender, Nondistended; Bowel sounds present  EXTREMITIES:, No clubbing, cyanosis  right leg: pt has edema on right lower leg to foot, warm to touch and mild tenderness.   left leg has mild edema   PSYCH: AAOx3, behaviorally controlled  NEUROLOGY: non-focal  SKIN: No rashes or lesions Vital Signs Last 24 Hrs  T(C): 36.7 (24 Jul 2019 19:15), Max: 37.2 (24 Jul 2019 16:00)  T(F): 98 (24 Jul 2019 19:15), Max: 98.9 (24 Jul 2019 16:00)  HR: 88 (24 Jul 2019 19:15) (60 - 88)  BP: 105/73 (24 Jul 2019 19:15) (97/64 - 107/65)  BP(mean): --  RR: 18 (24 Jul 2019 19:15) (16 - 18)  SpO2: 100% (24 Jul 2019 19:15) (98% - 100%)      PHYSICAL EXAM:  GENERAL: NAD, well-developed  HEAD:  Atraumatic, Normocephalic  EYES: EOMI, PERRLA, conjunctiva and sclera clear  NECK: Supple, No JVD  CHEST/LUNG: Clear to auscultation bilaterally; No wheeze  HEART: irregular rate, +s1 +s2   ABDOMEN: Soft, Nontender, Nondistended; Bowel sounds present  EXTREMITIES:, No clubbing, cyanosis  right leg: pt has edema on right lower leg to foot, warm to touch and mild tenderness.   left leg has mild edema   PSYCH: AAOx3, behaviorally controlled  NEUROLOGY: non-focal  SKIN: No rashes or lesions

## 2019-07-24 NOTE — H&P ADULT - NSICDXPASTMEDICALHX_GEN_ALL_CORE_FT
PAST MEDICAL HISTORY:  Diabetes     Hypertension     Lymphedema of leg     PND (paroxysmal nocturnal dyspnea)

## 2019-07-24 NOTE — CHART NOTE - NSCHARTNOTEFT_GEN_A_CORE
EVENT:   - Pharmacist Antione, called to say that Cardizem CD is unavailable and advised to change it to regular Cardizem.    OBJECTIVE:  Vital Signs Last 24 Hrs  T(C): 36.7 (24 Jul 2019 19:15), Max: 37.2 (24 Jul 2019 16:00)  T(F): 98 (24 Jul 2019 19:15), Max: 98.9 (24 Jul 2019 16:00)  HR: 88 (24 Jul 2019 19:15) (60 - 88)  BP: 105/73 (24 Jul 2019 19:15) (97/64 - 107/65)  BP(mean): --  RR: 18 (24 Jul 2019 19:15) (16 - 18)  SpO2: 100% (24 Jul 2019 19:15) (98% - 100%)    FOCUSED PHYSICAL EXAM:  Neuro: awake, alert, oriented x 3. No neuro deficit  Cardiovascular: Pulses +2 B/L in lower and upper extremities, HR regular, BP stable, No edema.  Respiratory: Respirations regular, unlabored, breath sounds clear B/L.   GI: Abdomen soft, non-tender, positive bowel sounds.  : no bladder distention noted. No complaints at this time.  Skin: Dry, intact, no bruising, no diaphoresis.    LABS:                        13.2   10.82 )-----------( 264      ( 24 Jul 2019 13:08 )             41.7     07-24    137  |  100  |  45<H>  ----------------------------<  118<H>  4.3   |  30  |  1.67<H>    Ca    9.2      24 Jul 2019 13:08    TPro  7.9  /  Alb  3.3<L>  /  TBili  0.8  /  DBili  x   /  AST  96<H>  /  ALT  112<H>  /  AlkPhos  254<H>  07-24      EKG:   IMGAGING:    ASSESSMENT:  HPI:  Patient is 79 year old male has pmhx of htn, dm, ?afib, right leg lymphedema came in with intense pain in his right foot for 1 day.     Patient was in his usual health until yesterday night when he started to have sudden, sharp, intense, 8-9/10 on intensity, right foot pain, gradually got worse today morning, continuous in nature, pt could not put right foot on the ground because of pain, pt took 2 tylenol today morning and it helped with the pain, pt came to ed for further evaluation.   patient denies fever, headache, cough, cp, sob, n/v/d/c, abdominal pain. pt has increased urinary frequency as he is on lasix for lymphedema. (24 Jul 2019 16:11)      PLAN: Change Cardizem CD 60 once/day to Cardizem 30mg, BID with holding parameters EVENT:   - Pharmacist Antione, called to say that Cardizem CD is unavailable and advised to change it to regular Cardizem.    OBJECTIVE:  Vital Signs Last 24 Hrs  T(C): 36.7 (24 Jul 2019 19:15), Max: 37.2 (24 Jul 2019 16:00)  T(F): 98 (24 Jul 2019 19:15), Max: 98.9 (24 Jul 2019 16:00)  HR: 88 (24 Jul 2019 19:15) (60 - 88)  BP: 105/73 (24 Jul 2019 19:15) (97/64 - 107/65)  BP(mean): --  RR: 18 (24 Jul 2019 19:15) (16 - 18)  SpO2: 100% (24 Jul 2019 19:15) (98% - 100%)    FOCUSED PHYSICAL EXAM:  Neuro: awake, alert, oriented x 3. No neuro deficit  Cardiovascular: Pulses +2 B/L in lower and upper extremities, HR regular, BP stable, No edema.  Respiratory: Respirations regular, unlabored, breath sounds clear B/L.   GI: Abdomen soft, non-tender, positive bowel sounds.  : no bladder distention noted. No complaints at this time.  Skin: Dry, intact, no bruising, no diaphoresis.    LABS:                        13.2   10.82 )-----------( 264      ( 24 Jul 2019 13:08 )             41.7     07-24    137  |  100  |  45<H>  ----------------------------<  118<H>  4.3   |  30  |  1.67<H>    Ca    9.2      24 Jul 2019 13:08    TPro  7.9  /  Alb  3.3<L>  /  TBili  0.8  /  DBili  x   /  AST  96<H>  /  ALT  112<H>  /  AlkPhos  254<H>  07-24      EKG:   IMGAGING:    ASSESSMENT:  HPI:  Patient is 79 year old male has pmhx of htn, dm, ?afib, right leg lymphedema came in with intense pain in his right foot for 1 day.     Patient was in his usual health until yesterday night when he started to have sudden, sharp, intense, 8-9/10 on intensity, right foot pain, gradually got worse today morning, continuous in nature, pt could not put right foot on the ground because of pain, pt took 2 tylenol today morning and it helped with the pain, pt came to ed for further evaluation.   patient denies fever, headache, cough, cp, sob, n/v/d/c, abdominal pain. pt has increased urinary frequency as he is on Lasix for lymphedema. (24 Jul 2019 16:11)      PLAN: Change Cardizem CD 60 once/day to Cardizem 30mg, Q12 hrs with holding parameters

## 2019-07-24 NOTE — ED ADULT TRIAGE NOTE - CHIEF COMPLAINT QUOTE
c/o right foot pain since y/d, unable to put on compression stocking today. Diabetic pt, denies wound.

## 2019-07-24 NOTE — H&P ADULT - ATTENDING COMMENTS
Patient seen and examined together with Dr. Deshpande   He is a 79 year old male has pmhx of htn, afib, DM and  right leg lymphedema came in with intense pain in his right foot for 1 day.   Patient was in his usual health until yesterday night when he started to have sudden, sharp, intense, 8-9/10 on intensity, right foot pain, gradually got worse today morning, continuous in nature, pt could not put right foot on the ground because of pain, pt took 2 tylenol today morning and it helped with the pain, pt came to ed for further evaluation.   He denies fever, headache, cough, cp, sob, n/v/d/c, abdominal pain. pt has increased urinary frequency as he is on lasix for lymphedema.          ROS and PE as above   Vital Signs Last 24 Hrs  T(C): 36.7 (24 Jul 2019 19:15), Max: 37.2 (24 Jul 2019 16:00)  T(F): 98 (24 Jul 2019 19:15), Max: 98.9 (24 Jul 2019 16:00)  HR: 88 (24 Jul 2019 19:15) (60 - 88)  BP: 105/73 (24 Jul 2019 19:15) (97/64 - 107/65)  BP(mean): --  RR: 18 (24 Jul 2019 19:15) (16 - 18)  SpO2: 100% (24 Jul 2019 19:15) (98% - 100%)    1. Cellulitis of right LE from foot up to knee.   Afebrile, mild leukocytosis, BC sent   c/w Zosyn for now   Tylenol for pain     2. Lymphedema right LE: c/w Furosemide home dose   3. Chronic afib: On eliquis 2.5 mg po twice daily, c/w the same dose. rate controlled off medications   4. DM: Follow HBA1c. Hold all home meds; c/w Humalog per SS for now   5. Hx of HTN: BP is low. hold BP meds for now.     Plan discussed with patient in detail

## 2019-07-24 NOTE — H&P ADULT - HISTORY OF PRESENT ILLNESS
Patient is 79 year old male has pmhx of htn, dm, ?afib, right leg lymphedema came in with intense pain in his right foot for 1 day.     Patient was in his usual health until yesterday night when he started to have sudden, sharp, intense, 8-9/10 on intensity, right foot pain, gradually got worse today morning, continuous in nature, pt could not put right foot on the ground because of pain, pt took 2 tylenol today morning and it helped with the pain, pt came to ed for further evaluation.   patient denies fever, headache, cough, cp, sob, n/v/d/c, abdominal pain. pt has increased urinary frequency as he is on lasix for lymphedema.

## 2019-07-24 NOTE — ED PROVIDER NOTE - OBJECTIVE STATEMENT
78 y/o F pt with a significant PMHx of DM, LE lymphedema and significant PSHx of inguinal hernia repair presents to the ED with c/o R foot pain for 2d. Pt reports having difficulty walking. Pt states having chills and is blood thinners for DVT. Pt denies any fever or any other complains. NKDA.

## 2019-07-24 NOTE — H&P ADULT - PROBLEM SELECTOR PLAN 1
afebrile, no leucocytosis   c/w zosyn for now   f/u blood culture   ID consult Dr. Mustafa afebrile, mild leucocytosis   c/w zosyn for now   f/u blood culture   ID consult Dr. Mustafa

## 2019-07-24 NOTE — H&P ADULT - PROBLEM SELECTOR PLAN 4
will continue wit lasix and lower the dose of cardizem as bp is on lower side.   adjust meds as needed

## 2019-07-24 NOTE — ED ADULT NURSE NOTE - OBJECTIVE STATEMENT
c/o right foot pain since y/d, unable to put on compression stocking today. Diabetic pt, denies wound. Right foot and leg swollen +4

## 2019-07-25 DIAGNOSIS — R94.5 ABNORMAL RESULTS OF LIVER FUNCTION STUDIES: ICD-10-CM

## 2019-07-25 DIAGNOSIS — L03.115 CELLULITIS OF RIGHT LOWER LIMB: ICD-10-CM

## 2019-07-25 DIAGNOSIS — I89.0 LYMPHEDEMA, NOT ELSEWHERE CLASSIFIED: ICD-10-CM

## 2019-07-25 DIAGNOSIS — I48.91 UNSPECIFIED ATRIAL FIBRILLATION: ICD-10-CM

## 2019-07-25 LAB
24R-OH-CALCIDIOL SERPL-MCNC: 44.3 NG/ML — SIGNIFICANT CHANGE UP (ref 30–80)
ALBUMIN SERPL ELPH-MCNC: 2.9 G/DL — LOW (ref 3.5–5)
ALP SERPL-CCNC: 203 U/L — HIGH (ref 40–120)
ALT FLD-CCNC: 82 U/L DA — HIGH (ref 10–60)
ANION GAP SERPL CALC-SCNC: 5 MMOL/L — SIGNIFICANT CHANGE UP (ref 5–17)
AST SERPL-CCNC: 53 U/L — HIGH (ref 10–40)
BASOPHILS # BLD AUTO: 0.04 K/UL — SIGNIFICANT CHANGE UP (ref 0–0.2)
BASOPHILS NFR BLD AUTO: 0.4 % — SIGNIFICANT CHANGE UP (ref 0–2)
BILIRUB SERPL-MCNC: 1 MG/DL — SIGNIFICANT CHANGE UP (ref 0.2–1.2)
BUN SERPL-MCNC: 38 MG/DL — HIGH (ref 7–18)
CALCIUM SERPL-MCNC: 8.9 MG/DL — SIGNIFICANT CHANGE UP (ref 8.4–10.5)
CHLORIDE SERPL-SCNC: 102 MMOL/L — SIGNIFICANT CHANGE UP (ref 96–108)
CHOLEST SERPL-MCNC: 86 MG/DL — SIGNIFICANT CHANGE UP (ref 10–199)
CO2 SERPL-SCNC: 30 MMOL/L — SIGNIFICANT CHANGE UP (ref 22–31)
CREAT SERPL-MCNC: 1.58 MG/DL — HIGH (ref 0.5–1.3)
EOSINOPHIL # BLD AUTO: 0.37 K/UL — SIGNIFICANT CHANGE UP (ref 0–0.5)
EOSINOPHIL NFR BLD AUTO: 4.1 % — SIGNIFICANT CHANGE UP (ref 0–6)
FOLATE SERPL-MCNC: 13 NG/ML — SIGNIFICANT CHANGE UP
GLUCOSE BLDC GLUCOMTR-MCNC: 125 MG/DL — HIGH (ref 70–99)
GLUCOSE BLDC GLUCOMTR-MCNC: 126 MG/DL — HIGH (ref 70–99)
GLUCOSE BLDC GLUCOMTR-MCNC: 156 MG/DL — HIGH (ref 70–99)
GLUCOSE BLDC GLUCOMTR-MCNC: 207 MG/DL — HIGH (ref 70–99)
GLUCOSE SERPL-MCNC: 105 MG/DL — HIGH (ref 70–99)
HBA1C BLD-MCNC: 7.4 % — HIGH (ref 4–5.6)
HCT VFR BLD CALC: 38.9 % — LOW (ref 39–50)
HDLC SERPL-MCNC: 45 MG/DL — SIGNIFICANT CHANGE UP
HGB BLD-MCNC: 12.2 G/DL — LOW (ref 13–17)
IMM GRANULOCYTES NFR BLD AUTO: 0.8 % — SIGNIFICANT CHANGE UP (ref 0–1.5)
LIPID PNL WITH DIRECT LDL SERPL: 26 MG/DL — SIGNIFICANT CHANGE UP
LYMPHOCYTES # BLD AUTO: 0.95 K/UL — LOW (ref 1–3.3)
LYMPHOCYTES # BLD AUTO: 10.5 % — LOW (ref 13–44)
MAGNESIUM SERPL-MCNC: 2 MG/DL — SIGNIFICANT CHANGE UP (ref 1.6–2.6)
MCHC RBC-ENTMCNC: 26.3 PG — LOW (ref 27–34)
MCHC RBC-ENTMCNC: 31.4 GM/DL — LOW (ref 32–36)
MCV RBC AUTO: 83.8 FL — SIGNIFICANT CHANGE UP (ref 80–100)
MONOCYTES # BLD AUTO: 0.92 K/UL — HIGH (ref 0–0.9)
MONOCYTES NFR BLD AUTO: 10.2 % — SIGNIFICANT CHANGE UP (ref 2–14)
NEUTROPHILS # BLD AUTO: 6.66 K/UL — SIGNIFICANT CHANGE UP (ref 1.8–7.4)
NEUTROPHILS NFR BLD AUTO: 74 % — SIGNIFICANT CHANGE UP (ref 43–77)
NRBC # BLD: 0 /100 WBCS — SIGNIFICANT CHANGE UP (ref 0–0)
OSMOLALITY UR: 613 MOSM/KG — SIGNIFICANT CHANGE UP (ref 50–1200)
PHOSPHATE SERPL-MCNC: 3 MG/DL — SIGNIFICANT CHANGE UP (ref 2.5–4.5)
PLATELET # BLD AUTO: 265 K/UL — SIGNIFICANT CHANGE UP (ref 150–400)
POTASSIUM SERPL-MCNC: 3.9 MMOL/L — SIGNIFICANT CHANGE UP (ref 3.5–5.3)
POTASSIUM SERPL-SCNC: 3.9 MMOL/L — SIGNIFICANT CHANGE UP (ref 3.5–5.3)
PROT SERPL-MCNC: 6.9 G/DL — SIGNIFICANT CHANGE UP (ref 6–8.3)
RBC # BLD: 4.64 M/UL — SIGNIFICANT CHANGE UP (ref 4.2–5.8)
RBC # FLD: 14 % — SIGNIFICANT CHANGE UP (ref 10.3–14.5)
SODIUM SERPL-SCNC: 137 MMOL/L — SIGNIFICANT CHANGE UP (ref 135–145)
TOTAL CHOLESTEROL/HDL RATIO MEASUREMENT: 1.9 RATIO — LOW (ref 3.4–9.6)
TRIGL SERPL-MCNC: 74 MG/DL — SIGNIFICANT CHANGE UP (ref 10–149)
TSH SERPL-MCNC: 1.33 UU/ML — SIGNIFICANT CHANGE UP (ref 0.34–4.82)
URATE UR-MCNC: 71.4 MG/DL — SIGNIFICANT CHANGE UP
VIT B12 SERPL-MCNC: 690 PG/ML — SIGNIFICANT CHANGE UP (ref 232–1245)
WBC # BLD: 9.01 K/UL — SIGNIFICANT CHANGE UP (ref 3.8–10.5)
WBC # FLD AUTO: 9.01 K/UL — SIGNIFICANT CHANGE UP (ref 3.8–10.5)

## 2019-07-25 PROCEDURE — 99233 SBSQ HOSP IP/OBS HIGH 50: CPT | Mod: GC

## 2019-07-25 RX ORDER — TRAMADOL HYDROCHLORIDE 50 MG/1
25 TABLET ORAL EVERY 6 HOURS
Refills: 0 | Status: DISCONTINUED | OUTPATIENT
Start: 2019-07-25 | End: 2019-07-28

## 2019-07-25 RX ORDER — CEFAZOLIN SODIUM 1 G
1000 VIAL (EA) INJECTION EVERY 8 HOURS
Refills: 0 | Status: DISCONTINUED | OUTPATIENT
Start: 2019-07-25 | End: 2019-07-31

## 2019-07-25 RX ORDER — TRAMADOL HYDROCHLORIDE 50 MG/1
25 TABLET ORAL ONCE
Refills: 0 | Status: DISCONTINUED | OUTPATIENT
Start: 2019-07-25 | End: 2019-07-25

## 2019-07-25 RX ORDER — TRAMADOL HYDROCHLORIDE 50 MG/1
25 TABLET ORAL
Refills: 0 | Status: DISCONTINUED | OUTPATIENT
Start: 2019-07-25 | End: 2019-07-25

## 2019-07-25 RX ADMIN — Medication 30 MILLIGRAM(S): at 17:23

## 2019-07-25 RX ADMIN — PIPERACILLIN AND TAZOBACTAM 25 GRAM(S): 4; .5 INJECTION, POWDER, LYOPHILIZED, FOR SOLUTION INTRAVENOUS at 14:31

## 2019-07-25 RX ADMIN — Medication 1: at 22:03

## 2019-07-25 RX ADMIN — LATANOPROST 1 DROP(S): 0.05 SOLUTION/ DROPS OPHTHALMIC; TOPICAL at 22:04

## 2019-07-25 RX ADMIN — TRAMADOL HYDROCHLORIDE 25 MILLIGRAM(S): 50 TABLET ORAL at 12:25

## 2019-07-25 RX ADMIN — Medication 2: at 12:47

## 2019-07-25 RX ADMIN — TRAMADOL HYDROCHLORIDE 25 MILLIGRAM(S): 50 TABLET ORAL at 11:07

## 2019-07-25 RX ADMIN — Medication 100 MILLIGRAM(S): at 23:11

## 2019-07-25 RX ADMIN — PIPERACILLIN AND TAZOBACTAM 25 GRAM(S): 4; .5 INJECTION, POWDER, LYOPHILIZED, FOR SOLUTION INTRAVENOUS at 06:48

## 2019-07-25 RX ADMIN — TRAMADOL HYDROCHLORIDE 25 MILLIGRAM(S): 50 TABLET ORAL at 22:30

## 2019-07-25 RX ADMIN — APIXABAN 2.5 MILLIGRAM(S): 2.5 TABLET, FILM COATED ORAL at 17:23

## 2019-07-25 RX ADMIN — APIXABAN 2.5 MILLIGRAM(S): 2.5 TABLET, FILM COATED ORAL at 06:32

## 2019-07-25 RX ADMIN — TRAMADOL HYDROCHLORIDE 25 MILLIGRAM(S): 50 TABLET ORAL at 23:30

## 2019-07-25 NOTE — CONSULT NOTE ADULT - SUBJECTIVE AND OBJECTIVE BOX
HPI:  Patient is 79 year old male has pmhx of htn, dm, ?afib, right leg lymphedema came in with intense pain in his right foot for 1 day.     Patient was in his usual health until yesterday night when he started to have sudden, sharp, intense, 8-9/10 on intensity, right foot pain, gradually got worse today morning, continuous in nature, pt could not put right foot on the ground because of pain, pt took 2 tylenol today morning and it helped with the pain, pt came to ed for further evaluation.   patient denies fever, headache, cough, cp, sob, n/v/d/c, abdominal pain. pt has increased urinary frequency as he is on lasix for lymphedema. (2019 16:11)      PAST MEDICAL & SURGICAL HISTORY:  PND (paroxysmal nocturnal dyspnea)  Lymphedema of leg  Hypertension  Diabetes  H/O inguinal hernia repair      No Known Allergies      Meds:  apixaban 2.5 milliGRAM(s) Oral every 12 hours  dextrose 40% Gel 15 Gram(s) Oral once PRN  dextrose 5%. 1000 milliLiter(s) IV Continuous <Continuous>  dextrose 50% Injectable 12.5 Gram(s) IV Push once  dextrose 50% Injectable 25 Gram(s) IV Push once  dextrose 50% Injectable 25 Gram(s) IV Push once  diltiazem    Tablet 30 milliGRAM(s) Oral every 12 hours  furosemide    Tablet 20 milliGRAM(s) Oral daily  glucagon  Injectable 1 milliGRAM(s) IntraMuscular once PRN  insulin lispro (HumaLOG) corrective regimen sliding scale   SubCutaneous Before meals and at bedtime  latanoprost 0.005% Ophthalmic Solution 1 Drop(s) Both EYES at bedtime  piperacillin/tazobactam IVPB.. 3.375 Gram(s) IV Intermittent every 8 hours      SOCIAL HISTORY:  Smoker:  YES / NO        PACK YEARS:                         WHEN QUIT?  ETOH use:  YES / NO               FREQUENCY / QUANTITY:  Ilicit Drug use:  YES / NO  Occupation:  Assisted device use (Cane / Walker):  Live with:    FAMILY HISTORY:  No pertinent family history in first degree relatives      VITALS:  Vital Signs Last 24 Hrs  T(C): 36.8 (2019 15:56), Max: 36.9 (2019 07:57)  T(F): 98.2 (2019 15:56), Max: 98.4 (2019 07:57)  HR: 77 (2019 15:56) (65 - 88)  BP: 114/70 (2019 15:56) (99/45 - 114/70)  BP(mean): --  RR: 16 (2019 15:56) (16 - 18)  SpO2: 100% (2019 15:56) (99% - 100%)    LABS/DIAGNOSTIC TESTS:                          12.2   9.01  )-----------( 265      ( 2019 06:53 )             38.9     WBC Count: 9.01 K/uL ( @ 06:53)  WBC Count: 10.82 K/uL ( @ 13:08)          137  |  102  |  38<H>  ----------------------------<  105<H>  3.9   |  30  |  1.58<H>    Ca    8.9      2019 13:28  Phos  3.0       Mg     2.0         TPro  6.9  /  Alb  2.9<L>  /  TBili  1.0  /  DBili  x   /  AST  53<H>  /  ALT  82<H>  /  AlkPhos  203<H>        Urinalysis Basic - ( 2019 13:08 )    Color: Yellow / Appearance: Clear / S.010 / pH: x  Gluc: x / Ketone: Negative  / Bili: Negative / Urobili: Negative   Blood: x / Protein: 15 / Nitrite: Negative   Leuk Esterase: Negative / RBC: 0-2 /HPF / WBC 0-2 /HPF   Sq Epi: x / Non Sq Epi: Occasional /HPF / Bacteria: Trace /HPF        LIVER FUNCTIONS - ( 2019 13:28 )  Alb: 2.9 g/dL / Pro: 6.9 g/dL / ALK PHOS: 203 U/L / ALT: 82 U/L DA / AST: 53 U/L / GGT: x             PT/INR - ( 2019 13:08 )   PT: 21.0 sec;   INR: 1.86 ratio         PTT - ( 2019 13:08 )  PTT:35.2 sec    LACTATE:    ABG -     CULTURES:       RADIOLOGY:< from: Xray Chest 1 View AP/PA (19 @ 13:53) >  EXAM:  XR CHEST AP OR PA 1V                            PROCEDURE DATE:  2019          INTERPRETATION:  Chest one view    HISTORY: Sepsis    COMPARISON STUDY: 2018    Frontal lordotic expiratory view of the chest shows the heart to be   normal in size. The lungs are clear and there is no evidence of   pneumothorax nor pleural effusion.    IMPRESSION:  No active pulmonary disease.    Thank you for the courtesy of this referral.    --------------------------------------------------------------------------------------------------------------------    < from: Xray Foot AP + Lateral + Oblique, Right (19 @ 13:52) >  EXAM:  FOOT RIGHT (MINIMUM 3 VIEWS)                            PROCEDURE DATE:  2019          INTERPRETATION:  Right foot. 3 images obtained. Patient has local   infection.    There is extensive edema of the foot. Edema extends up the leg.    There is an inferior calcaneal spur. No bone destruction or fracture is   evident.    IMPRESSION: Extensive leg and foot edema. No bony finding of note.          < end of copied text >      ROS  [  ] UNABLE TO ELICIT HPI:  Patient is 79 year old male has pmhx of htn, dm, ?afib, right leg lymphedema came in with intense pain in his right foot for 1 day.     Patient was in his usual health until yesterday night when he started to have sudden, sharp, intense, 8-9/10 on intensity, right foot pain, gradually got worse today morning, continuous in nature, pt could not put right foot on the ground because of pain, pt took 2 tylenol today morning and it helped with the pain, pt came to ed for further evaluation.   patient denies fever, headache, cough, cp, sob, n/v/d/c, abdominal pain. pt has increased urinary frequency as he is on lasix for lymphedema. (2019 16:11)    History as above , pt who has a h/o lymphedema of his right leg and presented with severe right leg pain. He has no fevers but his legs are warm and erythematous.      PAST MEDICAL & SURGICAL HISTORY:  PND (paroxysmal nocturnal dyspnea)  Lymphedema of leg  Hypertension  Diabetes  H/O inguinal hernia repair      No Known Allergies      Meds:  apixaban 2.5 milliGRAM(s) Oral every 12 hours  dextrose 40% Gel 15 Gram(s) Oral once PRN  dextrose 5%. 1000 milliLiter(s) IV Continuous <Continuous>  dextrose 50% Injectable 12.5 Gram(s) IV Push once  dextrose 50% Injectable 25 Gram(s) IV Push once  dextrose 50% Injectable 25 Gram(s) IV Push once  diltiazem    Tablet 30 milliGRAM(s) Oral every 12 hours  furosemide    Tablet 20 milliGRAM(s) Oral daily  glucagon  Injectable 1 milliGRAM(s) IntraMuscular once PRN  insulin lispro (HumaLOG) corrective regimen sliding scale   SubCutaneous Before meals and at bedtime  latanoprost 0.005% Ophthalmic Solution 1 Drop(s) Both EYES at bedtime  piperacillin/tazobactam IVPB.. 3.375 Gram(s) IV Intermittent every 8 hours      SOCIAL HISTORY:  Smoker: no  ETOH use:  no    FAMILY HISTORY:  No pertinent family history in first degree relatives      VITALS:  Vital Signs Last 24 Hrs  T(C): 36.8 (2019 15:56), Max: 36.9 (2019 07:57)  T(F): 98.2 (2019 15:56), Max: 98.4 (2019 07:57)  HR: 77 (2019 15:56) (65 - 88)  BP: 114/70 (2019 15:56) (99/45 - 114/70)  BP(mean): --  RR: 16 (2019 15:56) (16 - 18)  SpO2: 100% (2019 15:56) (99% - 100%)    LABS/DIAGNOSTIC TESTS:                          12.2   9.01  )-----------( 265      ( 2019 06:53 )             38.9     WBC Count: 9.01 K/uL ( @ 06:53)  WBC Count: 10.82 K/uL ( @ 13:08)          137  |  102  |  38<H>  ----------------------------<  105<H>  3.9   |  30  |  1.58<H>    Ca    8.9      2019 13:28  Phos  3.0       Mg     2.0         TPro  6.9  /  Alb  2.9<L>  /  TBili  1.0  /  DBili  x   /  AST  53<H>  /  ALT  82<H>  /  AlkPhos  203<H>        Urinalysis Basic - ( 2019 13:08 )    Color: Yellow / Appearance: Clear / S.010 / pH: x  Gluc: x / Ketone: Negative  / Bili: Negative / Urobili: Negative   Blood: x / Protein: 15 / Nitrite: Negative   Leuk Esterase: Negative / RBC: 0-2 /HPF / WBC 0-2 /HPF   Sq Epi: x / Non Sq Epi: Occasional /HPF / Bacteria: Trace /HPF        LIVER FUNCTIONS - ( 2019 13:28 )  Alb: 2.9 g/dL / Pro: 6.9 g/dL / ALK PHOS: 203 U/L / ALT: 82 U/L DA / AST: 53 U/L / GGT: x             PT/INR - ( 2019 13:08 )   PT: 21.0 sec;   INR: 1.86 ratio         PTT - ( 2019 13:08 )  PTT:35.2 sec    LACTATE:    ABG -     CULTURES:       RADIOLOGY:< from: Xray Chest 1 View AP/PA (19 @ 13:53) >  EXAM:  XR CHEST AP OR PA 1V                            PROCEDURE DATE:  2019          INTERPRETATION:  Chest one view    HISTORY: Sepsis    COMPARISON STUDY: 2018    Frontal lordotic expiratory view of the chest shows the heart to be   normal in size. The lungs are clear and there is no evidence of   pneumothorax nor pleural effusion.    IMPRESSION:  No active pulmonary disease.    Thank you for the courtesy of this referral.    --------------------------------------------------------------------------------------------------------------------    < from: Xray Foot AP + Lateral + Oblique, Right (19 @ 13:52) >  EXAM:  FOOT RIGHT (MINIMUM 3 VIEWS)                            PROCEDURE DATE:  2019          INTERPRETATION:  Right foot. 3 images obtained. Patient has local   infection.    There is extensive edema of the foot. Edema extends up the leg.    There is an inferior calcaneal spur. No bone destruction or fracture is   evident.    IMPRESSION: Extensive leg and foot edema. No bony finding of note.          < end of copied text >      ROS  [  ] UNABLE TO ELICIT

## 2019-07-25 NOTE — PROGRESS NOTE ADULT - PROBLEM SELECTOR PLAN 3
Creatinine yesterday 1.67  Labs pending for today   Metolazone on hold Creatinine improving 1.67 --> 1.58  Metolazone on hold

## 2019-07-25 NOTE — PROGRESS NOTE ADULT - PROBLEM SELECTOR PLAN 4
CMP pending for today   Avoid hepatotoxins   Pt states he takes Tylenol at home for pain  Liver sono pending CMP pending for today   Avoid hepatotoxins   Pt states he takes Tylenol at home for pain  Statin dc'd   Liver sono pending Improving   Avoid hepatotoxins   Pt states he takes Tylenol at home for pain  Statin dc'd   Liver sono pending

## 2019-07-25 NOTE — PROGRESS NOTE ADULT - ASSESSMENT
79 year old male has pmhx of htn, dm, ?afib, right leg lymphedema admitted for RLE cellulitis.       Problem/Plan - 2:  ·  Problem: PND (paroxysmal nocturnal dyspnea).  Plan: rate controlled   c/w cardizem 60mg with parameters and eliquis.       Problem/Plan - 4:  ·  Problem: Hypertension.  Plan: will continue wit lasix and lower the dose of cardizem as bp is on lower side.   adjust meds as needed.       Problem/Plan - 6:  Problem: Diabetes.Plan: c/w hss for now  f/u hba1c.      3. Chronic afib: On eliquis 2.5 mg po twice daily, c/w the same dose. rate controlled off medications   4. DM: Follow HBA1c. Hold all home meds; c/w Humalog per SS for now 79 year old male has pmhx of htn, dm, ?afib, right leg lymphedema admitted for RLE cellulitis.     4. DM: Follow HBA1c. Hold all home meds; c/w Humalog per SS for now 79 year old male has pmhx of htn, dm, ?afib, right leg lymphedema admitted for RLE cellulitis.

## 2019-07-25 NOTE — PROGRESS NOTE ADULT - SUBJECTIVE AND OBJECTIVE BOX
NP Note discussed with  Primary Attending    Patient is a 79y old  Male who presents with a chief complaint of cellulitis of right foot (2019 16:11)      INTERVAL HPI/OVERNIGHT EVENTS: no new complaints    MEDICATIONS  (STANDING):  apixaban 2.5 milliGRAM(s) Oral every 12 hours  atorvastatin 40 milliGRAM(s) Oral at bedtime  dextrose 5%. 1000 milliLiter(s) (50 mL/Hr) IV Continuous <Continuous>  dextrose 50% Injectable 12.5 Gram(s) IV Push once  dextrose 50% Injectable 25 Gram(s) IV Push once  dextrose 50% Injectable 25 Gram(s) IV Push once  diltiazem    Tablet 30 milliGRAM(s) Oral every 12 hours  furosemide    Tablet 20 milliGRAM(s) Oral daily  insulin lispro (HumaLOG) corrective regimen sliding scale   SubCutaneous Before meals and at bedtime  latanoprost 0.005% Ophthalmic Solution 1 Drop(s) Both EYES at bedtime  piperacillin/tazobactam IVPB.. 3.375 Gram(s) IV Intermittent every 8 hours    MEDICATIONS  (PRN):  dextrose 40% Gel 15 Gram(s) Oral once PRN Blood Glucose LESS THAN 70 milliGRAM(s)/deciLiter  glucagon  Injectable 1 milliGRAM(s) IntraMuscular once PRN Glucose <70 milliGRAM(s)/deciLiter      __________________________________________________  REVIEW OF SYSTEMS:    CONSTITUTIONAL: No fever,   EYES: no acute visual disturbances  NECK: No pain or stiffness  RESPIRATORY: No cough; No shortness of breath  CARDIOVASCULAR: No chest pain, no palpitations  GASTROINTESTINAL: No pain. No nausea or vomiting; No diarrhea   NEUROLOGICAL: No headache or numbness, no tremors  MUSCULOSKELETAL: No joint pain, no muscle pain  GENITOURINARY: no dysuria, no frequency, no hesitancy  PSYCHIATRY: no depression , no anxiety  ALL OTHER  ROS negative        Vital Signs Last 24 Hrs  T(C): 36.9 (2019 07:57), Max: 37.2 (2019 16:00)  T(F): 98.4 (2019 07:57), Max: 98.9 (2019 16:00)  HR: 77 (2019 07:57) (65 - 88)  BP: 101/50 (2019 07:57) (99/45 - 107/65)  BP(mean): --  RR: 16 (2019 07:57) (16 - 18)  SpO2: 99% (2019 07:57) (99% - 100%)    ________________________________________________  PHYSICAL EXAM:  GENERAL: NAD  HEENT: Normocephalic;  conjunctivae and sclerae clear; moist mucous membranes;   NECK : supple  CHEST/LUNG: Clear to auscultation bilaterally with good air entry   HEART: S1 S2  regular; no murmurs, gallops or rubs  ABDOMEN: Soft, Nontender, Nondistended; Bowel sounds present  EXTREMITIES: no cyanosis; no edema; no calf tenderness  SKIN: warm and dry; no rash  NERVOUS SYSTEM:  Awake and alert; Oriented  to place, person and time ; no new deficits    _________________________________________________  LABS:                        12.2   9.01  )-----------( 265      ( 2019 06:53 )             38.9     07-24    137  |  100  |  45<H>  ----------------------------<  118<H>  4.3   |  30  |  1.67<H>    Ca    9.2      2019 13:08  Phos  3.0     07-25  Mg     2.0     07-25    TPro  7.9  /  Alb  3.3<L>  /  TBili  0.8  /  DBili  x   /  AST  96<H>  /  ALT  112<H>  /  AlkPhos  254<H>  07-24    PT/INR - ( 2019 13:08 )   PT: 21.0 sec;   INR: 1.86 ratio         PTT - ( 2019 13:08 )  PTT:35.2 sec  Urinalysis Basic - ( 2019 13:08 )    Color: Yellow / Appearance: Clear / S.010 / pH: x  Gluc: x / Ketone: Negative  / Bili: Negative / Urobili: Negative   Blood: x / Protein: 15 / Nitrite: Negative   Leuk Esterase: Negative / RBC: 0-2 /HPF / WBC 0-2 /HPF   Sq Epi: x / Non Sq Epi: Occasional /HPF / Bacteria: Trace /HPF    CAPILLARY BLOOD GLUCOSE      POCT Blood Glucose.: 125 mg/dL (2019 08:27)  POCT Blood Glucose.: 151 mg/dL (2019 21:44)  POCT Blood Glucose.: 107 mg/dL (2019 17:28)    RADIOLOGY & ADDITIONAL TESTS:    Imaging  Reviewed:  YES    Consultant(s) Notes Reviewed:   YES      Plan of care was discussed with patient and /or primary care giver; all questions and concerns were addressed NP Note discussed with  Primary Attending    Patient is a 79y old  Male who presents with a chief complaint of cellulitis of right foot (2019 16:11)      INTERVAL HPI/OVERNIGHT EVENTS: complaining of pain in right lower extremity     MEDICATIONS  (STANDING):  apixaban 2.5 milliGRAM(s) Oral every 12 hours  atorvastatin 40 milliGRAM(s) Oral at bedtime  dextrose 5%. 1000 milliLiter(s) (50 mL/Hr) IV Continuous <Continuous>  dextrose 50% Injectable 12.5 Gram(s) IV Push once  dextrose 50% Injectable 25 Gram(s) IV Push once  dextrose 50% Injectable 25 Gram(s) IV Push once  diltiazem    Tablet 30 milliGRAM(s) Oral every 12 hours  furosemide    Tablet 20 milliGRAM(s) Oral daily  insulin lispro (HumaLOG) corrective regimen sliding scale   SubCutaneous Before meals and at bedtime  latanoprost 0.005% Ophthalmic Solution 1 Drop(s) Both EYES at bedtime  piperacillin/tazobactam IVPB.. 3.375 Gram(s) IV Intermittent every 8 hours    MEDICATIONS  (PRN):  dextrose 40% Gel 15 Gram(s) Oral once PRN Blood Glucose LESS THAN 70 milliGRAM(s)/deciLiter  glucagon  Injectable 1 milliGRAM(s) IntraMuscular once PRN Glucose <70 milliGRAM(s)/deciLiter      __________________________________________________  REVIEW OF SYSTEMS:    CONSTITUTIONAL: No fever,   EYES: no acute visual disturbances  NECK: No pain or stiffness  RESPIRATORY: No cough; No shortness of breath  CARDIOVASCULAR: No chest pain, no palpitations  GASTROINTESTINAL: No pain. No nausea or vomiting; No diarrhea   NEUROLOGICAL: No headache or numbness, no tremors  MUSCULOSKELETAL: No joint pain, no muscle pain  GENITOURINARY: no dysuria, no frequency, no hesitancy  PSYCHIATRY: no depression , no anxiety  ALL OTHER  ROS negative        Vital Signs Last 24 Hrs  T(C): 36.9 (2019 07:57), Max: 37.2 (2019 16:00)  T(F): 98.4 (2019 07:57), Max: 98.9 (2019 16:00)  HR: 77 (2019 07:57) (65 - 88)  BP: 101/50 (2019 07:57) (99/45 - 107/65)  BP(mean): --  RR: 16 (2019 07:57) (16 - 18)  SpO2: 99% (2019 07:57) (99% - 100%)    ________________________________________________  PHYSICAL EXAM:  GENERAL: NAD  HEENT: Normocephalic;  conjunctivae and sclerae clear; moist mucous membranes;   NECK : supple  CHEST/LUNG: Clear to auscultation bilaterally with good air entry   HEART: S1 S2  irregular; no murmurs, gallops or rubs  ABDOMEN: Soft, Nontender, Nondistended; Bowel sounds present  EXTREMITIES: +1 LLE edema, RLE with lymphedema, +3 edema, warmth    SKIN: warm and dry; no rash  NERVOUS SYSTEM:  Awake and alert; Oriented  to place, person and time ; no new deficits    _________________________________________________  LABS:                        12.2   9.01  )-----------( 265      ( 2019 06:53 )             38.9     07-24    137  |  100  |  45<H>  ----------------------------<  118<H>  4.3   |  30  |  1.67<H>    Ca    9.2      2019 13:08  Phos  3.0     07-25  Mg     2.0     07-25    TPro  7.9  /  Alb  3.3<L>  /  TBili  0.8  /  DBili  x   /  AST  96<H>  /  ALT  112<H>  /  AlkPhos  254<H>  07-24    PT/INR - ( 2019 13:08 )   PT: 21.0 sec;   INR: 1.86 ratio         PTT - ( 2019 13:08 )  PTT:35.2 sec  Urinalysis Basic - ( 2019 13:08 )    Color: Yellow / Appearance: Clear / S.010 / pH: x  Gluc: x / Ketone: Negative  / Bili: Negative / Urobili: Negative   Blood: x / Protein: 15 / Nitrite: Negative   Leuk Esterase: Negative / RBC: 0-2 /HPF / WBC 0-2 /HPF   Sq Epi: x / Non Sq Epi: Occasional /HPF / Bacteria: Trace /HPF    CAPILLARY BLOOD GLUCOSE      POCT Blood Glucose.: 125 mg/dL (2019 08:27)  POCT Blood Glucose.: 151 mg/dL (2019 21:44)  POCT Blood Glucose.: 107 mg/dL (2019 17:28)    RADIOLOGY & ADDITIONAL TESTS:    Imaging  Reviewed:  YES    Consultant(s) Notes Reviewed:   YES      Plan of care was discussed with patient and /or primary care giver; all questions and concerns were addressed

## 2019-07-25 NOTE — CONSULT NOTE ADULT - SKIN COMMENTS
erythema and warmth of both legs but left leg looks worse to me, right leg has lymphedema also which is chronic

## 2019-07-26 DIAGNOSIS — K80.20 CALCULUS OF GALLBLADDER WITHOUT CHOLECYSTITIS WITHOUT OBSTRUCTION: ICD-10-CM

## 2019-07-26 LAB
ALBUMIN SERPL ELPH-MCNC: 2.7 G/DL — LOW (ref 3.5–5)
ALP SERPL-CCNC: 204 U/L — HIGH (ref 40–120)
ALT FLD-CCNC: 67 U/L DA — HIGH (ref 10–60)
ANION GAP SERPL CALC-SCNC: 7 MMOL/L — SIGNIFICANT CHANGE UP (ref 5–17)
AST SERPL-CCNC: 47 U/L — HIGH (ref 10–40)
BILIRUB SERPL-MCNC: 1 MG/DL — SIGNIFICANT CHANGE UP (ref 0.2–1.2)
BUN SERPL-MCNC: 29 MG/DL — HIGH (ref 7–18)
CALCIUM SERPL-MCNC: 8.8 MG/DL — SIGNIFICANT CHANGE UP (ref 8.4–10.5)
CHLORIDE SERPL-SCNC: 98 MMOL/L — SIGNIFICANT CHANGE UP (ref 96–108)
CO2 SERPL-SCNC: 28 MMOL/L — SIGNIFICANT CHANGE UP (ref 22–31)
CREAT SERPL-MCNC: 1.54 MG/DL — HIGH (ref 0.5–1.3)
CULTURE RESULTS: SIGNIFICANT CHANGE UP
GLUCOSE BLDC GLUCOMTR-MCNC: 134 MG/DL — HIGH (ref 70–99)
GLUCOSE BLDC GLUCOMTR-MCNC: 174 MG/DL — HIGH (ref 70–99)
GLUCOSE BLDC GLUCOMTR-MCNC: 199 MG/DL — HIGH (ref 70–99)
GLUCOSE BLDC GLUCOMTR-MCNC: 244 MG/DL — HIGH (ref 70–99)
GLUCOSE SERPL-MCNC: 121 MG/DL — HIGH (ref 70–99)
HCT VFR BLD CALC: 37.7 % — LOW (ref 39–50)
HGB BLD-MCNC: 11.9 G/DL — LOW (ref 13–17)
MCHC RBC-ENTMCNC: 26.4 PG — LOW (ref 27–34)
MCHC RBC-ENTMCNC: 31.6 GM/DL — LOW (ref 32–36)
MCV RBC AUTO: 83.8 FL — SIGNIFICANT CHANGE UP (ref 80–100)
NRBC # BLD: 0 /100 WBCS — SIGNIFICANT CHANGE UP (ref 0–0)
PLATELET # BLD AUTO: 266 K/UL — SIGNIFICANT CHANGE UP (ref 150–400)
POTASSIUM SERPL-MCNC: 3.7 MMOL/L — SIGNIFICANT CHANGE UP (ref 3.5–5.3)
POTASSIUM SERPL-SCNC: 3.7 MMOL/L — SIGNIFICANT CHANGE UP (ref 3.5–5.3)
PROT SERPL-MCNC: 7.1 G/DL — SIGNIFICANT CHANGE UP (ref 6–8.3)
RBC # BLD: 4.5 M/UL — SIGNIFICANT CHANGE UP (ref 4.2–5.8)
RBC # FLD: 13.9 % — SIGNIFICANT CHANGE UP (ref 10.3–14.5)
SODIUM SERPL-SCNC: 133 MMOL/L — LOW (ref 135–145)
SPECIMEN SOURCE: SIGNIFICANT CHANGE UP
WBC # BLD: 11.26 K/UL — HIGH (ref 3.8–10.5)
WBC # FLD AUTO: 11.26 K/UL — HIGH (ref 3.8–10.5)

## 2019-07-26 PROCEDURE — 76705 ECHO EXAM OF ABDOMEN: CPT | Mod: 26

## 2019-07-26 PROCEDURE — 99233 SBSQ HOSP IP/OBS HIGH 50: CPT | Mod: GC

## 2019-07-26 RX ORDER — DOCUSATE SODIUM 100 MG
100 CAPSULE ORAL
Refills: 0 | Status: DISCONTINUED | OUTPATIENT
Start: 2019-07-26 | End: 2019-07-27

## 2019-07-26 RX ADMIN — Medication 1: at 12:02

## 2019-07-26 RX ADMIN — LATANOPROST 1 DROP(S): 0.05 SOLUTION/ DROPS OPHTHALMIC; TOPICAL at 22:05

## 2019-07-26 RX ADMIN — Medication 100 MILLIGRAM(S): at 22:04

## 2019-07-26 RX ADMIN — TRAMADOL HYDROCHLORIDE 25 MILLIGRAM(S): 50 TABLET ORAL at 07:15

## 2019-07-26 RX ADMIN — APIXABAN 2.5 MILLIGRAM(S): 2.5 TABLET, FILM COATED ORAL at 17:21

## 2019-07-26 RX ADMIN — Medication 100 MILLIGRAM(S): at 13:08

## 2019-07-26 RX ADMIN — Medication 1: at 17:22

## 2019-07-26 RX ADMIN — Medication 30 MILLIGRAM(S): at 05:56

## 2019-07-26 RX ADMIN — TRAMADOL HYDROCHLORIDE 25 MILLIGRAM(S): 50 TABLET ORAL at 13:08

## 2019-07-26 RX ADMIN — APIXABAN 2.5 MILLIGRAM(S): 2.5 TABLET, FILM COATED ORAL at 05:56

## 2019-07-26 RX ADMIN — TRAMADOL HYDROCHLORIDE 25 MILLIGRAM(S): 50 TABLET ORAL at 06:53

## 2019-07-26 RX ADMIN — Medication 2: at 22:05

## 2019-07-26 RX ADMIN — Medication 20 MILLIGRAM(S): at 05:56

## 2019-07-26 RX ADMIN — Medication 100 MILLIGRAM(S): at 05:56

## 2019-07-26 RX ADMIN — TRAMADOL HYDROCHLORIDE 25 MILLIGRAM(S): 50 TABLET ORAL at 13:32

## 2019-07-26 NOTE — CHART NOTE - NSCHARTNOTEFT_GEN_A_CORE
EVENT:     OBJECTIVE:  Vital Signs Last 24 Hrs  T(C): 37 (26 Jul 2019 15:37), Max: 37.7 (26 Jul 2019 07:41)  T(F): 98.6 (26 Jul 2019 15:37), Max: 99.9 (26 Jul 2019 07:41)  HR: 52 (26 Jul 2019 15:37) (52 - 92)  BP: 105/52 (26 Jul 2019 15:37) (103/53 - 124/66)  BP(mean): --  RR: 16 (26 Jul 2019 15:37) (16 - 17)  SpO2: 97% (26 Jul 2019 15:37) (97% - 100%)    FOCUSED PHYSICAL EXAM:    LABS:                        11.9   11.26 )-----------( 266      ( 26 Jul 2019 09:01 )             37.7     07-26    133<L>  |  98  |  29<H>  ----------------------------<  121<H>  3.7   |  28  |  1.54<H>    Ca    8.8      26 Jul 2019 09:01  Phos  3.0     07-25  Mg     2.0     07-25    TPro  7.1  /  Alb  2.7<L>  /  TBili  1.0  /  DBili  x   /  AST  47<H>  /  ALT  67<H>  /  AlkPhos  204<H>  07-26      EKG:   IMGAGING:    ASSESSMENT:  HPI:  Patient is 79 year old male has pmhx of htn, dm, ?afib, right leg lymphedema came in with intense pain in his right foot for 1 day.     Patient was in his usual health until yesterday night when he started to have sudden, sharp, intense, 8-9/10 on intensity, right foot pain, gradually got worse today morning, continuous in nature, pt could not put right foot on the ground because of pain, pt took 2 tylenol today morning and it helped with the pain, pt came to ed for further evaluation.   patient denies fever, headache, cough, cp, sob, n/v/d/c, abdominal pain. pt has increased urinary frequency as he is on lasix for lymphedema. (24 Jul 2019 16:11)      PLAN:     FOLLOW UP / RESULT:

## 2019-07-26 NOTE — PROGRESS NOTE ADULT - SUBJECTIVE AND OBJECTIVE BOX
NP Note discussed with  Primary Attending    Patient is a 79y old  Male who presents with a chief complaint of cellulitis of right foot (25 Jul 2019 17:03)      INTERVAL HPI/OVERNIGHT EVENTS: no new complaints    MEDICATIONS  (STANDING):  apixaban 2.5 milliGRAM(s) Oral every 12 hours  ceFAZolin   IVPB 1000 milliGRAM(s) IV Intermittent every 8 hours  dextrose 5%. 1000 milliLiter(s) (50 mL/Hr) IV Continuous <Continuous>  dextrose 50% Injectable 12.5 Gram(s) IV Push once  dextrose 50% Injectable 25 Gram(s) IV Push once  dextrose 50% Injectable 25 Gram(s) IV Push once  diltiazem    Tablet 30 milliGRAM(s) Oral every 12 hours  furosemide    Tablet 20 milliGRAM(s) Oral daily  insulin lispro (HumaLOG) corrective regimen sliding scale   SubCutaneous Before meals and at bedtime  latanoprost 0.005% Ophthalmic Solution 1 Drop(s) Both EYES at bedtime    MEDICATIONS  (PRN):  dextrose 40% Gel 15 Gram(s) Oral once PRN Blood Glucose LESS THAN 70 milliGRAM(s)/deciLiter  glucagon  Injectable 1 milliGRAM(s) IntraMuscular once PRN Glucose <70 milliGRAM(s)/deciLiter  traMADol 25 milliGRAM(s) Oral every 6 hours PRN Moderate Pain (4 - 6)      __________________________________________________  REVIEW OF SYSTEMS:    CONSTITUTIONAL: No fever,   EYES: no acute visual disturbances  NECK: No pain or stiffness  RESPIRATORY: No cough; No shortness of breath  CARDIOVASCULAR: No chest pain, no palpitations  GASTROINTESTINAL: No pain. No nausea or vomiting; No diarrhea   NEUROLOGICAL: No headache or numbness, no tremors  MUSCULOSKELETAL: No joint pain, no muscle pain  GENITOURINARY: no dysuria, no frequency, no hesitancy  PSYCHIATRY: no depression , no anxiety  ALL OTHER  ROS negative        Vital Signs Last 24 Hrs  T(C): 37.7 (26 Jul 2019 07:41), Max: 37.7 (26 Jul 2019 07:41)  T(F): 99.9 (26 Jul 2019 07:41), Max: 99.9 (26 Jul 2019 07:41)  HR: 92 (26 Jul 2019 07:41) (77 - 92)  BP: 103/53 (26 Jul 2019 07:41) (103/53 - 124/66)  BP(mean): --  RR: 17 (26 Jul 2019 07:41) (16 - 17)  SpO2: 97% (26 Jul 2019 07:41) (97% - 100%)    ________________________________________________  PHYSICAL EXAM:  GENERAL: NAD  HEENT: Normocephalic;  conjunctivae and sclerae clear; moist mucous membranes;   NECK : supple  CHEST/LUNG: Clear to auscultation bilaterally with good air entry   HEART: S1 S2  regular; no murmurs, gallops or rubs  ABDOMEN: Soft, Nontender, Nondistended; Bowel sounds present  EXTREMITIES:  +1 LLE edema, RLE with lymphedema, +3 edema, warmth SKIN: warm and dry; no rash  NERVOUS SYSTEM:  Awake and alert; Oriented  to place, person and time ; no new deficits    _________________________________________________  LABS:                        11.9   11.26 )-----------( 266      ( 26 Jul 2019 09:01 )             37.7     07-26    133<L>  |  98  |  29<H>  ----------------------------<  121<H>  3.7   |  28  |  1.54<H>    Ca    8.8      26 Jul 2019 09:01  Phos  3.0     07-25  Mg     2.0     07-25    TPro  7.1  /  Alb  2.7<L>  /  TBili  1.0  /  DBili  x   /  AST  47<H>  /  ALT  67<H>  /  AlkPhos  204<H>  07-26        CAPILLARY BLOOD GLUCOSE      POCT Blood Glucose.: 199 mg/dL (26 Jul 2019 11:44)  POCT Blood Glucose.: 134 mg/dL (26 Jul 2019 08:00)  POCT Blood Glucose.: 156 mg/dL (25 Jul 2019 21:38)  POCT Blood Glucose.: 126 mg/dL (25 Jul 2019 16:53)        RADIOLOGY & ADDITIONAL TESTS:  EXAM:  US LIVER AND PANCREAS                            PROCEDURE DATE:  07/26/2019          INTERPRETATION:  Elevated LFTs.    Right upper quadrant ultrasound.    Multiple mobile gallstones. No gallbladder wall thickening or   pericholecystic fluid. Common duct 0.5 cm. Liver not remarkable. Pancreas   not well visualized. Right kidney 9.3 cm unremarkable. No ascites.    Impression: Cholelithiasis. No biliary dilatation. Limited evaluation   pancreas.                PHIL PAGE M.D., ATTENDING RADIOLOGIST  This document has been electronically signed. Jul 26 2019 12:27PM                  Imaging Personally Reviewed:  YES/NO    Consultant(s) Notes Reviewed:   YES/ No    Care Discussed with Consultants :     Plan of care was discussed with patient and /or primary care giver; all questions and concerns were addressed and care was aligned with patient's wishes.

## 2019-07-26 NOTE — PROGRESS NOTE ADULT - PROBLEM SELECTOR PLAN 4
hepatic / pancreatic sonogram done due to elevated LFTs  and showed cholelithiasis - no pain reported, no obstruction, discussed with PMD   LFT trending down   monitor LFTs

## 2019-07-26 NOTE — CHART NOTE - NSCHARTNOTEFT_GEN_A_CORE
Event: C/o no BM X   CHEST/LUNG: Clear to auscultation bilaterally; No wheezes or rales  HEART: Regular rate and rhythm; No murmurs, rubs, or gallops  ABDOMEN: Soft, Nontender, Nondistended, Normal bowel sounds  EXTREMITIES:  2+ Peripheral Pulses, No clubbing, cyanosis, or edema  Neurological: AOx3,   Skin: Warm and dry  Plan:   1. Bisacodyl 5 milliGRAM(s) Oral at bedtime ordered Event: Requesting dulcolax tablet, nurse reported BM yesterday  Vital Signs Last 24 Hrs  T(C): 37 (26 Jul 2019 15:37), Max: 37.7 (26 Jul 2019 07:41)  T(F): 98.6 (26 Jul 2019 15:37), Max: 99.9 (26 Jul 2019 07:41)  HR: 52 (26 Jul 2019 15:37) (52 - 92)  BP: 105/52 (26 Jul 2019 15:37) (103/53 - 118/61)  BP(mean): --  RR: 16 (26 Jul 2019 15:37) (16 - 17)  SpO2: 97% (26 Jul 2019 15:37) (97% - 97%)    CHEST/LUNG: Clear to auscultation bilaterally; No wheezes or rales  HEART: Irregular rate and rhythm; No murmurs, rubs, or gallops  ABDOMEN: Soft, Nontender, Nondistended, Normal bowel sounds  EXTREMITIES:  2+ Peripheral Pulses, No clubbing, cyanosis, or edema  Neurological: AOx3,   Skin: Warm and dry  Plan:   1. Bisacodyl 5 milliGRAM(s) Oral at bedtime ordered  2. Docusate sodium 100 milliGRAM(s) Oral two times a day ordered

## 2019-07-26 NOTE — CHART NOTE - NSCHARTNOTEFT_GEN_A_CORE
EVENT:   Patient c/o left LE pain level 6-7. Requested Tramadol.   OBJECTIVE:  Vital Signs Last 24 Hrs  T(C): 37.1 (25 Jul 2019 23:34), Max: 37.1 (25 Jul 2019 23:34)  T(F): 98.7 (25 Jul 2019 23:34), Max: 98.7 (25 Jul 2019 23:34)  HR: 91 (25 Jul 2019 23:34) (77 - 91)  BP: 124/66 (25 Jul 2019 23:34) (101/50 - 124/66)  BP(mean): --  RR: 17 (25 Jul 2019 23:34) (16 - 17)  SpO2: 100% (25 Jul 2019 23:34) (99% - 100%)    FOCUSED PHYSICAL EXAM:    LABS:                        12.2   9.01  )-----------( 265      ( 25 Jul 2019 06:53 )             38.9     07-25    137  |  102  |  38<H>  ----------------------------<  105<H>  3.9   |  30  |  1.58<H>    Ca    8.9      25 Jul 2019 13:28  Phos  3.0     07-25  Mg     2.0     07-25    TPro  6.9  /  Alb  2.9<L>  /  TBili  1.0  /  DBili  x   /  AST  53<H>  /  ALT  82<H>  /  AlkPhos  203<H>  07-25      EKG:   IMAGING:    ASSESSMENT:  HPI:  Patient is 79 year old male has pmhx of htn, dm, ?afib, right leg lymphedema came in with intense pain in his right foot for 1 day.     Patient was in his usual health until yesterday night when he started to have sudden, sharp, intense, 8-9/10 on intensity, right foot pain, gradually got worse today morning, continuous in nature, pt could not put right foot on the ground because of pain, pt took 2 tylenol today morning and it helped with the pain, pt came to ed for further evaluation.   patient denies fever, headache, cough, cp, sob, n/v/d/c, abdominal pain. pt has increased urinary frequency as he is on lasix for lymphedema. (24 Jul 2019 16:11)      PLAN:   Tramadol 25 mg po Q6hrhs PRN for moderate pain ( 4-6) , stop after 3 days.   FOLLOW UP / RESULT: morbidly obese.

## 2019-07-27 DIAGNOSIS — K59.00 CONSTIPATION, UNSPECIFIED: ICD-10-CM

## 2019-07-27 DIAGNOSIS — I95.9 HYPOTENSION, UNSPECIFIED: ICD-10-CM

## 2019-07-27 LAB
ALBUMIN SERPL ELPH-MCNC: 2.4 G/DL — LOW (ref 3.5–5)
ALP SERPL-CCNC: 191 U/L — HIGH (ref 40–120)
ALT FLD-CCNC: 47 U/L DA — SIGNIFICANT CHANGE UP (ref 10–60)
ANION GAP SERPL CALC-SCNC: 6 MMOL/L — SIGNIFICANT CHANGE UP (ref 5–17)
AST SERPL-CCNC: 39 U/L — SIGNIFICANT CHANGE UP (ref 10–40)
BILIRUB SERPL-MCNC: 1.1 MG/DL — SIGNIFICANT CHANGE UP (ref 0.2–1.2)
BUN SERPL-MCNC: 28 MG/DL — HIGH (ref 7–18)
CALCIUM SERPL-MCNC: 8.8 MG/DL — SIGNIFICANT CHANGE UP (ref 8.4–10.5)
CHLORIDE SERPL-SCNC: 98 MMOL/L — SIGNIFICANT CHANGE UP (ref 96–108)
CO2 SERPL-SCNC: 32 MMOL/L — HIGH (ref 22–31)
CREAT SERPL-MCNC: 1.48 MG/DL — HIGH (ref 0.5–1.3)
GLUCOSE BLDC GLUCOMTR-MCNC: 114 MG/DL — HIGH (ref 70–99)
GLUCOSE BLDC GLUCOMTR-MCNC: 179 MG/DL — HIGH (ref 70–99)
GLUCOSE BLDC GLUCOMTR-MCNC: 218 MG/DL — HIGH (ref 70–99)
GLUCOSE BLDC GLUCOMTR-MCNC: 219 MG/DL — HIGH (ref 70–99)
GLUCOSE SERPL-MCNC: 122 MG/DL — HIGH (ref 70–99)
HCT VFR BLD CALC: 36.7 % — LOW (ref 39–50)
HGB BLD-MCNC: 11.8 G/DL — LOW (ref 13–17)
MCHC RBC-ENTMCNC: 26.9 PG — LOW (ref 27–34)
MCHC RBC-ENTMCNC: 32.2 GM/DL — SIGNIFICANT CHANGE UP (ref 32–36)
MCV RBC AUTO: 83.8 FL — SIGNIFICANT CHANGE UP (ref 80–100)
NRBC # BLD: 0 /100 WBCS — SIGNIFICANT CHANGE UP (ref 0–0)
PLATELET # BLD AUTO: 251 K/UL — SIGNIFICANT CHANGE UP (ref 150–400)
POTASSIUM SERPL-MCNC: 3.5 MMOL/L — SIGNIFICANT CHANGE UP (ref 3.5–5.3)
POTASSIUM SERPL-SCNC: 3.5 MMOL/L — SIGNIFICANT CHANGE UP (ref 3.5–5.3)
PROT SERPL-MCNC: 6.9 G/DL — SIGNIFICANT CHANGE UP (ref 6–8.3)
RBC # BLD: 4.38 M/UL — SIGNIFICANT CHANGE UP (ref 4.2–5.8)
RBC # FLD: 13.8 % — SIGNIFICANT CHANGE UP (ref 10.3–14.5)
SODIUM SERPL-SCNC: 136 MMOL/L — SIGNIFICANT CHANGE UP (ref 135–145)
WBC # BLD: 11.09 K/UL — HIGH (ref 3.8–10.5)
WBC # FLD AUTO: 11.09 K/UL — HIGH (ref 3.8–10.5)

## 2019-07-27 PROCEDURE — 99233 SBSQ HOSP IP/OBS HIGH 50: CPT | Mod: GC

## 2019-07-27 RX ORDER — SODIUM CHLORIDE 9 MG/ML
500 INJECTION INTRAMUSCULAR; INTRAVENOUS; SUBCUTANEOUS ONCE
Refills: 0 | Status: COMPLETED | OUTPATIENT
Start: 2019-07-27 | End: 2019-07-27

## 2019-07-27 RX ORDER — POLYETHYLENE GLYCOL 3350 17 G/17G
17 POWDER, FOR SOLUTION ORAL DAILY
Refills: 0 | Status: DISCONTINUED | OUTPATIENT
Start: 2019-07-27 | End: 2019-07-31

## 2019-07-27 RX ADMIN — Medication 30 MILLIGRAM(S): at 17:41

## 2019-07-27 RX ADMIN — Medication 1: at 22:02

## 2019-07-27 RX ADMIN — TRAMADOL HYDROCHLORIDE 25 MILLIGRAM(S): 50 TABLET ORAL at 05:44

## 2019-07-27 RX ADMIN — LATANOPROST 1 DROP(S): 0.05 SOLUTION/ DROPS OPHTHALMIC; TOPICAL at 22:03

## 2019-07-27 RX ADMIN — Medication 100 MILLIGRAM(S): at 05:41

## 2019-07-27 RX ADMIN — APIXABAN 2.5 MILLIGRAM(S): 2.5 TABLET, FILM COATED ORAL at 05:41

## 2019-07-27 RX ADMIN — Medication 100 MILLIGRAM(S): at 14:02

## 2019-07-27 RX ADMIN — SODIUM CHLORIDE 500 MILLILITER(S): 9 INJECTION INTRAMUSCULAR; INTRAVENOUS; SUBCUTANEOUS at 12:04

## 2019-07-27 RX ADMIN — POLYETHYLENE GLYCOL 3350 17 GRAM(S): 17 POWDER, FOR SOLUTION ORAL at 12:03

## 2019-07-27 RX ADMIN — Medication 5 MILLIGRAM(S): at 05:41

## 2019-07-27 RX ADMIN — Medication 100 MILLIGRAM(S): at 22:03

## 2019-07-27 RX ADMIN — APIXABAN 2.5 MILLIGRAM(S): 2.5 TABLET, FILM COATED ORAL at 17:41

## 2019-07-27 RX ADMIN — Medication 100 MILLIGRAM(S): at 05:42

## 2019-07-27 RX ADMIN — Medication 2: at 17:41

## 2019-07-27 RX ADMIN — TRAMADOL HYDROCHLORIDE 25 MILLIGRAM(S): 50 TABLET ORAL at 06:30

## 2019-07-27 RX ADMIN — Medication 2: at 12:02

## 2019-07-27 NOTE — PHYSICAL THERAPY INITIAL EVALUATION ADULT - TRANSFER SAFETY CONCERNS NOTED: BED/CHAIR, REHAB EVAL
decreased safety awareness/losing balance/decreased step length/decreased proprioception/stand pivot

## 2019-07-27 NOTE — PHYSICAL THERAPY INITIAL EVALUATION ADULT - GAIT DEVIATIONS NOTED, PT EVAL
decreased tonya/increased time in double stance/decreased step length/decreased weight-shifting ability/decreased velocity of limb motion

## 2019-07-27 NOTE — PHYSICAL THERAPY INITIAL EVALUATION ADULT - GENERAL OBSERVATIONS, REHAB EVAL
Pt seen sitting in chair w/no lines attached, (+) lymphedema of (R)LE, c/o L>R LEG pain-RN aware. Pt is cooperative during assessment

## 2019-07-27 NOTE — PROGRESS NOTE ADULT - SUBJECTIVE AND OBJECTIVE BOX
Patient is a 79y old  Male who presents with a chief complaint of cellulitis of right foot (26 Jul 2019 14:43)      INTERVAL HPI/OVERNIGHT EVENTS: seen and examined. Wife was present during examination. c/o constipation. Cellulitis improving     MEDICATIONS  (STANDING):  apixaban 2.5 milliGRAM(s) Oral every 12 hours  ceFAZolin   IVPB 1000 milliGRAM(s) IV Intermittent every 8 hours  dextrose 5%. 1000 milliLiter(s) (50 mL/Hr) IV Continuous <Continuous>  dextrose 50% Injectable 12.5 Gram(s) IV Push once  dextrose 50% Injectable 25 Gram(s) IV Push once  dextrose 50% Injectable 25 Gram(s) IV Push once  diltiazem    Tablet 30 milliGRAM(s) Oral every 12 hours  furosemide    Tablet 20 milliGRAM(s) Oral daily  insulin lispro (HumaLOG) corrective regimen sliding scale   SubCutaneous Before meals and at bedtime  latanoprost 0.005% Ophthalmic Solution 1 Drop(s) Both EYES at bedtime  polyethylene glycol 3350 17 Gram(s) Oral daily    MEDICATIONS  (PRN):  dextrose 40% Gel 15 Gram(s) Oral once PRN Blood Glucose LESS THAN 70 milliGRAM(s)/deciLiter  glucagon  Injectable 1 milliGRAM(s) IntraMuscular once PRN Glucose <70 milliGRAM(s)/deciLiter  traMADol 25 milliGRAM(s) Oral every 6 hours PRN Moderate Pain (4 - 6)      Allergies    No Known Allergies    Intolerances        REVIEW OF SYSTEMS:  CONSTITUTIONAL: No fever, weight loss, or fatigue  RESPIRATORY: No cough, wheezing, chills or hemoptysis; No shortness of breath  CARDIOVASCULAR: No chest pain, palpitations, dizziness, or leg swelling  GASTROINTESTINAL: constipation, No abdominal or epigastric pain. No nausea, vomiting, or hematemesis; No diarrhea. No melena or hematochezia.  NEUROLOGICAL: No headaches, memory loss, loss of strength, numbness, or tremors  MUSCULOSKELETAL: No joint pain or swelling; No muscle, back, or extremity pain      Vital Signs Last 24 Hrs  T(C): 36.7 (27 Jul 2019 08:02), Max: 37.2 (27 Jul 2019 00:00)  T(F): 98 (27 Jul 2019 08:02), Max: 99 (27 Jul 2019 00:00)  HR: 84 (27 Jul 2019 12:17) (52 - 84)  BP: 145/81 (27 Jul 2019 12:17) (80/55 - 145/81)  BP(mean): --  RR: 18 (27 Jul 2019 12:17) (16 - 18)  SpO2: 97% (27 Jul 2019 12:17) (96% - 97%)    PHYSICAL EXAM:  GENERAL: NAD, well-groomed, well-developed, feeling down   HEAD:  Atraumatic, Normocephalic  EYES: conjunctiva and sclera clear  NECK: Supple, No JVD,  NERVOUS SYSTEM:  Alert & Oriented X3, No gross focal deficits  CHEST/LUNG: Clear to percussion bilaterally; No rales, rhonchi, wheezing, or rubs  HEART: Regular rate and rhythm; No murmurs, rubs, or gallops  ABDOMEN: Soft, Nontender, Nondistended; Bowel sounds present  EXTREMITIES:  right LE lymphedema  SKIN: No rashes or lesions    LABS:                        11.8   11.09 )-----------( 251      ( 27 Jul 2019 08:45 )             36.7     07-27    136  |  98  |  28<H>  ----------------------------<  122<H>  3.5   |  32<H>  |  1.48<H>    Ca    8.8      27 Jul 2019 08:45    TPro  6.9  /  Alb  2.4<L>  /  TBili  1.1  /  DBili  x   /  AST  39  /  ALT  47  /  AlkPhos  191<H>  07-27        CAPILLARY BLOOD GLUCOSE      POCT Blood Glucose.: 219 mg/dL (27 Jul 2019 11:49)  POCT Blood Glucose.: 114 mg/dL (27 Jul 2019 08:32)  POCT Blood Glucose.: 244 mg/dL (26 Jul 2019 21:15)  POCT Blood Glucose.: 174 mg/dL (26 Jul 2019 16:59)      RADIOLOGY & ADDITIONAL TESTS:    Imaging Personally Reviewed:  [ ] YES  [ ] NO    Consultant(s) Notes Reviewed:  [x ] YES  [ ] NO    Care Discussed with Consultants/Other Providers [ ] YES  [ ] NO    Plan of Care discussed with Housestaff [ ]YES [ ] NO

## 2019-07-27 NOTE — PHYSICAL THERAPY INITIAL EVALUATION ADULT - PLANNED THERAPY INTERVENTIONS, PT EVAL
balance training/bed mobility training/gait training/neuromuscular re-education/strengthening/transfer training

## 2019-07-27 NOTE — PHYSICAL THERAPY INITIAL EVALUATION ADULT - MANUAL MUSCLE TESTING RESULTS, REHAB EVAL
(B)UE grossly 4/5; (R) LE 3-/5; except for ankle 3/5; (L) LE 2/5 (due to c/o severe pain with movement)

## 2019-07-28 LAB
ANION GAP SERPL CALC-SCNC: 7 MMOL/L — SIGNIFICANT CHANGE UP (ref 5–17)
BUN SERPL-MCNC: 26 MG/DL — HIGH (ref 7–18)
CALCIUM SERPL-MCNC: 8.6 MG/DL — SIGNIFICANT CHANGE UP (ref 8.4–10.5)
CHLORIDE SERPL-SCNC: 99 MMOL/L — SIGNIFICANT CHANGE UP (ref 96–108)
CO2 SERPL-SCNC: 31 MMOL/L — SIGNIFICANT CHANGE UP (ref 22–31)
CREAT SERPL-MCNC: 1.24 MG/DL — SIGNIFICANT CHANGE UP (ref 0.5–1.3)
GLUCOSE BLDC GLUCOMTR-MCNC: 130 MG/DL — HIGH (ref 70–99)
GLUCOSE BLDC GLUCOMTR-MCNC: 154 MG/DL — HIGH (ref 70–99)
GLUCOSE BLDC GLUCOMTR-MCNC: 181 MG/DL — HIGH (ref 70–99)
GLUCOSE BLDC GLUCOMTR-MCNC: 199 MG/DL — HIGH (ref 70–99)
GLUCOSE SERPL-MCNC: 173 MG/DL — HIGH (ref 70–99)
HCT VFR BLD CALC: 38.2 % — LOW (ref 39–50)
HGB BLD-MCNC: 12.1 G/DL — LOW (ref 13–17)
MAGNESIUM SERPL-MCNC: 1.8 MG/DL — SIGNIFICANT CHANGE UP (ref 1.6–2.6)
MCHC RBC-ENTMCNC: 26.7 PG — LOW (ref 27–34)
MCHC RBC-ENTMCNC: 31.7 GM/DL — LOW (ref 32–36)
MCV RBC AUTO: 84.1 FL — SIGNIFICANT CHANGE UP (ref 80–100)
NRBC # BLD: 0 /100 WBCS — SIGNIFICANT CHANGE UP (ref 0–0)
PHOSPHATE SERPL-MCNC: 2.9 MG/DL — SIGNIFICANT CHANGE UP (ref 2.5–4.5)
PLATELET # BLD AUTO: 270 K/UL — SIGNIFICANT CHANGE UP (ref 150–400)
POTASSIUM SERPL-MCNC: 3.8 MMOL/L — SIGNIFICANT CHANGE UP (ref 3.5–5.3)
POTASSIUM SERPL-SCNC: 3.8 MMOL/L — SIGNIFICANT CHANGE UP (ref 3.5–5.3)
RBC # BLD: 4.54 M/UL — SIGNIFICANT CHANGE UP (ref 4.2–5.8)
RBC # FLD: 13.8 % — SIGNIFICANT CHANGE UP (ref 10.3–14.5)
SODIUM SERPL-SCNC: 137 MMOL/L — SIGNIFICANT CHANGE UP (ref 135–145)
WBC # BLD: 11.73 K/UL — HIGH (ref 3.8–10.5)
WBC # FLD AUTO: 11.73 K/UL — HIGH (ref 3.8–10.5)

## 2019-07-28 PROCEDURE — 99232 SBSQ HOSP IP/OBS MODERATE 35: CPT | Mod: GC

## 2019-07-28 RX ORDER — TRAMADOL HYDROCHLORIDE 50 MG/1
25 TABLET ORAL EVERY 6 HOURS
Refills: 0 | Status: DISCONTINUED | OUTPATIENT
Start: 2019-07-28 | End: 2019-07-31

## 2019-07-28 RX ORDER — ACETAMINOPHEN 500 MG
650 TABLET ORAL ONCE
Refills: 0 | Status: COMPLETED | OUTPATIENT
Start: 2019-07-28 | End: 2019-07-28

## 2019-07-28 RX ORDER — ACETAMINOPHEN 500 MG
650 TABLET ORAL EVERY 6 HOURS
Refills: 0 | Status: DISCONTINUED | OUTPATIENT
Start: 2019-07-28 | End: 2019-07-31

## 2019-07-28 RX ADMIN — TRAMADOL HYDROCHLORIDE 25 MILLIGRAM(S): 50 TABLET ORAL at 20:06

## 2019-07-28 RX ADMIN — Medication 100 MILLIGRAM(S): at 21:59

## 2019-07-28 RX ADMIN — Medication 1: at 11:55

## 2019-07-28 RX ADMIN — Medication 100 MILLIGRAM(S): at 06:22

## 2019-07-28 RX ADMIN — TRAMADOL HYDROCHLORIDE 25 MILLIGRAM(S): 50 TABLET ORAL at 07:37

## 2019-07-28 RX ADMIN — APIXABAN 2.5 MILLIGRAM(S): 2.5 TABLET, FILM COATED ORAL at 17:55

## 2019-07-28 RX ADMIN — TRAMADOL HYDROCHLORIDE 25 MILLIGRAM(S): 50 TABLET ORAL at 20:45

## 2019-07-28 RX ADMIN — Medication 1: at 21:59

## 2019-07-28 RX ADMIN — TRAMADOL HYDROCHLORIDE 25 MILLIGRAM(S): 50 TABLET ORAL at 07:10

## 2019-07-28 RX ADMIN — Medication 100 MILLIGRAM(S): at 13:30

## 2019-07-28 RX ADMIN — Medication 650 MILLIGRAM(S): at 21:59

## 2019-07-28 RX ADMIN — Medication 650 MILLIGRAM(S): at 16:10

## 2019-07-28 RX ADMIN — LATANOPROST 1 DROP(S): 0.05 SOLUTION/ DROPS OPHTHALMIC; TOPICAL at 23:41

## 2019-07-28 RX ADMIN — Medication 650 MILLIGRAM(S): at 13:40

## 2019-07-28 RX ADMIN — Medication 1: at 16:50

## 2019-07-28 RX ADMIN — Medication 30 MILLIGRAM(S): at 17:55

## 2019-07-28 RX ADMIN — APIXABAN 2.5 MILLIGRAM(S): 2.5 TABLET, FILM COATED ORAL at 06:22

## 2019-07-28 RX ADMIN — Medication 650 MILLIGRAM(S): at 22:40

## 2019-07-28 NOTE — PROGRESS NOTE ADULT - SUBJECTIVE AND OBJECTIVE BOX
79y Male    Meds:  ceFAZolin   IVPB 1000 milliGRAM(s) IV Intermittent every 8 hours    Allergies    No Known Allergies    Intolerances        VITALS:  Vital Signs Last 24 Hrs  T(C): 36.1 (28 Jul 2019 08:06), Max: 37.3 (27 Jul 2019 15:58)  T(F): 97 (28 Jul 2019 08:06), Max: 99.1 (27 Jul 2019 15:58)  HR: 70 (28 Jul 2019 08:06) (62 - 94)  BP: 129/70 (28 Jul 2019 08:06) (90/54 - 134/63)  BP(mean): --  RR: 17 (28 Jul 2019 08:06) (16 - 17)  SpO2: 99% (28 Jul 2019 08:06) (99% - 99%)    LABS/DIAGNOSTIC TESTS:                          12.1   11.73 )-----------( 270      ( 28 Jul 2019 11:46 )             38.2         07-28    137  |  99  |  26<H>  ----------------------------<  173<H>  3.8   |  31  |  1.24    Ca    8.6      28 Jul 2019 11:46  Phos  2.9     07-28  Mg     1.8     07-28    TPro  6.9  /  Alb  2.4<L>  /  TBili  1.1  /  DBili  x   /  AST  39  /  ALT  47  /  AlkPhos  191<H>  07-27      LIVER FUNCTIONS - ( 27 Jul 2019 08:45 )  Alb: 2.4 g/dL / Pro: 6.9 g/dL / ALK PHOS: 191 U/L / ALT: 47 U/L DA / AST: 39 U/L / GGT: x             CULTURES: .Urine  07-24 @ 19:25   >=3 organisms. Probable collection contamination.  --  --      .Blood  07-24 @ 19:08   No growth to date.  --  --            RADIOLOGY:      ROS:  [  ] UNABLE TO ELICIT 79y Male who is doing better clinically with much less leg pain, he has no fevers or chills, no diarrhea, c/o a headache today.    Meds:  ceFAZolin   IVPB 1000 milliGRAM(s) IV Intermittent every 8 hours    Allergies    No Known Allergies    Intolerances        VITALS:  Vital Signs Last 24 Hrs  T(C): 36.1 (28 Jul 2019 08:06), Max: 37.3 (27 Jul 2019 15:58)  T(F): 97 (28 Jul 2019 08:06), Max: 99.1 (27 Jul 2019 15:58)  HR: 70 (28 Jul 2019 08:06) (62 - 94)  BP: 129/70 (28 Jul 2019 08:06) (90/54 - 134/63)  BP(mean): --  RR: 17 (28 Jul 2019 08:06) (16 - 17)  SpO2: 99% (28 Jul 2019 08:06) (99% - 99%)    LABS/DIAGNOSTIC TESTS:                          12.1   11.73 )-----------( 270      ( 28 Jul 2019 11:46 )             38.2         07-28    137  |  99  |  26<H>  ----------------------------<  173<H>  3.8   |  31  |  1.24    Ca    8.6      28 Jul 2019 11:46  Phos  2.9     07-28  Mg     1.8     07-28    TPro  6.9  /  Alb  2.4<L>  /  TBili  1.1  /  DBili  x   /  AST  39  /  ALT  47  /  AlkPhos  191<H>  07-27      LIVER FUNCTIONS - ( 27 Jul 2019 08:45 )  Alb: 2.4 g/dL / Pro: 6.9 g/dL / ALK PHOS: 191 U/L / ALT: 47 U/L DA / AST: 39 U/L / GGT: x             CULTURES: .Urine  07-24 @ 19:25   >=3 organisms. Probable collection contamination.  --  --      .Blood  07-24 @ 19:08   No growth to date.  --  --            RADIOLOGY:      ROS:  [  ] UNABLE TO ELICIT

## 2019-07-28 NOTE — PROGRESS NOTE ADULT - SUBJECTIVE AND OBJECTIVE BOX
Patient is a 79y old  Male who presents with a chief complaint of cellulitis of right foot (27 Jul 2019 13:36)      INTERVAL HPI/OVERNIGHT EVENTS: seen and examined. Cellulitis improved. Mild leukocytosis.  Serum Cr. improved.     MEDICATIONS  (STANDING):  apixaban 2.5 milliGRAM(s) Oral every 12 hours  ceFAZolin   IVPB 1000 milliGRAM(s) IV Intermittent every 8 hours  dextrose 5%. 1000 milliLiter(s) (50 mL/Hr) IV Continuous <Continuous>  dextrose 50% Injectable 12.5 Gram(s) IV Push once  dextrose 50% Injectable 25 Gram(s) IV Push once  dextrose 50% Injectable 25 Gram(s) IV Push once  diltiazem    Tablet 30 milliGRAM(s) Oral every 12 hours  furosemide    Tablet 20 milliGRAM(s) Oral daily  insulin lispro (HumaLOG) corrective regimen sliding scale   SubCutaneous Before meals and at bedtime  latanoprost 0.005% Ophthalmic Solution 1 Drop(s) Both EYES at bedtime  polyethylene glycol 3350 17 Gram(s) Oral daily    MEDICATIONS  (PRN):  dextrose 40% Gel 15 Gram(s) Oral once PRN Blood Glucose LESS THAN 70 milliGRAM(s)/deciLiter  glucagon  Injectable 1 milliGRAM(s) IntraMuscular once PRN Glucose <70 milliGRAM(s)/deciLiter  traMADol 25 milliGRAM(s) Oral every 6 hours PRN Moderate Pain (4 - 6)      Allergies    No Known Allergies    Intolerances        REVIEW OF SYSTEMS:  CONSTITUTIONAL: No fever, weight loss, or fatigue  RESPIRATORY: No cough, wheezing, chills or hemoptysis; No shortness of breath  CARDIOVASCULAR: No chest pain, palpitations, dizziness, or leg swelling  GASTROINTESTINAL: No abdominal or epigastric pain. No nausea, vomiting, or hematemesis; No diarrhea or constipation. No melena or hematochezia.  NEUROLOGICAL: No headaches, memory loss, loss of strength, numbness, or tremors  MUSCULOSKELETAL: No joint pain or swelling; No muscle, back, or extremity pain      Vital Signs Last 24 Hrs  T(C): 36.1 (28 Jul 2019 08:06), Max: 37.3 (27 Jul 2019 15:58)  T(F): 97 (28 Jul 2019 08:06), Max: 99.1 (27 Jul 2019 15:58)  HR: 70 (28 Jul 2019 08:06) (62 - 94)  BP: 129/70 (28 Jul 2019 08:06) (90/54 - 134/63)  BP(mean): --  RR: 17 (28 Jul 2019 08:06) (16 - 17)  SpO2: 99% (28 Jul 2019 08:06) (99% - 99%)    PHYSICAL EXAM:  GENERAL: Sad looking elderly male. Laying in bed, looks sad   HEAD:  Atraumatic, Normocephalic  EYES:  conjunctiva and sclera clear  NECK: Supple, No JVD,  NERVOUS SYSTEM:  Alert & Oriented X3, No gross focal deficits  CHEST/LUNG: Clear to percussion bilaterally; No rales, rhonchi, wheezing, or rubs  HEART: Regular rate and rhythm; No murmurs, rubs, or gallops  ABDOMEN: Soft, Nontender, Nondistended; Bowel sounds present  EXTREMITIES:  No clubbing, cyanosis, or edema  SKIN: No rashes or lesions    LABS:                        12.1   11.73 )-----------( 270      ( 28 Jul 2019 11:46 )             38.2     07-28    137  |  99  |  26<H>  ----------------------------<  173<H>  3.8   |  31  |  1.24    Ca    8.6      28 Jul 2019 11:46  Phos  2.9     07-28  Mg     1.8     07-28    TPro  6.9  /  Alb  2.4<L>  /  TBili  1.1  /  DBili  x   /  AST  39  /  ALT  47  /  AlkPhos  191<H>  07-27        CAPILLARY BLOOD GLUCOSE      POCT Blood Glucose.: 181 mg/dL (28 Jul 2019 11:39)  POCT Blood Glucose.: 130 mg/dL (28 Jul 2019 08:38)  POCT Blood Glucose.: 179 mg/dL (27 Jul 2019 21:31)  POCT Blood Glucose.: 218 mg/dL (27 Jul 2019 17:03)      RADIOLOGY & ADDITIONAL TESTS:    Imaging Personally Reviewed:  [ ] YES  [ ] NO    Consultant(s) Notes Reviewed:  [ ] YES  [ ] NO    Care Discussed with Consultants/Other Providers [ ] YES  [ ] NO    Plan of Care discussed with Housestaff [ ]YES [ ] NO

## 2019-07-28 NOTE — PROGRESS NOTE ADULT - ASSESSMENT
Cellulitis of both legs - greatly improved    plan -   cont Kefzol 1 gm iv q8hrs   can switch to keflex 500mgs po q6hrs from tomorrow for 5 days more  DC planning  reconsult prn.

## 2019-07-29 ENCOUNTER — TRANSCRIPTION ENCOUNTER (OUTPATIENT)
Age: 79
End: 2019-07-29

## 2019-07-29 DIAGNOSIS — Z02.9 ENCOUNTER FOR ADMINISTRATIVE EXAMINATIONS, UNSPECIFIED: ICD-10-CM

## 2019-07-29 LAB
ANION GAP SERPL CALC-SCNC: 6 MMOL/L — SIGNIFICANT CHANGE UP (ref 5–17)
BUN SERPL-MCNC: 23 MG/DL — HIGH (ref 7–18)
CALCIUM SERPL-MCNC: 9 MG/DL — SIGNIFICANT CHANGE UP (ref 8.4–10.5)
CHLORIDE SERPL-SCNC: 102 MMOL/L — SIGNIFICANT CHANGE UP (ref 96–108)
CO2 SERPL-SCNC: 31 MMOL/L — SIGNIFICANT CHANGE UP (ref 22–31)
CREAT SERPL-MCNC: 1.2 MG/DL — SIGNIFICANT CHANGE UP (ref 0.5–1.3)
CULTURE RESULTS: SIGNIFICANT CHANGE UP
CULTURE RESULTS: SIGNIFICANT CHANGE UP
GLUCOSE BLDC GLUCOMTR-MCNC: 140 MG/DL — HIGH (ref 70–99)
GLUCOSE BLDC GLUCOMTR-MCNC: 154 MG/DL — HIGH (ref 70–99)
GLUCOSE BLDC GLUCOMTR-MCNC: 206 MG/DL — HIGH (ref 70–99)
GLUCOSE BLDC GLUCOMTR-MCNC: 251 MG/DL — HIGH (ref 70–99)
GLUCOSE SERPL-MCNC: 125 MG/DL — HIGH (ref 70–99)
HCT VFR BLD CALC: 36.7 % — LOW (ref 39–50)
HGB BLD-MCNC: 11.5 G/DL — LOW (ref 13–17)
MCHC RBC-ENTMCNC: 26.3 PG — LOW (ref 27–34)
MCHC RBC-ENTMCNC: 31.3 GM/DL — LOW (ref 32–36)
MCV RBC AUTO: 84 FL — SIGNIFICANT CHANGE UP (ref 80–100)
NRBC # BLD: 0 /100 WBCS — SIGNIFICANT CHANGE UP (ref 0–0)
PLATELET # BLD AUTO: 280 K/UL — SIGNIFICANT CHANGE UP (ref 150–400)
POTASSIUM SERPL-MCNC: 3.8 MMOL/L — SIGNIFICANT CHANGE UP (ref 3.5–5.3)
POTASSIUM SERPL-SCNC: 3.8 MMOL/L — SIGNIFICANT CHANGE UP (ref 3.5–5.3)
RBC # BLD: 4.37 M/UL — SIGNIFICANT CHANGE UP (ref 4.2–5.8)
RBC # FLD: 13.6 % — SIGNIFICANT CHANGE UP (ref 10.3–14.5)
SODIUM SERPL-SCNC: 139 MMOL/L — SIGNIFICANT CHANGE UP (ref 135–145)
SPECIMEN SOURCE: SIGNIFICANT CHANGE UP
SPECIMEN SOURCE: SIGNIFICANT CHANGE UP
WBC # BLD: 10.57 K/UL — HIGH (ref 3.8–10.5)
WBC # FLD AUTO: 10.57 K/UL — HIGH (ref 3.8–10.5)

## 2019-07-29 PROCEDURE — 99232 SBSQ HOSP IP/OBS MODERATE 35: CPT | Mod: GC

## 2019-07-29 RX ORDER — TRAMADOL HYDROCHLORIDE 50 MG/1
0.5 TABLET ORAL
Qty: 28 | Refills: 0
Start: 2019-07-29 | End: 2019-08-11

## 2019-07-29 RX ORDER — CEPHALEXIN 500 MG
1 CAPSULE ORAL
Qty: 20 | Refills: 0
Start: 2019-07-29 | End: 2019-08-02

## 2019-07-29 RX ORDER — POLYETHYLENE GLYCOL 3350 17 G/17G
17 POWDER, FOR SOLUTION ORAL
Qty: 30 | Refills: 0
Start: 2019-07-29 | End: 2019-09-06

## 2019-07-29 RX ORDER — FUROSEMIDE 40 MG
1 TABLET ORAL
Qty: 15 | Refills: 0
Start: 2019-07-29 | End: 2019-08-27

## 2019-07-29 RX ADMIN — Medication 20 MILLIGRAM(S): at 06:57

## 2019-07-29 RX ADMIN — POLYETHYLENE GLYCOL 3350 17 GRAM(S): 17 POWDER, FOR SOLUTION ORAL at 12:38

## 2019-07-29 RX ADMIN — APIXABAN 2.5 MILLIGRAM(S): 2.5 TABLET, FILM COATED ORAL at 17:25

## 2019-07-29 RX ADMIN — Medication 2: at 08:43

## 2019-07-29 RX ADMIN — Medication 10 MILLIGRAM(S): at 09:40

## 2019-07-29 RX ADMIN — Medication 30 MILLIGRAM(S): at 06:57

## 2019-07-29 RX ADMIN — Medication 1: at 17:30

## 2019-07-29 RX ADMIN — Medication 650 MILLIGRAM(S): at 07:01

## 2019-07-29 RX ADMIN — Medication 100 MILLIGRAM(S): at 22:19

## 2019-07-29 RX ADMIN — LATANOPROST 1 DROP(S): 0.05 SOLUTION/ DROPS OPHTHALMIC; TOPICAL at 22:19

## 2019-07-29 RX ADMIN — Medication 3: at 12:36

## 2019-07-29 RX ADMIN — APIXABAN 2.5 MILLIGRAM(S): 2.5 TABLET, FILM COATED ORAL at 06:57

## 2019-07-29 RX ADMIN — Medication 100 MILLIGRAM(S): at 06:57

## 2019-07-29 RX ADMIN — Medication 100 MILLIGRAM(S): at 13:44

## 2019-07-29 RX ADMIN — Medication 30 MILLIGRAM(S): at 17:25

## 2019-07-29 RX ADMIN — Medication 650 MILLIGRAM(S): at 07:32

## 2019-07-29 NOTE — DISCHARGE NOTE PROVIDER - HOSPITAL COURSE
Patient is 79 year old male has pmhx of htn, dm, ?afib, right leg lymphedema came in with intense pain in his right foot for 1 day.         Patient was in his usual health until he came to ED, admitted to medicine for Rt foot cellulitis management.  Xray of rt foot showed no osteomyelitis, treating with IV ABT for cellulitis.  Rt foot pain improved as his Rt leg swelling subsided with antibiotic and pain well controlled on PRN  tramadol.  Continue on lasix every other day for lymphedema.     Seen by PT and recommended rehab and pt agrees to discharge to rehab at this time. Medically stable, d/c on P.O keflex for 5 more days from today. HPI:    Patient is 79 year old male has pmhx of htn, dm, ?afib, right leg lymphedema came in with intense pain in his right foot for 1 day. Patient was in his usual health until yesterday night when he started to have sudden, sharp, intense, 8-9/10 on intensity, right foot pain, gradually got worse today morning, continuous in nature, pt could not put right foot on the ground because of pain, pt took 2 tylenol today morning and it helped with the pain, pt came to ed for further evaluation. patient denies fever, headache, cough, cp, sob, n/v/d/c, abdominal pain. pt has increased urinary frequency as he is on lasix for lymphedema. (24 Jul 2019 16:11)    - admitted to medicine for Rt foot cellulitis management.  Xray of rt foot showed no osteomyelitis, treating with IV ABT for cellulitis.  Rt foot pain improved as his Rt leg swelling subsided with antibiotic and pain well controlled on PRN  tramadol.  Continue on lasix every other day for lymphedema.     Seen by PT and recommended rehab and pt agrees to discharge to rehab at this time. Medically stable, d/c on P.O keflex for 5 more days from today.

## 2019-07-29 NOTE — PROGRESS NOTE ADULT - SUBJECTIVE AND OBJECTIVE BOX
NP Note discussed with  Primary Attending    Patient is a 79y old  Male who presents with a chief complaint of cellulitis of right foot (29 Jul 2019 11:48)      INTERVAL HPI/OVERNIGHT EVENTS: no new complaints    MEDICATIONS  (STANDING):  apixaban 2.5 milliGRAM(s) Oral every 12 hours  ceFAZolin   IVPB 1000 milliGRAM(s) IV Intermittent every 8 hours  dextrose 5%. 1000 milliLiter(s) (50 mL/Hr) IV Continuous <Continuous>  dextrose 50% Injectable 12.5 Gram(s) IV Push once  dextrose 50% Injectable 25 Gram(s) IV Push once  dextrose 50% Injectable 25 Gram(s) IV Push once  diltiazem    Tablet 30 milliGRAM(s) Oral every 12 hours  furosemide    Tablet 20 milliGRAM(s) Oral daily  insulin lispro (HumaLOG) corrective regimen sliding scale   SubCutaneous Before meals and at bedtime  latanoprost 0.005% Ophthalmic Solution 1 Drop(s) Both EYES at bedtime  polyethylene glycol 3350 17 Gram(s) Oral daily    MEDICATIONS  (PRN):  acetaminophen   Tablet .. 650 milliGRAM(s) Oral every 6 hours PRN Mild Pain (1 - 3)  dextrose 40% Gel 15 Gram(s) Oral once PRN Blood Glucose LESS THAN 70 milliGRAM(s)/deciLiter  glucagon  Injectable 1 milliGRAM(s) IntraMuscular once PRN Glucose <70 milliGRAM(s)/deciLiter  traMADol 25 milliGRAM(s) Oral every 6 hours PRN Moderate Pain (4 - 6)      __________________________________________________  REVIEW OF SYSTEMS:    CONSTITUTIONAL: No fever,   EYES: no acute visual disturbances  NECK: No pain or stiffness  RESPIRATORY: No cough; No shortness of breath  CARDIOVASCULAR: No chest pain, no palpitations  GASTROINTESTINAL: No pain. No nausea or vomiting; No diarrhea   NEUROLOGICAL: No headache or numbness, no tremors  MUSCULOSKELETAL: No joint pain, no muscle pain  GENITOURINARY: no dysuria, no frequency, no hesitancy  PSYCHIATRY: no depression , no anxiety  ALL OTHER  ROS negative        Vital Signs Last 24 Hrs  T(C): 36.7 (29 Jul 2019 07:44), Max: 37.4 (28 Jul 2019 15:54)  T(F): 98 (29 Jul 2019 07:44), Max: 99.4 (29 Jul 2019 00:23)  HR: 88 (29 Jul 2019 07:44) (63 - 90)  BP: 117/63 (29 Jul 2019 07:44) (106/62 - 143/82)  BP(mean): --  RR: 16 (29 Jul 2019 07:44) (16 - 18)  SpO2: 96% (29 Jul 2019 07:44) (96% - 98%)    ________________________________________________  PHYSICAL EXAM:  GENERAL: NAD  HEENT: Normocephalic;  conjunctivae and sclerae clear; moist mucous membranes;   NECK : supple  CHEST/LUNG: Clear to auscultation bilaterally with good air entry   HEART: S1 S2  regular; no murmurs, gallops or rubs  ABDOMEN: Soft, Nontender, Nondistended; Bowel sounds present  EXTREMITIES: no cyanosis; no edema; no calf tenderness  SKIN: warm and dry; no rash  NERVOUS SYSTEM:  Awake and alert; Oriented  to place, person and time ; no new deficits    _________________________________________________  LABS:                        11.5   10.57 )-----------( 280      ( 29 Jul 2019 07:22 )             36.7     07-29    139  |  102  |  23<H>  ----------------------------<  125<H>  3.8   |  31  |  1.20    Ca    9.0      29 Jul 2019 07:22  Phos  2.9     07-28  Mg     1.8     07-28          CAPILLARY BLOOD GLUCOSE      POCT Blood Glucose.: 251 mg/dL (29 Jul 2019 11:24)  POCT Blood Glucose.: 206 mg/dL (29 Jul 2019 08:22)  POCT Blood Glucose.: 199 mg/dL (28 Jul 2019 21:37)  POCT Blood Glucose.: 154 mg/dL (28 Jul 2019 16:34)        RADIOLOGY & ADDITIONAL TESTS:    Imaging Personally Reviewed:  YES/NO    Consultant(s) Notes Reviewed:   YES/ No    Care Discussed with Consultants :     Plan of care was discussed with patient and /or primary care giver; all questions and concerns were addressed and care was aligned with patient's wishes.

## 2019-07-29 NOTE — PROGRESS NOTE ADULT - ASSESSMENT
79 year old male has pmhx of htn, dm, ?afib, right leg lymphedema admitted for RLE cellulitis.       Stable to d/c but due to insurance plan waiting for auth. Pain controlled well with tramadol. VSS, afebrile.

## 2019-07-29 NOTE — DISCHARGE NOTE PROVIDER - CARE PROVIDER_API CALL
Cameron Guidry)  Family Medicine  [address] [place] [address]  Phone: [phone]  Fax: [phone]  Follow Up Time:

## 2019-07-29 NOTE — DISCHARGE NOTE PROVIDER - NSDCCPCAREPLAN_GEN_ALL_CORE_FT
PRINCIPAL DISCHARGE DIAGNOSIS  Diagnosis: Cellulitis  Assessment and Plan of Treatment: Take all of your antibiotics as ordered.  If the affected cellulitic area increases in redness, warmth, pain or swelling call your Health Care Provider.  If you develop fever, chills, and/or malaise, call your Health Care Provider.        SECONDARY DISCHARGE DIAGNOSES  Diagnosis: Lymphedema of right lower extremity  Assessment and Plan of Treatment: continue to take lasix every other day   monitor your blood wokr for kidney function and elevates your legs while in bed    Diagnosis: BRENDAN (acute kidney injury)  Assessment and Plan of Treatment: Avoid taking (NSAIDs) - (ex: Ibuprofen, Advil, Celebrex, Naprosyn)  Avoid taking any nephrotoxic agents (can harm kidneys) - Intravenous contrast for diagnostic testing, combination cold medications.  Have all medications adjusted for your renal function by your Health Care Provider.  Blood pressure control is important.  Take all medication as prescribed.    Diagnosis: Afib  Assessment and Plan of Treatment: Atrial fibrillation is the most common heart rhythm problem.  The condition puts you at risk for has stroke and heart attack  It helps if you control your blood pressure, not drink more than 1-2 alcohol drinks per day, cut down on caffeine, getting treatment for over active thyroid gland, and get regular exercise  Call your doctor if you feel your heart racing or beating unusually, chest tightness or pain, lightheaded, faint, shortness of breath especially with exercise  It is important to take your heart medication as prescribed PRINCIPAL DISCHARGE DIAGNOSIS  Diagnosis: Cellulitis  Assessment and Plan of Treatment: Take all of your antibiotics as ordered.  If the affected cellulitic area increases in redness, warmth, pain or swelling call your Health Care Provider.  If you develop fever, chills, and/or malaise, call your Health Care Provider.        SECONDARY DISCHARGE DIAGNOSES  Diagnosis: BRENDAN (acute kidney injury)  Assessment and Plan of Treatment: Avoid taking (NSAIDs) - (ex: Ibuprofen, Advil, Celebrex, Naprosyn)  Avoid taking any nephrotoxic agents (can harm kidneys) - Intravenous contrast for diagnostic testing, combination cold medications.  Have all medications adjusted for your renal function by your Health Care Provider.  Blood pressure control is important.  Take all medication as prescribed.    Diagnosis: Lymphedema of right lower extremity  Assessment and Plan of Treatment: continue to take lasix every other day   monitor your blood work for kidney function and elevates your legs while in bed    Diagnosis: Afib  Assessment and Plan of Treatment: Atrial fibrillation is the most common heart rhythm problem.  The condition puts you at risk for has stroke and heart attack  It helps if you control your blood pressure, not drink more than 1-2 alcohol drinks per day, cut down on caffeine, getting treatment for over active thyroid gland, and get regular exercise  Call your doctor if you feel your heart racing or beating unusually, chest tightness or pain, lightheaded, faint, shortness of breath especially with exercise  It is important to take your heart medication as prescribed

## 2019-07-29 NOTE — DISCHARGE NOTE PROVIDER - NSDCFUSCHEDAPPT_GEN_ALL_CORE_FT
JHONNY GARCÍA ; 09/11/2019 ; [facility] [phone] JHONNY GARCÍA ; 09/11/2019 ; [facility] [address]

## 2019-07-30 LAB
ANION GAP SERPL CALC-SCNC: 6 MMOL/L — SIGNIFICANT CHANGE UP (ref 5–17)
BUN SERPL-MCNC: 24 MG/DL — HIGH (ref 7–18)
CALCIUM SERPL-MCNC: 8.8 MG/DL — SIGNIFICANT CHANGE UP (ref 8.4–10.5)
CHLORIDE SERPL-SCNC: 100 MMOL/L — SIGNIFICANT CHANGE UP (ref 96–108)
CO2 SERPL-SCNC: 31 MMOL/L — SIGNIFICANT CHANGE UP (ref 22–31)
CREAT SERPL-MCNC: 1.26 MG/DL — SIGNIFICANT CHANGE UP (ref 0.5–1.3)
GLUCOSE BLDC GLUCOMTR-MCNC: 144 MG/DL — HIGH (ref 70–99)
GLUCOSE BLDC GLUCOMTR-MCNC: 153 MG/DL — HIGH (ref 70–99)
GLUCOSE BLDC GLUCOMTR-MCNC: 176 MG/DL — HIGH (ref 70–99)
GLUCOSE BLDC GLUCOMTR-MCNC: 228 MG/DL — HIGH (ref 70–99)
GLUCOSE SERPL-MCNC: 134 MG/DL — HIGH (ref 70–99)
HCT VFR BLD CALC: 38.4 % — LOW (ref 39–50)
HGB BLD-MCNC: 12 G/DL — LOW (ref 13–17)
MCHC RBC-ENTMCNC: 26 PG — LOW (ref 27–34)
MCHC RBC-ENTMCNC: 31.3 GM/DL — LOW (ref 32–36)
MCV RBC AUTO: 83.3 FL — SIGNIFICANT CHANGE UP (ref 80–100)
NRBC # BLD: 0 /100 WBCS — SIGNIFICANT CHANGE UP (ref 0–0)
PLATELET # BLD AUTO: 334 K/UL — SIGNIFICANT CHANGE UP (ref 150–400)
POTASSIUM SERPL-MCNC: 4.1 MMOL/L — SIGNIFICANT CHANGE UP (ref 3.5–5.3)
POTASSIUM SERPL-SCNC: 4.1 MMOL/L — SIGNIFICANT CHANGE UP (ref 3.5–5.3)
RBC # BLD: 4.61 M/UL — SIGNIFICANT CHANGE UP (ref 4.2–5.8)
RBC # FLD: 13.7 % — SIGNIFICANT CHANGE UP (ref 10.3–14.5)
SODIUM SERPL-SCNC: 137 MMOL/L — SIGNIFICANT CHANGE UP (ref 135–145)
WBC # BLD: 8.68 K/UL — SIGNIFICANT CHANGE UP (ref 3.8–10.5)
WBC # FLD AUTO: 8.68 K/UL — SIGNIFICANT CHANGE UP (ref 3.8–10.5)

## 2019-07-30 PROCEDURE — 99232 SBSQ HOSP IP/OBS MODERATE 35: CPT | Mod: GC

## 2019-07-30 RX ADMIN — Medication 650 MILLIGRAM(S): at 06:29

## 2019-07-30 RX ADMIN — Medication 30 MILLIGRAM(S): at 05:29

## 2019-07-30 RX ADMIN — Medication 650 MILLIGRAM(S): at 07:30

## 2019-07-30 RX ADMIN — Medication 2: at 12:27

## 2019-07-30 RX ADMIN — APIXABAN 2.5 MILLIGRAM(S): 2.5 TABLET, FILM COATED ORAL at 17:17

## 2019-07-30 RX ADMIN — Medication 100 MILLIGRAM(S): at 22:26

## 2019-07-30 RX ADMIN — Medication 100 MILLIGRAM(S): at 05:29

## 2019-07-30 RX ADMIN — Medication 650 MILLIGRAM(S): at 18:00

## 2019-07-30 RX ADMIN — Medication 30 MILLIGRAM(S): at 17:17

## 2019-07-30 RX ADMIN — Medication 1: at 22:27

## 2019-07-30 RX ADMIN — Medication 100 MILLIGRAM(S): at 14:29

## 2019-07-30 RX ADMIN — Medication 20 MILLIGRAM(S): at 05:29

## 2019-07-30 RX ADMIN — LATANOPROST 1 DROP(S): 0.05 SOLUTION/ DROPS OPHTHALMIC; TOPICAL at 22:27

## 2019-07-30 RX ADMIN — Medication 650 MILLIGRAM(S): at 17:18

## 2019-07-30 RX ADMIN — POLYETHYLENE GLYCOL 3350 17 GRAM(S): 17 POWDER, FOR SOLUTION ORAL at 12:27

## 2019-07-30 RX ADMIN — Medication 1: at 08:52

## 2019-07-30 RX ADMIN — APIXABAN 2.5 MILLIGRAM(S): 2.5 TABLET, FILM COATED ORAL at 05:29

## 2019-07-30 NOTE — PROGRESS NOTE ADULT - ASSESSMENT
79 year old male has pmhx of htn, dm, ?afib, right leg lymphedema admitted for RLE cellulitis.  started on abx. Stable for  d/c to  [facility] pending ins auth

## 2019-07-30 NOTE — PROGRESS NOTE ADULT - PROBLEM SELECTOR PLAN 4
-BRENDAN likely due to infections process,  resolved now , Cr satble at 1.24 today   -cont to hold Metolazone

## 2019-07-30 NOTE — PROGRESS NOTE ADULT - SUBJECTIVE AND OBJECTIVE BOX
Patient is a 79y old  Male who presents with a chief complaint of cellulitis of right foot (29 Jul 2019 12:12)      INTERVAL HPI/OVERNIGHT EVENTS: no acute events overnight, awaiting ins auth for [facility] placement         REVIEW OF SYSTEMS:  CONSTITUTIONAL: No fever, chills  NECK: No pain or stiffness  RESPIRATORY: No cough, SOB  CARDIOVASCULAR: No chest pain, palpitations  GASTROINTESTINAL: No abdominal pain. No nausea, vomiting, or diarrhea  GENITOURINARY: No dysuria  NEUROLOGICAL: No HA  MUSCULOSKELETAL: intermittent right foot pain     T(C): 37.4 (07-30-19 @ 07:56), Max: 37.4 (07-30-19 @ 07:56)  HR: 74 (07-30-19 @ 07:56) (74 - 94)  BP: 117/73 (07-30-19 @ 07:56) (117/73 - 141/67)  RR: 17 (07-30-19 @ 07:56) (16 - 17)  SpO2: 96% (07-30-19 @ 07:56) (96% - 100%)  Wt(kg): --Vital Signs Last 24 Hrs  T(C): 37.4 (30 Jul 2019 07:56), Max: 37.4 (30 Jul 2019 07:56)  T(F): 99.3 (30 Jul 2019 07:56), Max: 99.3 (30 Jul 2019 07:56)  HR: 74 (30 Jul 2019 07:56) (74 - 94)  BP: 117/73 (30 Jul 2019 07:56) (117/73 - 141/67)  BP(mean): --  RR: 17 (30 Jul 2019 07:56) (16 - 17)  SpO2: 96% (30 Jul 2019 07:56) (96% - 100%)    MEDICATIONS  (STANDING):  apixaban 2.5 milliGRAM(s) Oral every 12 hours  ceFAZolin   IVPB 1000 milliGRAM(s) IV Intermittent every 8 hours  dextrose 5%. 1000 milliLiter(s) (50 mL/Hr) IV Continuous <Continuous>  dextrose 50% Injectable 12.5 Gram(s) IV Push once  dextrose 50% Injectable 25 Gram(s) IV Push once  dextrose 50% Injectable 25 Gram(s) IV Push once  diltiazem    Tablet 30 milliGRAM(s) Oral every 12 hours  furosemide    Tablet 20 milliGRAM(s) Oral daily  insulin lispro (HumaLOG) corrective regimen sliding scale   SubCutaneous Before meals and at bedtime  latanoprost 0.005% Ophthalmic Solution 1 Drop(s) Both EYES at bedtime  polyethylene glycol 3350 17 Gram(s) Oral daily    MEDICATIONS  (PRN):  acetaminophen   Tablet .. 650 milliGRAM(s) Oral every 6 hours PRN Mild Pain (1 - 3)  dextrose 40% Gel 15 Gram(s) Oral once PRN Blood Glucose LESS THAN 70 milliGRAM(s)/deciLiter  glucagon  Injectable 1 milliGRAM(s) IntraMuscular once PRN Glucose <70 milliGRAM(s)/deciLiter  traMADol 25 milliGRAM(s) Oral every 6 hours PRN Moderate Pain (4 - 6)      PHYSICAL EXAM:  GENERAL: NAD  ENMT: Moist mucous membranes  NECK: Supple, No JVD,   CHEST/LUNG: Clear to auscultation bilaterally; No rales, rhonchi, wheezing, or rubs  HEART: S1, S2, Regular rate and rhythm  ABDOMEN: Soft, Nontender, Nondistended; Bowel sounds present  NEURO: Alert & Oriented X3  EXTREMITIES: ch LE lymphedema with skin discoloration     Consultant(s) Notes Reviewed:  [x ] YES  [ ] NO  Care Discussed with Consultants/Other Providers [ x] YES  [ ] NO    LABS:                        12.0   8.68  )-----------( 334      ( 30 Jul 2019 09:35 )             38.4     07-30    137  |  100  |  24<H>  ----------------------------<  134<H>  4.1   |  31  |  1.26    Ca    8.8      30 Jul 2019 09:35          CAPILLARY BLOOD GLUCOSE      POCT Blood Glucose.: 228 mg/dL (30 Jul 2019 12:17)  POCT Blood Glucose.: 153 mg/dL (30 Jul 2019 08:35)  POCT Blood Glucose.: 140 mg/dL (29 Jul 2019 21:32)  POCT Blood Glucose.: 154 mg/dL (29 Jul 2019 17:29)    RADIOLOGY & ADDITIONAL TESTS:    < from: Xray Chest 1 View AP/PA (07.24.19 @ 13:53) >    EXAM:  XR CHEST AP OR PA 1V                            PROCEDURE DATE:  07/24/2019          INTERPRETATION:  Chest one view    HISTORY: Sepsis    COMPARISON STUDY: 6/23/2018    Frontal lordotic expiratory view of the chest shows the heart to be   normal in size. The lungs are clear and there is no evidence of   pneumothorax nor pleural effusion.    IMPRESSION:  No active pulmonary disease.    Thank you for the courtesy of this referral.    < end of copied text >      Imaging Personally Reviewed:  [ ] YES  [ ] NO

## 2019-07-30 NOTE — PROGRESS NOTE ADULT - PROBLEM SELECTOR PLAN 6
-pt takes dpagliflozin/metformin 5/1000mg daily at home   -HgA1c- 704  -cont [facility] while in hospital -pt takes dpagliflozin/metformin 5/1000mg daily at home   -HgA1c- 7.4  -cont [facility] while in hospital

## 2019-07-31 ENCOUNTER — TRANSCRIPTION ENCOUNTER (OUTPATIENT)
Age: 79
End: 2019-07-31

## 2019-07-31 VITALS
HEART RATE: 83 BPM | TEMPERATURE: 98 F | SYSTOLIC BLOOD PRESSURE: 134 MMHG | RESPIRATION RATE: 16 BRPM | DIASTOLIC BLOOD PRESSURE: 88 MMHG | OXYGEN SATURATION: 97 %

## 2019-07-31 LAB
GLUCOSE BLDC GLUCOMTR-MCNC: 136 MG/DL — HIGH (ref 70–99)
GLUCOSE BLDC GLUCOMTR-MCNC: 144 MG/DL — HIGH (ref 70–99)
GLUCOSE BLDC GLUCOMTR-MCNC: 265 MG/DL — HIGH (ref 70–99)

## 2019-07-31 PROCEDURE — 76705 ECHO EXAM OF ABDOMEN: CPT

## 2019-07-31 PROCEDURE — 82607 VITAMIN B-12: CPT

## 2019-07-31 PROCEDURE — 99238 HOSP IP/OBS DSCHRG MGMT 30/<: CPT | Mod: GC

## 2019-07-31 PROCEDURE — 80048 BASIC METABOLIC PNL TOTAL CA: CPT

## 2019-07-31 PROCEDURE — 87086 URINE CULTURE/COLONY COUNT: CPT

## 2019-07-31 PROCEDURE — 82962 GLUCOSE BLOOD TEST: CPT

## 2019-07-31 PROCEDURE — 87040 BLOOD CULTURE FOR BACTERIA: CPT

## 2019-07-31 PROCEDURE — 73630 X-RAY EXAM OF FOOT: CPT

## 2019-07-31 PROCEDURE — 84560 ASSAY OF URINE/URIC ACID: CPT

## 2019-07-31 PROCEDURE — 81001 URINALYSIS AUTO W/SCOPE: CPT

## 2019-07-31 PROCEDURE — 82306 VITAMIN D 25 HYDROXY: CPT

## 2019-07-31 PROCEDURE — 83036 HEMOGLOBIN GLYCOSYLATED A1C: CPT

## 2019-07-31 PROCEDURE — 84156 ASSAY OF PROTEIN URINE: CPT

## 2019-07-31 PROCEDURE — 96374 THER/PROPH/DIAG INJ IV PUSH: CPT

## 2019-07-31 PROCEDURE — 36415 COLL VENOUS BLD VENIPUNCTURE: CPT

## 2019-07-31 PROCEDURE — 85027 COMPLETE CBC AUTOMATED: CPT

## 2019-07-31 PROCEDURE — 83735 ASSAY OF MAGNESIUM: CPT

## 2019-07-31 PROCEDURE — 80061 LIPID PANEL: CPT

## 2019-07-31 PROCEDURE — 71045 X-RAY EXAM CHEST 1 VIEW: CPT

## 2019-07-31 PROCEDURE — 84443 ASSAY THYROID STIM HORMONE: CPT

## 2019-07-31 PROCEDURE — 83605 ASSAY OF LACTIC ACID: CPT

## 2019-07-31 PROCEDURE — 85610 PROTHROMBIN TIME: CPT

## 2019-07-31 PROCEDURE — 97162 PT EVAL MOD COMPLEX 30 MIN: CPT

## 2019-07-31 PROCEDURE — 83935 ASSAY OF URINE OSMOLALITY: CPT

## 2019-07-31 PROCEDURE — 82746 ASSAY OF FOLIC ACID SERUM: CPT

## 2019-07-31 PROCEDURE — 84300 ASSAY OF URINE SODIUM: CPT

## 2019-07-31 PROCEDURE — 84133 ASSAY OF URINE POTASSIUM: CPT

## 2019-07-31 PROCEDURE — 85730 THROMBOPLASTIN TIME PARTIAL: CPT

## 2019-07-31 PROCEDURE — 82570 ASSAY OF URINE CREATININE: CPT

## 2019-07-31 PROCEDURE — 93005 ELECTROCARDIOGRAM TRACING: CPT

## 2019-07-31 PROCEDURE — 80053 COMPREHEN METABOLIC PANEL: CPT

## 2019-07-31 PROCEDURE — 84100 ASSAY OF PHOSPHORUS: CPT

## 2019-07-31 PROCEDURE — 99285 EMERGENCY DEPT VISIT HI MDM: CPT | Mod: 25

## 2019-07-31 RX ADMIN — POLYETHYLENE GLYCOL 3350 17 GRAM(S): 17 POWDER, FOR SOLUTION ORAL at 12:56

## 2019-07-31 RX ADMIN — Medication 20 MILLIGRAM(S): at 06:57

## 2019-07-31 RX ADMIN — Medication 30 MILLIGRAM(S): at 17:02

## 2019-07-31 RX ADMIN — TRAMADOL HYDROCHLORIDE 25 MILLIGRAM(S): 50 TABLET ORAL at 06:00

## 2019-07-31 RX ADMIN — APIXABAN 2.5 MILLIGRAM(S): 2.5 TABLET, FILM COATED ORAL at 06:57

## 2019-07-31 RX ADMIN — Medication 650 MILLIGRAM(S): at 08:49

## 2019-07-31 RX ADMIN — Medication 3: at 12:55

## 2019-07-31 RX ADMIN — Medication 100 MILLIGRAM(S): at 06:57

## 2019-07-31 RX ADMIN — Medication 30 MILLIGRAM(S): at 06:57

## 2019-07-31 RX ADMIN — Medication 650 MILLIGRAM(S): at 08:02

## 2019-07-31 RX ADMIN — APIXABAN 2.5 MILLIGRAM(S): 2.5 TABLET, FILM COATED ORAL at 17:02

## 2019-07-31 RX ADMIN — TRAMADOL HYDROCHLORIDE 25 MILLIGRAM(S): 50 TABLET ORAL at 06:58

## 2019-07-31 NOTE — PROGRESS NOTE ADULT - PROBLEM SELECTOR PROBLEM 3
Afib
Lymphedema of right lower extremity
BRENDAN (acute kidney injury)
Afib
Afib
BRENDAN (acute kidney injury)
Lymphedema of right lower extremity

## 2019-07-31 NOTE — DIETITIAN INITIAL EVALUATION ADULT. - OTHER INFO
PT visited. Pt OOB to chair. D/W Wife at bedside. Per wife pt with  Decrease appetite Lately.  Wife Reports Some weight loss but also states he is on Lasix at  Home and D/T Decrease PO intake since June.  Stanislaw brooks suggested  And Wife agreed Plan is  D/C To Rehab.

## 2019-07-31 NOTE — PROGRESS NOTE ADULT - ATTENDING COMMENTS
Agree with above   Patient seen and examined. Cellulitis and pain resolved. No new complains   Vital signs and labs reviewed     Vital Signs Last 24 Hrs  T(C): 36.7 (31 Jul 2019 16:02), Max: 37.1 (30 Jul 2019 16:10)  T(F): 98.1 (31 Jul 2019 16:02), Max: 98.8 (30 Jul 2019 16:10)  HR: 83 (31 Jul 2019 16:02) (70 - 83)  BP: 134/88 (31 Jul 2019 16:02) (120/74 - 140/74)  BP(mean): --  RR: 16 (31 Jul 2019 16:02) (16 - 18)  SpO2: 97% (31 Jul 2019 16:02) (97% - 100%)    1. Cellulitis of right LE. improved.   2. Chronic lymphedema of LE; right >left   3. DM   4. BRENDAN on admission; resolved     Patient is leaving to [facility] today
Agree with above   patient seen and examined. The cellulitis improving. No new complains.   Vital signs and labs reviewed   Vital Signs Last 24 Hrs  T(C): 37 (26 Jul 2019 15:37), Max: 37.7 (26 Jul 2019 07:41)  T(F): 98.6 (26 Jul 2019 15:37), Max: 99.9 (26 Jul 2019 07:41)  HR: 52 (26 Jul 2019 15:37) (52 - 92)  BP: 105/52 (26 Jul 2019 15:37) (103/53 - 124/66)  BP(mean): --  RR: 16 (26 Jul 2019 15:37) (16 - 17)  SpO2: 97% (26 Jul 2019 15:37) (97% - 100%)    1. Cellulitis of right LE   2. Lymphedema   3. DM   4. HTN   5. Chronic afib     Plan as above and it discussed with Salvatore Hearn NP
Seen and examined. Wife was present during examination  NO new complains. Sitting on the chair.   Vital signs and labs reviewed   Vital Signs Last 24 Hrs  T(C): 37.1 (30 Jul 2019 16:10), Max: 37.4 (30 Jul 2019 07:56)  T(F): 98.8 (30 Jul 2019 16:10), Max: 99.3 (30 Jul 2019 07:56)  HR: 70 (30 Jul 2019 16:10) (70 - 94)  BP: 127/80 (30 Jul 2019 16:10) (117/73 - 141/67)  BP(mean): --  RR: 17 (30 Jul 2019 16:10) (17 - 17)  SpO2: 100% (30 Jul 2019 16:10) (96% - 100%)    1.Cellulitis of right LE: improved   2. DM: BG controlled   3. HTN: BP controlled   4. Chronic lymphedema    Plan as above  He is medically stable for discharge, awaiting insurance authorization to go to [facility].
Discharge in AM
Agree with above   patient seen and examined. Today is in better mood and had no complains. Cellulitis improved.   Edema resolved.   Vital signs and labs reviewed   Vital Signs Last 24 Hrs  T(C): 36.8 (29 Jul 2019 16:06), Max: 37.4 (29 Jul 2019 00:23)  T(F): 98.2 (29 Jul 2019 16:06), Max: 99.4 (29 Jul 2019 00:23)  HR: 81 (29 Jul 2019 16:06) (63 - 90)  BP: 125/65 (29 Jul 2019 16:06) (115/79 - 143/82)  BP(mean): --  RR: 16 (29 Jul 2019 16:06) (16 - 17)  SpO2: 100% (29 Jul 2019 16:06) (96% - 100%)    1. Cellulitis of right LE   2. Chronic lymphedema LE   3. Chronic Afib   4. BRENDAN   5. DM     Plan as above   Medically stable for discharge to [facility]
Agree with all the above   Seen and examined. LE redness and pain improving.   Vital signs and labs reviewed  Vital Signs Last 24 Hrs  T(C): 36.8 (25 Jul 2019 15:56), Max: 36.9 (25 Jul 2019 07:57)  T(F): 98.2 (25 Jul 2019 15:56), Max: 98.4 (25 Jul 2019 07:57)  HR: 77 (25 Jul 2019 15:56) (65 - 88)  BP: 114/70 (25 Jul 2019 15:56) (99/45 - 114/70)  BP(mean): --  RR: 16 (25 Jul 2019 15:56) (16 - 18)  SpO2: 100% (25 Jul 2019 15:56) (99% - 100%)    1. Right LE cellulitis and lymphedema   2. BRENDAN vs CKD??? Serum Cr. improving   3. Chronic Afib   4. DM: BG controlled   5. Elevated LFTs     Assessment and plan as above

## 2019-07-31 NOTE — PROGRESS NOTE ADULT - PROBLEM SELECTOR PROBLEM 4
BRENDAN (acute kidney injury)
BRENDAN (acute kidney injury)
Cholelithiasis
BRENDAN (acute kidney injury)
BRENDAN (acute kidney injury)
Elevated LFTs
BRENDAN (acute kidney injury)

## 2019-07-31 NOTE — PROGRESS NOTE ADULT - PROBLEM SELECTOR PLAN 2
-cont lasix  -Elevate RLE.
Improved
Continue home dose of 20mg lasix   Elevate RLE.
-RLE pain improving   -cont lasix  -Elevate RLE.
lasix  20mg  QOD   Elevate RLE.
Continue home dose of 20mg lasix   Elevate RLE
is likely diuretics induced   Give  cc bolus

## 2019-07-31 NOTE — PROGRESS NOTE ADULT - PROVIDER SPECIALTY LIST ADULT
Infectious Disease
Internal Medicine
Internal Medicine
Hospitalist
Internal Medicine
Internal Medicine
Hospitalist
Internal Medicine

## 2019-07-31 NOTE — PROGRESS NOTE ADULT - PROBLEM SELECTOR PLAN 4
-BRENDAN likely due to infections process,  resolved now , Cr stable at 1.24   -cont to hold Metolazone

## 2019-07-31 NOTE — PROGRESS NOTE ADULT - SUBJECTIVE AND OBJECTIVE BOX
Patient is a 79y old  Male who presents with a chief complaint of cellulitis of right foot (30 Jul 2019 14:53)      INTERVAL HPI/OVERNIGHT EVENTS: no acute events overnight, no complaints of RLE pain       REVIEW OF SYSTEMS:  CONSTITUTIONAL: No fever, chills  NECK: No pain or stiffness  RESPIRATORY: No cough, SOB  CARDIOVASCULAR: No chest pain, palpitations  GASTROINTESTINAL: No abdominal pain. No nausea, vomiting, or diarrhea  GENITOURINARY: No dysuria  NEUROLOGICAL: No HA  MUSCULOSKELETAL: RLE pain improved       T(C): 36.6 (07-31-19 @ 07:59), Max: 37.1 (07-30-19 @ 16:10)  HR: 78 (07-31-19 @ 07:59) (70 - 78)  BP: 123/63 (07-31-19 @ 07:59) (120/74 - 140/74)  RR: 17 (07-31-19 @ 07:59) (17 - 18)  SpO2: 100% (07-31-19 @ 07:59) (100% - 100%)  Wt(kg): --Vital Signs Last 24 Hrs  T(C): 36.6 (31 Jul 2019 07:59), Max: 37.1 (30 Jul 2019 16:10)  T(F): 97.9 (31 Jul 2019 07:59), Max: 98.8 (30 Jul 2019 16:10)  HR: 78 (31 Jul 2019 07:59) (70 - 78)  BP: 123/63 (31 Jul 2019 07:59) (120/74 - 140/74)  BP(mean): --  RR: 17 (31 Jul 2019 07:59) (17 - 18)  SpO2: 100% (31 Jul 2019 07:59) (100% - 100%)    MEDICATIONS  (STANDING):  apixaban 2.5 milliGRAM(s) Oral every 12 hours  ceFAZolin   IVPB 1000 milliGRAM(s) IV Intermittent every 8 hours  dextrose 5%. 1000 milliLiter(s) (50 mL/Hr) IV Continuous <Continuous>  dextrose 50% Injectable 12.5 Gram(s) IV Push once  dextrose 50% Injectable 25 Gram(s) IV Push once  dextrose 50% Injectable 25 Gram(s) IV Push once  diltiazem    Tablet 30 milliGRAM(s) Oral every 12 hours  furosemide    Tablet 20 milliGRAM(s) Oral daily  insulin lispro (HumaLOG) corrective regimen sliding scale   SubCutaneous Before meals and at bedtime  latanoprost 0.005% Ophthalmic Solution 1 Drop(s) Both EYES at bedtime  polyethylene glycol 3350 17 Gram(s) Oral daily    MEDICATIONS  (PRN):  acetaminophen   Tablet .. 650 milliGRAM(s) Oral every 6 hours PRN Mild Pain (1 - 3)  dextrose 40% Gel 15 Gram(s) Oral once PRN Blood Glucose LESS THAN 70 milliGRAM(s)/deciLiter  glucagon  Injectable 1 milliGRAM(s) IntraMuscular once PRN Glucose <70 milliGRAM(s)/deciLiter  traMADol 25 milliGRAM(s) Oral every 6 hours PRN Moderate Pain (4 - 6)      PHYSICAL EXAM:  GENERAL: NAD  ENMT: Moist mucous membranes  NECK: Supple, No JVD,   CHEST/LUNG: Clear to auscultation bilaterally; No rales, rhonchi, wheezing, or rubs  HEART: S1, S2, Regular rate and rhythm  ABDOMEN: Soft, Nontender, Nondistended; Bowel sounds present  NEURO: Alert & Oriented X3  EXTREMITIES: ch LE lymphedema with skin discoloration R >L     Consultant(s) Notes Reviewed:  [x ] YES  [ ] NO  Care Discussed with Consultants/Other Providers [ x] YES  [ ] NO    LABS:                        12.0   8.68  )-----------( 334      ( 30 Jul 2019 09:35 )             38.4     07-30    137  |  100  |  24<H>  ----------------------------<  134<H>  4.1   |  31  |  1.26    Ca    8.8      30 Jul 2019 09:35          CAPILLARY BLOOD GLUCOSE      POCT Blood Glucose.: 136 mg/dL (31 Jul 2019 08:11)  POCT Blood Glucose.: 176 mg/dL (30 Jul 2019 21:30)  POCT Blood Glucose.: 144 mg/dL (30 Jul 2019 17:08)  POCT Blood Glucose.: 228 mg/dL (30 Jul 2019 12:17)      RADIOLOGY & ADDITIONAL TESTS:    < from: Xray Chest 1 View AP/PA (07.24.19 @ 13:53) >    EXAM:  XR CHEST AP OR PA 1V                            PROCEDURE DATE:  07/24/2019          INTERPRETATION:  Chest one view    HISTORY: Sepsis    COMPARISON STUDY: 6/23/2018    Frontal lordotic expiratory view of the chest shows the heart to be   normal in size. The lungs are clear and there is no evidence of   pneumothorax nor pleural effusion.    IMPRESSION:  No active pulmonary disease.    Thank you for the courtesy of this referral.      < end of copied text >    < from: US Hepatic & Pancreatic (07.26.19 @ 09:54) >    EXAM:  US LIVER AND PANCREAS                            PROCEDURE DATE:  07/26/2019          INTERPRETATION:  Elevated LFTs.    Right upper quadrant ultrasound.    Multiple mobile gallstones. No gallbladder wall thickening or   pericholecystic fluid. Common duct 0.5 cm. Liver not remarkable. Pancreas   not well visualized. Right kidney 9.3 cm unremarkable. No ascites.    Impression: Cholelithiasis. No biliary dilatation. Limited evaluation   pancreas.        < end of copied text >      Imaging Personally Reviewed:  [ ] YES  [ ] NO

## 2019-07-31 NOTE — PROGRESS NOTE ADULT - PROBLEM SELECTOR PLAN 1
-p/w right leg pain and swelling sec to cellulitis   -cont Cefazolin   -leukocytosis resolved, remains afebrile   -final blood cultures negative   -abx to be changed to keflex 500mg q 6hours x 5 more days upon discharge   -ID Dr. Mustafa
Cellulitis improving   Afebrile, mild leukocytosis   c/w cefazolin 1000 mg IV q8, will switch to PO on discharge   Seen by PT, recommended NIESHA    Discharge to [facility] on Monday
-p/w right leg pain and swelling sec to cellulitis   -cont Cefazolin   -leukocytosis resolved, remains afebrile   -final blood cultures negative   -abx to be changed to keflex 500mg q 6hours x 5 more days upon discharge   -ID Dr. Mustafa
IV ABT changed to cefazolin as per ID Dr. Mustafa rec  WBC 11.26, afebrile
IV ABT changed to cefazolin as per ID Dr. Mustafa rec  WBC 10.57,  afebrile.  will switch to keflex 500mg 1q 6hours x 5 more days on d/c
Afebrile, leukocytosis resolved   Continue zosyn D2  ID Dr. Mustafa   Blood cultures testing
Cellulitis improving   Afebrile, no leukocytosis   c/w cefazolin 1000 mg IV q8, will switch to PO on discharge   Seen by PT, recommended NIESHA    Discharge to [facility] on Monday

## 2019-07-31 NOTE — PROGRESS NOTE ADULT - PROBLEM SELECTOR PROBLEM 2
Hypotension, unspecified hypotension type
Lymphedema of right lower extremity
Hypotension, unspecified hypotension type

## 2019-07-31 NOTE — PROGRESS NOTE ADULT - PROBLEM SELECTOR PLAN 7
-medically stable for discharge to [facility], awaiting ins auth   -CM following
-medically stable for discharge to [facility], awaiting ins auth   -CM following
A1C 7.4   Continue accuchecks and sliding scale.
Rate controlled  Continue eliquis.
SBPs low, 90s-100s  BP meds on hold for now
hepatic / pancreatic sonogram done due to elevated LFTs  and showed cholelithiasis - no pain reported, no obstruction, discussed with PMD   LFT trending down   monitor LFTs.
Rate controlled  Continue eliquis.

## 2019-07-31 NOTE — PROGRESS NOTE ADULT - PROBLEM SELECTOR PLAN 8
A1C 7.4   Continue accuchecks and sliding scale.
-DVT ppx- on full AC with Eliquis
-DVT ppx- on full AC with Eliquis
A1C 7.4   Continue accuchecks and sliding scale.
DVT ppx - pt on eliquis
c/w Tracy
pt is medically stable to d/c  PT rec - NIESHA   need auth from insurance  CM on board

## 2019-07-31 NOTE — PROGRESS NOTE ADULT - REASON FOR ADMISSION
cellulitis of right foot

## 2019-07-31 NOTE — PROGRESS NOTE ADULT - PROBLEM SELECTOR PROBLEM 8
Need for prophylactic measure
Diabetes
Discharge planning issues
Need for prophylactic measure
Diabetes

## 2019-07-31 NOTE — PROGRESS NOTE ADULT - PROBLEM SELECTOR PROBLEM 1
Cellulitis
Cellulitis of right lower extremity
Cellulitis
Cellulitis of right lower extremity
Cellulitis
Cellulitis of right lower extremity
Cellulitis of right lower extremity

## 2019-07-31 NOTE — DISCHARGE NOTE NURSING/CASE MANAGEMENT/SOCIAL WORK - NSDCDPATPORTLINK_GEN_ALL_CORE
You can access the [facility] Patient Portal, offered by Health system, by registering with the following website: [email or web address]

## 2019-09-11 ENCOUNTER — APPOINTMENT (OUTPATIENT)
Dept: CARDIOLOGY | Facility: CLINIC | Age: 79
End: 2019-09-11
Payer: MEDICARE

## 2019-09-11 VITALS
TEMPERATURE: 98.1 F | OXYGEN SATURATION: 97 % | SYSTOLIC BLOOD PRESSURE: 116 MMHG | HEIGHT: 66 IN | DIASTOLIC BLOOD PRESSURE: 77 MMHG | WEIGHT: 179 LBS | BODY MASS INDEX: 28.77 KG/M2 | HEART RATE: 86 BPM

## 2019-09-11 PROCEDURE — 93000 ELECTROCARDIOGRAM COMPLETE: CPT

## 2019-09-11 PROCEDURE — 99213 OFFICE O/P EST LOW 20 MIN: CPT

## 2019-09-11 RX ORDER — METOLAZONE 5 MG/1
5 TABLET ORAL DAILY
Qty: 30 | Refills: 6 | Status: DISCONTINUED | COMMUNITY
Start: 2018-07-24 | End: 2019-09-11

## 2019-09-11 NOTE — HISTORY OF PRESENT ILLNESS
[FreeTextEntry1] : 79-year-old male with HFpEF (echo 03/2018 showing preserved EF, mild AS), hypertension, hyperlipidemia, diabetes, chronic lymphedema, DVT, and atrial fibrillation on Eliquis, who presents followup visit. Since his last visit, patient developed cellulitis requiring admission to [facility]; he was subsequently discharged to [facility] for 4 weeks and has now been home for 2 weeks. \par \par Patient reports compliance with all medications, denies adverse effects. Denies orthopnea, or PND. Reports his appetite has been improving, and he has no chest pain, dyspnea, palpitations, lightheadedness, or syncope. Is pending follow-up with podiatry for his lymphedema (RLE > LLE).\par \par \par \par

## 2019-09-11 NOTE — REASON FOR VISIT
[Follow-Up - Clinic] : a clinic follow-up of [Atrial Fibrillation] : atrial fibrillation [Heart Failure] : congestive heart failure [Spouse] : spouse

## 2019-09-11 NOTE — ASSESSMENT
[FreeTextEntry1] : 79 year-old male with noted PMH including HFpEF, hypertension, hyperlipidemia, diabetes, chronic lymphedema, DVT, and atrial fibrillation on Eliquis, who presents for office followup.\par \par 1.HFpEF: Patient appears euvolemic on exam, has no complaints of dyspnea. \par -Will continue patient's furosemide at 40; he has been taken off metolazone\par \par 2. Atrial fibrillation:\par -Continue anticoagulation with Eliquis (CHADS2-Vasc score 4)\par -Rate controlled even off diltiazem; discussed possibility of digoxin for rate control if needed in the future\par \par 3. Hypertension: Patient is on ramipril and off diltiazem, now normotensive and with improvement in symptoms. He was restarted on doxazosin at the [facility]. He was only intermittently taking diltiazem, I told him to discontinue it at this point. \par \par 4. Lymphedema: significantly improved with compression stockings, follows with podiatry\par \par 5. Possible sleep apnea: Symptoms appear to have improved, patient wants to hold off seeing pulmonologist\par \par FUV 3 months

## 2019-09-12 NOTE — PATIENT PROFILE ADULT. - SPIRITUAL CULTURAL, RELIGIOUS PRACTICES/VALUES, PROFILE
Left a generic message for patient today asking the patient to call back to speak with a nurse for message from provider. Juan Nugent RN    Letter mailed to pt with labs and message from provider. Also, pt asked to please call the office to speak with a nurse to discuss labs. Juan Nugent RN  Also, I have sent a message to call back reg to please call pt to schedule this follow up.  Juan Nugent RN [employer]

## 2019-10-01 ENCOUNTER — APPOINTMENT (OUTPATIENT)
Dept: DERMATOLOGY | Facility: CLINIC | Age: 79
End: 2019-10-01

## 2019-11-06 ENCOUNTER — APPOINTMENT (OUTPATIENT)
Dept: CARDIOLOGY | Facility: CLINIC | Age: 79
End: 2019-11-06

## 2020-05-05 ENCOUNTER — APPOINTMENT (OUTPATIENT)
Dept: CARDIOLOGY | Facility: CLINIC | Age: 80
End: 2020-05-05
Payer: MEDICARE

## 2020-05-05 DIAGNOSIS — I89.0 LYMPHEDEMA, NOT ELSEWHERE CLASSIFIED: ICD-10-CM

## 2020-05-05 PROCEDURE — 99211 OFF/OP EST MAY X REQ PHY/QHP: CPT | Mod: 95

## 2020-05-07 PROBLEM — I89.0 LYMPHEDEMA, LIMB: Status: ACTIVE | Noted: 2018-07-24

## 2020-05-07 NOTE — HISTORY OF PRESENT ILLNESS
[Patient] : the patient [FreeTextEntry4] : Jones Alexis [FreeTextEntry1] : 80-year-old male with HFpEF (echo 03/2018 showing preserved EF, mild AS), hypertension, hyperlipidemia, diabetes, chronic lymphedema, DVT, and atrial fibrillation on Eliquis, who presents followup telehealth visit.   \par \par Patient reports compliance with all medications, denies adverse effects. Denies orthopnea, or PND. He has no chest pain, dyspnea, palpitations, lightheadedness, or syncope. Lymphedema has been stable. Has no complaints or concerns to share with me. \par \par \par \par

## 2020-05-07 NOTE — ASSESSMENT
[FreeTextEntry1] : 80 year-old male with noted PMH including HFpEF, hypertension, hyperlipidemia, diabetes, chronic lymphedema, DVT, and atrial fibrillation on Eliquis, who presents for telemedicine followup. Overall doing stable.\par \par 1.HFpEF: Patient appears euvolemic, has no complaints of dyspnea. \par -Will continue patient's furosemide at 40; he has been taken off metolazone\par \par 2. Atrial fibrillation:\par -Continue anticoagulation with Eliquis (CHADS2-Vasc score 4)\par -Rate controlled even off diltiazem; discussed possibility of digoxin for rate control if needed in the future\par \par 3. Hypertension: Patient is on ramipril  \par \par 4. Lymphedema: stable, follows with podiatry\par \par 5. Possible sleep apnea: Symptoms appear to have improved, patient wants to hold off seeing pulmonologist\par \par FUV 3 months

## 2020-06-15 ENCOUNTER — APPOINTMENT (OUTPATIENT)
Dept: SURGERY | Facility: CLINIC | Age: 80
End: 2020-06-15
Payer: MEDICARE

## 2020-06-15 DIAGNOSIS — L02.416 CUTANEOUS ABSCESS OF LEFT LOWER LIMB: ICD-10-CM

## 2020-06-15 PROCEDURE — 99203 OFFICE O/P NEW LOW 30 MIN: CPT | Mod: 25

## 2020-06-15 PROCEDURE — 10060 I&D ABSCESS SIMPLE/SINGLE: CPT

## 2020-06-15 NOTE — PLAN
[FreeTextEntry1] : Mr. GARCÍA  was told significance of findings, options, risks and benefits were explained  All surgical options were discussed including non-surgical treatments.  patient was advised to have the abscess drained.   He wishes to proceed with surgery. \par \par Procedure\par \par Preoperative diagnosis :  left leg abscess / infected hematoma\par Post operative diagnosis : same\par Procedure : Incision and Drainage of  left leg abscess \par \par The area was cleaned and prepped. The skin was incised with the scalpel and  contents of the abscess were removed  completely.  it was a combination of puss and blood. The wound was  packed.  Tolerated the procedure. Post op instructions given\par Specimen sent for  cultures and sensitivity \par Patient advised to seek immediate medical attention with any acute change in symptoms or with the development of any new or worsening symptoms.  Patient's questions and concerns addressed to patient's satisfaction, and patient verbalized an understanding of the information discussed.\par \par

## 2020-06-15 NOTE — PHYSICAL EXAM
[Oriented to Place] : oriented to place [Oriented to Person] : oriented to person [Alert] : alert [Calm] : calm [Oriented to Time] : oriented to time [de-identified] : He  is alert, well-groomed, and cheerful.\par   [de-identified] :   anicteric.  Nasal mucosa pink, septum midline. Oral mucosa pink.  Tongue midline, Pharynx without exudates.\par   [de-identified] : anterior left lover leg  abscess is  Firm,  Smooth,Tender,   Well defined. Superficial. No palpable lymph nodes.   Abscess size - 4  cm x 4  cm.. BL LE edema with skin excoriation
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
## 2020-06-15 NOTE — CONSULT LETTER
[Consult Letter:] : I had the pleasure of evaluating your patient, [unfilled]. [Dear  ___] : Dear  [unfilled], [Sincerely,] : Sincerely, [Please see my note below.] : Please see my note below. [FreeTextEntry3] : Dre Uriostegui MD

## 2020-06-24 LAB — BACTERIA WND CULT: ABNORMAL

## 2020-09-08 ENCOUNTER — APPOINTMENT (OUTPATIENT)
Dept: CARDIOLOGY | Facility: CLINIC | Age: 80
End: 2020-09-08
Payer: MEDICARE

## 2020-09-08 DIAGNOSIS — I10 ESSENTIAL (PRIMARY) HYPERTENSION: ICD-10-CM

## 2020-09-08 DIAGNOSIS — I48.91 UNSPECIFIED ATRIAL FIBRILLATION: ICD-10-CM

## 2020-09-08 DIAGNOSIS — E78.5 HYPERLIPIDEMIA, UNSPECIFIED: ICD-10-CM

## 2020-09-08 DIAGNOSIS — I50.30 UNSPECIFIED DIASTOLIC (CONGESTIVE) HEART FAILURE: ICD-10-CM

## 2020-09-08 PROCEDURE — G2012 BRIEF CHECK IN BY MD/QHP: CPT

## 2020-09-10 PROBLEM — I48.91 ATRIAL FIBRILLATION: Status: ACTIVE | Noted: 2018-07-24

## 2020-09-10 PROBLEM — I10 HTN (HYPERTENSION), BENIGN: Status: ACTIVE | Noted: 2018-07-24

## 2020-09-10 PROBLEM — E78.5 HLD (HYPERLIPIDEMIA): Status: ACTIVE | Noted: 2018-07-24

## 2020-09-10 PROBLEM — I50.30 HEART FAILURE WITH PRESERVED LEFT VENTRICULAR FUNCTION (HFPEF): Status: ACTIVE | Noted: 2018-07-24

## 2020-09-10 RX ORDER — HYDROCORTISONE 1 %
12 CREAM (GRAM) TOPICAL
Qty: 400 | Refills: 0 | Status: ACTIVE | COMMUNITY
Start: 2020-06-04

## 2020-09-10 RX ORDER — FUROSEMIDE 20 MG/1
20 TABLET ORAL
Qty: 45 | Refills: 0 | Status: ACTIVE | COMMUNITY
Start: 2020-09-04

## 2020-09-10 RX ORDER — FINASTERIDE 5 MG/1
5 TABLET, FILM COATED ORAL
Qty: 90 | Refills: 0 | Status: ACTIVE | COMMUNITY
Start: 2020-06-10

## 2020-09-10 RX ORDER — FUROSEMIDE 40 MG/1
40 TABLET ORAL
Refills: 0 | Status: DISCONTINUED | COMMUNITY
End: 2020-09-10

## 2020-09-10 NOTE — HISTORY OF PRESENT ILLNESS
[Home] : at home, [unfilled] , at the time of the visit. [Medical Office: ([facility])___] : at the medical office located in  [Spouse] : spouse [Verbal consent obtained from patient] : the patient, [unfilled] [FreeTextEntry1] : 80-year-old male with HFpEF (echo 03/2018 showing preserved EF, mild AS), hypertension, hyperlipidemia, diabetes, chronic lymphedema, DVT, and atrial fibrillation on Eliquis, who presents followup telehealth visit.   \par \par Patient reports compliance with all medications, denies adverse effects. Denies orthopnea, or PND. He has no chest pain, dyspnea, palpitations, lightheadedness, or syncope. Lymphedema has been stable. Stays at home due to the pandemic, rarely ventures outside. Patient reports compliance with all medications, denies adverse effects. Has no complaints or concerns to share with me. \par \par \par \par

## 2020-09-10 NOTE — ASSESSMENT
[FreeTextEntry1] : 80 year-old male with noted PMH including HFpEF, hypertension, hyperlipidemia, diabetes, chronic lymphedema, DVT, and atrial fibrillation on Eliquis, who presents for office followup.\par \par 1. HFpEF: Patient appears euvolemic on exam, has no complaints of dyspnea. \par -Will continue patient's furosemide at 20; he has been taken off metolazone\par \par 2. Atrial fibrillation:\par -Continue anticoagulation with Eliquis (CHADS2-Vasc score 4)\par -Rate controlled even off diltiazem; discussed possibility of digoxin for rate control if needed in the future\par \par 3. Hypertension: Patient is on ramipril and off diltiazem, now normotensive. He was restarted on doxazosin at the [facility].  \par \par 4. Lymphedema: significantly improved with compression stockings, follows with podiatry\par \par 5. Possible sleep apnea: Symptoms appear to have improved, patient wants to hold off seeing pulmonologist\par \par FUV 3 months. Patient instructed that he will need to come in in-person next time for routine blood work.

## 2020-09-27 PROCEDURE — 99284 EMERGENCY DEPT VISIT MOD MDM: CPT

## 2020-09-28 ENCOUNTER — EMERGENCY (EMERGENCY)
Facility: HOSPITAL | Age: 80
LOS: 1 days | Discharge: HOME | End: 2020-09-28
Attending: EMERGENCY MEDICINE
Payer: MEDICARE

## 2020-09-28 VITALS
HEART RATE: 63 BPM | OXYGEN SATURATION: 98 % | WEIGHT: 164.02 LBS | HEIGHT: 67 IN | SYSTOLIC BLOOD PRESSURE: 135 MMHG | DIASTOLIC BLOOD PRESSURE: 79 MMHG | TEMPERATURE: 98 F | RESPIRATION RATE: 22 BRPM

## 2020-09-28 DIAGNOSIS — Z98.89 OTHER SPECIFIED POSTPROCEDURAL STATES: Chronic | ICD-10-CM

## 2020-09-28 LAB
ALBUMIN SERPL ELPH-MCNC: 3.3 G/DL — LOW (ref 3.5–5)
ALP SERPL-CCNC: 97 U/L — SIGNIFICANT CHANGE UP (ref 40–120)
ALT FLD-CCNC: 40 U/L DA — SIGNIFICANT CHANGE UP (ref 10–60)
ANION GAP SERPL CALC-SCNC: 1 MMOL/L — LOW (ref 5–17)
APTT BLD: 40.2 SEC — HIGH (ref 27.5–35.5)
AST SERPL-CCNC: 29 U/L — SIGNIFICANT CHANGE UP (ref 10–40)
BILIRUB SERPL-MCNC: 0.7 MG/DL — SIGNIFICANT CHANGE UP (ref 0.2–1.2)
BUN SERPL-MCNC: 29 MG/DL — HIGH (ref 7–18)
CALCIUM SERPL-MCNC: 8.9 MG/DL — SIGNIFICANT CHANGE UP (ref 8.4–10.5)
CHLORIDE SERPL-SCNC: 108 MMOL/L — SIGNIFICANT CHANGE UP (ref 96–108)
CO2 SERPL-SCNC: 31 MMOL/L — SIGNIFICANT CHANGE UP (ref 22–31)
CREAT SERPL-MCNC: 1.37 MG/DL — HIGH (ref 0.5–1.3)
GLUCOSE SERPL-MCNC: 121 MG/DL — HIGH (ref 70–99)
HCT VFR BLD CALC: 42 % — SIGNIFICANT CHANGE UP (ref 39–50)
HGB BLD-MCNC: 13.4 G/DL — SIGNIFICANT CHANGE UP (ref 13–17)
INR BLD: 1.49 RATIO — HIGH (ref 0.88–1.16)
MCHC RBC-ENTMCNC: 27.1 PG — SIGNIFICANT CHANGE UP (ref 27–34)
MCHC RBC-ENTMCNC: 31.9 GM/DL — LOW (ref 32–36)
MCV RBC AUTO: 85 FL — SIGNIFICANT CHANGE UP (ref 80–100)
NRBC # BLD: 0 /100 WBCS — SIGNIFICANT CHANGE UP (ref 0–0)
PLATELET # BLD AUTO: 210 K/UL — SIGNIFICANT CHANGE UP (ref 150–400)
POTASSIUM SERPL-MCNC: 4.7 MMOL/L — SIGNIFICANT CHANGE UP (ref 3.5–5.3)
POTASSIUM SERPL-SCNC: 4.7 MMOL/L — SIGNIFICANT CHANGE UP (ref 3.5–5.3)
PROT SERPL-MCNC: 7.1 G/DL — SIGNIFICANT CHANGE UP (ref 6–8.3)
PROTHROM AB SERPL-ACNC: 17.1 SEC — HIGH (ref 10.6–13.6)
RBC # BLD: 4.94 M/UL — SIGNIFICANT CHANGE UP (ref 4.2–5.8)
RBC # FLD: 14.4 % — SIGNIFICANT CHANGE UP (ref 10.3–14.5)
SODIUM SERPL-SCNC: 140 MMOL/L — SIGNIFICANT CHANGE UP (ref 135–145)
TROPONIN I SERPL-MCNC: <0.015 NG/ML — SIGNIFICANT CHANGE UP (ref 0–0.04)
WBC # BLD: 7.85 K/UL — SIGNIFICANT CHANGE UP (ref 3.8–10.5)
WBC # FLD AUTO: 7.85 K/UL — SIGNIFICANT CHANGE UP (ref 3.8–10.5)

## 2020-09-28 PROCEDURE — 86850 RBC ANTIBODY SCREEN: CPT

## 2020-09-28 PROCEDURE — 86900 BLOOD TYPING SEROLOGIC ABO: CPT

## 2020-09-28 PROCEDURE — 85027 COMPLETE CBC AUTOMATED: CPT

## 2020-09-28 PROCEDURE — 80053 COMPREHEN METABOLIC PANEL: CPT

## 2020-09-28 PROCEDURE — 85610 PROTHROMBIN TIME: CPT

## 2020-09-28 PROCEDURE — 71045 X-RAY EXAM CHEST 1 VIEW: CPT | Mod: 26

## 2020-09-28 PROCEDURE — 71045 X-RAY EXAM CHEST 1 VIEW: CPT

## 2020-09-28 PROCEDURE — 84484 ASSAY OF TROPONIN QUANT: CPT

## 2020-09-28 PROCEDURE — 85730 THROMBOPLASTIN TIME PARTIAL: CPT

## 2020-09-28 PROCEDURE — 36415 COLL VENOUS BLD VENIPUNCTURE: CPT

## 2020-09-28 PROCEDURE — 99284 EMERGENCY DEPT VISIT MOD MDM: CPT | Mod: 25

## 2020-09-28 PROCEDURE — 86901 BLOOD TYPING SEROLOGIC RH(D): CPT

## 2020-09-28 PROCEDURE — 93005 ELECTROCARDIOGRAM TRACING: CPT

## 2020-09-28 RX ORDER — ACETAMINOPHEN 500 MG
650 TABLET ORAL ONCE
Refills: 0 | Status: COMPLETED | OUTPATIENT
Start: 2020-09-28 | End: 2020-09-28

## 2020-09-28 RX ADMIN — Medication 650 MILLIGRAM(S): at 11:24

## 2020-09-28 RX ADMIN — Medication 650 MILLIGRAM(S): at 11:54

## 2020-09-28 NOTE — ED ADULT NURSE NOTE - NSIMPLEMENTINTERV_GEN_ALL_ED
Implemented All Universal Safety Interventions:  [place] to call system. Call bell, personal items and telephone within reach. Instruct patient to call for assistance. Room bathroom lighting operational. Non-slip footwear when patient is off stretcher. Physically safe environment: no spills, clutter or unnecessary equipment. Stretcher in lowest position, wheels locked, appropriate side rails in place.

## 2020-09-28 NOTE — ED PROVIDER NOTE - CLINICAL SUMMARY MEDICAL DECISION MAKING FREE TEXT BOX
79 yo M with chest pain, worse with movement, most likely MSK. However given age and risk factors, will check EKG, labs, CXR, reassess.

## 2020-09-28 NOTE — ED PROVIDER NOTE - PROGRESS NOTE DETAILS
labs - Cr 1.3, trop neg  CXR - cardiomegaly   Pain improved after tylenol. Will dc with symptomatic support and PCP fu. Discussed indications for patient return to ED. Patient understood.

## 2020-09-28 NOTE — ED PROVIDER NOTE - NSFOLLOWUPINSTRUCTIONS_ED_ALL_ED_FT
COSTOCHONDRITIS - AfterCare(R) Instructions(ER/ED)           Costochondritis    WHAT YOU NEED TO KNOW:    Costochondritis is a condition that causes pain in the cartilage that connect your ribs to your sternum (breastbone). Cartilage is the tough, bendable tissue that protects your bones.     DISCHARGE INSTRUCTIONS:    Medicines:   •Acetaminophen: This medicine decreases pain. Acetaminophen is available without a doctor's order. Ask how much to take and how often to take it. Follow directions. Acetaminophen can cause liver damage if not taken correctly.      •NSAIDs, such as ibuprofen, help decrease swelling, pain, and fever. This medicine is available with or without a doctor's order. NSAIDs can cause stomach bleeding or kidney problems in certain people. If you take blood thinner medicine, always ask if NSAIDs are safe for you. Always read the medicine label and follow directions. Do not give these medicines to children under 6 months of age without direction from your child's healthcare provider.      •Take your medicine as directed. Contact your healthcare provider if you think your medicine is not helping or if you have side effects. Tell him of her if you are allergic to any medicine. Keep a list of the medicines, vitamins, and herbs you take. Include the amounts, and when and why you take them. Bring the list or the pill bottles to follow-up visits. Carry your medicine list with you in case of an emergency.      Follow up with your healthcare provider as directed: Write down your questions so you remember to ask them during your visits.     Rest: You may need to get more rest. Learn which movements and activities cause pain, and avoid doing them. Do not carry objects, such as a purse or backpack, if this is painful. Avoid activities such as rowing and weightlifting until your pain decreases or goes away. Ask which activities are best for you to do while you recover.    Heat: Heat helps decrease pain in some patients. Apply heat on the area for 20 to 30 minutes every 2 hours for as many days as directed.     Ice: Ice helps decrease swelling and pain. Ice may also help prevent tissue damage. Use an ice pack, or put crushed ice in a plastic bag. Cover it with a towel and place it on the painful area for 15 to 20 minutes every hour or as directed.    Stretching exercises: Gentle stretching may help your symptoms.  a doorway and put your hands on the door frame at the level of your ears or shoulders. Take 1 step forward and gently stretch your chest. Try this with your hands higher up on the doorway.     Contact your healthcare provider if:   •You have a fever.      •The painful areas of your chest look swollen, red, and feel warm to the touch.       •You cannot sleep because of the pain.      •You have questions or concerns about your condition or care.         © Copyright [employer] 2020           back to top                          © Copyright Cued 2020

## 2020-09-28 NOTE — ED PROVIDER NOTE - PHYSICAL EXAMINATION
GENERAL: well appearing, no acute distress   HEAD: atraumatic   EYES: EOMI, pink conjunctiva   ENT: moist oral mucosa   CARDIAC: RRR, distal pulses present, b/l LE chronic lymphedema (R > L)  RESPIRATORY: lungs CTAB, no increased work of breathing   GASTROINTESTINAL: no abdominal tenderness, no rebound or guarding, bowel sounds presents  GENITOURINARY: no CVA tenderness   MUSCULOSKELETAL: no deformity   NEUROLOGICAL: AAOx3, spontaneous movement of extremities   SKIN: intact   PSYCHIATRIC: cooperative  HEME LYMPH: no lymphadenopathy

## 2020-09-28 NOTE — ED PROVIDER NOTE - PATIENT PORTAL LINK FT
You can access the FollowMyHealth Patient Portal offered by [facility] by registering at the following website: [email or web address]. By joining [employer]’s FollowMyHealth portal, you will also be able to view your health information using other applications (apps) compatible with our system.

## 2020-09-28 NOTE — ED PROVIDER NOTE - OBJECTIVE STATEMENT
81 yo M pmh of afib (on eliquis), DM, HTN, chronic lymphedema presents with chest pain that started overnight while in bed. Worse with movement. Denies SOB, fever, cough, trauma, back pain, other acute complaints.

## 2020-11-16 NOTE — DIETITIAN INITIAL EVALUATION ADULT. - PHYSICAL APPEARANCE
Subjective:       Patient ID: Floyd Nunes is a 62 y.o. male.    Chief Complaint: Pain of the Lumbar Spine (Lumbar pain x 8-10 years after injury working on a car. Was treated for years by PCP with medication, had MRI and x-ray years ago. Dx DDD and under care of )      History of Present Illness  As above history as above daily low back pain with intermittent pain and numbness in his legs.  Was told that he had degenerative disc disease.    Current Medications  Current Outpatient Medications   Medication Sig Dispense Refill    pantoprazole (PROTONIX) 40 MG tablet Take 1 tablet (40 mg total) by mouth once daily. 90 tablet 2    simvastatin (ZOCOR) 20 MG tablet Take 1 tablet (20 mg total) by mouth every evening. 90 tablet 3    diclofenac (CATAFLAM) 50 MG tablet Take 1 tablet (50 mg total) by mouth 2 (two) times daily. 60 tablet 1    HYDROcodone-acetaminophen (NORCO) 5-325 mg per tablet Take 1 tablet by mouth every 24 hours as needed for Pain. (Patient not taking: Reported on 11/16/2020) 20 tablet 0    meloxicam (MOBIC) 15 MG tablet Take 1 tablet (15 mg total) by mouth once daily. 30 tablet 3    nicotine (NICODERM CQ) 21 mg/24 hr Place 1 patch onto the skin once daily. 14 patch 0    nicotine (NICODERM CQ) 7 mg/24 hr Place 1 patch onto the skin once daily. 14 patch 0    nicotine polacrilex 2 MG Lozg Take 1 lozenge (2 mg total) by mouth as needed. 1-2 per hour in place of cigarettes 72 lozenge 0     No current facility-administered medications for this visit.      Facility-Administered Medications Ordered in Other Visits   Medication Dose Route Frequency Provider Last Rate Last Dose    lactated ringers infusion   Intravenous Continuous Darius Alvares MD 75 mL/hr at 02/01/19 0734      sodium chloride 0.9% flush 3 mL  3 mL Intravenous PRN Darius Alvares MD           Allergies  Review of patient's allergies indicates:   Allergen Reactions    Insect venom        Past Medical History  Past Medical  History:   Diagnosis Date    Abdominal pain     Bronchitis     CAD (coronary artery disease)     Chest pain, atypical     Chronic hepatitis C without hepatic coma 6/13/2020    DDD (degenerative disc disease), lumbar     Gastritis     High cholesterol     Lethargy     Sinusitis        Surgical History  Past Surgical History:   Procedure Laterality Date    ANKLE SURGERY Left 1974    ANTERIOR CERVICAL DISCECTOMY W/ FUSION      COLONOSCOPY N/A 2/1/2019    Procedure: COLONOSCOPY;  Surgeon: Darius Alvares MD;  Location: [facility];  Service: Endoscopy;  Laterality: N/A;    ESOPHAGOGASTRODUODENOSCOPY N/A 2/15/2019    Procedure: EGD (ESOPHAGOGASTRODUODENOSCOPY);  Surgeon: Darius Alvares MD;  Location: [facility];  Service: Endoscopy;  Laterality: N/A;    FRACTURE SURGERY      ankle    TONSILLECTOMY         Family History:   Family History   Problem Relation Age of Onset    Emphysema Mother     Squamous cell carcinoma Father        Social History:   Social History     Socioeconomic History    Marital status:      Spouse name: Not on file    Number of children: Not on file    Years of education: Not on file    Highest education level: Not on file   Occupational History    Not on file   Social Needs    Financial resource strain: Not on file    Food insecurity     Worry: Not on file     Inability: Not on file    Transportation needs     Medical: Not on file     Non-medical: Not on file   Tobacco Use    Smoking status: Current Every Day Smoker     Packs/day: 1.00     Years: 45.00     Pack years: 45.00     Types: Cigarettes    Smokeless tobacco: Never Used   Substance and Sexual Activity    Alcohol use: Yes     Alcohol/week: 2.0 standard drinks     Types: 2 Cans of beer per week     Comment: occasional     Drug use: No    Sexual activity: Not Currently     Partners: Female   Lifestyle    Physical activity     Days per week: Not on file     Minutes per session: Not on file    Stress: Not on  file   Relationships    Social connections     Talks on phone: Not on file     Gets together: Not on file     Attends [employer] service: Not on file     Active member of club or organization: Not on file     Attends meetings of clubs or organizations: Not on file     Relationship status: Not on file   Other Topics Concern    Not on file   Social History Narrative    Not on file       Hospitalization/Major Diagnostic Procedure:     Review of Systems     General/Constitutional:  Chills denies. Fatigue denies. Fever denies. Weight gain denies. Weight loss denies.    Respiratory:  Shortness of breath denies.    Cardiovascular:  Chest pain denies.    Gastrointestinal:  Constipation denies. Diarrhea denies. Nausea denies. Vomiting denies.     Hematology:  Easy bruising denies. Prolonged bleeding denies.     Genitourinary:  Frequent urination denies. Pain in lower back denies. Painful urination denies.     Musculoskeletal:  See HPI for details    Skin:  Rash denies.    Neurologic:  Dizziness denies. Gait abnormalities denies. Seizures denies. Tingling/Numbess denies.    Psychiatric:  Anxiety denies. Depressed mood denies.     Objective:   Vital Signs:   Vitals:    11/16/20 0756   BP: 132/66   Pulse: 69        Physical Exam      General Examination:     Constitutional: The patient is alert and oriented to lace person and time. Mood is pleasant.     Head/Face: Normal facial features normal eyebrows    Eyes: Normal extraocular motion bilaterally    Lungs: Respirations are equal and unlabored    Gait is coordinated.    Cardiovascular: There are no swelling or varicosities present.    Lymphatic: Negative for adenopathy    Skin: Normal    Neurological: Level of consciousness normal. Oriented to place person and time and situation    Psychiatric: Oriented to time place person and situation    Patient stand erect has pain when doing so moderate tenderness paraspinous muscles L4-S1 in the midline no spasm noted range of motion  limited due to pain straight leg raising negative reflex symmetrical with hypoactive hip and knee range of motion normal    XRAY Report/ Interpretation :  AP pelvis x-ray was taken today. Indications low back pain and/or hip pain. Findings AP pelvis x-ray appears to be normal with no evidence of fractures or significant degenerative disease  AP and lateral x-rays of lumbar spine will performed today. Indications low back pain. Findings:  Moderate disc space narrowing at L5-S1 loss of normal lumbar lordosis    Assessment:       1. Lumbar pain    2. Disc degeneration, lumbar        Plan:       Floyd was seen today for pain.    Diagnoses and all orders for this visit:    Lumbar pain  -     X-Ray Lumbar Spine Ap And Lateral  -     X-Ray Pelvis Routine AP    Disc degeneration, lumbar    Other orders  -     diclofenac (CATAFLAM) 50 MG tablet; Take 1 tablet (50 mg total) by mouth 2 (two) times daily.         No follow-ups on file.    Treatment options were discussed and we will order an MRI study of the lumbar spine.  She had a diclofenac given patient will respect potential side effects medicines.  Return after MRI    This note was created using Dragon voice recognition software that occasionally misinterpreted phrases or words.       well nourished

## 2020-12-28 NOTE — PATIENT PROFILE ADULT - LAST BOWEL MOVEMENT DATE
23-Jul-2019 I explained the r/b/a of seeg with iris robot for right sided placement of depth electrodes, ground electrode, and left scalp electrodes.  the risk include but not limited to bleeding infection stroke paralysis death, need for further lead placement, surgery, and or adjuvant treatment.  parents understand and wish to proceed with surgery

## 2021-08-27 NOTE — PHYSICAL THERAPY INITIAL EVALUATION ADULT - CRITERIA FOR SKILLED THERAPEUTIC INTERVENTIONS
You can access the FollowMyHealth Patient Portal offered by [facility] by registering at the following website: [email or web address]. By joining [employer]’s FollowMyHealth portal, you will also be able to view your health information using other applications (apps) compatible with our system.
rehab potential/anticipated discharge recommendation/functional limitations in following categories/therapy frequency/impairments found/predicted duration of therapy intervention

## 2021-12-26 NOTE — PATIENT PROFILE ADULT. - CAREGIVER PHONE NUMBER
Pt seen during the COVID pandemic.     Patient : Uriah Melgar Age: 34 year old Sex: male   MRN: [id number] Encounter Date: 12/25/2021    History     Chief Complaint   Patient presents with   • Cardiac Arrest     Pt is a 33 y/o male with no PMHx BIBEMS for management of unwitnessed full arrest. Per EMS, pt was found unresponsive by family in his car in their apartment complex, and pt's last known normal was stated to be approximately 4:30 PM. Per EMS, a needle was found on the dashboard of the vehicle. Per EMS, initial rhythm check was PEA and follow-up rhythm checks were asystole while in transit to this ED. EMS report initial blood sugar reading of 58, and that pt was given D50 LR while in transit. Pt was also given narcan and 4x Epi while in transit.    The pt is unresponsive, and did not contribute to this history.          Not on File    There are no discharge medications for this patient.      No PMH  No past surgical history on file.    No family history on file.    Social History     Tobacco Use   • Smoking status: Not on file   • Smokeless tobacco: Not on file   Substance Use Topics   • Alcohol use: Not on file   • Drug use: Yes     Types: Heroin       Review of Systems   Unable to assess. The patient is unresponsive.      Physical Exam     Patient Vitals for the past 24 hrs:   BP Pulse   12/25/21 2221 (!) 0/0 (!) 0        Physical Exam  Constitutional:       Interventions In Place: [facility]-D (via EMS)  Cardiovascular:      Rate and Rhythm: Asystole on monitor.   Neurological:      Mental Status: Unresponsive.  Skin:     Skin Coloration: Cyanotic.         ED Course   Intubation    Date/Time: 12/25/2021 10:05 PM  Performed by: Jcarlos Eric MD  Authorized by: Jcarlos Eric MD     Consent:     Consent obtained:  Emergent situation  Pre-procedure details:     Patient status:  Unresponsive    Pretreatment medications:  None    Paralytics:  Succinylcholine  Procedure details:     Preoxygenation:  [facility] (via EMS)    Intubation method:  Oral    Oral intubation technique:  Video-assisted    Laryngoscope blade:  Mac 4    Tube size (mm):  7.5    Tube type:  Cuffed    Number of attempts:  1    Tube visualized through cords: yes    Placement assessment:     Tube secured with:  ETT grimm    Breath sounds:  Equal    Placement verification: chest rise, condensation, CXR verification, direct visualization, equal breath sounds and ETCO2 detector      CXR findings:  ETT in proper place  Post-procedure details:     Patient tolerance of procedure:  Tolerated well, no immediate complications  HEART/LUNG RESUSCITATION (CPR)    Date/Time: 12/25/2021 10:05 PM  Performed by: Jcarlos Eric MD  Authorized by: Jcarlos Eric MD     Consent:     Consent obtained:  Emergent situation  Indications:     Indications:  Asystole on monitor  Post-procedure details:     Patient tolerance of procedure:  Tolerated well, no immediate complications  Critical Care  Performed by: Jcarlos Eric MD  Authorized by: Jcarlos Eric MD     Critical care provider statement:     Critical care time (minutes):  30    Critical care time was exclusive of:  Separately billable procedures and treating other patients and teaching time    Critical care was necessary to treat or prevent imminent or life-threatening deterioration of the following conditions:  Cardiac failure and respiratory failure    Critical care was time spent personally by me on the following activities:  Blood draw for specimens, development of treatment plan with patient or surrogate, discussions with consultants, evaluation of patient's response to treatment, examination of patient, obtaining history from patient or surrogate, ordering and performing treatments and interventions, ordering and review of laboratory studies, ordering and review of radiographic studies, pulse oximetry, re-evaluation of patient's condition and review of old charts  Comments:      CPR and  intubation        Lab Results     No results found for this visit on 21.      Radiology Results     Imaging Results    None         ED Medication Orders (From admission, onward)    Ordered Start     Status Ordering Provider    21  dextrose 50 % injection  PRN         Last MAR action: Given RED ERICID F    21  naLOXone (NARCAN) injection  PRN         Last MAR action: Given KYSIA, EGAN F    21  calcium chloride 10 % 10% injection  PRN         Last MAR action: Given KYSIA, EGAN F    21  sodium bicarbonate 8.4 % injection  PRN         Last MAR action: Given CEFERINOAREDID F    21  EPINEPHrine 0.1 MG/ML (syringe) injection  PRN         Last MAR action: Given JIGNESH ERIC F          ED Course as of 21   Sat Dec 25, 2021   2209 The patient has . [AZ]      ED Course User Index  [AZ] Leonidas Eason       [facility]    MDM   Multiple differential diagnoses were considered for this patient.  Discussed with patient plan of care including diagnostics and therapeutics.  Patient agrees to course of care.  Will continue to monitor and update      Clinical Impression     ED Diagnosis   1. Cardiac arrest (CMS/HCC)         Disposition         2021 11:03 PM  There is no comment          Charting performed by ED Scribe Leonidas Eason for Dr. Eric.     I have reviewed the scribe's charting and agree that the final signed note accurately reflects my personal performance of the history, physical exam, hospital course, and assessment and plan.       Jignesh Eric MD  21 [id number]     [phone]

## 2022-02-02 NOTE — PATIENT PROFILE ADULT. - ATTEMPT TO OOB
Continuity of Care Form    Patient Name: Dimitrios Lockhart   :  1932  MRN:  [id number]    Admit date:  2022  Discharge date:  ***    Code Status Order: Full Code   Advance Directives:      Admitting Physician:  Keon Huynh MD  PCP: Brenda Hua DO    Discharging Nurse: [facility] Unit/Room#: 3OA-1704/5414-09  Discharging Unit Phone Number: ***    Emergency Contact:   Extended Emergency Contact Information  Primary Emergency Contact: Michelle Almanzar Phone: [phone]  Work Phone: [phone]  Relation: Child    Past Surgical History:  Past Surgical History:   Procedure Laterality Date    HYSTERECTOMY         Immunization History:   Immunization History   Administered Date(s) Administered    Pneumococcal Conjugate 13-valent Barbara Morales) 2018       Active Problems:  Patient Active Problem List   Diagnosis Code    Acute diastolic heart failure (HCC) I50.31    HTN (hypertension) I10    Hyponatremia E87.1    PRABHA (acute kidney injury) ([facility] N17.9    Hypokalemia E87.6    Suspected COVID-19 virus infection Z20.822    Acute respiratory failure due to COVID-19 ([facility] U07.1, J96.00    COVID-19 U07.1    Depression F32. A    Dementia ([facility] [address] F03.90    Acute respiratory failure (HCC) J96.00    UTI (urinary tract infection) N39.0       Isolation/Infection:   Isolation            Droplet Plus          Patient Infection Status       Infection Onset Added Last Indicated Last Indicated By Review Planned Expiration Resolved Resolved By    COVID-19 22 COVID-19 22      Resolved    COVID-19 (Rule Out) 22 COVID-19 (Ordered)   22 Rule-Out Test Resulted    COVID-19 21 COVID-19   21     COVID-19 (Rule Out) 21 COVID-19 (Ordered)   21 Rule-Out Test Resulted            Nurse Assessment:  Last Vital Signs: /75   Pulse 89   Temp 97.2 °F (36.2 °C) (Oral) Resp 18   Ht 4' 11\" (1.499 m)   Wt 201 lb 4.8 oz (91.3 kg)   SpO2 95%   BMI 40.66 kg/m²     Last documented pain score (0-10 scale): Pain Level: 0  Last Weight:   Wt Readings from Last 1 Encounters:   02/02/22 201 lb 4.8 oz (91.3 kg)     Mental Status:  oriented and alert    IV Access:  - Peripheral IV - site  R Antecubital, insertion date: 1/30/22    Nursing Mobility/ADLs:  Walking   Assisted  Transfer  Assisted  Bathing  Dependent  Dressing  Assisted  Toileting  Dependent  Feeding  Assisted  Med Admin  Assisted  Med Delivery   whole    Wound Care Documentation and Therapy:  Wound 01/07/21 Coccyx Left; Inner (Active)   Number of days: 390       Wound 01/07/21 Perineum Inner (Active)   Number of days: 390       Wound 01/07/21 Vagina Inner;Left (Active)   Number of days: 390       Wound 01/30/22 Buttocks Mid;Right;Left moisture associated skin damage (incontinence) (Active)   Wound Image   01/31/22 1759   Wound Etiology Other 02/01/22 2115   Wound Cleansed Other (Comment) 02/01/22 2115   Val-wound Assessment Erosion; Excoriated;Blanchable erythema 02/01/22 2115   Number of days: 2        Elimination:  Continence: Bowel: No  Bladder: No  Urinary Catheter: None   Colostomy/Ileostomy/Ileal Conduit: Yes       Date of Last BM: 2/2/22    Intake/Output Summary (Last 24 hours) at 2/2/2022 0902  Last data filed at 2/1/2022 1751  Gross per 24 hour   Intake --   Output 350 ml   Net -350 ml     I/O last 3 completed shifts:  In: -   Out: 350 [Urine:350]    Safety Concerns: At Risk for Falls    Impairments/Disabilities:      [facility]    Nutrition Therapy:  Current Nutrition Therapy:   - Oral Diet:  General    Routes of Feeding: Oral  Liquids: Thin Liquids  Daily Fluid Restriction: no  Last Modified Barium Swallow with Video (Video Swallowing Test): not done    Treatments at the Time of Hospital Discharge:   Respiratory Treatments: ***  Oxygen Therapy:  is on oxygen at 3 L/min per nasal cannula.   Ventilator:    - No ventilator support    Rehab Therapies: SN,PT,OT,ST  Weight Bearing Status/Restrictions: No weight bearing restirctions  Other Medical Equipment (for information only, NOT a DME order):  wheelchair, walker, and bedside commode  Other Treatments: HOME HEALTH CARE: LEVEL 3 SAFETY       -Initial home health evaluation to occur within 24-48 hours, in patient home   -Home health agency to establish plan of care for patient over 60 day period   -Medication Reconciliation   -PT/OT/Speech evaluations in home within 24-48 hours of discharge; including  -DME and home safety   -Frontload therapy 5 days, then 3x a week   -OT to evaluate if patient has [address] needs for personal care   - evaluation within 24-48 hours, includes evaluation of resources   and insurance to determine [place], [place], LTC, and Medicaid options   -PCP Visit scheduled within three to seven days of discharge   -Telehealth-Homecare Vitals(If patient is agreeable and meets guidelines)  -May Have Physical Therapy Start Of Care       Patient's personal belongings (please select all that are sent with patient):  Mel    RN SIGNATURE:  Electronically signed by Kevyn Ceja RN on 2/2/22 at 2:04 PM EST    CASE MANAGEMENT/SOCIAL WORK SECTION    Inpatient Status Date: 01/30/22    Readmission Risk Assessment Score:  Readmission Risk              Risk of Unplanned Readmission:  19           Discharging to Facility/ Agency   Name: Marissa Mcguire will call for Appointment  Phone: [phone]  Fax: [phone]     [facility] w/[facility], [phone]    / signature: Electronically signed by CHEY Saravia, LSW on 2/2/22 at 3:19 PM EST    PHYSICIAN SECTION    Prognosis: Guarded    Condition at Discharge: Stable    Rehab Potential (if transferring to Rehab): Good    Recommended Labs or Other Treatments After Discharge:  [address]     Physician Certification: I certify the above information and transfer of Rene Rojas  is necessary for the continuing treatment of the diagnosis listed and that she requires [place] for greater 30 days.      Update Admission H&P: No change in H&P    PHYSICIAN SIGNATURE:  Electronically signed by Tyshawn Serrano MD on 2/2/22 at 9:02 AM EST no

## 2022-02-03 NOTE — PHYSICAL THERAPY INITIAL EVALUATION ADULT - SITTING BALANCE: STATIC
Hpi Title: Evaluation of Skin Lesions
What Type Of Note Output Would You Prefer (Optional)?: Bullet Format
fair plus

## 2022-04-04 NOTE — PROGRESS NOTE ADULT - PROBLEM SELECTOR PLAN 3
complains of pain/discomfort Patient has falls times x 2, Non-focal neuro exam   Ck elevated, got the fluids, for now stop the fluid  CT head negative   CT cervical spine wnl  Xray of that sides negative   syphilis RPR negative  PT recommended sub acute rehab

## 2022-05-21 NOTE — DISCHARGE NOTE ADULT - CARE PLAN
Principal Discharge DX:	CHF exacerbation  Secondary Diagnosis:	Atrial fibrillation  Secondary Diagnosis:	Cellulitis of lower extremity, unspecified laterality  Secondary Diagnosis:	Lymphedema of leg  Secondary Diagnosis:	Diabetes 5 Principal Discharge DX:	CHF exacerbation  Goal:	symptoms resolved  Assessment and plan of treatment:	Please continue lasix 40mg , twice a day and please follow up with your cardiologist within one week after discharge.  Secondary Diagnosis:	Atrial fibrillation  Goal:	heart rated controlled  Assessment and plan of treatment:	Please eliquis 5mg , twice a day and cardizem 120mg daily and please follow up with your cardiologist within one week after discharge.  Secondary Diagnosis:	Lymphedema of leg  Assessment and plan of treatment:	Pt  Secondary Diagnosis:	Diabetes  Secondary Diagnosis:	Hypertension Principal Discharge DX:	CHF exacerbation  Goal:	symptoms resolved  Assessment and plan of treatment:	Please continue lasix 40mg , twice a day and please follow up with your cardiologist within one week after discharge.  Secondary Diagnosis:	Atrial fibrillation  Goal:	heart rated controlled  Assessment and plan of treatment:	Please eliquis 5mg , twice a day and cardizem 120mg daily and please follow up with your cardiologist within one week after discharge.  Secondary Diagnosis:	Lymphedema of leg  Assessment and plan of treatment:	Please follow up with your primary care doctor within one week after discharge.  Secondary Diagnosis:	Diabetes  Goal:	keep Hemoglobin A1c <7  Assessment and plan of treatment:	Please continue home medications as instruction and please follow up with your primary care doctor within one week after discharge.  Secondary Diagnosis:	Hypertension  Goal:	keep BP <140/90mmHg  Assessment and plan of treatment:	Please continue home medications as instruction and please follow up with your primary care doctor within one week after discharge. Please take low salt diet.  Secondary Diagnosis:	Cellulitis  Assessment and plan of treatment:	You were given antibiotics during hospitalization and antibiotics were discontinued due to drug eruption. Please follow up with your primary care doctor within one week after discharge.  Secondary Diagnosis:	Melena Principal Discharge DX:	CHF exacerbation  Goal:	symptoms resolved  Assessment and plan of treatment:	Please continue lasix 40mg , twice a day and please follow up with your cardiologist within one week after discharge. Please follow up with pulmonologist Dr. Feliciano for sleep study at outpatient.  Secondary Diagnosis:	Atrial fibrillation  Goal:	heart rated controlled  Assessment and plan of treatment:	Please eliquis 5mg , twice a day and cardizem 120mg daily and please follow up with your cardiologist within one week after discharge.  Secondary Diagnosis:	Lymphedema of leg  Assessment and plan of treatment:	Please follow up with your primary care doctor within one week after discharge and continue compression stocking after discharge.  Secondary Diagnosis:	Diabetes  Goal:	keep Hemoglobin A1c <7  Assessment and plan of treatment:	Please continue home medications as instruction and please follow up with your primary care doctor within one week after discharge.  Secondary Diagnosis:	Hypertension  Goal:	keep BP <140/90mmHg  Assessment and plan of treatment:	Please continue home medications as instruction and please follow up with your primary care doctor within one week after discharge. Please take low salt diet.  Secondary Diagnosis:	Cellulitis  Assessment and plan of treatment:	You were given antibiotics during hospitalization and antibiotics were discontinued due to drug eruption. Please follow up with your primary care doctor within one week after discharge.  Secondary Diagnosis:	Melena  Assessment and plan of treatment:	please follow up with GI Dr. Brock within one week after discharge for further workup at outpatient.

## 2022-07-13 NOTE — PATIENT PROFILE ADULT - ANY SIGNIFICANT CHANGE IN ABILITY TO PERFORM THE FOLLOWING ADL SINCE THE ONSET OF PRESENT ILLNESS?
Cheilitis Normal Treatment: I recommended application of Vaseline or Aquaphor numerous times a day (as often as every hour) and before going to bed. no

## 2022-07-19 ENCOUNTER — EMERGENCY (EMERGENCY)
Facility: HOSPITAL | Age: 82
LOS: 1 days | End: 2022-07-19
Attending: STUDENT IN AN ORGANIZED HEALTH CARE EDUCATION/TRAINING PROGRAM
Payer: MEDICARE

## 2022-07-19 VITALS — WEIGHT: 160.06 LBS

## 2022-07-19 DIAGNOSIS — Z98.89 OTHER SPECIFIED POSTPROCEDURAL STATES: Chronic | ICD-10-CM

## 2022-07-19 PROCEDURE — 99285 EMERGENCY DEPT VISIT HI MDM: CPT | Mod: 25

## 2022-07-19 PROCEDURE — 31500 INSERT EMERGENCY AIRWAY: CPT

## 2022-07-19 NOTE — ED PROVIDER NOTE - CLINICAL SUMMARY MEDICAL DECISION MAKING FREE TEXT BOX
Patient presenting cardiac arrest, total down time over 45 min, on arrival, patient remains pulseless, given epi, bicarb and calcium without rosc. patient intubated with good breath sound. patient pronounced after further acls in the ED with prolonged [place], low likelihood of rosc or quality of life with rosc

## 2022-07-19 NOTE — ED PROVIDER NOTE - OBJECTIVE STATEMENT
82 y.o bibems for cardiac arrest. last known normal at lunch time at [facility]. per ems, total down time observed by ems was 45 min but last known normal before being found pulseless by nh was at lunch time. per ems, given epi x5, shocked once. patient arrive with anna tube in place. patient on arrival pulse, acls continued

## 2022-07-19 NOTE — ED ADULT NURSE NOTE - CHIEF COMPLAINT QUOTE
pt  rolled in with [facility]  on a [employer]  machine, ACLS in progress, pt  no spontaneous breathing  noted intubated  w/ anna tube upon arrival

## 2022-07-19 NOTE — ED PROVIDER NOTE - PHYSICAL EXAMINATION
fixed dilated pupil b.l  no spontaneous cardiac s1/s2  abd soft, no distension  no withdrawal to painful stimuli  lung sounds + f/l with ambu bag  skin mottled  no signs of head trauma  noting vomitus on airway

## 2022-07-19 NOTE — ED ADULT TRIAGE NOTE - CHIEF COMPLAINT QUOTE
EMS Notification- Cardiac Arrest ,ACLS protocol in progress  ,pea on scene ,epi x 5 ,intubated k tube ,after 18 min ,rosc ,in v fib ,shocked x 2

## 2022-09-15 NOTE — PROGRESS NOTE ADULT - ENMT
negative No oral lesions; no gross abnormalities Topical Sulfur Applications Counseling: Topical Sulfur Counseling: Patient counseled that this medication may cause skin irritation or allergic reactions.  In the event of skin irritation, the patient was advised to reduce the amount of the drug applied or use it less frequently.   The patient verbalized understanding of the proper use and possible adverse effects of topical sulfur application.  All of the patient's questions and concerns were addressed.

## 2023-01-27 NOTE — DISCHARGE NOTE ADULT - NSTOBACCOUSAGEY/N_GEN_A_CS
No
I attest my time as attending is greater than 50% of the total combined time spent on qualifying patient care activities by the PA/NP and attending.

## 2023-07-05 NOTE — PATIENT PROFILE ADULT. - FUNCTIONAL SCREEN CURRENT LEVEL: TOILETING, MLM
Patient admitted with one bag of belongings and one purse bot bags locked up in contraband locker          Behavioral Health Belongings     Informed of Valuables Policy  Yes No         Item                  Qty   Clothing:     Home   Bin   Security Lock Up Room Returned   Date/Initials    Pants with strings         Shirts with strings         Shoes / boots with strings         Coats / jackets with strings         Other                               Behavioral Health Belongings List                     [id number]                          Qty       Luggage / Bags:   Home   Bin   Security Lock Up Room Returned Date/Initials    Suitcases / Luggage with straps         Gym Bags / purses with long straps         Backpack         Plastic bags (including Ziploc)         Other                     Qty   Grooming / Hygiene Items:   Home   Bin   Security Lock Up Room Returned Date/Initials    Perfume / [place]         Mouthwash         Items in glass bottles (nail polish, makeup, etc.)         Aerosol cans (hair spray, mousse, etc         Electric razor         Disposable razor         Shaving cream         Dental floss         Metal files, nail clippers, tweezers, etc.         Hairdryer         Curling iron         Other                                                             Behavioral Health Belongings List                            [id number]                               Qty   Electronic Devices:   Home   Bin   Security Lock Up Room Returned Date/Initials    Cell phone, iPhone, Blackberry, etc.         iPod / Media player         Chargers / cords         Headphones         Other                              Qty   Smoking Items:   Home   Bin   Security Lock Up Room Returned Date/Initials    Cigarettes, cigars         Lighters         Matches         Loose tobacco for rolling cigarettes, Chew, Pipe tobacco         Tobacco pipes         Rolling Papers         Other                                                   Behavioral Health Belongings List                          [id number]                                                   .                                .            Qty   Cards, Keys, Valuables:   Home   Bin   Security Lock Up Room Returned Date/Initials    Keys         Wallet / Purse         Cash (Encourage  to home or Loss Prevention)         Credit Cards (Encourage home or Loss Prevention)         Checkbook (Encourage home or Loss Prevention)         Jewelry - Specify         Other                              Qty   Other / Various Sharps:   Home   Bin   Security Lock Up Room Returned Date/Initials    Brace         Cane         Contacts         Dentures U / L         Hearing Aide L / R         Partials / Retainer         Prothesesvin Anthony         Walker         W/C         Medication         Other                                                                             Behavioral Health Belongings List                            [id number]                           Qty   Other Misc Items   Home   Bin   Security Lock Up Room Returned Date/Initials                                                                    Signature at Admission ___________________________________________Date: _________    Signature at Update ______________________________________________Date: _________    Signature at Discharge ____________________________________________Date: _________                                                                    Behavioral Health Belongings List                       [id number]          (2) assistive person

## 2023-08-31 NOTE — PROGRESS NOTE ADULT - SKIN COMMENTS
erythema of both legs resolved but right leg is still warm Hatchet Flap Text: The defect edges were debeveled with a #15 scalpel blade.  Given the location of the defect, shape and size of the defect and the proximity to free margins a hatchet flap was deemed most appropriate.  Using a sterile surgical marker, an appropriate hatchet flap was drawn incorporating the defect and placing the expected incisions within the relaxed skin tension lines where possible.    The area thus outlined was incised deep to adipose tissue with a #15 scalpel blade.  The skin margins were undermined to an appropriate distance in all directions.

## 2023-09-18 NOTE — PHYSICAL THERAPY INITIAL EVALUATION ADULT - WEIGHT-BEARING RESTRICTIONS, REHAB EVAL
full weight-bearing Azithromycin Pregnancy And Lactation Text: This medication is considered safe during pregnancy and is also secreted in breast milk.

## 2024-08-21 NOTE — ED PROVIDER NOTE - QRS

## 2024-09-25 NOTE — CONSULT NOTE ADULT - PEDAL EDEMA SEVERITY
"Subjective   Patient ID: Wing Murphy is a 40 y.o. male who presents for Annual Exam.    Wing is seen for RPE today along with labs and Rx refills.   Overall feeling well without new concerns.   Using Luneta for sleep for years without adverse effects. Occ fatigue, inquiring on testosterone levels.   Family hx reviewed - no cancer in immediate relatives.   Denies CP, SOB.          Review of Systems   All other systems reviewed and are negative.      Objective   /84   Pulse 68   Ht 1.753 m (5' 9\")   Wt 59 kg (130 lb)   SpO2 98%   BMI 19.20 kg/m²     Physical Exam  Constitutional:       General: He is not in acute distress.     Appearance: Normal appearance.   HENT:      Right Ear: Tympanic membrane and ear canal normal.      Left Ear: Tympanic membrane and ear canal normal.      Mouth/Throat:      Pharynx: Oropharynx is clear. No posterior oropharyngeal erythema.   Eyes:      Extraocular Movements: Extraocular movements intact.      Pupils: Pupils are equal, round, and reactive to light.   Cardiovascular:      Rate and Rhythm: Normal rate and regular rhythm.      Heart sounds: Normal heart sounds.   Pulmonary:      Breath sounds: Normal breath sounds.   Abdominal:      General: Bowel sounds are normal.      Palpations: Abdomen is soft.      Tenderness: There is no abdominal tenderness.   Musculoskeletal:         General: Normal range of motion.      Cervical back: Neck supple.   Skin:     Findings: No rash.   Neurological:      Mental Status: He is alert.         Assessment/Plan   Diagnoses and all orders for this visit:  Physical exam  -     Comprehensive Metabolic Panel; Future  -     CBC; Future  -     Lipid Panel; Future  -     Thyroid Stimulating Hormone; Future  -     Testosterone; Future  Mixed hyperlipidemia  Primary insomnia  Generalized anxiety disorder  Other fatigue  -     Thyroid Stimulating Hormone; Future  -     Testosterone; Future  Vasectomy evaluation  -     Referral to Urology; " Future    Follow up in 3 months.       3+

## 2024-10-07 NOTE — PATIENT PROFILE ADULT. - NS PRO CONTRA FLU 1
Encourage better fluid intake- 8 cups of water daily  Loop recorder to monitor heart long term-  will call you (not available 10/22-25 traveling)  
yes
